# Patient Record
Sex: FEMALE | Race: WHITE | NOT HISPANIC OR LATINO | Employment: OTHER | URBAN - METROPOLITAN AREA
[De-identification: names, ages, dates, MRNs, and addresses within clinical notes are randomized per-mention and may not be internally consistent; named-entity substitution may affect disease eponyms.]

---

## 2017-01-18 ENCOUNTER — TRANSFERRED RECORDS (OUTPATIENT)
Dept: HEALTH INFORMATION MANAGEMENT | Facility: CLINIC | Age: 37
End: 2017-01-18

## 2017-01-18 ENCOUNTER — OFFICE VISIT (OUTPATIENT)
Dept: URGENT CARE | Facility: URGENT CARE | Age: 37
End: 2017-01-18

## 2017-01-18 VITALS
DIASTOLIC BLOOD PRESSURE: 83 MMHG | SYSTOLIC BLOOD PRESSURE: 145 MMHG | BODY MASS INDEX: 21.58 KG/M2 | OXYGEN SATURATION: 100 % | HEART RATE: 74 BPM | WEIGHT: 118 LBS | TEMPERATURE: 98.9 F

## 2017-01-18 DIAGNOSIS — S09.90XA CLOSED HEAD INJURY, INITIAL ENCOUNTER: Primary | ICD-10-CM

## 2017-01-18 DIAGNOSIS — H53.8 BLURRING OF VISUAL IMAGE: ICD-10-CM

## 2017-01-18 PROCEDURE — 99213 OFFICE O/P EST LOW 20 MIN: CPT | Performed by: FAMILY MEDICINE

## 2017-01-18 NOTE — MR AVS SNAPSHOT
"              After Visit Summary   2017    Rober Renee    MRN: [id number]           Patient Information     Date Of Birth          1980        Visit Information        Provider Department      2017 7:55 PM Niya Landon MD [facility]        Today's Diagnoses     Closed head injury, initial encounter    -  1     Blurring of visual image            Follow-ups after your visit        Who to contact     If you have questions or need follow up information about today's clinic visit or your schedule please contact [facility] directly at [phone].  Normal or non-critical lab and imaging results will be communicated to you by FreeATMhart, letter or phone within 4 business days after the clinic has received the results. If you do not hear from us within 7 days, please contact the clinic through Document Agilityt or phone. If you have a critical or abnormal lab result, we will notify you by phone as soon as possible.  Submit refill requests through Rayku or call your pharmacy and they will forward the refill request to us. Please allow 3 business days for your refill to be completed.          Additional Information About Your Visit        MyChart Information     Rayku lets you send messages to your doctor, view your test results, renew your prescriptions, schedule appointments and more. To sign up, go to [email or web address] . Click on \"Log in\" on the left side of the screen, which will take you to the Welcome page. Then click on \"Sign up Now\" on the right side of the page.     You will be asked to enter the access code listed below, as well as some personal information. Please follow the directions to create your username and password.     Your access code is: [email or web address]  Expires: 2017 12:15 AM     Your access code will  in 90 days. If you need help or a new code, please call your [facility] or [phone].        Care EveryWhere ID     This is your Care " EveryWhere ID. This could be used by other organizations to access your [place] medical records  [id number]        Your Vitals Were     Pulse Temperature Pulse Oximetry             74 98.9  F (37.2  C) (Tympanic) 100%          Blood Pressure from Last 3 Encounters:   01/18/17 145/83   07/05/16 139/93   02/23/16 133/82    Weight from Last 3 Encounters:   01/18/17 118 lb (53.524 kg)   02/23/16 126 lb (57.153 kg)   11/20/15 120 lb (54.432 kg)              Today, you had the following     No orders found for display       Primary Care Provider Office Phone # Fax #    R Joe Sanz [phone]       [place] [address]        Thank you!     [facility] you for [facility]. Our goal is always to provide you with excellent care. Hearing back from our patients is one way we can continue to improve our services. Please take a few minutes to complete the written survey that you may receive in the mail after your visit with us. Thank you!             Your Updated Medication List - Protect others around you: Learn how to safely use, store and throw away your medicines at www.disposemymeds.org.      Notice  As of 1/18/2017 11:59 PM    You have not been prescribed any medications.
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
## 2017-01-19 NOTE — NURSING NOTE
"Chief Complaint   Patient presents with     Head Injury       Initial /83 mmHg  Pulse 74  Temp(Src) 98.9  F (37.2  C) (Tympanic)  Wt 118 lb (53.524 kg)  SpO2 100% Estimated body mass index is 21.58 kg/(m^2) as calculated from the following:    Height as of 2/23/16: 5' 2\" (1.575 m).    Weight as of this encounter: 118 lb (53.524 kg).  BP completed using cuff size: regular    YONAS Sterling      "

## 2017-07-08 ENCOUNTER — HEALTH MAINTENANCE LETTER (OUTPATIENT)
Age: 37
End: 2017-07-08

## 2017-11-27 ENCOUNTER — TRANSFERRED RECORDS (OUTPATIENT)
Dept: HEALTH INFORMATION MANAGEMENT | Facility: CLINIC | Age: 37
End: 2017-11-27

## 2017-11-28 ENCOUNTER — HOSPITAL ENCOUNTER (EMERGENCY)
Facility: CLINIC | Age: 37
Discharge: HOME | End: 2017-11-28
Attending: PHYSICIAN ASSISTANT | Admitting: PHYSICIAN ASSISTANT

## 2017-11-28 VITALS
SYSTOLIC BLOOD PRESSURE: 136 MMHG | DIASTOLIC BLOOD PRESSURE: 100 MMHG | RESPIRATION RATE: 16 BRPM | OXYGEN SATURATION: 100 % | TEMPERATURE: 98.2 F

## 2017-11-28 DIAGNOSIS — R52 GENERALIZED PAIN: ICD-10-CM

## 2017-11-28 LAB
ALBUMIN SERPL-MCNC: 4.1 G/DL (ref 3.4–5)
ALBUMIN UR-MCNC: NEGATIVE MG/DL
ALP SERPL-CCNC: 49 U/L (ref 40–150)
ALT SERPL W P-5'-P-CCNC: 39 U/L (ref 0–50)
ANION GAP SERPL CALCULATED.3IONS-SCNC: 6 MMOL/L (ref 3–14)
APPEARANCE UR: CLEAR
AST SERPL W P-5'-P-CCNC: 20 U/L (ref 0–45)
BASOPHILS # BLD AUTO: 0 10E9/L (ref 0–0.2)
BASOPHILS NFR BLD AUTO: 0.5 %
BILIRUB SERPL-MCNC: 0.5 MG/DL (ref 0.2–1.3)
BILIRUB UR QL STRIP: NEGATIVE
BUN SERPL-MCNC: 9 MG/DL (ref 7–30)
CALCIUM SERPL-MCNC: 8.6 MG/DL (ref 8.5–10.1)
CHLORIDE SERPL-SCNC: 109 MMOL/L (ref 94–109)
CO2 SERPL-SCNC: 24 MMOL/L (ref 20–32)
COLOR UR AUTO: ABNORMAL
CREAT SERPL-MCNC: 0.65 MG/DL (ref 0.52–1.04)
CRP SERPL-MCNC: <2.9 MG/L (ref 0–8)
DIFFERENTIAL METHOD BLD: ABNORMAL
EOSINOPHIL # BLD AUTO: 0.1 10E9/L (ref 0–0.7)
EOSINOPHIL NFR BLD AUTO: 1 %
ERYTHROCYTE [DISTWIDTH] IN BLOOD BY AUTOMATED COUNT: 15.5 % (ref 10–15)
ERYTHROCYTE [SEDIMENTATION RATE] IN BLOOD BY WESTERGREN METHOD: 7 MM/H (ref 0–20)
GFR SERPL CREATININE-BSD FRML MDRD: >90 ML/MIN/1.7M2
GLUCOSE SERPL-MCNC: 101 MG/DL (ref 70–99)
GLUCOSE UR STRIP-MCNC: NEGATIVE MG/DL
HCG UR QL: NEGATIVE
HCT VFR BLD AUTO: 36.6 % (ref 35–47)
HGB BLD-MCNC: 11.6 G/DL (ref 11.7–15.7)
HGB UR QL STRIP: ABNORMAL
IMM GRANULOCYTES # BLD: 0 10E9/L (ref 0–0.4)
IMM GRANULOCYTES NFR BLD: 0 %
KETONES UR STRIP-MCNC: NEGATIVE MG/DL
LEUKOCYTE ESTERASE UR QL STRIP: NEGATIVE
LYMPHOCYTES # BLD AUTO: 0.8 10E9/L (ref 0.8–5.3)
LYMPHOCYTES NFR BLD AUTO: 11.9 %
MAGNESIUM SERPL-MCNC: 2.5 MG/DL (ref 1.6–2.3)
MCH RBC QN AUTO: 26 PG (ref 26.5–33)
MCHC RBC AUTO-ENTMCNC: 31.7 G/DL (ref 31.5–36.5)
MCV RBC AUTO: 82 FL (ref 78–100)
MONOCYTES # BLD AUTO: 0.4 10E9/L (ref 0–1.3)
MONOCYTES NFR BLD AUTO: 5.9 %
NEUTROPHILS # BLD AUTO: 5.1 10E9/L (ref 1.6–8.3)
NEUTROPHILS NFR BLD AUTO: 80.7 %
NITRATE UR QL: NEGATIVE
PH UR STRIP: 6.5 PH (ref 5–7)
PLATELET # BLD AUTO: 210 10E9/L (ref 150–450)
POTASSIUM SERPL-SCNC: 4.3 MMOL/L (ref 3.4–5.3)
PROT SERPL-MCNC: 7.8 G/DL (ref 6.8–8.8)
RBC # BLD AUTO: 4.46 10E12/L (ref 3.8–5.2)
RBC #/AREA URNS AUTO: 2 /HPF (ref 0–2)
SODIUM SERPL-SCNC: 139 MMOL/L (ref 133–144)
SOURCE: ABNORMAL
SP GR UR STRIP: 1 (ref 1–1.03)
SQUAMOUS #/AREA URNS AUTO: 1 /HPF (ref 0–1)
UROBILINOGEN UR STRIP-MCNC: NORMAL MG/DL (ref 0–2)
WBC # BLD AUTO: 6.3 10E9/L (ref 4–11)
WBC #/AREA URNS AUTO: 1 /HPF (ref 0–2)

## 2017-11-28 PROCEDURE — 99213 OFFICE O/P EST LOW 20 MIN: CPT | Performed by: PHYSICIAN ASSISTANT

## 2017-11-28 PROCEDURE — 86431 RHEUMATOID FACTOR QUANT: CPT | Performed by: PHYSICIAN ASSISTANT

## 2017-11-28 PROCEDURE — 86038 ANTINUCLEAR ANTIBODIES: CPT | Performed by: PHYSICIAN ASSISTANT

## 2017-11-28 PROCEDURE — 86618 LYME DISEASE ANTIBODY: CPT | Performed by: PHYSICIAN ASSISTANT

## 2017-11-28 PROCEDURE — 80053 COMPREHEN METABOLIC PANEL: CPT | Performed by: PHYSICIAN ASSISTANT

## 2017-11-28 PROCEDURE — 81001 URINALYSIS AUTO W/SCOPE: CPT | Performed by: EMERGENCY MEDICINE

## 2017-11-28 PROCEDURE — 85652 RBC SED RATE AUTOMATED: CPT | Performed by: PHYSICIAN ASSISTANT

## 2017-11-28 PROCEDURE — 99213 OFFICE O/P EST LOW 20 MIN: CPT

## 2017-11-28 PROCEDURE — 83735 ASSAY OF MAGNESIUM: CPT | Performed by: PHYSICIAN ASSISTANT

## 2017-11-28 PROCEDURE — 86140 C-REACTIVE PROTEIN: CPT | Performed by: PHYSICIAN ASSISTANT

## 2017-11-28 PROCEDURE — 81025 URINE PREGNANCY TEST: CPT | Performed by: EMERGENCY MEDICINE

## 2017-11-28 PROCEDURE — 85025 COMPLETE CBC W/AUTO DIFF WBC: CPT | Performed by: PHYSICIAN ASSISTANT

## 2017-11-28 RX ORDER — CYCLOBENZAPRINE HCL 5 MG
5 TABLET ORAL 3 TIMES DAILY PRN
Qty: 42 TABLET | Refills: 0 | Status: SHIPPED | OUTPATIENT
Start: 2017-11-28 | End: 2018-04-06

## 2017-11-28 RX ORDER — IBUPROFEN 800 MG/1
800 TABLET, FILM COATED ORAL EVERY 8 HOURS PRN
Qty: 60 TABLET | Refills: 0 | Status: SHIPPED | OUTPATIENT
Start: 2017-11-28 | End: 2017-12-06

## 2017-11-28 NOTE — ED NOTES
Pt here for generalized body aches that have been progressively getting worse over the past year. Has been seeing a chiropractor and taking supplements to try to help.

## 2017-11-28 NOTE — ED PROVIDER NOTES
History     Chief Complaint   Patient presents with     Generalized Body Aches     progressive for about a year     HPI  Rober Renee is a 37 year old female who resides in urgent care with concern over diffuse muscle pain/neck/hip/back pain which then ongoing for the last year.  She states that in the last several days pain has been more severe resulted in nausea, inability to eat.  She describes the pain as having knots throughout her body.  She states she has become lightheaded at time due to symptoms, 4 times in the last month which resolved spontaneously after a few minutes.  She is unable to identify any aggravating or alleviating factors attributed to this.  She has not had any objective fever, chills, recent nasal congestion, cough, dyspnea, wheezing, vomiting, diarrhea, abdominal pain.  She has attempted numerous OTC meds for this including vitamin supplements with magnesium, potassium, yoga, chiropractic therapy, cupping without relief.  She has not spoken about this with a healthcare provider other than her chiropractor.      Problem List:    Patient Active Problem List    Diagnosis Date Noted     Major depressive disorder, single episode, moderate (H) 02/29/2016     Priority: Medium     Renal stones 10/09/2015     Priority: Medium     Migraine headache 09/11/2012     Priority: Medium     Menorrhagia 01/27/2012     Priority: Medium     CARDIOVASCULAR SCREENING; LDL GOAL LESS THAN 160 10/31/2010     Priority: Medium     ASCUS on Pap smear 11/02/2009     Priority: Medium     10/16/09:pap--ASCUS, + HPV 53. Repeat pap at 6 wk PP visit. (9/2010)  8/11/2010:Pap--NIL. Repeat pap 1 year.  1/27/12:Pap--NIL. Repeat pap 1 year.  Pap 7/13--if normal, then Q3 year. Pap--NIL. Next pap in 3 years. HM updated.        Past Medical History:    Past Medical History:   Diagnosis Date     Calculus of kidney 12/06     Chickenpox      Gestational diabetes 2002,2010     History of postpartum depression, currently pregnant       Intervertebral lumbar disc disorder with myelopathy, lumbar region      Migraines      Urinary tract bacterial infections      Past Surgical History:    Past Surgical History:   Procedure Laterality Date      SECTION, TUBAL LIGATION, COMBINED  2013    c/section with BTL     Family History:    Family History   Problem Relation Age of Onset     CANCER Maternal Grandfather      liver CA     CANCER Paternal Grandfather      prostate CA     CANCER Maternal Grandmother      ovarian CA     DIABETES Paternal Grandmother      Hypertension Paternal Grandmother      Hypertension Father      Hypertension Brother      Other - See Comments Mother      migraine     Other - See Comments Maternal Grandmother      migraine     Other - See Comments Maternal Uncle      migraine     Depression Sister      Depression Father      Social History:  Marital Status:   [2]  Social History   Substance Use Topics     Smoking status: Former Smoker     Packs/day: 0.50     Years: 10.00     Types: Cigarettes     Quit date: 2008     Smokeless tobacco: Never Used     Alcohol use Yes      Comment: very seldom        Medications:      No current outpatient prescriptions on file.      Review of Systems   Constitutional: Positive for activity change. Negative for appetite change, chills, fever and unexpected weight change.   Eyes: Negative for pain, discharge and visual disturbance.   Respiratory: Negative for cough and shortness of breath.    Cardiovascular: Negative for chest pain.   Gastrointestinal: Positive for nausea. Negative for abdominal pain, blood in stool, diarrhea and vomiting.   Skin: Negative for color change, rash and wound.   Neurological: Positive for dizziness and light-headedness. Negative for tremors, seizures, weakness, numbness and headaches.     Physical Exam   BP: (!) 136/100  Heart Rate: 82  Temp: 98.2  F (36.8  C)  Resp: 16  SpO2: 100 %      Physical Exam   Constitutional: She appears well-developed  and well-nourished. No distress.   HENT:   Head: Normocephalic and atraumatic.   Eyes: Conjunctivae and EOM are normal. Pupils are equal, round, and reactive to light. Right eye exhibits no discharge. Left eye exhibits no discharge.   Neck: Normal range of motion.   Cardiovascular: Normal rate, regular rhythm and normal heart sounds.  Exam reveals no gallop and no friction rub.    No murmur heard.  Pulmonary/Chest: Effort normal and breath sounds normal. No respiratory distress. She has no wheezes. She has no rales. She exhibits no tenderness.   Abdominal: Soft. Bowel sounds are normal. She exhibits no distension. There is no tenderness. There is no rebound and no guarding.   Musculoskeletal:   Mild diffuse tenderness to palpation, no focal bony tenderness.  Patient has full ROM of her shoulders, elbows wrist, fingers, hips, knees and ankles bilaterally.     Lymphadenopathy:     She has no cervical adenopathy.   Skin: Skin is warm and dry. No rash noted. No erythema.     ED Course     ED Course     Procedures          Critical Care time:  none            Labs Ordered and Resulted from Time of ED Arrival Up to the Time of Departure from the ED   URINE MACROSCOPIC WITH REFLEX TO MICRO - Abnormal; Notable for the following:        Result Value    Specific Gravity Urine 1.002 (*)     Blood Urine Moderate (*)     All other components within normal limits   HCG QUALITATIVE URINE     Assessments & Plan (with Medical Decision Making)     I have reviewed the nursing notes.    I have reviewed the findings, diagnosis, plan and need for follow up with the patient.     Discharge Medication List as of 11/28/2017  2:09 PM      START taking these medications    Details   cyclobenzaprine (FLEXERIL) 5 MG tablet Take 1 tablet (5 mg) by mouth 3 times daily as needed for muscle spasms, Disp-42 tablet, R-0, E-Prescribe           Final diagnoses:   Generalized pain     37 year old female presents to  with concern over one year history  of generalized pain described as muscle cramping/knots in her body.  She was noted to be hypertensive upon arrival remainder of vital signs within normal limits.  physical exam findings as described above.  Given vague nature and duration of symptoms patient had extensive work up which included urinalysis, UPT, CBC, CMP, lyme screen, ESR, CRP, RF and AYSHA which were significant only for mild anemia which is unlikely cause of her symptoms and elevated magnesium which is likely secondary to patient's supplementation.   Laboratory testing effectively ruled out polymyalgia, rheumatica, electrolyte abnormality, or seropositive rheumatologic condition. Would consider possible of fibromyalgia.  Patient was given trial of muscle relaxer to help with her symptoms and instructed to follow up with PCP for further evaluation and treatment wtihn the next week.  Worrisome reasons to return to ER/UC sooner discussed    Disclaimer: This note consists of symbols derived from keyboarding, dictation, and/or voice recognition software. As a result, there may be errors in the script that have gone undetected.  Please consider this when interpreting information found in the chart.    11/28/2017   [facility] EMERGENCY DEPARTMENT     Reva Lopez PA-C  12/01/17 1139

## 2017-11-28 NOTE — ED AVS SNAPSHOT
[facility] [place] [facility]    [address] [address]    Phone:  [phone]    Fax:  [phone]                                       Rober Renee   MRN: [id number]    Department:  [facility] [place] [facility]   Date of Visit:  11/28/2017           Patient Information     Date Of Birth          1980        Your diagnoses for this visit were:     Generalized pain        You were seen by Reva Lopez PA-C.      Follow-up Information     Follow up with EVERETT Sanz MD In 1 week.    Specialty:  Family Practice    Why:  for recheck or sooner if new or worsening symptoms     Contact information:    [address] [address] [address]  [phone]        24 Hour Appointment Hotline       To make an appointment at any [facility], call [phone] ([phone]). If you don't have a family doctor or clinic, we will help you find one. [facility] clinics are conveniently located to serve the needs of you and your family.             Review of your medicines      START taking        Dose / Directions Last dose taken    cyclobenzaprine 5 MG tablet   Commonly known as:  FLEXERIL   Dose:  5 mg   Quantity:  42 tablet        Take 1 tablet (5 mg) by mouth 3 times daily as needed for muscle spasms   Refills:  0                Prescriptions were sent or printed at these locations (1 Prescription)                   [facility] [place], [place] - [address]   [address] [facility] [address]    Telephone:  [phone]   Fax:  [phone]   Hours:                  E-Prescribed (1 of 1)         cyclobenzaprine (FLEXERIL) 5 MG tablet                Procedures and tests performed during your visit     Anti Nuclear Debby IgG by IFA with Reflex    CBC with platelets, differential    CRP Inflammation    Comprehensive metabolic panel    Erythrocyte sedimentation rate auto    HCG qualitative urine    Lyme Disease Debby with reflex to WB Serum    Magnesium     Rheumatoid factor    UA reflex to Microscopic      Orders Needing Specimen Collection     None      Pending Results     Date and Time Order Name Status Description    11/28/2017 1350 Anti Nuclear Debby IgG by IFA with Reflex In process     11/28/2017 1350 Rheumatoid factor In process     11/28/2017 1350 Lyme Disease Debby with reflex to WB Serum In process     11/28/2017 1350 Erythrocyte sedimentation rate auto In process     11/28/2017 1350 CRP Inflammation In process     11/28/2017 1350 Magnesium In process     11/28/2017 1350 Comprehensive metabolic panel In process     11/28/2017 1350 CBC with platelets, differential In process             Pending Culture Results     No orders found from 11/26/2017 to 11/29/2017.            Pending Results Instructions     If you had any lab results that were not finalized at the time of your Discharge, you can call the ED Lab Result RN at [phone]. You will be contacted by this team for any positive Lab results or changes in treatment. The nurses are available 7 days a week from 10A to 6:30P.  You can leave a message 24 hours per day and they will return your call.        Test Results From Your Hospital Stay        11/28/2017  1:12 PM      Component Results     Component Value Ref Range & Units Status    Color Urine Straw  Final    Appearance Urine Clear  Final    Glucose Urine Negative NEG^Negative mg/dL Final    Bilirubin Urine Negative NEG^Negative Final    Ketones Urine Negative NEG^Negative mg/dL Final    Specific Gravity Urine 1.002 (L) 1.003 - 1.035 Final    Blood Urine Moderate (A) NEG^Negative Final    pH Urine 6.5 5.0 - 7.0 pH Final    Protein Albumin Urine Negative NEG^Negative mg/dL Final    Urobilinogen mg/dL Normal 0.0 - 2.0 mg/dL Final    Nitrite Urine Negative NEG^Negative Final    Leukocyte Esterase Urine Negative NEG^Negative Final    Source Midstream Urine  Final    RBC Urine 2 0 - 2 /HPF Final    WBC Urine 1 0 - 2 /HPF Final    Squamous Epithelial /HPF Urine  "1 0 - 1 /HPF Final         2017  1:20 PM      Component Results     Component Value Ref Range & Units Status    HCG Qual Urine Negative NEG^Negative Final    This test is for screening purposes.  Results should be interpreted along with   the clinical picture.  Confirmation testing is available if warranted by   ordering [id number], HCG Quantitative Pregnancy.           2017  2:08 PM         2017  2:08 PM         2017  2:08 PM         2017  2:08 PM         2017  2:08 PM         2017  2:08 PM         2017  2:08 PM         2017  2:08 PM                Thank you for choosing [facility]       Thank you for choosing [facility] for your care. Our goal is always to provide you with excellent care. Hearing back from our patients is one way we can continue to improve our services. Please take a few minutes to complete the written survey that you may receive in the mail after you visit with us. Thank you!        [employer] Information     Tirendo lets you send messages to your doctor, view your test results, renew your prescriptions, schedule appointments and more. To sign up, go to [email or web address] . Click on \"Log in\" on the left side of the screen, which will take you to the Welcome page. Then click on \"Sign up Now\" on the right side of the page.     You will be asked to enter the access code listed below, as well as some personal information. Please follow the directions to create your username and password.     Your access code is: [email or web address]  Expires: 2018  2:09 PM     Your access code will  in 90 days. If you need help or a new code, please call your [facility] clinic or [phone].        Care EveryWhere ID     This is your Care EveryWhere ID. This could be used by other organizations to access your [facility] medical records  [id number]        Equal Access to Services     KEYANA WRAY: Allyson Calderon, karla rojas, alex david " srinivas ku ah. So [facility] [phone].    ATENCIÓN: Si habla español, tiene a nguyen disposición servicios gratuitos de asistencia lingüística. Llame al [phone].    We comply with applicable federal civil rights laws and [place] laws. We do not discriminate on the basis of race, color, national origin, age, disability, sex, sexual orientation, or gender identity.            After Visit Summary       This is your record. Keep this with you and show to your community pharmacist(s) and doctor(s) at your next visit.

## 2017-11-28 NOTE — ED AVS SNAPSHOT
[facility] Emergency Department    [address] [address]    Phone:  [phone]    Fax:  [phone]                                       Rober Renee   MRN: [id number]    Department:  [facility] Emergency Department   Date of Visit:  11/28/2017           After Visit Summary Signature Page     I have received my discharge instructions, and my questions have been answered. I have discussed any challenges I see with this plan with the nurse or doctor.    ..........................................................................................................................................  Patient/Patient Representative Signature      ..........................................................................................................................................  Patient Representative Print Name and Relationship to Patient    ..................................................               ................................................  Date                                            Time    ..........................................................................................................................................  Reviewed by Signature/Title    ...................................................              ..............................................  Date                                                            Time

## 2017-11-29 LAB
ANA SER QL IF: NEGATIVE
B BURGDOR IGG+IGM SER QL: 0.08 (ref 0–0.89)
RHEUMATOID FACT SER NEPH-ACNC: <20 IU/ML (ref 0–20)

## 2017-12-01 ENCOUNTER — OFFICE VISIT (OUTPATIENT)
Dept: FAMILY MEDICINE | Facility: CLINIC | Age: 37
End: 2017-12-01

## 2017-12-01 VITALS
SYSTOLIC BLOOD PRESSURE: 120 MMHG | HEART RATE: 64 BPM | DIASTOLIC BLOOD PRESSURE: 78 MMHG | BODY MASS INDEX: 22.73 KG/M2 | WEIGHT: 124.3 LBS

## 2017-12-01 DIAGNOSIS — F41.9 ANXIETY: Primary | ICD-10-CM

## 2017-12-01 DIAGNOSIS — M79.18 MYOFASCIAL PAIN SYNDROME: ICD-10-CM

## 2017-12-01 PROCEDURE — 99214 OFFICE O/P EST MOD 30 MIN: CPT | Performed by: FAMILY MEDICINE

## 2017-12-01 RX ORDER — ASPIRIN 81 MG
TABLET,CHEWABLE ORAL 4 TIMES DAILY
Qty: 30 G | Refills: 11 | Status: SHIPPED | OUTPATIENT
Start: 2017-12-01 | End: 2018-04-06

## 2017-12-01 RX ORDER — ESCITALOPRAM OXALATE 10 MG/1
10 TABLET ORAL DAILY
Qty: 30 TABLET | Refills: 3 | Status: SHIPPED | OUTPATIENT
Start: 2017-12-01 | End: 2017-12-12 | Stop reason: SINTOL

## 2017-12-01 ASSESSMENT — ENCOUNTER SYMPTOMS
EYE DISCHARGE: 0
VOMITING: 0
LIGHT-HEADEDNESS: 1
TREMORS: 0
SEIZURES: 0
SHORTNESS OF BREATH: 0
COLOR CHANGE: 0
COUGH: 0
NUMBNESS: 0
NAUSEA: 1
CHILLS: 0
ABDOMINAL PAIN: 0
DIARRHEA: 0
UNEXPECTED WEIGHT CHANGE: 0
APPETITE CHANGE: 0
EYE PAIN: 0
WOUND: 0
BLOOD IN STOOL: 0
ACTIVITY CHANGE: 1
FEVER: 0
DIZZINESS: 1
WEAKNESS: 0
HEADACHES: 0

## 2017-12-01 ASSESSMENT — ANXIETY QUESTIONNAIRES
2. NOT BEING ABLE TO STOP OR CONTROL WORRYING: MORE THAN HALF THE DAYS
7. FEELING AFRAID AS IF SOMETHING AWFUL MIGHT HAPPEN: NOT AT ALL
1. FEELING NERVOUS, ANXIOUS, OR ON EDGE: NEARLY EVERY DAY
6. BECOMING EASILY ANNOYED OR IRRITABLE: MORE THAN HALF THE DAYS
3. WORRYING TOO MUCH ABOUT DIFFERENT THINGS: MORE THAN HALF THE DAYS
IF YOU CHECKED OFF ANY PROBLEMS ON THIS QUESTIONNAIRE, HOW DIFFICULT HAVE THESE PROBLEMS MADE IT FOR YOU TO DO YOUR WORK, TAKE CARE OF THINGS AT HOME, OR GET ALONG WITH OTHER PEOPLE: SOMEWHAT DIFFICULT
5. BEING SO RESTLESS THAT IT IS HARD TO SIT STILL: NOT AT ALL
GAD7 TOTAL SCORE: 12

## 2017-12-01 ASSESSMENT — PATIENT HEALTH QUESTIONNAIRE - PHQ9
SUM OF ALL RESPONSES TO PHQ QUESTIONS 1-9: 10
5. POOR APPETITE OR OVEREATING: NEARLY EVERY DAY

## 2017-12-01 NOTE — PROGRESS NOTES
SUBJECTIVE:   Rober Renee is a 37 year old female who presents to clinic today for the following health issues:      ED/UC Followup:    Facility:  [facility]  Date of visit: 11/28/2017  Reason for visit: generalized pain  Current Status: Slightly improved today     She has been complaining of generalized pain but primarily in the shoulder strap area on both sides and sometimes up into her neck. She has tried chiropractic, massage therapy, various over-the-counter pain medications without relief. It has been disturbing her sleep and she does admit to a fair amount of anxiety, but she thinks this is occurring because of the disturbed sleep and inability to rest the muscles.  In February 2016 I had seen her for what seemed to be in a significant episode of depression and prescribed escitalopram. She states that she only took that for about a week and then began a vigorous exercise program which she continued for over a year. She was feeling much better and in the best physical shape that she been in a long time. Then she started to have this upper back pain and has found it difficult to do her exercising. Her job is as a  but she has done that for many years so no real change in the stress level or physical activity required for her job.    She went to emergency room 4 days ago and requested extensive laboratory testing for problems like arthritis or Lyme's disease but that all came back negative. They did give her cyclobenzaprine 5 mg at bedtime and she did feel that that helped her sleep but also seemed to give her some palpitations      Current symptoms include:  PHQ-9 ([employer]) 12/1/2017   No Interest In Doing Things    Feeling Depressed    Trouble Sleeping    Tired / No Energy    No appetite or Over-Eating    Feeling Bad about Self    Trouble Concentrating    Moving Slow or Restless    Suicidal Thoughts    Total Score    1.  Little interest or pleasure in doing things 2   2.  Feeling down, depressed, or  hopeless 1   3.  Trouble falling or staying asleep, or sleeping too much 3   4.  Feeling tired or having little energy 3   5.  Poor appetite or overeating 0   6.  Feeling bad about yourself 1   7.  Trouble concentrating 0   8.  Moving slowly or restless 0   9.  Suicidal or self-harm thoughts 0   PHQ-9 Total Score 10   Difficulty at work, home, or with people Somewhat difficult     JACOB-7   [employer], 2002; Used with Permission) 12/1/2017   1. Feeling nervous, anxious, or on edge 3   2. Not being able to stop or control worrying 2   3. Worrying too much about different things 2   4. Trouble relaxing 3   5. Being so restless that it is hard to sit still 0   6. Becoming easily annoyed or irritable 2   7. Feeling afraid, as if something awful might happen 0   JACOB-7 Total Score 12   If you checked any problems, how difficult have they made it for you to do your work, take care of things at home, or get along with other people? Somewhat difficult     Problem list and histories reviewed & adjusted, as indicated.  Additional history: none        Reviewed and updated as needed this visit by clinical staff       Reviewed and updated as needed this visit by Provider               ROS:  Constitutional, HEENT, cardiovascular, pulmonary, gi and gu systems are negative, except as otherwise noted.          OBJECTIVE:                                                    /78 (BP Location: Right arm, Patient Position: Chair, Cuff Size: Adult Regular)  Pulse 64  Wt 124 lb 4.8 oz (56.4 kg)  BMI 22.73 kg/m2    GENERAL: healthy, alert and no distress  EYES: Eyes grossly normal to inspection, extraocular movements - intact, and PERRL  NECK: no tenderness, no adenopathy, no asymmetry, no masses, no stiffness; thyroid- normal to palpation  RESP: lungs clear to auscultation - no rales, no rhonchi, no wheezes  CV: regular rates and rhythm, normal S1 S2, no S3 or S4 and no murmur, no click or rub -  MS: Moderate tenderness and tightness  of the muscles in the shoulder strap region bilaterally, some tenderness of the paraspinal neck muscles posteriorly  NEURO: strength and tone- normal, sensory exam- grossly normal, mentation- intact, speech- normal, reflexes- symmetric  PSYCH: mentation appears normal, affect normal/bright and anxious         ASSESSMENT/PLAN:                                                    ASSESSMENT:  1. Anxiety    2. Myofascial pain syndrome      It is not clear whether the anxiety is causing the flare up of myofascial pain or vice versa. Either way, I think treating her anxiety will help so recommended we start the escitalopram. Will also have her use the capsaicin cream topically on the sore areas and the cyclobenzaprine at bedtime. Recheck in a month      PLAN:  Orders Placed This Encounter     escitalopram (LEXAPRO) 10 MG tablet     Capsaicin 0.1 % cream       Patient Instructions   Continue the cyclobenzaprine (may in crease to 10 mg if not working)    We are adding escitalopram to help with anxiety.    Use the capsaicin as directed      EVERETT Sanz  [facility][note 3]

## 2017-12-01 NOTE — NURSING NOTE
"Chief Complaint   Patient presents with     Anxiety     not able to sleep, palpations and pt. feels nervous all the time X 3-4 months.      Patient Request     concerns with knots in her shoulders that are not loosening up      Results     go over lab results     ER F/U     pt. was seen in ER 11/28/2017       Initial /78 (BP Location: Right arm, Patient Position: Chair, Cuff Size: Adult Regular)  Pulse 64  Wt 124 lb 4.8 oz (56.4 kg)  BMI 22.73 kg/m2 Estimated body mass index is 22.73 kg/(m^2) as calculated from the following:    Height as of 2/23/16: 5' 2\" (1.575 m).    Weight as of this encounter: 124 lb 4.8 oz (56.4 kg).  Medication Reconciliation: complete     Mayra Schneider, CMA      "

## 2017-12-01 NOTE — PATIENT INSTRUCTIONS
Continue the cyclobenzaprine (may in crease to 10 mg if not working)    We are adding escitalopram to help with anxiety.    Use the capsaicin as directed

## 2017-12-01 NOTE — MR AVS SNAPSHOT
"              After Visit Summary   12/1/2017    Rober Renee    MRN: [id number]           Patient Information     Date Of Birth          1980        Visit Information        Provider Department      12/1/2017 1:20 PM Yvon, EVERETT Diaz MD [facility]        Today's Diagnoses     Anxiety    -  1    Myofascial pain syndrome          Care Instructions    Continue the cyclobenzaprine (may in crease to 10 mg if not working)    We are adding escitalopram to help with anxiety.    Use the capsaicin as directed          Follow-ups after your visit        Who to contact     If you have questions or need follow up information about today's clinic visit or your schedule please contact [facility] directly at [phone].  Normal or non-critical lab and imaging results will be communicated to you by Reciclatahart, letter or phone within 4 business days after the clinic has received the results. If you do not hear from us within 7 days, please contact the clinic through Reciclatahart or phone. If you have a critical or abnormal lab result, we will notify you by phone as soon as possible.  Submit refill requests through [email or web address] or call your pharmacy and they will forward the refill request to us. Please allow 3 business days for your refill to be completed.          Additional Information About Your Visit        MyChart Information     [email or web address] lets you send messages to your doctor, view your test results, renew your prescriptions, schedule appointments and more. To sign up, go to [email or web address] . Click on \"Log in\" on the left side of the screen, which will take you to the Welcome page. Then click on \"Sign up Now\" on the right side of the page.     You will be asked to enter the access code listed below, as well as some personal information. Please follow the directions to create your username and password.     Your access code is: [email or web address]  Expires: 2/26/2018  2:09 PM     Your access code " will  in 90 days. If you need help or a new code, please call your [place] clinic or [phone].        Care EveryWhere ID     This is your Care EveryWhere ID. This could be used by other organizations to access your [place] medical records  [id number]        Your Vitals Were     Pulse BMI (Body Mass Index)                64 22.73 kg/m2           Blood Pressure from Last 3 Encounters:   17 120/78   17 (!) 136/100   17 145/83    Weight from Last 3 Encounters:   17 124 lb 4.8 oz (56.4 kg)   17 118 lb (53.5 kg)   16 126 lb (57.2 kg)              Today, you had the following     No orders found for display         Today's Medication Changes          These changes are accurate as of: 17  2:17 PM.  If you have any questions, ask your nurse or doctor.               Start taking these medicines.        Dose/Directions    Capsaicin 0.1 % cream   Used for:  Myofascial pain syndrome   Started by:  EVERETT Sanz MD        Apply topically 4 times daily Apply sparingly using rubber gloves.   Quantity:  30 g   Refills:  11       escitalopram 10 MG tablet   Commonly known as:  LEXAPRO   Used for:  Anxiety, Myofascial pain syndrome   Started by:  EVERETT Sanz MD        Dose:  10 mg   Take 1 tablet (10 mg) by mouth daily   Quantity:  30 tablet   Refills:  3            Where to get your medicines      These medications were sent to [facility] [address] [place] [address] [address]    [address]  [facility] [address]     Phone:  [phone]     Capsaicin 0.1 % cream    escitalopram 10 MG tablet                Primary Care Provider Office Phone # Fax #    EVERETT Sanz [phone] [address] [facility] [address]        Equal Access to Services     KEYANA MACK : Allyson quileso Sohemanth, waaxda luqadaha, qaybta kaalmada adedimitriyada, srinivas mcneill. So [facility] [phone].    ATENCIÓN: Si linda jones  a nguyen disposición servicios gratuitos de asistencia lingüística. Magali wilkins [phone].    We comply with applicable federal civil rights laws and [place] laws. We do not discriminate on the basis of race, color, national origin, age, disability, sex, sexual orientation, or gender identity.            Thank you!     Thank you for choosing [facility]  for your care. Our goal is always to provide you with excellent care. Hearing back from our patients is one way we can continue to improve our services. Please take a few minutes to complete the written survey that you may receive in the mail after your visit with us. Thank you!             Your Updated Medication List - Protect others around you: Learn how to safely use, store and throw away your medicines at www.disposemymeds.org.          This list is accurate as of: 12/1/17  2:17 PM.  Always use your most recent med list.                   Brand Name Dispense Instructions for use Diagnosis    Capsaicin 0.1 % cream     30 g    Apply topically 4 times daily Apply sparingly using rubber gloves.    Myofascial pain syndrome       cyclobenzaprine 5 MG tablet    FLEXERIL    42 tablet    Take 1 tablet (5 mg) by mouth 3 times daily as needed for muscle spasms        escitalopram 10 MG tablet    LEXAPRO    30 tablet    Take 1 tablet (10 mg) by mouth daily    Anxiety, Myofascial pain syndrome       ibuprofen 800 MG tablet    ADVIL/MOTRIN    60 tablet    Take 1 tablet (800 mg) by mouth every 8 hours as needed for moderate pain

## 2017-12-02 ASSESSMENT — ANXIETY QUESTIONNAIRES: GAD7 TOTAL SCORE: 12

## 2017-12-04 PROBLEM — F41.9 ANXIETY: Status: ACTIVE | Noted: 2017-12-04

## 2017-12-12 ENCOUNTER — TELEPHONE (OUTPATIENT)
Dept: FAMILY MEDICINE | Facility: CLINIC | Age: 37
End: 2017-12-12

## 2017-12-12 DIAGNOSIS — F41.9 ANXIETY: Primary | ICD-10-CM

## 2017-12-12 RX ORDER — LORAZEPAM 1 MG/1
1 TABLET ORAL EVERY 8 HOURS PRN
Qty: 20 TABLET | Refills: 0 | Status: SHIPPED | OUTPATIENT
Start: 2017-12-12 | End: 2018-04-05

## 2017-12-12 NOTE — TELEPHONE ENCOUNTER
Please call and explained to the patient: I doubt that the anger and confusion was due to the lexapro.  More likely it was due to her anxiety which had not come under control yet with only taking this med for 2 days.  At any rate, I will switch to sertraline which hopefully she will tolerate better.  I will give her a short-term prescription for Lorazepam to use until the sertraline kicks in.  But emphasized as you already have that this is not good for long-term use and can be habit-forming.  It should get her through until the sertraline kicks in and helps with the anxiety

## 2017-12-12 NOTE — TELEPHONE ENCOUNTER
Reason for call:  Patient reporting a symptom    Symptom or request: side effects  Confused /angry   From lexapro        Duration (how long have symptoms been present): stopped medication    Requesting  Anxiety medication  For bad days not necessarily  Taking every day if possible     Have you been treated for this before? No    Additional comments: please call patient     Phone Number patient can be reached at:  Cell number on file:    Telephone Information:   Mobile [phone]       Best Time:  Any     Can we leave a detailed message on this number:  YES   Minnie Bertrand- [facility]      Call taken on 12/12/2017 at 12:47 PM by Minnie Bertrand

## 2017-12-12 NOTE — TELEPHONE ENCOUNTER
Patient is contacted and told of the new Rx for zoloft and the ativan.  Fax to [facility]. Julianna CARRILLO RN

## 2017-12-12 NOTE — TELEPHONE ENCOUNTER
"        S-(situation): medication reaction    B-(background): started lexapro, took for two days only and expresses \"she couldn't do it\".  Patient felt she was angry about everything.  Went into grocery store and came out and had forgotten where she left the car.  Slept a lot and felt like she was in a dream state.  Has tried propanolol and ativan in the past for headaches and anxiety. It is explained that Ativan is a controlled substance and can be habit forming.  Patient unaware of this.   Works nights and gets home lays there unable to sleep due to stress and anxiety.    A-(assessment): depression, anxiety    R-(recommendations): please advise. Julianna CARRILLO RN      "

## 2017-12-18 ENCOUNTER — TELEPHONE (OUTPATIENT)
Dept: FAMILY MEDICINE | Facility: CLINIC | Age: 37
End: 2017-12-18

## 2017-12-18 NOTE — TELEPHONE ENCOUNTER
Anemic?  Pt had labs done on 11/28/17 with HGB of 11.6 (NML is 11.7-15.7)  Has she had other labs done @ different location?  LM to call clinic/RN to clarify.  Mahesh

## 2017-12-18 NOTE — TELEPHONE ENCOUNTER
Reason for Call:  Other call back    Detailed comments: Rober is extremely anemic.  She has results from some blood tests that she had done.  She is wondering if Dr. Sanz would look at the results that she has or does she need blood work done before she seen?  Please advise.    Phone Number Patient can be reached at: Cell number on file:    Telephone Information:   Mobile [phone]       Best Time: any    Can we leave a detailed message on this number? YES    Call taken on 12/18/2017 at 1:39 PM by Miroslava Freeman

## 2017-12-19 NOTE — TELEPHONE ENCOUNTER
Patient has appt on 12/21/17.  Patient did have labs done at a different location.  Patient will bring in labs to discuss the results.    Madhuri GRACE RN

## 2017-12-21 ENCOUNTER — OFFICE VISIT (OUTPATIENT)
Dept: FAMILY MEDICINE | Facility: CLINIC | Age: 37
End: 2017-12-21

## 2017-12-21 VITALS
HEIGHT: 60 IN | DIASTOLIC BLOOD PRESSURE: 74 MMHG | HEART RATE: 72 BPM | WEIGHT: 123.4 LBS | BODY MASS INDEX: 24.23 KG/M2 | SYSTOLIC BLOOD PRESSURE: 120 MMHG

## 2017-12-21 DIAGNOSIS — D50.0 IRON DEFICIENCY ANEMIA DUE TO CHRONIC BLOOD LOSS: Primary | ICD-10-CM

## 2017-12-21 DIAGNOSIS — E55.9 VITAMIN D DEFICIENCY: ICD-10-CM

## 2017-12-21 DIAGNOSIS — N92.0 MENORRHAGIA WITH REGULAR CYCLE: ICD-10-CM

## 2017-12-21 PROCEDURE — 99214 OFFICE O/P EST MOD 30 MIN: CPT | Performed by: FAMILY MEDICINE

## 2017-12-21 RX ORDER — CHOLECALCIFEROL (VITAMIN D3) 50 MCG
2000 TABLET ORAL DAILY
Qty: 100 TABLET | Refills: 3 | Status: SHIPPED | OUTPATIENT
Start: 2017-12-21 | End: 2019-01-17

## 2017-12-21 RX ORDER — FERROUS SULFATE 325(65) MG
325 TABLET ORAL
Qty: 90 TABLET | Refills: 2 | Status: SHIPPED | OUTPATIENT
Start: 2017-12-21 | End: 2018-07-02

## 2017-12-21 NOTE — PATIENT INSTRUCTIONS
Schedule apptmt with Dr Wilson to consider abalation (and update your pap).    Take the iron 3 times a day and Vit D daily and recheck in a month to see how the Hbg and Vit D level improve.

## 2017-12-21 NOTE — PROGRESS NOTES
Subjective:  Rober Renee is a 37 year old female   Chief Complaint   Patient presents with     Anemia     pt. had lab work done through [facility] and pt. brought lab work forms with her to go over with Dr. Sanz.     Medication Reconciliation     pt. has D/C zoloft 4-5 days. pt. has concerns with fatigue due to anemia and the medication ?     Health Maintenance     pt. was reminded she is due for pap/pe     She was seen earlier this month with anxiety along with a lot of myofascial pain.  She had some laboratory evaluation done in urgent care and at that time was noted to have a very mild microcytic anemia with a hemoglobin of 11.6.  After that she had some blood work sent off to [facility]. and brought in the results today: everything came back normal except her anemia was actually worse at 10.8 with definite microcytic hyperchromic indices and her iron level was on the low end of normal but her ferritin was low at 7.  The only other abnormality was a low vitamin D level at 28.  These labs will be abstracted into her chart.  She has had a problem with heavy and prolonged periods for a number of years.  Apparently her gynecologist had talked to her after the birth of her last child about an endometrial ablation but she put it off at that time.        Encounter Diagnoses   Name Primary?     Iron deficiency anemia due to chronic blood loss Yes     Menorrhagia with regular cycle      Vitamin D deficiency        ROS:other than noted above, general, HEENT, respiratory, cardiac, gastrointestinal systems are negative    Medical, surgical, social, and family histories, medications and allergies reviewed and updated.    Objective:  Exam:    GENERAL APPEARANCE ADULT: Alert, no acute distress  EYES: PERRL, EOM normal, conjunctiva and lids normal  PSYCH: mentation appears normal., affect and mood normal, appears tired      ASSESSMENT:  1. Iron deficiency anemia due to chronic blood loss    2. Menorrhagia with regular cycle    3.  Vitamin D deficiency      At her age I am quite confident that the iron deficiency anemia is related to blood loss from her heavy menses.  Therefore I recommend she seriously consider an endometrial ablation.  She is not interested in any further pregnancies and has had a tubal ligation    PLAN:  Orders Placed This Encounter     OB/GYN REFERRAL     ferrous sulfate (IRON) 325 (65 FE) MG tablet     Cholecalciferol (VITAMIN D) 2000 UNITS tablet       Patient Instructions   Schedule apptmt with Dr Wilson to consider abalation (and update your pap).    Take the iron 3 times a day and Vit D daily and recheck in a month to see how the Hbg and Vit D level improve.       This was a 30 minute appointment and 25 minutes were spent in education, counseling and coordination of care for this problem.

## 2017-12-21 NOTE — MR AVS SNAPSHOT
After Visit Summary   12/21/2017    Rober Renee    MRN: [id number]           Patient Information     Date Of Birth          1980        Visit Information        Provider Department      12/21/2017 9:00 AM EVERETT Sanz MD [facility]        Today's Diagnoses     Iron deficiency anemia due to chronic blood loss    -  1    Menorrhagia with regular cycle        Vitamin D deficiency          Care Instructions    Schedule apptmt with Dr Wilson to consider abalation (and update your pap).    Take the iron 3 times a day and Vit D daily and recheck in a month to see how the Hbg and Vit D level improve.          Follow-ups after your visit        Additional Services     OB/GYN REFERRAL       Your provider has referred you to:  [facility]: [facility] [phone]   [email or web address]    Please be aware that coverage of these services is subject to the terms and limitations of your health insurance plan.  Call member services at your health plan with any benefit or coverage questions.      Please bring the following with you to your appointment:    (1) Any X-Rays, CTs or MRIs which have been performed.  Contact the facility where they were done to arrange for  prior to your scheduled appointment.   (2) List of current medications   (3) This referral request   (4) Any documents/labs given to you for this referral                  Who to contact     If you have questions or need follow up information about today's clinic visit or your schedule please contact [facility] directly at [phone].  Normal or non-critical lab and imaging results will be communicated to you by MyChart, letter or phone within 4 business days after the clinic has received the results. If you do not hear from us within 7 days, please contact the clinic through MyChart or phone. If you have a critical or abnormal lab result, we will notify you by  "phone as soon as possible.  Submit refill requests through [employer] or call your pharmacy and they will forward the refill request to us. Please allow 3 business days for your refill to be completed.          Additional Information About Your Visit        [employer] Information     [employer] lets you send messages to your doctor, view your test results, renew your prescriptions, schedule appointments and more. To sign up, go to [email or web address] . Click on \"Log in\" on the left side of the screen, which will take you to the Welcome page. Then click on \"Sign up Now\" on the right side of the page.     You will be asked to enter the access code listed below, as well as some personal information. Please follow the directions to create your username and password.     Your access code is: [email or web address]  Expires: 2018  2:09 PM     Your access code will  in 90 days. If you need help or a new code, please call your [place] clinic or [phone].        Care EveryWhere ID     This is your Care EveryWhere ID. This could be used by other organizations to access your [place] medical records  [id number]        Your Vitals Were     Pulse Height BMI (Body Mass Index)             72 5' (1.524 m) 24.1 kg/m2          Blood Pressure from Last 3 Encounters:   17 120/74   17 120/78   17 (!) 136/100    Weight from Last 3 Encounters:   17 123 lb 6.4 oz (56 kg)   17 124 lb 4.8 oz (56.4 kg)   17 118 lb (53.5 kg)              We Performed the Following     OB/GYN REFERRAL          Today's Medication Changes          These changes are accurate as of: 17  9:47 AM.  If you have any questions, ask your nurse or doctor.               Start taking these medicines.        Dose/Directions    ferrous sulfate 325 (65 FE) MG tablet   Commonly known as:  IRON   Used for:  Iron deficiency anemia due to chronic blood loss   Started by:  EVERETT Sanz MD        Dose:  325 mg   Take 1 tablet (325 mg) by " mouth 3 times daily (with meals)   Quantity:  90 tablet   Refills:  2       vitamin D 2000 UNITS tablet   Used for:  Vitamin D deficiency   Started by:  EVERETT Sanz MD        Dose:  2000 Units   Take 2,000 Units by mouth daily   Quantity:  100 tablet   Refills:  3            Where to get your medicines      These medications were sent to [id number] - [place], [place] - [address]  [address] [facility] [address]     Phone:  [phone]     ferrous sulfate 325 (65 FE) MG tablet    vitamin D 2000 UNITS tablet                Primary Care Provider Office Phone # Fax #    EVERETT Sanz [phone] [address] [facility] [address]        Equal Access to Services     KEYANA MACK : Hadii dior [facility] hadasho Sohemanth, waaxda luqadaha, qaybta kaalmada adeegyada, srinivas landaverde . So [facility] [place] [phone].    ATENCIÓN: Si habla español, tiene a nguyen disposición servicios gratuitos de asistencia lingüística. [facility] [phone].    We comply with applicable federal civil rights laws and [place] laws. We do not discriminate on the basis of race, color, national origin, age, disability, sex, sexual orientation, or gender identity.            Thank you!     Thank you for choosing [facility]  for your care. Our goal is always to provide you with excellent care. Hearing back from our patients is one way we can continue to improve our services. Please take a few minutes to complete the written survey that you may receive in the mail after your visit with us. Thank you!             Your Updated Medication List - Protect others around you: Learn how to safely use, store and throw away your medicines at www.disposemymeds.org.          This list is accurate as of: 12/21/17  9:47 AM.  Always use your most recent med list.                   Brand Name Dispense Instructions for use Diagnosis    Capsaicin 0.1 % cream     30 g    Apply topically  4 times daily Apply sparingly using rubber gloves.    Myofascial pain syndrome       cyclobenzaprine 5 MG tablet    FLEXERIL    42 tablet    Take 1 tablet (5 mg) by mouth 3 times daily as needed for muscle spasms        ferrous sulfate 325 (65 FE) MG tablet    IRON    90 tablet    Take 1 tablet (325 mg) by mouth 3 times daily (with meals)    Iron deficiency anemia due to chronic blood loss       LORazepam 1 MG tablet    ATIVAN    20 tablet    Take 1 tablet (1 mg) by mouth every 8 hours as needed for anxiety    Anxiety       sertraline 50 MG tablet    ZOLOFT    30 tablet    Take 1 tablet (50 mg) by mouth daily    Anxiety       vitamin D 2000 UNITS tablet     100 tablet    Take 2,000 Units by mouth daily    Vitamin D deficiency

## 2017-12-21 NOTE — NURSING NOTE
Chief Complaint   Patient presents with     Anemia     pt. had lab work done through lab [facility] and pt. brought lab work forms with her to go over with Dr. Sanz.     Medication Reconciliation     pt. has D/C zoloft 4-5 days. pt. has concerns with fatigue due to anemia and the medication ?     Health Maintenance     pt. was reminded she is due for pap/pe       Initial /74 (BP Location: Right arm, Patient Position: Chair, Cuff Size: Adult Regular)  Pulse 72  Ht 5' (1.524 m)  Wt 123 lb 6.4 oz (56 kg)  BMI 24.1 kg/m2 Estimated body mass index is 24.1 kg/(m^2) as calculated from the following:    Height as of this encounter: 5' (1.524 m).    Weight as of this encounter: 123 lb 6.4 oz (56 kg).  Medication Reconciliation: complete     Mayra Schneider, CMA

## 2018-03-24 ENCOUNTER — HOSPITAL ENCOUNTER (EMERGENCY)
Facility: CLINIC | Age: 38
Discharge: HOME | End: 2018-03-24
Attending: FAMILY MEDICINE | Admitting: FAMILY MEDICINE

## 2018-03-24 VITALS
SYSTOLIC BLOOD PRESSURE: 124 MMHG | HEART RATE: 84 BPM | DIASTOLIC BLOOD PRESSURE: 77 MMHG | OXYGEN SATURATION: 99 % | WEIGHT: 120 LBS | TEMPERATURE: 98.3 F | BODY MASS INDEX: 23.56 KG/M2 | HEIGHT: 60 IN | RESPIRATION RATE: 16 BRPM

## 2018-03-24 DIAGNOSIS — R10.2 SUPRAPUBIC PAIN, ACUTE: ICD-10-CM

## 2018-03-24 LAB
ALBUMIN SERPL-MCNC: 4.2 G/DL (ref 3.4–5)
ALBUMIN UR-MCNC: NEGATIVE MG/DL
ALP SERPL-CCNC: 48 U/L (ref 40–150)
ALT SERPL W P-5'-P-CCNC: 33 U/L (ref 0–50)
ANION GAP SERPL CALCULATED.3IONS-SCNC: 7 MMOL/L (ref 3–14)
APPEARANCE UR: ABNORMAL
AST SERPL W P-5'-P-CCNC: 16 U/L (ref 0–45)
BACTERIA #/AREA URNS HPF: ABNORMAL /HPF
BASOPHILS # BLD AUTO: 0 10E9/L (ref 0–0.2)
BASOPHILS NFR BLD AUTO: 0.1 %
BILIRUB SERPL-MCNC: 1.1 MG/DL (ref 0.2–1.3)
BILIRUB UR QL STRIP: NEGATIVE
BUN SERPL-MCNC: 11 MG/DL (ref 7–30)
CALCIUM SERPL-MCNC: 8.6 MG/DL (ref 8.5–10.1)
CHLORIDE SERPL-SCNC: 105 MMOL/L (ref 94–109)
CO2 SERPL-SCNC: 27 MMOL/L (ref 20–32)
COLOR UR AUTO: YELLOW
CREAT SERPL-MCNC: 0.55 MG/DL (ref 0.52–1.04)
DIFFERENTIAL METHOD BLD: ABNORMAL
EOSINOPHIL # BLD AUTO: 0 10E9/L (ref 0–0.7)
EOSINOPHIL NFR BLD AUTO: 0.3 %
ERYTHROCYTE [DISTWIDTH] IN BLOOD BY AUTOMATED COUNT: 13 % (ref 10–15)
GFR SERPL CREATININE-BSD FRML MDRD: >90 ML/MIN/1.7M2
GLUCOSE SERPL-MCNC: 91 MG/DL (ref 70–99)
GLUCOSE UR STRIP-MCNC: NEGATIVE MG/DL
HCG UR QL: NEGATIVE
HCT VFR BLD AUTO: 42.4 % (ref 35–47)
HGB BLD-MCNC: 14.9 G/DL (ref 11.7–15.7)
HGB UR QL STRIP: ABNORMAL
IMM GRANULOCYTES # BLD: 0 10E9/L (ref 0–0.4)
IMM GRANULOCYTES NFR BLD: 0.1 %
KETONES UR STRIP-MCNC: 20 MG/DL
LACTATE BLD-SCNC: 0.9 MMOL/L (ref 0.7–2)
LEUKOCYTE ESTERASE UR QL STRIP: NEGATIVE
LYMPHOCYTES # BLD AUTO: 0.5 10E9/L (ref 0.8–5.3)
LYMPHOCYTES NFR BLD AUTO: 6.4 %
MCH RBC QN AUTO: 33.7 PG (ref 26.5–33)
MCHC RBC AUTO-ENTMCNC: 35.1 G/DL (ref 31.5–36.5)
MCV RBC AUTO: 96 FL (ref 78–100)
MONOCYTES # BLD AUTO: 0.3 10E9/L (ref 0–1.3)
MONOCYTES NFR BLD AUTO: 4.6 %
MUCOUS THREADS #/AREA URNS LPF: PRESENT /LPF
NEUTROPHILS # BLD AUTO: 6.4 10E9/L (ref 1.6–8.3)
NEUTROPHILS NFR BLD AUTO: 88.5 %
NITRATE UR QL: NEGATIVE
PH UR STRIP: 5 PH (ref 5–7)
PLATELET # BLD AUTO: 128 10E9/L (ref 150–450)
POTASSIUM SERPL-SCNC: 3.6 MMOL/L (ref 3.4–5.3)
PROT SERPL-MCNC: 7.5 G/DL (ref 6.8–8.8)
RBC # BLD AUTO: 4.42 10E12/L (ref 3.8–5.2)
RBC #/AREA URNS AUTO: 7 /HPF (ref 0–2)
SODIUM SERPL-SCNC: 139 MMOL/L (ref 133–144)
SOURCE: ABNORMAL
SP GR UR STRIP: 1.02 (ref 1–1.03)
SQUAMOUS #/AREA URNS AUTO: 6 /HPF (ref 0–1)
UROBILINOGEN UR STRIP-MCNC: 2 MG/DL (ref 0–2)
WBC # BLD AUTO: 7.2 10E9/L (ref 4–11)
WBC #/AREA URNS AUTO: 3 /HPF (ref 0–5)

## 2018-03-24 PROCEDURE — 96374 THER/PROPH/DIAG INJ IV PUSH: CPT | Performed by: FAMILY MEDICINE

## 2018-03-24 PROCEDURE — 99284 EMERGENCY DEPT VISIT MOD MDM: CPT | Mod: 25 | Performed by: FAMILY MEDICINE

## 2018-03-24 PROCEDURE — 76705 ECHO EXAM OF ABDOMEN: CPT | Mod: 26 | Performed by: FAMILY MEDICINE

## 2018-03-24 PROCEDURE — 25000128 H RX IP 250 OP 636: Performed by: FAMILY MEDICINE

## 2018-03-24 PROCEDURE — 96361 HYDRATE IV INFUSION ADD-ON: CPT | Performed by: FAMILY MEDICINE

## 2018-03-24 PROCEDURE — 85025 COMPLETE CBC W/AUTO DIFF WBC: CPT | Performed by: FAMILY MEDICINE

## 2018-03-24 PROCEDURE — 96375 TX/PRO/DX INJ NEW DRUG ADDON: CPT | Performed by: FAMILY MEDICINE

## 2018-03-24 PROCEDURE — 81001 URINALYSIS AUTO W/SCOPE: CPT | Performed by: FAMILY MEDICINE

## 2018-03-24 PROCEDURE — 80053 COMPREHEN METABOLIC PANEL: CPT | Performed by: FAMILY MEDICINE

## 2018-03-24 PROCEDURE — 87086 URINE CULTURE/COLONY COUNT: CPT | Performed by: FAMILY MEDICINE

## 2018-03-24 PROCEDURE — 81025 URINE PREGNANCY TEST: CPT | Performed by: FAMILY MEDICINE

## 2018-03-24 PROCEDURE — 76705 ECHO EXAM OF ABDOMEN: CPT | Performed by: FAMILY MEDICINE

## 2018-03-24 PROCEDURE — 83605 ASSAY OF LACTIC ACID: CPT | Performed by: FAMILY MEDICINE

## 2018-03-24 RX ORDER — ONDANSETRON 2 MG/ML
4 INJECTION INTRAMUSCULAR; INTRAVENOUS EVERY 30 MIN PRN
Status: DISCONTINUED | OUTPATIENT
Start: 2018-03-24 | End: 2018-03-24 | Stop reason: HOSPADM

## 2018-03-24 RX ORDER — CIPROFLOXACIN 500 MG/1
500 TABLET, FILM COATED ORAL 2 TIMES DAILY
Qty: 14 TABLET | Refills: 0 | Status: SHIPPED | OUTPATIENT
Start: 2018-03-24 | End: 2018-03-31

## 2018-03-24 RX ORDER — KETOROLAC TROMETHAMINE 15 MG/ML
15 INJECTION, SOLUTION INTRAMUSCULAR; INTRAVENOUS ONCE
Status: COMPLETED | OUTPATIENT
Start: 2018-03-24 | End: 2018-03-24

## 2018-03-24 RX ORDER — ONDANSETRON 4 MG/1
4 TABLET, ORALLY DISINTEGRATING ORAL EVERY 8 HOURS PRN
Qty: 10 TABLET | Refills: 0 | Status: SHIPPED | OUTPATIENT
Start: 2018-03-24 | End: 2018-03-27

## 2018-03-24 RX ORDER — SODIUM CHLORIDE 9 MG/ML
1000 INJECTION, SOLUTION INTRAVENOUS CONTINUOUS
Status: DISCONTINUED | OUTPATIENT
Start: 2018-03-24 | End: 2018-03-24 | Stop reason: HOSPADM

## 2018-03-24 RX ADMIN — KETOROLAC TROMETHAMINE 15 MG: 15 INJECTION, SOLUTION INTRAMUSCULAR; INTRAVENOUS at 20:06

## 2018-03-24 RX ADMIN — ONDANSETRON 4 MG: 2 INJECTION INTRAMUSCULAR; INTRAVENOUS at 20:02

## 2018-03-24 RX ADMIN — SODIUM CHLORIDE 1000 ML: 9 INJECTION, SOLUTION INTRAVENOUS at 20:02

## 2018-03-24 ASSESSMENT — ENCOUNTER SYMPTOMS
FLANK PAIN: 1
DIARRHEA: 0
VOMITING: 1
NAUSEA: 1
ABDOMINAL PAIN: 1

## 2018-03-24 NOTE — ED AVS SNAPSHOT
[facility] Emergency Department    [facility] [facility] [address]    Phone:  [phone]    Fax:  [phone]                                       Rober Renee   MRN: [id number]    Department:  [facility] Emergency Department   Date of Visit:  3/24/2018           Patient Information     Date Of Birth          1980        Your diagnoses for this visit were:     Suprapubic pain, acute given poartially treated symptoms on macrobid but also with flank pain, will change to cipro while awaiting urine culture.  Bedside ultrasoaund without signs of hydronephrosis.  Return for fever, dehydration/. take zofran for nausea/vomiting. stay hydrated with clear fluid. check on urine culture tuesday       You were seen by Jeff Moon MD.      Follow-up Information     Follow up with EVERETT Sanz MD.    Specialty:  Family Practice    Contact information:    [address] [facility] [id number] [phone]          Follow up with [facility].    Specialty:  EMERGENCY MEDICINE    Why:  As needed, If symptoms worsen    Contact information:    [address] [place] [id number] [phone]    Additional information:    The medical center is located at   [address] [place] [address] (between [address] and   [address] in [place], [place] north   of [place]).        Discharge Instructions         ICD-10-CM    1. Suprapubic pain, acute R10.2     given poartially treated symptoms on macrobid but also with flank pain, will change to cipro while awaiting urine culture.  Bedside ultrasoaund without signs of hydronephrosis.  Return for fever, dehydration/. take zofran for nausea/vomiting. stay hydrated with clear fluid         Flank Pain, Uncertain Cause  The flank is the area between your upper abdomen and your back. Pain there is often caused by a problem with your kidneys. It might be a kidney infection or a kidney stone. Other causes of flank pain include spinal  arthritis, a pinched nerve from a back injury, or a back muscle strain or spasm.  The cause of your flank pain is not certain. You may need other tests.  Home care  Follow these tips when caring for yourself at home:    You may use acetaminophen or ibuprofen to control pain, unless your health care provider prescribed another medicine. If you have chronic liver or kidney disease, talk with your provider before taking these medicines. Also talk with your provider first if you ve ever had a stomach ulcer or GI bleeding.    If the pain is coming from your muscles, you may get relief with ice or heat. During the first 2 days after the injury, put an ice pack on the painful area for 20 minutes every 2 to 4 hours. This will reduce swelling and pain. A hot shower, hot bath, or heating pad works well for a muscle spasm. You can start with ice, then switch to heat after 2 days. You might find that alternating ice and heat works well. Use the method that feels the best to you.  Follow-up care  Follow up with your healthcare provider if your symptoms don t get better over the next few days.  When to seek medical advice  Call your healthcare provider right away if any of these happen:    Repeated vomiting    Fever of 100.4 F (38 C) or higher, or as directed by your health care provider    Flank pain that gets worse    Pain that spreads to the front of your belly (abdomen)    Dizziness, weakness, or fainting    Blood in your urine    Burning feeling when you urinate or the need to urinate often    Pain in one of your legs that gets worse    Numbness or weakness in a leg  Date Last Reviewed: 10/1/2016    0622-8928 [employer]. [address] [place] [address]. All rights reserved. This information is not intended as a substitute for professional medical care. Always follow your healthcare professional's instructions.          24 Hour Appointment Hotline       To make an appointment at any [facility],  call [phone] ([phone]). If you don't have a family doctor or clinic, we will help you find one. [facility] are conveniently located to serve the needs of you and your family.             Review of your medicines      START taking        Dose / Directions Last dose taken    ciprofloxacin 500 MG tablet   Commonly known as:  CIPRO   Dose:  500 mg   Quantity:  14 tablet        Take 1 tablet (500 mg) by mouth 2 times daily for 7 days   Refills:  0        ondansetron 4 MG ODT tab   Commonly known as:  ZOFRAN ODT   Dose:  4 mg   Quantity:  10 tablet        Take 1 tablet (4 mg) by mouth every 8 hours as needed for nausea   Refills:  0          Our records show that you are taking the medicines listed below. If these are incorrect, please call your family doctor or clinic.        Dose / Directions Last dose taken    Capsaicin 0.1 % cream   Quantity:  30 g        Apply topically 4 times daily Apply sparingly using rubber gloves.   Refills:  11        cyclobenzaprine 5 MG tablet   Commonly known as:  FLEXERIL   Dose:  5 mg   Quantity:  42 tablet        Take 1 tablet (5 mg) by mouth 3 times daily as needed for muscle spasms   Refills:  0        ferrous sulfate 325 (65 FE) MG tablet   Commonly known as:  IRON   Dose:  325 mg   Quantity:  90 tablet        Take 1 tablet (325 mg) by mouth 3 times daily (with meals)   Refills:  2        LORazepam 1 MG tablet   Commonly known as:  ATIVAN   Dose:  1 mg   Quantity:  20 tablet        Take 1 tablet (1 mg) by mouth every 8 hours as needed for anxiety   Refills:  0        sertraline 50 MG tablet   Commonly known as:  ZOLOFT   Dose:  50 mg   Quantity:  30 tablet        Take 1 tablet (50 mg) by mouth daily   Refills:  1        vitamin D 2000 UNITS tablet   Dose:  2000 Units   Quantity:  100 tablet        Take 2,000 Units by mouth daily   Refills:  3                Prescriptions were sent or printed at these locations (2 Prescriptions)                   Other Prescriptions                 Printed at Department/Unit printer (2 of 2)         ondansetron (ZOFRAN ODT) 4 MG ODT tab               ciprofloxacin (CIPRO) 500 MG tablet                Procedures and tests performed during your visit     CBC with platelets differential    Comprehensive metabolic panel    HCG qualitative urine    Lactic acid whole blood    POC US ABDOMEN LIMITED    UA with Microscopic    Urine Culture      Orders Needing Specimen Collection     None      Pending Results     Date and Time Order Name Status Description    3/24/2018 1940 POC US ABDOMEN LIMITED In process     3/24/2018 1940 Urine Culture In process             Pending Culture Results     Date and Time Order Name Status Description    3/24/2018 1940 Urine Culture In process             Pending Results Instructions     If you had any lab results that were not finalized at the time of your Discharge, you can call the ED Lab Result RN at [phone]. You will be contacted by this team for any positive Lab results or changes in treatment. The nurses are available 7 days a week from 10A to 6:30P.  You can leave a message 24 hours per day and they will return your call.        Test Results From Your Hospital Stay        3/24/2018  8:32 PM      Component Results     Component Value Ref Range & Units Status    Sodium 139 133 - 144 mmol/L Final    Potassium 3.6 3.4 - 5.3 mmol/L Final    Chloride 105 94 - 109 mmol/L Final    Carbon Dioxide 27 20 - 32 mmol/L Final    Anion Gap 7 3 - 14 mmol/L Final    Glucose 91 70 - 99 mg/dL Final    Urea Nitrogen 11 7 - 30 mg/dL Final    Creatinine 0.55 0.52 - 1.04 mg/dL Final    GFR Estimate >90 >60 mL/min/1.7m2 Final    Non  GFR Calc    GFR Estimate If Black >90 >60 mL/min/1.7m2 Final    African American GFR Calc    Calcium 8.6 8.5 - 10.1 mg/dL Final    Bilirubin Total 1.1 0.2 - 1.3 mg/dL Final    Albumin 4.2 3.4 - 5.0 g/dL Final    Protein Total 7.5 6.8 - 8.8 g/dL Final    Alkaline Phosphatase 48 40 - 150 U/L  Final    ALT 33 0 - 50 U/L Final    AST 16 0 - 45 U/L Final         3/24/2018  8:19 PM      Component Results     Component Value Ref Range & Units Status    WBC 7.2 4.0 - 11.0 10e9/L Final    RBC Count 4.42 3.8 - 5.2 10e12/L Final    Hemoglobin 14.9 11.7 - 15.7 g/dL Final    Hematocrit 42.4 35.0 - 47.0 % Final    MCV 96 78 - 100 fl Final    MCH 33.7 (H) 26.5 - 33.0 pg Final    MCHC 35.1 31.5 - 36.5 g/dL Final    RDW 13.0 10.0 - 15.0 % Final    Platelet Count 128 (L) 150 - 450 10e9/L Final    Diff Method Automated Method  Final    % Neutrophils 88.5 % Final    % Lymphocytes 6.4 % Final    % Monocytes 4.6 % Final    % Eosinophils 0.3 % Final    % Basophils 0.1 % Final    % Immature Granulocytes 0.1 % Final    Absolute Neutrophil 6.4 1.6 - 8.3 10e9/L Final    Absolute Lymphocytes 0.5 (L) 0.8 - 5.3 10e9/L Final    Absolute Monocytes 0.3 0.0 - 1.3 10e9/L Final    Absolute Eosinophils 0.0 0.0 - 0.7 10e9/L Final    Absolute Basophils 0.0 0.0 - 0.2 10e9/L Final    Abs Immature Granulocytes 0.0 0 - 0.4 10e9/L Final         3/24/2018  8:13 PM      Component Results     Component Value Ref Range & Units Status    Lactic Acid 0.9 0.7 - 2.0 mmol/L Final         3/24/2018  8:16 PM      Component Results     Component Value Ref Range & Units Status    Color Urine Yellow  Final    Appearance Urine Slightly Cloudy  Final    Glucose Urine Negative NEG^Negative mg/dL Final    Bilirubin Urine Negative NEG^Negative Final    Ketones Urine 20 (A) NEG^Negative mg/dL Final    Specific Gravity Urine 1.024 1.003 - 1.035 Final    Blood Urine Large (A) NEG^Negative Final    pH Urine 5.0 5.0 - 7.0 pH Final    Protein Albumin Urine Negative NEG^Negative mg/dL Final    Urobilinogen mg/dL 2.0 0.0 - 2.0 mg/dL Final    Nitrite Urine Negative NEG^Negative Final    Leukocyte Esterase Urine Negative NEG^Negative Final    Source Midstream Urine  Final    WBC Urine 3 0 - 5 /HPF Final    RBC Urine 7 (H) 0 - 2 /HPF Final    Bacteria Urine Few (A)  "NEG^Negative /HPF Final    Squamous Epithelial /HPF Urine 6 (H) 0 - 1 /HPF Final    Mucous Urine Present (A) NEG^Negative /LPF Final         3/24/2018  8:10 PM         3/24/2018  7:40 PM      Result not yet available     Exam Begun         3/24/2018  8:52 PM      Component Results     Component Value Ref Range & Units Status    HCG Qual Urine Negative NEG^Negative Final    This test is for screening purposes.  Results should be interpreted along with   the clinical picture.  Confirmation testing is available if warranted by   ordering [id number], HCG Quantitative Pregnancy.                  Thank you for choosing [facility]       Thank you for choosing [facility] for your care. Our goal is always to provide you with excellent care. Hearing back from our patients is one way we can continue to improve our services. Please take a few minutes to complete the written survey that you may receive in the mail after you visit with us. Thank you!        [employer] Information     [employer] lets you send messages to your doctor, view your test results, renew your prescriptions, schedule appointments and more. To sign up, go to [email or web address] . Click on \"Log in\" on the left side of the screen, which will take you to the Welcome page. Then click on \"Sign up Now\" on the right side of the page.     You will be asked to enter the access code listed below, as well as some personal information. Please follow the directions to create your username and password.     Your access code is: [email or web address]  Expires: 2018  9:30 PM     Your access code will  in 90 days. If you need help or a new code, please call your [place] clinic or [phone].        Care EveryWhere ID     This is your Care EveryWhere ID. This could be used by other organizations to access your [place] medical records  [id number]        Equal Access to Services     KEYANA WRAY: karla Zhong qaybta kaalmada adeegyada, waxay " sina landaverde ah. So [facility] [phone].    ATENCIÓN: Si habla español, tiene a nguyen disposición servicios gratuitos de asistencia lingüística. Llame al [phone].    We comply with applicable federal civil rights laws and [place] laws. We do not discriminate on the basis of race, color, national origin, age, disability, sex, sexual orientation, or gender identity.            After Visit Summary       This is your record. Keep this with you and show to your community pharmacist(s) and doctor(s) at your next visit.

## 2018-03-24 NOTE — ED AVS SNAPSHOT
[facility] Emergency Department    [address] [facility] [address]    Phone:  [phone]    Fax:  [phone]                                       Rober Renee   MRN: [id number]    Department:  [facility] Emergency Department   Date of Visit:  3/24/2018           After Visit Summary Signature Page     I have received my discharge instructions, and my questions have been answered. I have discussed any challenges I see with this plan with the nurse or doctor.    ..........................................................................................................................................  Patient/Patient Representative Signature      ..........................................................................................................................................  Patient Representative Print Name and Relationship to Patient    ..................................................               ................................................  Date                                            Time    ..........................................................................................................................................  Reviewed by Signature/Title    ...................................................              ..............................................  Date                                                            Time

## 2018-03-25 LAB
BACTERIA SPEC CULT: NORMAL
Lab: NORMAL
SPECIMEN SOURCE: NORMAL

## 2018-03-25 ASSESSMENT — ENCOUNTER SYMPTOMS
BLOOD IN STOOL: 0
COUGH: 0
CONSTIPATION: 0
SORE THROAT: 0
DIAPHORESIS: 0
SHORTNESS OF BREATH: 0
DYSURIA: 0
PALPITATIONS: 0
HEMATURIA: 1
FEVER: 0
SINUS PRESSURE: 0
HEADACHES: 0
CHILLS: 0
WHEEZING: 0
FREQUENCY: 0

## 2018-03-25 NOTE — ED NOTES
Pt c/o pain at the end of urination yesterday and today.  Pt took 2 doses of abx yesterday today has been vomiting, bilat. Flank pain, and blood in urine.  No fevers.

## 2018-03-25 NOTE — ED PROVIDER NOTES
History     Chief Complaint   Patient presents with     Vomiting     bilateral back pain, hematuria and vomiting that started today.      Hematuria     HPI  Rober Renee is a 37 year old female who has a history of pyuria, perinephric fluid collection, acidosis, acute renal failure, anemia, menorrhagia, recurrent urinary tract infection,  renal stones, depression, and anxiety who presents to the ED for evaluation of vomiting and hematuria.   Patient reports onset of urinary tract infection symptoms two days ago and at that time she started on Macrobid. She had additional symptom of nausea and vomiting as well. She has had two doses. She took pyridium yesterday as well. Patient woke up today with pain in the lower back and suprapubic abdomen. She has tried tylenol for discomfort but this was vomited up. She also woke up with hematuria as well. She reports additional symptom of possible fever, but she did not have thermometer at home.   Patient does have a history of kidney stones. She has no history of STD. Patient has had a tubal ligation and denies chance of pregnancy.  History of menorrhagia, but denies current menses      Problem List:    Patient Active Problem List    Diagnosis Date Noted     Anxiety 12/04/2017     Priority: Medium     Major depressive disorder, single episode, moderate (H) 02/29/2016     Priority: Medium     Renal stones 10/09/2015     Priority: Medium     Migraine headache 09/11/2012     Priority: Medium     Menorrhagia 01/27/2012     Priority: Medium     CARDIOVASCULAR SCREENING; LDL GOAL LESS THAN 160 10/31/2010     Priority: Medium     ASCUS on Pap smear 11/02/2009     Priority: Medium     10/16/09:pap--ASCUS, + HPV 53. Repeat pap at 6 wk PP visit. (9/2010)  8/11/2010:Pap--NIL. Repeat pap 1 year.  1/27/12:Pap--NIL. Repeat pap 1 year.  Pap 7/13--if normal, then Q3 year. Pap--NIL. Next pap in 3 years. HM updated.          Past Medical History:    Past Medical History:   Diagnosis Date      Calculus of kidney      Chickenpox      Gestational diabetes ,     History of postpartum depression, currently pregnant      Intervertebral lumbar disc disorder with myelopathy, lumbar region      Migraines      Urinary tract bacterial infections        Past Surgical History:    Past Surgical History:   Procedure Laterality Date      SECTION, TUBAL LIGATION, COMBINED  2013    c/section with BTL       Family History:    Family History   Problem Relation Age of Onset     CANCER Maternal Grandfather      liver CA     CANCER Paternal Grandfather      prostate CA     CANCER Maternal Grandmother      ovarian CA     Other - See Comments Maternal Grandmother      migraine     DIABETES Paternal Grandmother      Hypertension Paternal Grandmother      Hypertension Father      Depression Father      Other - See Comments Mother      migraine     Hypertension Brother      Other - See Comments Maternal Uncle      migraine     Depression Sister        Social History:  Marital Status:   [2]  Social History   Substance Use Topics     Smoking status: Former Smoker     Packs/day: 0.50     Years: 10.00     Types: Cigarettes     Quit date: 2008     Smokeless tobacco: Never Used     Alcohol use Yes      Comment: very seldom        Medications:      ferrous sulfate (IRON) 325 (65 FE) MG tablet   Cholecalciferol (VITAMIN D) 2000 UNITS tablet   sertraline (ZOLOFT) 50 MG tablet   LORazepam (ATIVAN) 1 MG tablet   Capsaicin 0.1 % cream   cyclobenzaprine (FLEXERIL) 5 MG tablet         Review of Systems   Constitutional: Negative for chills, diaphoresis and fever.   HENT: Negative for ear pain, sinus pressure and sore throat.    Eyes: Negative for visual disturbance.   Respiratory: Negative for cough, shortness of breath and wheezing.    Cardiovascular: Negative for chest pain and palpitations.   Gastrointestinal: Positive for abdominal pain, nausea and vomiting. Negative for blood in stool, constipation and  diarrhea.   Genitourinary: Positive for flank pain and hematuria. Negative for dysuria, frequency and urgency.   Skin: Negative for rash.   Neurological: Negative for headaches.   All other systems reviewed and are negative.      Physical Exam   BP: 138/85  Pulse: 84  Temp: 98.3  F (36.8  C)  Resp: 16  Height: 152.4 cm (5')  Weight: 54.4 kg (120 lb)  SpO2: 99 %      Physical Exam   Constitutional: No distress.   HENT:   Mouth/Throat: Oropharynx is clear and moist.   Eyes: Conjunctivae are normal.   Neck: Neck supple.   Cardiovascular: Normal rate and regular rhythm.    No murmur heard.  Pulmonary/Chest: Breath sounds normal. She has no wheezes. She has no rhonchi. She has no rales.   Abdominal: Soft. Bowel sounds are normal. There is tenderness in the suprapubic area. There is CVA tenderness. There is no rebound and no guarding.   Musculoskeletal: She exhibits no edema.   Neurological: She exhibits normal muscle tone.   Skin: No rash noted. She is not diaphoretic.     Abdomen: CVA tenderness on the right > left CVA is without significant tenderness to palpation.     ED Course     ED Course     Procedures               Critical Care time:  none               Results for orders placed or performed during the hospital encounter of 03/24/18 (from the past 24 hour(s))   POC US ABDOMEN LIMITED    Impression    [facility] Procedure Note    Limited Bedside ED Renal Ultrasound:    PERFORMED BY: Dr. Jeff Moon  INDICATIONS:  Hematuria  PROBE: Low frequency convex probe  BODY LOCATION:  Abdomen  FINDINGS:  The ultrasound was performed with longitudinal and transverse views.   Right Kidney:   Hydronephrosis:  None   Renal cyst:  None  Left Kidney:   Hydronephrosis:  None   Renal cyst:  None  INTERPRETATION:  The evaluation of the kidneys was normal without evidence of hydronephrosis or cysts.  IMAGE DOCUMENTATION: Images were archived to PACs system.     UA with Microscopic   Result Value Ref Range    Color Urine  Yellow     Appearance Urine Slightly Cloudy     Glucose Urine Negative NEG^Negative mg/dL    Bilirubin Urine Negative NEG^Negative    Ketones Urine 20 (A) NEG^Negative mg/dL    Specific Gravity Urine 1.024 1.003 - 1.035    Blood Urine Large (A) NEG^Negative    pH Urine 5.0 5.0 - 7.0 pH    Protein Albumin Urine Negative NEG^Negative mg/dL    Urobilinogen mg/dL 2.0 0.0 - 2.0 mg/dL    Nitrite Urine Negative NEG^Negative    Leukocyte Esterase Urine Negative NEG^Negative    Source Midstream Urine     WBC Urine 3 0 - 5 /HPF    RBC Urine 7 (H) 0 - 2 /HPF    Bacteria Urine Few (A) NEG^Negative /HPF    Squamous Epithelial /HPF Urine 6 (H) 0 - 1 /HPF    Mucous Urine Present (A) NEG^Negative /LPF   Urine Culture   Result Value Ref Range    Specimen Description Midstream Urine     Special Requests Specimen received in preservative     Culture Micro PENDING    HCG qualitative urine   Result Value Ref Range    HCG Qual Urine Negative NEG^Negative   Comprehensive metabolic panel   Result Value Ref Range    Sodium 139 133 - 144 mmol/L    Potassium 3.6 3.4 - 5.3 mmol/L    Chloride 105 94 - 109 mmol/L    Carbon Dioxide 27 20 - 32 mmol/L    Anion Gap 7 3 - 14 mmol/L    Glucose 91 70 - 99 mg/dL    Urea Nitrogen 11 7 - 30 mg/dL    Creatinine 0.55 0.52 - 1.04 mg/dL    GFR Estimate >90 >60 mL/min/1.7m2    GFR Estimate If Black >90 >60 mL/min/1.7m2    Calcium 8.6 8.5 - 10.1 mg/dL    Bilirubin Total 1.1 0.2 - 1.3 mg/dL    Albumin 4.2 3.4 - 5.0 g/dL    Protein Total 7.5 6.8 - 8.8 g/dL    Alkaline Phosphatase 48 40 - 150 U/L    ALT 33 0 - 50 U/L    AST 16 0 - 45 U/L   CBC with platelets differential   Result Value Ref Range    WBC 7.2 4.0 - 11.0 10e9/L    RBC Count 4.42 3.8 - 5.2 10e12/L    Hemoglobin 14.9 11.7 - 15.7 g/dL    Hematocrit 42.4 35.0 - 47.0 %    MCV 96 78 - 100 fl    MCH 33.7 (H) 26.5 - 33.0 pg    MCHC 35.1 31.5 - 36.5 g/dL    RDW 13.0 10.0 - 15.0 %    Platelet Count 128 (L) 150 - 450 10e9/L    Diff Method Automated Method     %  Neutrophils 88.5 %    % Lymphocytes 6.4 %    % Monocytes 4.6 %    % Eosinophils 0.3 %    % Basophils 0.1 %    % Immature Granulocytes 0.1 %    Absolute Neutrophil 6.4 1.6 - 8.3 10e9/L    Absolute Lymphocytes 0.5 (L) 0.8 - 5.3 10e9/L    Absolute Monocytes 0.3 0.0 - 1.3 10e9/L    Absolute Eosinophils 0.0 0.0 - 0.7 10e9/L    Absolute Basophils 0.0 0.0 - 0.2 10e9/L    Abs Immature Granulocytes 0.0 0 - 0.4 10e9/L   Lactic acid whole blood   Result Value Ref Range    Lactic Acid 0.9 0.7 - 2.0 mmol/L       Medications - No data to display    7:26 PM Patient assessed.    Assessments & Plan (with Medical Decision Making)     MDM: Rober Renee is a 37 year old female who presented with a recent history of urinary tract symptoms  with hematuria and right flank pain without associated fever.  She does not appear to be in significant discomfort at this time and is afebrile with normal vital signs.  Has a benign exam.  Her urinalysis demonstrated red cells without significant pyuria.  This may be as she was on Macrobid partially treated.  A bedside ultrasound revealed no significant hydronephrosis.  We did discuss that this could still be a ureteral stone but as there was no finding of significant infection currently, would not recommend performing a CT.  She was however given precautions for return and await the urine culture.  In the meantime we will have her on ciprofloxacin place of the Macrobid.  I asked her to follow-up this week with her primary provider.    I have reviewed the nursing notes.    I have reviewed the findings, diagnosis, plan and need for follow up with the patient.       New Prescriptions    No medications on file       Final diagnoses:   Suprapubic pain, acute - given poartially treated symptoms on macrobid but also with flank pain, will change to cipro while awaiting urine culture.  Bedside ultrasoaund without signs of hydronephrosis.  Return for fever, dehydration/. take zofran for nausea/vomiting. stay  hydrated with clear fluid. check on urine culture tuesday     This document serves as a record of the services and decisions personally performed and made by Jeff Moon MD. It was created on HIS/HER behalf by   Karmen Pizarro, a trained medical scribe. The creation of this document is based the provider's statements to the medical scribe.  Karmen Pizarro 7:26 PM 3/24/2018    Provider:   The information in this document, created by the medical scribe for me, accurately reflects the services I personally performed and the decisions made by me. I have reviewed and approved this document for accuracy prior to leaving the patient care area.  Jeff Moon MD 7:26 PM 3/24/2018    3/24/2018   [facility] EMERGENCY DEPARTMENT     Jeff Moon MD  03/25/18 1020

## 2018-03-25 NOTE — DISCHARGE INSTRUCTIONS
ICD-10-CM    1. Suprapubic pain, acute R10.2     given poartially treated symptoms on macrobid but also with flank pain, will change to cipro while awaiting urine culture.  Bedside ultrasoaund without signs of hydronephrosis.  Return for fever, dehydration/. take zofran for nausea/vomiting. stay hydrated with clear fluid         Flank Pain, Uncertain Cause  The flank is the area between your upper abdomen and your back. Pain there is often caused by a problem with your kidneys. It might be a kidney infection or a kidney stone. Other causes of flank pain include spinal arthritis, a pinched nerve from a back injury, or a back muscle strain or spasm.  The cause of your flank pain is not certain. You may need other tests.  Home care  Follow these tips when caring for yourself at home:    You may use acetaminophen or ibuprofen to control pain, unless your health care provider prescribed another medicine. If you have chronic liver or kidney disease, talk with your provider before taking these medicines. Also talk with your provider first if you ve ever had a stomach ulcer or GI bleeding.    If the pain is coming from your muscles, you may get relief with ice or heat. During the first 2 days after the injury, put an ice pack on the painful area for 20 minutes every 2 to 4 hours. This will reduce swelling and pain. A hot shower, hot bath, or heating pad works well for a muscle spasm. You can start with ice, then switch to heat after 2 days. You might find that alternating ice and heat works well. Use the method that feels the best to you.  Follow-up care  Follow up with your healthcare provider if your symptoms don t get better over the next few days.  When to seek medical advice  Call your healthcare provider right away if any of these happen:    Repeated vomiting    Fever of 100.4 F (38 C) or higher, or as directed by your health care provider    Flank pain that gets worse    Pain that spreads to the front of your belly  (abdomen)    Dizziness, weakness, or fainting    Blood in your urine    Burning feeling when you urinate or the need to urinate often    Pain in one of your legs that gets worse    Numbness or weakness in a leg  Date Last Reviewed: 10/1/2016    0618-9597 [employer]. [address] [place] [address]. All rights reserved. This information is not intended as a substitute for professional medical care. Always follow your healthcare professional's instructions.

## 2018-03-29 ENCOUNTER — MYC MEDICAL ADVICE (OUTPATIENT)
Dept: OBGYN | Facility: CLINIC | Age: 38
End: 2018-03-29

## 2018-03-29 DIAGNOSIS — N92.0 MENORRHAGIA: Primary | ICD-10-CM

## 2018-03-29 DIAGNOSIS — R30.0 DYSURIA: ICD-10-CM

## 2018-03-29 NOTE — TELEPHONE ENCOUNTER
OK for patient to complete her antibiotics.  Recommend aggressive hydration.  Usually cipro does not cause yeast infections.      Hina Pro M.D.

## 2018-03-29 NOTE — TELEPHONE ENCOUNTER
Please have the patient get a formal pelvic ultrasound and have her on the wait list.      Hina Pro M.D.

## 2018-03-30 RX ORDER — FLUCONAZOLE 150 MG/1
150 TABLET ORAL ONCE
Qty: 1 TABLET | Refills: 0 | Status: SHIPPED | OUTPATIENT
Start: 2018-03-30 | End: 2018-03-30

## 2018-03-31 ENCOUNTER — HOSPITAL ENCOUNTER (OUTPATIENT)
Dept: ULTRASOUND IMAGING | Facility: CLINIC | Age: 38
Discharge: HOME | End: 2018-03-31
Attending: OBSTETRICS & GYNECOLOGY | Admitting: OBSTETRICS & GYNECOLOGY

## 2018-03-31 DIAGNOSIS — N92.0 MENORRHAGIA: ICD-10-CM

## 2018-03-31 PROCEDURE — 76856 US EXAM PELVIC COMPLETE: CPT

## 2018-04-03 ENCOUNTER — OFFICE VISIT (OUTPATIENT)
Dept: OBGYN | Facility: CLINIC | Age: 38
End: 2018-04-03
Payer: COMMERCIAL

## 2018-04-03 ENCOUNTER — MYC MEDICAL ADVICE (OUTPATIENT)
Dept: OBGYN | Facility: CLINIC | Age: 38
End: 2018-04-03

## 2018-04-03 ENCOUNTER — TELEPHONE (OUTPATIENT)
Dept: OBGYN | Facility: CLINIC | Age: 38
End: 2018-04-03

## 2018-04-03 VITALS
BODY MASS INDEX: 25.32 KG/M2 | DIASTOLIC BLOOD PRESSURE: 96 MMHG | HEART RATE: 81 BPM | TEMPERATURE: 98.7 F | RESPIRATION RATE: 18 BRPM | SYSTOLIC BLOOD PRESSURE: 140 MMHG | WEIGHT: 129 LBS | HEIGHT: 60 IN

## 2018-04-03 DIAGNOSIS — N94.6 DYSMENORRHEA: Primary | ICD-10-CM

## 2018-04-03 DIAGNOSIS — F41.9 ANXIETY: ICD-10-CM

## 2018-04-03 DIAGNOSIS — Z12.4 SCREENING FOR MALIGNANT NEOPLASM OF CERVIX: ICD-10-CM

## 2018-04-03 DIAGNOSIS — N92.0 MENORRHAGIA WITH REGULAR CYCLE: ICD-10-CM

## 2018-04-03 DIAGNOSIS — D25.1 INTRAMURAL LEIOMYOMA OF UTERUS: ICD-10-CM

## 2018-04-03 PROCEDURE — G0145 SCR C/V CYTO,THINLAYER,RESCR: HCPCS | Performed by: OBSTETRICS & GYNECOLOGY

## 2018-04-03 PROCEDURE — G0476 HPV COMBO ASSAY CA SCREEN: HCPCS | Performed by: OBSTETRICS & GYNECOLOGY

## 2018-04-03 PROCEDURE — 99214 OFFICE O/P EST MOD 30 MIN: CPT | Performed by: OBSTETRICS & GYNECOLOGY

## 2018-04-03 RX ORDER — OXYCODONE HCL 10 MG/1
10 TABLET, FILM COATED, EXTENDED RELEASE ORAL ONCE
Status: CANCELLED | OUTPATIENT
Start: 2018-04-03 | End: 2018-04-03

## 2018-04-03 RX ORDER — PHENAZOPYRIDINE HYDROCHLORIDE 200 MG/1
200 TABLET, FILM COATED ORAL ONCE
Status: CANCELLED | OUTPATIENT
Start: 2018-04-03 | End: 2018-04-03

## 2018-04-03 RX ORDER — CEFAZOLIN SODIUM 2 G/100ML
2 INJECTION, SOLUTION INTRAVENOUS
Status: CANCELLED | OUTPATIENT
Start: 2018-04-03

## 2018-04-03 NOTE — LETTER
[facility]  [address] [place] [address]  [phone]    April 3, 2018      To Whom It May Concern:      Rober Renee ( 1980) is a patient here at [facility].  She is scheduled for a surgery on 18 and will need this date and up to 6 weeks after for post-op recovery.      Sincerely,      Taisha Wilson MD

## 2018-04-03 NOTE — NURSING NOTE
Initial BP (!) 140/96 (BP Location: Right arm, Patient Position: Chair, Cuff Size: Adult Small)  Pulse 81  Temp 98.7  F (37.1  C) (Tympanic)  Resp 18  Ht 5' (1.524 m)  Wt 129 lb (58.5 kg)  LMP 03/10/2018  BMI 25.19 kg/m2 Estimated body mass index is 25.19 kg/(m^2) as calculated from the following:    Height as of this encounter: 5' (1.524 m).    Weight as of this encounter: 129 lb (58.5 kg). .

## 2018-04-03 NOTE — PROGRESS NOTES
Rober is a 37 year old   female who presents for evaluation as requested by recent ER visit; Rober states her menses have become more than a minor problem for her, 4 days are extremely heavy with large clots and 2 days are unbearably painful, refractory to Ibuprofen and Tylenol; she has had to miss or curtail work due to her symptoms; she denies dyspareunia; she has not plans for more children, had a BILATERAL TUBAL STERILIZATION with her c/section.  She is not a smoker but generally does not like BCP  She had a pelvic sonogram in the ER and it showed an enlarged 11-12 cm uterus with an intramural fibroid present, generous endometrial stripe and a 3cm complex left ovarian cyst..    Patient Active Problem List    Diagnosis Date Noted     Anxiety 2017     Priority: Medium     Major depressive disorder, single episode, moderate (H) 2016     Priority: Medium     Renal stones 10/09/2015     Priority: Medium     Migraine headache 2012     Priority: Medium     Menorrhagia 2012     Priority: Medium     CARDIOVASCULAR SCREENING; LDL GOAL LESS THAN 160 10/31/2010     Priority: Medium     ASCUS on Pap smear 2009     Priority: Medium     10/16/09:pap--ASCUS, + HPV 53. Repeat pap at 6 wk PP visit. (2010)  2010:Pap--NIL. Repeat pap 1 year.  12:Pap--NIL. Repeat pap 1 year.  Pap --if normal, then Q3 year. Pap--NIL. Next pap in 3 years. HM updated.         All systems were reviewed and pertinent information in noted in subjective/HPI.    Past Medical History:   Diagnosis Date     Calculus of kidney     right side     Chickenpox      Gestational diabetes ,     History of postpartum depression, currently pregnant     mild     Intervertebral lumbar disc disorder with myelopathy, lumbar region     herniated disks, L4-5, s/p steroid injection     Migraines      Urinary tract bacterial infections        Past Surgical History:   Procedure Laterality Date       SECTION, TUBAL LIGATION, COMBINED  6/4/2013    c/section with BTL         Current Outpatient Prescriptions:      ferrous sulfate (IRON) 325 (65 FE) MG tablet, Take 1 tablet (325 mg) by mouth 3 times daily (with meals), Disp: 90 tablet, Rfl: 2     Cholecalciferol (VITAMIN D) 2000 UNITS tablet, Take 2,000 Units by mouth daily, Disp: 100 tablet, Rfl: 3     LORazepam (ATIVAN) 1 MG tablet, Take 1 tablet (1 mg) by mouth every 8 hours as needed for anxiety, Disp: 20 tablet, Rfl: 0     Capsaicin 0.1 % cream, Apply topically 4 times daily Apply sparingly using rubber gloves., Disp: 30 g, Rfl: 11     sertraline (ZOLOFT) 50 MG tablet, Take 1 tablet (50 mg) by mouth daily (Patient not taking: Reported on 12/21/2017), Disp: 30 tablet, Rfl: 1     cyclobenzaprine (FLEXERIL) 5 MG tablet, Take 1 tablet (5 mg) by mouth 3 times daily as needed for muscle spasms (Patient not taking: Reported on 4/3/2018), Disp: 42 tablet, Rfl: 0    ALLERGIES:  Imitrex [sumatriptan] and Sulfa drugs    Social History     Social History     Marital status:      Spouse name: N/A     Number of children: 4     Years of education: N/A     Occupational History     [employer]      Social History Main Topics     Smoking status: Former Smoker     Packs/day: 0.50     Years: 10.00     Types: Cigarettes     Quit date: 2/20/2008     Smokeless tobacco: Never Used     Alcohol use Yes      Comment: very seldom     Drug use: No     Sexual activity: Yes     Partners: Male     Birth control/ protection: Surgical      Comment: tubal     Other Topics Concern     Parent/Sibling W/ Cabg, Mi Or Angioplasty Before 65f 55m? No     Social History Narrative       Family History   Problem Relation Age of Onset     CANCER Maternal Grandfather      liver CA     CANCER Paternal Grandfather      prostate CA     CANCER Maternal Grandmother      ovarian CA     Other - See Comments Maternal Grandmother      migraine     DIABETES Paternal  Grandmother      Hypertension Paternal Grandmother      Hypertension Father      Depression Father      Other - See Comments Mother      migraine     Hypertension Brother      Other - See Comments Maternal Uncle      migraine     Depression Sister        OBJECTIVE:  Vitals: BP (!) 140/96 (BP Location: Right arm, Patient Position: Chair, Cuff Size: Adult Small)  Pulse 81  Temp 98.7  F (37.1  C) (Tympanic)  Resp 18  Ht 5' (1.524 m)  Wt 129 lb (58.5 kg)  LMP 03/10/2018  BMI 25.19 kg/m2 BMI= Body mass index is 25.19 kg/(m^2).   Patient's last menstrual period was 03/10/2018.     GENERAL APPEARANCE: healthy, alert and no distress  ABDOMEN:  soft, nontender, no hepato-splenomegaly or hernias  PELVIC:  EGBUS:  within normal limits  VAGINA:  normoestrogenic, well-supported, no unusual discharge  CERVIX:  smooth, non-friable, no gross lesions, thin-layer PAP was taken  UTERUS:  AV, enlarged 8-9 week size, soft texture and tender over fundus; no motion tenderness  ADNEXAE:  non-tender, no masses palpable, no cul de sac nodularity    ASSESSMENT:      ICD-10-CM    1. Dysmenorrhea N94.6 [facility]-Operative Worksheet GYN   2. Menorrhagia with regular cycle N92.0    3. Intramural leiomyoma of uterus D25.1 [facility]-Operative Worksheet GYN   4. Screening for malignant neoplasm of cervix Z12.4 Pap imaged thin layer screen with HPV - recommended age 30 - 65 years (select HPV order below)     HPV High Risk Types DNA Cervical       PLAN:  I would suspect based upon her symptoms that Rober has a component of adenomyosis (variant of endometriosis) in the uterine wall; we discussed treatment options including, BCP, Mirena IUD, endometrial ablation and LAPAROSCOPIC ASSISTED VAGINAL HYSTERECTOMY-possible bilateral salpingo-oophorectomy. We weighed risks and benefits of each, and after counseling, Rober would like to proceed with LAPAROSCOPIC ASSISTED VAGINAL HYSTERECTOMY with bilateral salpingo-oophorectomy (if significant endometriosis is  encountered).  All questions were answered and no promises were made or implied  Lilia Wilson MD  [facility]    Duration of visit:  25 minutes, >50% in discussion of current issues, treatment options and treatment planning.  LILIA Wilson MD

## 2018-04-03 NOTE — MR AVS SNAPSHOT
After Visit Summary   4/3/2018    Rober Renee    MRN: [id number]           Patient Information     Date Of Birth          1980        Visit Information        Provider Department      4/3/2018 11:00 AM Taisha Wilson MD [facility]        Today's Diagnoses     Dysmenorrhea    -  1    Menorrhagia with regular cycle        Intramural leiomyoma of uterus        Screening for malignant neoplasm of cervix           Follow-ups after your visit        Who to contact     If you have questions or need follow up information about today's clinic visit or your schedule please contact [facility] directly at [phone].  Normal or non-critical lab and imaging results will be communicated to you by MyChart, letter or phone within 4 business days after the clinic has received the results. If you do not hear from us within 7 days, please contact the clinic through [employer] or phone. If you have a critical or abnormal lab result, we will notify you by phone as soon as possible.  Submit refill requests through "[employer] or call your pharmacy and they will forward the refill request to us. Please allow 3 business days for your refill to be completed.          Additional Information About Your Visit        MyChart Information     "[employer] gives you secure access to your electronic health record. If you see a primary care provider, you can also send messages to your care team and make appointments. If you have questions, please call your primary care clinic.  If you do not have a primary care provider, please call [phone] and they will assist you.        Care EveryWhere ID     This is your Care EveryWhere ID. This could be used by other organizations to access your [place] medical records  [id number]        Your Vitals Were     Pulse Temperature Respirations Height Last Period BMI (Body Mass Index)    81 98.7  F (37.1  C) (Tympanic) 18 5' (1.524 m) 03/10/2018 25.19 kg/m2        Blood Pressure from Last 3 Encounters:   04/03/18 (!) 140/96   03/24/18 124/77   12/21/17 120/74    Weight from Last 3 Encounters:   04/03/18 129 lb (58.5 kg)   03/24/18 120 lb (54.4 kg)   12/21/17 123 lb 6.4 oz (56 kg)              We Performed the Following     HPV High Risk Types DNA Cervical     Pap imaged thin layer screen with HPV - recommended age 30 - 65 years (select HPV order below)     Julianna-Operative Worksheet GYN        Primary Care Provider Office Phone # Fax #    R Joe Sanz [phone] [address] [address]        Equal Access to Services     KEYANA MACK : Allyson Calderon, wadacia rojas, qaybta kaalmada kings, srinivas mcneill. So [facility] [place] [phone].    ATENCIÓN: Si habla español, tiene a nguyen disposición servicios gratuitos de asistencia lingüística. Llame al [phone].    We comply with applicable federal civil rights laws and [place] laws. We do not discriminate on the basis of race, color, national origin, age, disability, sex, sexual orientation, or gender identity.            Thank you!     Thank you for choosing [facility]  for your care. Our goal is always to provide you with excellent care. Hearing back from our patients is one way we can continue to improve our services. Please take a few minutes to complete the written survey that you may receive in the mail after your visit with us. Thank you!             Your Updated Medication List - Protect others around you: Learn how to safely use, store and throw away your medicines at www.disposemymeds.org.          This list is accurate as of 4/3/18 11:18 AM.  Always use your most recent med list.                   Brand Name Dispense Instructions for use Diagnosis    Capsaicin 0.1 % cream     30 g    Apply topically 4 times daily Apply sparingly using rubber gloves.    Myofascial pain syndrome       cyclobenzaprine 5 MG tablet    FLEXERIL     42 tablet    Take 1 tablet (5 mg) by mouth 3 times daily as needed for muscle spasms        ferrous sulfate 325 (65 FE) MG tablet    IRON    90 tablet    Take 1 tablet (325 mg) by mouth 3 times daily (with meals)    Iron deficiency anemia due to chronic blood loss       LORazepam 1 MG tablet    ATIVAN    20 tablet    Take 1 tablet (1 mg) by mouth every 8 hours as needed for anxiety    Anxiety       sertraline 50 MG tablet    ZOLOFT    30 tablet    Take 1 tablet (50 mg) by mouth daily    Anxiety       vitamin D 2000 UNITS tablet     100 tablet    Take 2,000 Units by mouth daily    Vitamin D deficiency

## 2018-04-03 NOTE — TELEPHONE ENCOUNTER
"  You are now scheduled for surgery at [facility].  Below are the details for your surgery.  Please read the \"Preparing for Your Surgery\" instructions and let us know if you have any questions.    Type of surgery: Laparoscopic assisted vaginal hysterectomy with possible bilateral salpingoophorectomy.            Surgeon:  Taisha Wilson MD  Location of surgery: [facility] OR    Date of surgery: 4-24-18    Time: 7:30am   Arrival Time: 6:00am    Time can change, to be confirmed a couple of days prior by pre-op surgery nurse.    Pre-Op Appt Date: Patient to schedule with a PCP or Family Practice Provider within 30 days to the surgery.  Post-Op Appt Date: To be determined by provider     Packet sent out: Yes  Pre-cert/Authorization completed:  TBD by Financial Securing Office.   MA Sterilization/Hysterectomy Acknowledgment Consent signed: Not Applicable    [facility]  [phone]    Fax: [phone]  Same Day Surgery [phone]  Fax: [phone]    "

## 2018-04-05 LAB
COPATH REPORT: NORMAL
PAP: NORMAL

## 2018-04-05 RX ORDER — LORAZEPAM 1 MG/1
1 TABLET ORAL EVERY 8 HOURS PRN
Qty: 20 TABLET | Refills: 0 | Status: SHIPPED | OUTPATIENT
Start: 2018-04-05 | End: 2018-04-06

## 2018-04-05 RX ORDER — HYDROCODONE BITARTRATE AND ACETAMINOPHEN 5; 325 MG/1; MG/1
1-2 TABLET ORAL EVERY 4 HOURS PRN
Qty: 20 TABLET | Refills: 0 | Status: ON HOLD | OUTPATIENT
Start: 2018-04-05 | End: 2018-04-10

## 2018-04-05 NOTE — TELEPHONE ENCOUNTER
Hand signed rx for Norco and Ativan walked to the [facility] - pt informed.    -Maricarmen SOTO [facility]  [facility] [place]

## 2018-04-06 ENCOUNTER — OFFICE VISIT (OUTPATIENT)
Dept: FAMILY MEDICINE | Facility: CLINIC | Age: 38
End: 2018-04-06
Payer: COMMERCIAL

## 2018-04-06 VITALS
SYSTOLIC BLOOD PRESSURE: 149 MMHG | TEMPERATURE: 98.7 F | WEIGHT: 125.5 LBS | HEIGHT: 60 IN | RESPIRATION RATE: 16 BRPM | HEART RATE: 98 BPM | DIASTOLIC BLOOD PRESSURE: 99 MMHG | BODY MASS INDEX: 24.64 KG/M2

## 2018-04-06 DIAGNOSIS — N92.0 MENORRHAGIA WITH REGULAR CYCLE: ICD-10-CM

## 2018-04-06 DIAGNOSIS — D25.1 INTRAMURAL LEIOMYOMA OF UTERUS: ICD-10-CM

## 2018-04-06 DIAGNOSIS — Z01.818 PREOP GENERAL PHYSICAL EXAM: Primary | ICD-10-CM

## 2018-04-06 DIAGNOSIS — N94.6 DYSMENORRHEA: ICD-10-CM

## 2018-04-06 LAB
FINAL DIAGNOSIS: ABNORMAL
HPV HR 12 DNA CVX QL NAA+PROBE: POSITIVE
HPV16 DNA SPEC QL NAA+PROBE: NEGATIVE
HPV18 DNA SPEC QL NAA+PROBE: NEGATIVE
SPECIMEN DESCRIPTION: ABNORMAL
SPECIMEN SOURCE CVX/VAG CYTO: ABNORMAL

## 2018-04-06 PROCEDURE — 99214 OFFICE O/P EST MOD 30 MIN: CPT | Performed by: FAMILY MEDICINE

## 2018-04-06 NOTE — MR AVS SNAPSHOT
After Visit Summary   4/6/2018    Rober Renee    MRN: [id number]           Patient Information     Date Of Birth          1980        Visit Information        Provider Department      4/6/2018 3:20 PM Post, EVERETT Diaz MD [facility]        Today's Diagnoses     Preop general physical exam    -  1      Care Instructions      Before Your Surgery      Call your surgeon if there is any change in your health. This includes signs of a cold or flu (such as a sore throat, runny nose, cough, rash or fever).    Do not smoke, drink alcohol or take over the counter medicine (unless your surgeon or primary care doctor tells you to) for the 24 hours before and after surgery.    If you take prescribed drugs: Follow your doctor s orders about which medicines to take and which to stop until after surgery.    Eating and drinking prior to surgery: follow the instructions from your surgeon    Take a shower or bath the night before surgery. Use the soap your surgeon gave you to gently clean your skin. If you do not have soap from your surgeon, use your regular soap. Do not shave or scrub the surgery site.  Wear clean pajamas and have clean sheets on your bed.           Follow-ups after your visit        Your next 10 appointments already scheduled     Apr 10, 2018   Procedure with Taisha Wilson MD   [facility] (--)    [address] [address] [phone]           The medical center is located at [address] (between [address] and [address] in [place], [place] north of [place]).              Who to contact     If you have questions or need follow up information about today's clinic visit or your schedule please contact [facility] directly at [phone].  Normal or non-critical lab and imaging results will be communicated to you by MyChart, letter or phone within 4 business days after the clinic has received the results.  If you do not hear from us within 7 days, please contact the clinic through [employer] or phone. If you have a critical or abnormal lab result, we will notify you by phone as soon as possible.  Submit refill requests through [employer] or call your pharmacy and they will forward the refill request to us. Please allow 3 business days for your refill to be completed.          Additional Information About Your Visit        [employer] Information     [employer] gives you secure access to your electronic health record. If you see a primary care provider, you can also send messages to your care team and make appointments. If you have questions, please call your primary care clinic.  If you do not have a primary care provider, please call [phone] and they will assist you.        Care EveryWhere ID     This is your Care EveryWhere ID. This could be used by other organizations to access your [place] medical records  [id number]        Your Vitals Were     Pulse Temperature Respirations Height Last Period BMI (Body Mass Index)    98 98.7  F (37.1  C) (Tympanic) 16 5' (1.524 m) 03/10/2018 24.51 kg/m2       Blood Pressure from Last 3 Encounters:   04/06/18 (!) 149/99   04/03/18 (!) 140/96   03/24/18 124/77    Weight from Last 3 Encounters:   04/06/18 125 lb 8 oz (56.9 kg)   04/03/18 129 lb (58.5 kg)   03/24/18 120 lb (54.4 kg)              Today, you had the following     No orders found for display       Primary Care Provider Office Phone # Fax #    R Joe Sanz [phone] [address] [address]        Equal Access to Services     [facility] : Hadii dior Calderon, karla rojas, qasrinivas solis. So [facility] [phone].    ATENCIÓN: Si habla español, tiene a nguyen disposición servicios gratuitos de asistencia lingüística. Llame al [phone].    We comply with applicable federal civil rights laws and [place] laws. We do not  discriminate on the basis of race, color, national origin, age, disability, sex, sexual orientation, or gender identity.            Thank you!     Thank you for choosing [facility]  for your care. Our goal is always to provide you with excellent care. Hearing back from our patients is one way we can continue to improve our services. Please take a few minutes to complete the written survey that you may receive in the mail after your visit with us. Thank you!             Your Updated Medication List - Protect others around you: Learn how to safely use, store and throw away your medicines at www.disposemymeds.org.          This list is accurate as of 4/6/18  3:46 PM.  Always use your most recent med list.                   Brand Name Dispense Instructions for use Diagnosis    ferrous sulfate 325 (65 FE) MG tablet    IRON    90 tablet    Take 1 tablet (325 mg) by mouth 3 times daily (with meals)    Iron deficiency anemia due to chronic blood loss       HYDROcodone-acetaminophen 5-325 MG per tablet    NORCO    20 tablet    Take 1-2 tablets by mouth every 4 hours as needed for pain maximum 10 tablet(s) per day    Dysmenorrhea       vitamin D 2000 UNITS tablet     100 tablet    Take 2,000 Units by mouth daily    Vitamin D deficiency

## 2018-04-06 NOTE — NURSING NOTE
Chief Complaint   Patient presents with     Pre-Op Exam     surgery 4/10/2018 with Dr. Wilson       Initial BP (!) 149/99  Pulse 98  Temp 98.7  F (37.1  C) (Tympanic)  Resp 16  Ht 5' (1.524 m)  Wt 125 lb 8 oz (56.9 kg)  LMP 03/10/2018  BMI 24.51 kg/m2 Estimated body mass index is 24.51 kg/(m^2) as calculated from the following:    Height as of this encounter: 5' (1.524 m).    Weight as of this encounter: 125 lb 8 oz (56.9 kg).  Medication Reconciliation: complete     Mayra Schneider, CMA

## 2018-04-06 NOTE — PROGRESS NOTES
[facility]  [address] [facility] [address]  [phone]  Dept: [phone]    PRE-OP EVALUATION:  Today's date: 2018    Rober Renee (: 1980) presents for pre-operative evaluation assessment as requested by Dr. Wilson.  She requires evaluation and anesthesia risk assessment prior to undergoing surgery/procedure for treatment of dysmenorrhea, menorrhagia, uterine leiomyoma    Proposed Surgery/ Procedure:        Laparoscopic assisted vaginal hysterectomy with possible bilateral salpingoophorectomy         Date of Surgery/ Procedure: 4/10/2018  Time of Surgery/ Procedure: 10:05 am  Hospital/Surgical Facility[facility]    Primary Physician: EVERETT Sanz  Type of Anesthesia Anticipated: General    Patient has a Health Care Directive or Living Will:  NO    1. NO - Do you have a history of heart attack, stroke, stent, bypass or surgery on an artery in the head, neck, heart or legs?  2. YES - Do you ever have any pain or discomfort in your chest?  Not exertional  3. NO - Do you have a history of  Heart Failure?  4. NO - Are you troubled by shortness of breath when: walking on the level, up a slight hill or at night?  5. NO - Do you currently have a cold, bronchitis or other respiratory infection?  6. NO - Do you have a cough, shortness of breath or wheezing?  7. NO - Do you sometimes get pains in the calves of your legs when you walk?  8. NO - Do you or anyone in your family have previous history of blood clots?  9. NO - Do you or does anyone in your family have a serious bleeding problem such as prolonged bleeding following surgeries or cuts?  10. YES - Have you ever had problems with anemia or been told to take iron pills?  See HPI   11. YES- Have you had any abnormal blood loss such as black, tarry or bloody stools, or abnormal vaginal bleeding?  See HPI  12. NO - Have you ever had a blood transfusion?  13. NO - Have you or any of your relatives ever had problems with  anesthesia?  14. NO - Do you have sleep apnea, excessive snoring or daytime drowsiness?  15. NO - Do you have any prosthetic heart valves?  16. NO - Do you have prosthetic joints?  17. NO - Is there any chance that you may be pregnant?      HPI:     HPI related to upcoming procedure: She has been dealing with heavy and prolonged periods for some time and actually develop iron deficiency anemia.  She was seen by Dr. Wilson and the above surgery was recommended      See problem list for active medical problems.  Problems all longstanding and stable, except as noted/documented.  See ROS for pertinent symptoms related to these conditions.                                                                                                                                                          .    MEDICAL HISTORY:     Patient Active Problem List    Diagnosis Date Noted     Dysmenorrhea 04/03/2018     Priority: Medium     Intramural leiomyoma of uterus 04/03/2018     Priority: Medium     Anxiety 12/04/2017     Priority: Medium     Major depressive disorder, single episode, moderate (H) 02/29/2016     Priority: Medium     Renal stones 10/09/2015     Priority: Medium     Migraine headache 09/11/2012     Priority: Medium     Menorrhagia 01/27/2012     Priority: Medium     CARDIOVASCULAR SCREENING; LDL GOAL LESS THAN 160 10/31/2010     Priority: Medium     ASCUS on Pap smear 11/02/2009     Priority: Medium     10/16/09:pap--ASCUS, + HPV 53. Repeat pap at 6 wk PP visit. (9/2010)  8/11/2010:Pap--NIL. Repeat pap 1 year.  1/27/12:Pap--NIL. Repeat pap 1 year.  Pap 7/13--if normal, then Q3 year. Pap--NIL. Next pap in 3 years. HM updated.        Past Medical History:   Diagnosis Date     Calculus of kidney 12/06    right side     Chickenpox      Gestational diabetes 2002,2010     History of postpartum depression, currently pregnant     mild     Intervertebral lumbar disc disorder with myelopathy, lumbar region     herniated disks,  "L4-5, s/p steroid injection     Migraines      Urinary tract bacterial infections      Past Surgical History:   Procedure Laterality Date      SECTION, TUBAL LIGATION, COMBINED  2013    c/section with BTL     Current Outpatient Prescriptions   Medication Sig Dispense Refill     HYDROcodone-acetaminophen (NORCO) 5-325 MG per tablet Take 1-2 tablets by mouth every 4 hours as needed for pain maximum 10 tablet(s) per day 20 tablet 0     ferrous sulfate (IRON) 325 (65 FE) MG tablet Take 1 tablet (325 mg) by mouth 3 times daily (with meals) 90 tablet 2     Cholecalciferol (VITAMIN D) 2000 UNITS tablet Take 2,000 Units by mouth daily 100 tablet 3     OTC products: None, except as noted above    Allergies   Allergen Reactions     Imitrex [Sumatriptan] Other (See Comments)     Felt terrible, \"like pores were on fire\"     Sulfa Drugs Hives      Latex Allergy: NO    Social History   Substance Use Topics     Smoking status: Former Smoker     Packs/day: 0.50     Years: 10.00     Types: Cigarettes     Quit date: 2008     Smokeless tobacco: Never Used     Alcohol use Yes      Comment: very seldom     History   Drug Use No       REVIEW OF SYSTEMS:   CONSTITUTIONAL: NEGATIVE for fever, chills, change in weight  ENT/MOUTH: NEGATIVE for ear, mouth and throat problems  RESP: NEGATIVE for significant cough or SOB  CV: NEGATIVE for chest pain, palpitations or peripheral edema    EXAM:   BP (!) 149/99  Pulse 98  Temp 98.7  F (37.1  C) (Tympanic)  Resp 16  Ht 5' (1.524 m)  Wt 125 lb 8 oz (56.9 kg)  LMP 03/10/2018  BMI 24.51 kg/m2    GENERAL APPEARANCE: healthy, alert and no distress     EYES: EOMI, PERRL     HENT: ear canals and TM's normal and nose and mouth without ulcers or lesions     NECK: no adenopathy, no asymmetry, masses, or scars and thyroid normal to palpation     RESP: lungs clear to auscultation - no rales, rhonchi or wheezes     CV: regular rates and rhythm, normal S1 S2, no S3 or S4 and no murmur, " click or rub     ABDOMEN:  soft, nontender, no HSM or masses and bowel sounds normal     MS: extremities normal- no gross deformities noted, no evidence of inflammation in joints, FROM in all extremities.     SKIN: no suspicious lesions or rashes     NEURO: Normal strength and tone, sensory exam grossly normal, mentation intact and speech normal     PSYCH: mentation appears normal. and affect normal/bright     LYMPHATICS: No cervical adenopathy    DIAGNOSTICS:   EKG: Not indicated due to non-vascular surgery and low risk of event (age <65 and without cardiac risk factors)    Recent Labs   Lab Test  03/24/18   1950  11/28/17   1355   HGB  14.9  11.6*   PLT  128*  210   NA  139  139   POTASSIUM  3.6  4.3   CR  0.55  0.65        IMPRESSION:   Reason for surgery/procedure: Dysmenorrhea and menorrhagia  Diagnosis/reason for consult: Evaluate for anesthesia risk    The proposed surgical procedure is considered LOW risk.    REVISED CARDIAC RISK INDEX  The patient has the following serious cardiovascular risks for perioperative complications such as (MI, PE, VFib and 3  AV Block):  No serious cardiac risks  INTERPRETATION: 0 risks: Class I (very low risk - 0.4% complication rate)    The patient has the following additional risks for perioperative complications:  No identified additional risks      ICD-10-CM    1. Preop general physical exam Z01.818    2. Dysmenorrhea N94.6    3. Menorrhagia with regular cycle N92.0    4. Intramural leiomyoma of uterus D25.1        RECOMMENDATIONS:         --Patient is to take all scheduled medications on the day of surgery EXCEPT for modifications listed below.    Anticoagulant or Antiplatelet Medication Use  NSAIDS: Ibuprofen (Motrin):         Stop 4 days prior to surgery        APPROVAL GIVEN to proceed with proposed procedure, without further diagnostic evaluation       Signed Electronically by: EVERETT Sanz MD    Copy of this evaluation report is provided to requesting  physician.    [place] Preop Guidelines    Revised Cardiac Risk Index

## 2018-04-07 ENCOUNTER — RESULT FOLLOW UP (OUTPATIENT)
Dept: OBGYN | Facility: CLINIC | Age: 38
End: 2018-04-07

## 2018-04-07 DIAGNOSIS — R87.810 CERVICAL HIGH RISK HPV (HUMAN PAPILLOMAVIRUS) TEST POSITIVE: Primary | ICD-10-CM

## 2018-04-09 ENCOUNTER — ANESTHESIA EVENT (OUTPATIENT)
Dept: SURGERY | Facility: CLINIC | Age: 38
End: 2018-04-09
Payer: COMMERCIAL

## 2018-04-09 ENCOUNTER — NURSE TRIAGE (OUTPATIENT)
Dept: NURSING | Facility: CLINIC | Age: 38
End: 2018-04-09

## 2018-04-09 ASSESSMENT — LIFESTYLE VARIABLES: TOBACCO_USE: 1

## 2018-04-09 NOTE — ANESTHESIA PREPROCEDURE EVALUATION
Anesthesia Evaluation     . Pt has had prior anesthetic. Type: General           ROS/MED HX    ENT/Pulmonary:     (+)tobacco use, Past use , . .    Neurologic:     (+)migraines,     Cardiovascular: Comment: Nonexertional chest pain - neg cardiovascular ROS       METS/Exercise Tolerance:     Hematologic:     (+) Anemia, -      Musculoskeletal: Comment: Lumbar herniated discs - neg musculoskeletal ROS       GI/Hepatic:  - neg GI/hepatic ROS       Renal/Genitourinary: Comment: History UTIs    (+) Nephrolithiasis ,       Endo:         Psychiatric:     (+) psychiatric history anxiety and depression      Infectious Disease:         Malignancy:         Other: Comment: Menorrhagia  Dysmenorrhea  Intramural leiomyoma uterus                    Physical Exam  Normal systems: cardiovascular, pulmonary and dental    Airway   Mallampati: II  TM distance: >3 FB  Neck ROM: full    Dental     Cardiovascular       Pulmonary                     Anesthesia Plan      History & Physical Review  History and physical reviewed and following examination; no interval change.    ASA Status:  2 .    NPO Status:  > 8 hours    Plan for General and ETT with Intravenous and Propofol induction. Maintenance will be Balanced.    PONV prophylaxis:  Ondansetron (or other 5HT-3) and Dexamethasone or Solumedrol  Additional equipment: Videolaryngoscope      Postoperative Care  Postoperative pain management:  IV analgesics and Oral pain medications.      Consents  Anesthetic plan, risks, benefits and alternatives discussed with:  Patient..                          .

## 2018-04-09 NOTE — TELEPHONE ENCOUNTER
Caller is inquiring if it is okay to take  Norco for  cramping pain tonight; is  for hysterectomy in morning and was instructed not to take NSAIDs  Advised that Norco is not an NSAID and that she can take it  Understands ans will comply  Glenna Vides RN  FNA    Additional Information    Caller has medication question, adult has minor symptoms, caller declines triage, and triager answers question    Protocols used: MEDICATION QUESTION CALL-ADULT-

## 2018-04-10 ENCOUNTER — HOSPITAL ENCOUNTER (OUTPATIENT)
Facility: CLINIC | Age: 38
Discharge: HOME | End: 2018-04-10
Attending: OBSTETRICS & GYNECOLOGY | Admitting: OBSTETRICS & GYNECOLOGY
Payer: COMMERCIAL

## 2018-04-10 ENCOUNTER — SURGERY (OUTPATIENT)
Age: 38
End: 2018-04-10

## 2018-04-10 ENCOUNTER — ANESTHESIA (OUTPATIENT)
Dept: SURGERY | Facility: CLINIC | Age: 38
End: 2018-04-10
Payer: COMMERCIAL

## 2018-04-10 VITALS
TEMPERATURE: 97.6 F | HEIGHT: 60 IN | DIASTOLIC BLOOD PRESSURE: 76 MMHG | WEIGHT: 125 LBS | OXYGEN SATURATION: 94 % | SYSTOLIC BLOOD PRESSURE: 125 MMHG | BODY MASS INDEX: 24.54 KG/M2 | RESPIRATION RATE: 16 BRPM

## 2018-04-10 DIAGNOSIS — Z98.890 POSTOPERATIVE STATE: ICD-10-CM

## 2018-04-10 DIAGNOSIS — E89.40 SURGICAL MENOPAUSE: Primary | ICD-10-CM

## 2018-04-10 PROBLEM — N80.9 ENDOMETRIOSIS DETERMINED BY LAPAROSCOPY: Status: ACTIVE | Noted: 2018-04-10

## 2018-04-10 LAB
ABO + RH BLD: NORMAL
ABO + RH BLD: NORMAL
BLD GP AB SCN SERPL QL: NORMAL
BLOOD BANK CMNT PATIENT-IMP: NORMAL
ERYTHROCYTE [DISTWIDTH] IN BLOOD BY AUTOMATED COUNT: 11.7 % (ref 10–15)
HCT VFR BLD AUTO: 36.7 % (ref 35–47)
HGB BLD-MCNC: 12.8 G/DL (ref 11.7–15.7)
MCH RBC QN AUTO: 33.2 PG (ref 26.5–33)
MCHC RBC AUTO-ENTMCNC: 34.9 G/DL (ref 31.5–36.5)
MCV RBC AUTO: 95 FL (ref 78–100)
PLATELET # BLD AUTO: 195 10E9/L (ref 150–450)
RBC # BLD AUTO: 3.85 10E12/L (ref 3.8–5.2)
SPECIMEN EXP DATE BLD: NORMAL
WBC # BLD AUTO: 4.1 10E9/L (ref 4–11)

## 2018-04-10 PROCEDURE — 71000012 ZZH RECOVERY PHASE 1 LEVEL 1 FIRST HR: Performed by: OBSTETRICS & GYNECOLOGY

## 2018-04-10 PROCEDURE — 25000128 H RX IP 250 OP 636: Performed by: NURSE ANESTHETIST, CERTIFIED REGISTERED

## 2018-04-10 PROCEDURE — 25000128 H RX IP 250 OP 636: Performed by: OBSTETRICS & GYNECOLOGY

## 2018-04-10 PROCEDURE — 25000132 ZZH RX MED GY IP 250 OP 250 PS 637: Performed by: OBSTETRICS & GYNECOLOGY

## 2018-04-10 PROCEDURE — 85027 COMPLETE CBC AUTOMATED: CPT | Performed by: OBSTETRICS & GYNECOLOGY

## 2018-04-10 PROCEDURE — 25000125 ZZHC RX 250: Performed by: NURSE ANESTHETIST, CERTIFIED REGISTERED

## 2018-04-10 PROCEDURE — 58552 LAPARO-VAG HYST INCL T/O: CPT | Performed by: OBSTETRICS & GYNECOLOGY

## 2018-04-10 PROCEDURE — 25000564 ZZH DESFLURANE, EA 15 MIN: Performed by: OBSTETRICS & GYNECOLOGY

## 2018-04-10 PROCEDURE — 37000009 ZZH ANESTHESIA TECHNICAL FEE, EACH ADDTL 15 MIN: Performed by: OBSTETRICS & GYNECOLOGY

## 2018-04-10 PROCEDURE — 36000093 ZZH SURGERY LEVEL 4 1ST 30 MIN: Performed by: OBSTETRICS & GYNECOLOGY

## 2018-04-10 PROCEDURE — 25000125 ZZHC RX 250: Performed by: OBSTETRICS & GYNECOLOGY

## 2018-04-10 PROCEDURE — 86901 BLOOD TYPING SEROLOGIC RH(D): CPT | Performed by: OBSTETRICS & GYNECOLOGY

## 2018-04-10 PROCEDURE — 37000008 ZZH ANESTHESIA TECHNICAL FEE, 1ST 30 MIN: Performed by: OBSTETRICS & GYNECOLOGY

## 2018-04-10 PROCEDURE — 27210794 ZZH OR GENERAL SUPPLY STERILE: Performed by: OBSTETRICS & GYNECOLOGY

## 2018-04-10 PROCEDURE — 71000027 ZZH RECOVERY PHASE 2 EACH 15 MINS: Performed by: OBSTETRICS & GYNECOLOGY

## 2018-04-10 PROCEDURE — 88307 TISSUE EXAM BY PATHOLOGIST: CPT | Mod: 26 | Performed by: OBSTETRICS & GYNECOLOGY

## 2018-04-10 PROCEDURE — 88307 TISSUE EXAM BY PATHOLOGIST: CPT | Performed by: OBSTETRICS & GYNECOLOGY

## 2018-04-10 PROCEDURE — 86900 BLOOD TYPING SEROLOGIC ABO: CPT | Performed by: OBSTETRICS & GYNECOLOGY

## 2018-04-10 PROCEDURE — 40000306 ZZH STATISTIC PRE PROC ASSESS II: Performed by: OBSTETRICS & GYNECOLOGY

## 2018-04-10 PROCEDURE — 27210995 ZZH RX 272: Performed by: OBSTETRICS & GYNECOLOGY

## 2018-04-10 PROCEDURE — 36415 COLL VENOUS BLD VENIPUNCTURE: CPT | Performed by: OBSTETRICS & GYNECOLOGY

## 2018-04-10 PROCEDURE — 86850 RBC ANTIBODY SCREEN: CPT | Performed by: OBSTETRICS & GYNECOLOGY

## 2018-04-10 PROCEDURE — 36000063 ZZH SURGERY LEVEL 4 EA 15 ADDTL MIN: Performed by: OBSTETRICS & GYNECOLOGY

## 2018-04-10 PROCEDURE — 71000013 ZZH RECOVERY PHASE 1 LEVEL 1 EA ADDTL HR: Performed by: OBSTETRICS & GYNECOLOGY

## 2018-04-10 RX ORDER — AMOXICILLIN 250 MG
1-2 CAPSULE ORAL 2 TIMES DAILY
Qty: 60 TABLET | Refills: 1 | Status: SHIPPED | OUTPATIENT
Start: 2018-04-10 | End: 2018-05-24

## 2018-04-10 RX ORDER — SODIUM CHLORIDE, SODIUM LACTATE, POTASSIUM CHLORIDE, CALCIUM CHLORIDE 600; 310; 30; 20 MG/100ML; MG/100ML; MG/100ML; MG/100ML
INJECTION, SOLUTION INTRAVENOUS CONTINUOUS
Status: DISCONTINUED | OUTPATIENT
Start: 2018-04-10 | End: 2018-04-10 | Stop reason: HOSPADM

## 2018-04-10 RX ORDER — IBUPROFEN 600 MG/1
600 TABLET, FILM COATED ORAL
Status: DISCONTINUED | OUTPATIENT
Start: 2018-04-10 | End: 2018-04-10 | Stop reason: HOSPADM

## 2018-04-10 RX ORDER — ALBUTEROL SULFATE 0.83 MG/ML
2.5 SOLUTION RESPIRATORY (INHALATION) EVERY 4 HOURS PRN
Status: DISCONTINUED | OUTPATIENT
Start: 2018-04-10 | End: 2018-04-10 | Stop reason: HOSPADM

## 2018-04-10 RX ORDER — ESTRADIOL 2 MG/1
2 TABLET ORAL DAILY
Qty: 90 TABLET | Refills: 3 | Status: SHIPPED | OUTPATIENT
Start: 2018-04-10 | End: 2018-06-14

## 2018-04-10 RX ORDER — NALOXONE HYDROCHLORIDE 0.4 MG/ML
.1-.4 INJECTION, SOLUTION INTRAMUSCULAR; INTRAVENOUS; SUBCUTANEOUS
Status: DISCONTINUED | OUTPATIENT
Start: 2018-04-10 | End: 2018-04-10 | Stop reason: HOSPADM

## 2018-04-10 RX ORDER — OXYCODONE HYDROCHLORIDE 5 MG/1
5-10 TABLET ORAL
Qty: 40 TABLET | Refills: 0 | Status: SHIPPED | OUTPATIENT
Start: 2018-04-10 | End: 2018-04-14

## 2018-04-10 RX ORDER — MEPERIDINE HYDROCHLORIDE 25 MG/ML
12.5 INJECTION INTRAMUSCULAR; INTRAVENOUS; SUBCUTANEOUS
Status: DISCONTINUED | OUTPATIENT
Start: 2018-04-10 | End: 2018-04-10 | Stop reason: HOSPADM

## 2018-04-10 RX ORDER — ONDANSETRON 2 MG/ML
INJECTION INTRAMUSCULAR; INTRAVENOUS PRN
Status: DISCONTINUED | OUTPATIENT
Start: 2018-04-10 | End: 2018-04-10

## 2018-04-10 RX ORDER — LIDOCAINE 40 MG/G
CREAM TOPICAL
Status: DISCONTINUED | OUTPATIENT
Start: 2018-04-10 | End: 2018-04-10 | Stop reason: HOSPADM

## 2018-04-10 RX ORDER — GLYCOPYRROLATE 0.2 MG/ML
INJECTION, SOLUTION INTRAMUSCULAR; INTRAVENOUS PRN
Status: DISCONTINUED | OUTPATIENT
Start: 2018-04-10 | End: 2018-04-10

## 2018-04-10 RX ORDER — HYDROMORPHONE HYDROCHLORIDE 1 MG/ML
.3-.5 INJECTION, SOLUTION INTRAMUSCULAR; INTRAVENOUS; SUBCUTANEOUS EVERY 10 MIN PRN
Status: DISCONTINUED | OUTPATIENT
Start: 2018-04-10 | End: 2018-04-10 | Stop reason: HOSPADM

## 2018-04-10 RX ORDER — LORAZEPAM 1 MG/1
TABLET ORAL
COMMUNITY
Start: 2018-04-05 | End: 2018-10-04

## 2018-04-10 RX ORDER — KETOROLAC TROMETHAMINE 30 MG/ML
30 INJECTION, SOLUTION INTRAMUSCULAR; INTRAVENOUS EVERY 6 HOURS PRN
Status: DISCONTINUED | OUTPATIENT
Start: 2018-04-10 | End: 2018-04-10 | Stop reason: HOSPADM

## 2018-04-10 RX ORDER — KETOROLAC TROMETHAMINE 30 MG/ML
INJECTION, SOLUTION INTRAMUSCULAR; INTRAVENOUS PRN
Status: DISCONTINUED | OUTPATIENT
Start: 2018-04-10 | End: 2018-04-10

## 2018-04-10 RX ORDER — OXYCODONE HYDROCHLORIDE 5 MG/1
5-10 TABLET ORAL
Status: COMPLETED | OUTPATIENT
Start: 2018-04-10 | End: 2018-04-10

## 2018-04-10 RX ORDER — BUPIVACAINE HYDROCHLORIDE AND EPINEPHRINE 5; 5 MG/ML; UG/ML
INJECTION, SOLUTION PERINEURAL PRN
Status: DISCONTINUED | OUTPATIENT
Start: 2018-04-10 | End: 2018-04-10 | Stop reason: HOSPADM

## 2018-04-10 RX ORDER — FENTANYL CITRATE 50 UG/ML
25-50 INJECTION, SOLUTION INTRAMUSCULAR; INTRAVENOUS
Status: DISCONTINUED | OUTPATIENT
Start: 2018-04-10 | End: 2018-04-10 | Stop reason: HOSPADM

## 2018-04-10 RX ORDER — ONDANSETRON 2 MG/ML
4 INJECTION INTRAMUSCULAR; INTRAVENOUS EVERY 30 MIN PRN
Status: DISCONTINUED | OUTPATIENT
Start: 2018-04-10 | End: 2018-04-10 | Stop reason: HOSPADM

## 2018-04-10 RX ORDER — OXYCODONE HCL 10 MG/1
10 TABLET, FILM COATED, EXTENDED RELEASE ORAL ONCE
Status: COMPLETED | OUTPATIENT
Start: 2018-04-10 | End: 2018-04-10

## 2018-04-10 RX ORDER — PHENAZOPYRIDINE HYDROCHLORIDE 200 MG/1
200 TABLET, FILM COATED ORAL ONCE
Status: COMPLETED | OUTPATIENT
Start: 2018-04-10 | End: 2018-04-10

## 2018-04-10 RX ORDER — CEFAZOLIN SODIUM 1 G/50ML
1 INJECTION, SOLUTION INTRAVENOUS SEE ADMIN INSTRUCTIONS
Status: DISCONTINUED | OUTPATIENT
Start: 2018-04-10 | End: 2018-04-10 | Stop reason: HOSPADM

## 2018-04-10 RX ORDER — ONDANSETRON 4 MG/1
4 TABLET, ORALLY DISINTEGRATING ORAL
Status: DISCONTINUED | OUTPATIENT
Start: 2018-04-10 | End: 2018-04-10 | Stop reason: HOSPADM

## 2018-04-10 RX ORDER — DEXAMETHASONE SODIUM PHOSPHATE 4 MG/ML
INJECTION, SOLUTION INTRA-ARTICULAR; INTRALESIONAL; INTRAMUSCULAR; INTRAVENOUS; SOFT TISSUE PRN
Status: DISCONTINUED | OUTPATIENT
Start: 2018-04-10 | End: 2018-04-10

## 2018-04-10 RX ORDER — HYDRALAZINE HYDROCHLORIDE 20 MG/ML
2.5-5 INJECTION INTRAMUSCULAR; INTRAVENOUS EVERY 10 MIN PRN
Status: DISCONTINUED | OUTPATIENT
Start: 2018-04-10 | End: 2018-04-10 | Stop reason: HOSPADM

## 2018-04-10 RX ORDER — PROPOFOL 10 MG/ML
INJECTION, EMULSION INTRAVENOUS PRN
Status: DISCONTINUED | OUTPATIENT
Start: 2018-04-10 | End: 2018-04-10

## 2018-04-10 RX ORDER — HYDROXYZINE HYDROCHLORIDE 25 MG/1
25 TABLET, FILM COATED ORAL
Status: COMPLETED | OUTPATIENT
Start: 2018-04-10 | End: 2018-04-10

## 2018-04-10 RX ORDER — DEXAMETHASONE SODIUM PHOSPHATE 4 MG/ML
4 INJECTION, SOLUTION INTRA-ARTICULAR; INTRALESIONAL; INTRAMUSCULAR; INTRAVENOUS; SOFT TISSUE EVERY 10 MIN PRN
Status: DISCONTINUED | OUTPATIENT
Start: 2018-04-10 | End: 2018-04-10 | Stop reason: HOSPADM

## 2018-04-10 RX ORDER — CEFAZOLIN SODIUM 2 G/100ML
2 INJECTION, SOLUTION INTRAVENOUS
Status: COMPLETED | OUTPATIENT
Start: 2018-04-10 | End: 2018-04-10

## 2018-04-10 RX ORDER — IBUPROFEN 600 MG/1
600 TABLET, FILM COATED ORAL EVERY 6 HOURS PRN
Qty: 40 TABLET | Refills: 1 | Status: SHIPPED | OUTPATIENT
Start: 2018-04-10 | End: 2020-01-08

## 2018-04-10 RX ORDER — FENTANYL CITRATE 50 UG/ML
INJECTION, SOLUTION INTRAMUSCULAR; INTRAVENOUS PRN
Status: DISCONTINUED | OUTPATIENT
Start: 2018-04-10 | End: 2018-04-10

## 2018-04-10 RX ORDER — ONDANSETRON 4 MG/1
4 TABLET, ORALLY DISINTEGRATING ORAL EVERY 30 MIN PRN
Status: DISCONTINUED | OUTPATIENT
Start: 2018-04-10 | End: 2018-04-10 | Stop reason: HOSPADM

## 2018-04-10 RX ORDER — ONDANSETRON 4 MG/1
4-8 TABLET, ORALLY DISINTEGRATING ORAL EVERY 8 HOURS PRN
Qty: 4 TABLET | Refills: 0 | Status: SHIPPED | OUTPATIENT
Start: 2018-04-10 | End: 2018-05-24

## 2018-04-10 RX ADMIN — ONDANSETRON 4 MG: 2 INJECTION INTRAMUSCULAR; INTRAVENOUS at 11:02

## 2018-04-10 RX ADMIN — FENTANYL CITRATE 50 MCG: 50 INJECTION, SOLUTION INTRAMUSCULAR; INTRAVENOUS at 10:56

## 2018-04-10 RX ADMIN — HYDROXYZINE HYDROCHLORIDE 25 MG: 25 TABLET ORAL at 14:31

## 2018-04-10 RX ADMIN — MIDAZOLAM HYDROCHLORIDE 2 MG: 1 INJECTION, SOLUTION INTRAMUSCULAR; INTRAVENOUS at 10:56

## 2018-04-10 RX ADMIN — LIDOCAINE HYDROCHLORIDE 50 MG: 10 INJECTION, SOLUTION EPIDURAL; INFILTRATION; INTRACAUDAL; PERINEURAL at 11:02

## 2018-04-10 RX ADMIN — HYDROMORPHONE HYDROCHLORIDE 1 MG: 1 INJECTION, SOLUTION INTRAMUSCULAR; INTRAVENOUS; SUBCUTANEOUS at 11:27

## 2018-04-10 RX ADMIN — Medication 1 KIT: at 12:07

## 2018-04-10 RX ADMIN — FENTANYL CITRATE 50 MCG: 50 INJECTION, SOLUTION INTRAMUSCULAR; INTRAVENOUS at 11:38

## 2018-04-10 RX ADMIN — BUPIVACAINE HYDROCHLORIDE AND EPINEPHRINE BITARTRATE 8 ML: 5; .005 INJECTION, SOLUTION PERINEURAL at 11:40

## 2018-04-10 RX ADMIN — OXYCODONE HYDROCHLORIDE 10 MG: 10 TABLET, FILM COATED, EXTENDED RELEASE ORAL at 09:25

## 2018-04-10 RX ADMIN — MIDAZOLAM HYDROCHLORIDE 2 MG: 1 INJECTION, SOLUTION INTRAMUSCULAR; INTRAVENOUS at 10:59

## 2018-04-10 RX ADMIN — SODIUM CHLORIDE, POTASSIUM CHLORIDE, SODIUM LACTATE AND CALCIUM CHLORIDE: 600; 310; 30; 20 INJECTION, SOLUTION INTRAVENOUS at 09:07

## 2018-04-10 RX ADMIN — FENTANYL CITRATE 50 MCG: 50 INJECTION, SOLUTION INTRAMUSCULAR; INTRAVENOUS at 11:33

## 2018-04-10 RX ADMIN — ROCURONIUM BROMIDE 30 MG: 10 INJECTION INTRAVENOUS at 11:02

## 2018-04-10 RX ADMIN — HYDROMORPHONE HYDROCHLORIDE 0.5 MG: 1 INJECTION, SOLUTION INTRAMUSCULAR; INTRAVENOUS; SUBCUTANEOUS at 14:00

## 2018-04-10 RX ADMIN — KETOROLAC TROMETHAMINE 30 MG: 30 INJECTION, SOLUTION INTRAMUSCULAR at 12:12

## 2018-04-10 RX ADMIN — LIDOCAINE HYDROCHLORIDE 1 ML: 10 INJECTION, SOLUTION EPIDURAL; INFILTRATION; INTRACAUDAL; PERINEURAL at 09:24

## 2018-04-10 RX ADMIN — PHENAZOPYRIDINE HYDROCHLORIDE 200 MG: 200 TABLET, COATED ORAL at 09:24

## 2018-04-10 RX ADMIN — MIDAZOLAM 1 MG: 1 INJECTION INTRAMUSCULAR; INTRAVENOUS at 13:04

## 2018-04-10 RX ADMIN — FENTANYL CITRATE 100 MCG: 50 INJECTION, SOLUTION INTRAMUSCULAR; INTRAVENOUS at 10:58

## 2018-04-10 RX ADMIN — CEFAZOLIN SODIUM 2 G: 2 INJECTION, SOLUTION INTRAVENOUS at 10:56

## 2018-04-10 RX ADMIN — SODIUM CHLORIDE, POTASSIUM CHLORIDE, SODIUM LACTATE AND CALCIUM CHLORIDE: 600; 310; 30; 20 INJECTION, SOLUTION INTRAVENOUS at 09:24

## 2018-04-10 RX ADMIN — DEXAMETHASONE SODIUM PHOSPHATE 8 MG: 4 INJECTION, SOLUTION INTRA-ARTICULAR; INTRALESIONAL; INTRAMUSCULAR; INTRAVENOUS; SOFT TISSUE at 11:02

## 2018-04-10 RX ADMIN — VASOPRESSIN 8 ML: 20 INJECTION INTRAVENOUS at 12:19

## 2018-04-10 RX ADMIN — GLYCOPYRROLATE 0.2 MG: 0.2 INJECTION, SOLUTION INTRAMUSCULAR; INTRAVENOUS at 11:02

## 2018-04-10 RX ADMIN — MIDAZOLAM HYDROCHLORIDE 1 MG: 1 INJECTION, SOLUTION INTRAMUSCULAR; INTRAVENOUS at 11:01

## 2018-04-10 RX ADMIN — SODIUM CHLORIDE, POTASSIUM CHLORIDE, SODIUM LACTATE AND CALCIUM CHLORIDE: 600; 310; 30; 20 INJECTION, SOLUTION INTRAVENOUS at 13:32

## 2018-04-10 RX ADMIN — FENTANYL CITRATE 100 MCG: 50 INJECTION, SOLUTION INTRAMUSCULAR; INTRAVENOUS at 11:01

## 2018-04-10 RX ADMIN — OXYCODONE HYDROCHLORIDE 5 MG: 5 TABLET ORAL at 14:45

## 2018-04-10 RX ADMIN — PROPOFOL 200 MG: 10 INJECTION, EMULSION INTRAVENOUS at 11:02

## 2018-04-10 RX ADMIN — FENTANYL CITRATE 50 MCG: 50 INJECTION, SOLUTION INTRAMUSCULAR; INTRAVENOUS at 11:40

## 2018-04-10 RX ADMIN — FENTANYL CITRATE 100 MCG: 50 INJECTION, SOLUTION INTRAMUSCULAR; INTRAVENOUS at 11:27

## 2018-04-10 RX ADMIN — ONDANSETRON 4 MG: 2 INJECTION INTRAMUSCULAR; INTRAVENOUS at 14:31

## 2018-04-10 NOTE — INTERVAL H&P NOTE
H & P without inteval change; informed consent reviewed and signed  Taisha Wilson MD  [facility]

## 2018-04-10 NOTE — ANESTHESIA POSTPROCEDURE EVALUATION
Patient: Rober Renee    Procedure(s):  Laparoscopic assisted vaginal hysterectomy with possible bilateral salpingoophorectomy. - Wound Class: II-Clean Contaminated    Diagnosis:Dysmenorrhea, intramural leiomyoma of uterus  Diagnosis Additional Information: No value filed.    Anesthesia Type:  General, ETT    Note:  Anesthesia Post Evaluation    Patient location during evaluation: Phase 2  Patient participation: Able to fully participate in evaluation  Level of consciousness: awake and alert  Pain management: adequate  Airway patency: patent  Cardiovascular status: stable  Respiratory status: room air  Hydration status: stable  PONV: none             Last vitals:  Vitals:    04/10/18 1352 04/10/18 1400 04/10/18 1430   BP:  118/77 117/72   Resp:  16 16   Temp:      SpO2: 98% 98% 98%         Electronically Signed By: ANTONIO Dumont CRNA  April 10, 2018  3:22 PM

## 2018-04-10 NOTE — BRIEF OP NOTE
[facility] Gynecology Brief Operative Note    Pre-operative diagnosis: Dysmenorrhea, intramural leiomyoma of uterus   Post-operative diagnosis: Endometriosis,dysmenorrhea, intramural fibroid of uterus, menorraghia   Procedure: LAPAROSCOPIC ASSISTED VAGINAL HYSTERECTOMY-BSO   Surgeon: Taisha Wilson MD   Assistant(s): OR crew   Anesthesia: General Endotracheal Anesthesia   Estimated blood loss: 150ml   Total IV fluids: (See anesthesia record)   Blood transfusion: No transfusion was given during surgery   Total urine output: (See anesthesia record)   Drains: None   Specimens: Uterus, cervix, tubes and ovaries   Findings:    Complications: None   Condition: Stable   Comments: See dictated operative report for full details

## 2018-04-10 NOTE — IP AVS SNAPSHOT
MRN:[id number]                      After Visit Summary   4/10/2018    Rober Renee    MRN: [id number]           Thank you!     Thank you for choosing [facility] for your care. Our goal is always to provide you with excellent care. Hearing back from our patients is one way we can continue to improve our services. Please take a few minutes to complete the written survey that you may receive in the mail after you visit with us. Thank you!        Patient Information     Date Of Birth          1980        About your hospital stay     You were admitted on:  April 10, 2018 You last received care in the:  [facility] PreOP/Phase II    You were discharged on:  April 10, 2018       Who to Call     For medical emergencies, please call 911.  For non-urgent questions about your medical care, please call your primary care provider or clinic, [phone]  For questions related to your surgery, please call your surgery clinic        Attending Provider     Provider Specialty    Taisha Wilson MD OB/Gyn       Primary Care Provider Office Phone # Fax #    R Joe Sanz [phone]      After Care Instructions     Discharge Instructions       Resume pre procedure diet            Discharge Instructions       Pelvic Rest. No tampons, douching or intercourse for  6  weeks.            Discharge Instructions       Patient to arrange follow up appointment in 2  weeks            Ice to affected area       PRN as tolerated            No alcohol       NO ALCOHOL for 24 hours post procedure            No driving or operating machinery       No driving or operating machinery until day after procedure            No lifting       No lifting over 10 pounds and no strenuous physical activity.  For 2 weeks            Shower        Shower on Post-op day  1.   DO NOT take a bath for 2 weeks                  Further instructions from your care team                         Same Day Surgery Discharge  Instructions  Special Precautions After Surgery - Adult    1. It is not unusual to feel lightheaded or faint, up to 24 hours after surgery or while taking pain medication.  If you have these symptoms; sit for a few minutes before standing and have someone assist you when getting up.  2. You should rest and relax for the next 24 hours and must have someone stay with you for at least 24 hours after your discharge.  3. DO NOT DRIVE any vehicle or operate mechanical equipment for 24 hours following the end of your surgery.  DO NOT DRIVE while taking narcotic pain medications that have been prescribed by your physician.  If you had a limb operated on, you must be able to use it fully to drive.  4. DO NOT drink alcoholic beverages for 24 hours following surgery or while taking prescription pain medication.  5. Drink clear liquids (apple juice, ginger ale, broth, 7-Up, etc.).  Progress to your regular diet as you feel able.  6. Any questions call your physician and do not make important decisions for 24 hours.                                                                                   OB Clinic:  [phone]     Same Day Surgery [phone], Monday thru Friday 6am-9pm.  Breakthrough Bleeding    How/Why does it occur?   There are many causes of breakthrough bleeding onto your dressing. The two most common causes are increased activity and increased NSAID use.     How is it treated?   The treatment for breakthrough dressing bleeding depends on the cause. For simple problems such as a saturated dressing, you may need to reinforce the dressing with more gauze and tape and put slight pressure on the site.  Call your healthcare provider if something else is causing bleeding.        Nausea and Vomiting  What are nausea and vomiting?   Nausea is the queasy feeling you usually have before you vomit. Vomiting is the forceful emptying (throwing up) of the stomach's contents through the mouth.   What causes nausea and  vomiting?   Nausea and vomiting are symptoms that may occur with many conditions, such as:   Anesthesia medications   side effect of narcotic medicines  exposure to unpleasant odors or sights   stress and anxiety     How is it treated?   At first you should rest your stomach for a few hours by eating nothing solid and sipping only clear liquids. A little later you can eat soft bland foods that are easy to digest.   It is important to drink small amounts (1 to 4 ounces) often so that you do not become dehydrated. Gradually drink larger amounts of the clear fluids. If you vomit, wait an hour, then start over with a smaller amount of fluid.   Eat slowly and avoid foods that are acidic, spicy, fatty, or fibrous (such as meats, coarse grains, and raw vegetables). Also avoid extremely hot or cold food. In addition, avoid dairy products if you have diarrhea. You may start eating your normal diet again in 3 days or so, when all signs of illness have passed.   Rest as much as possible. Sit or lie down with your head propped up. Do not lie flat for at least 2 hours after eating. Nausea and vomiting usually last only a short period of time. If you have cramping or pain in your belly you can try putting a heating pad set at low or a covered hot water bottle on your belly. Never set a heating pad on high because you could get burned.   If you have been vomiting for more than a day or have had diarrhea for over 3 days, you may need to have an exam by your provider, including a check for dehydration. If you are very dehydrated, you may need to be given fluids intravenously (IV). In children and older adults dehydration can quickly become life threatening.   When should I call my healthcare provider?   Talk with your provider if you are unable to keep fluids down for more than 12 hours or if you have any of the following symptoms with nausea and vomiting:   severe headache or neck ache, or stiff neck   severe abdominal pain    diarrhea and vomiting that last more than 24 hours   blood in the vomited material that may look red, brown, or black, or like coffee grounds   bloody diarrhea   very forceful vomiting   signs of dehydration such as dry mouth, excessive thirst, little or no urination, severe weakness, dizziness, or lightheadedness.   If you have nausea and pain in the jaw, arm, shoulder, chest, or back; sweating; shortness of breath; or lightheadedness; call 911 for emergency care.           Pending Results     Date and Time Order Name Status Description    4/10/2018 1156 Surgical pathology exam In process             Admission Information     Date & Time Provider Department Dept. Phone    4/10/2018 Taisha Wilson MD [facility] PreOP/Phase [phone]      Your Vitals Were     Blood Pressure Temperature Respirations Height Weight Last Period    121/72 97.6  F (36.4  C) 16 1.524 m (5') 56.7 kg (125 lb) 04/02/2018    Pulse Oximetry BMI (Body Mass Index)                97% 24.41 kg/m2          [facility] Information     [facility] gives you secure access to your electronic health record. If you see a primary care provider, you can also send messages to your care team and make appointments. If you have questions, please call your primary care clinic.  If you do not have a primary care provider, please call [phone] and they will assist you.        Care EveryWhere ID     This is your Care EveryWhere ID. This could be used by other organizations to access your [facility] medical records  [id number]        Equal Access to Services     KEYANA MACK : Hadii dior quileso Somarivelali, waaxda luqadaha, qaybta kaalmada srinivas ku . So [facility] [phone].    ATENCIÓN: Si habla español, tiene a nguyen disposición servicios gratuitos de asistencia lingüística. Llame al [phone].    We comply with applicable federal civil rights laws and [place] laws. We do not discriminate on the basis of  race, color, national origin, age, disability, sex, sexual orientation, or gender identity.               Review of your medicines      START taking        Dose / Directions    estradiol 2 MG tablet   Commonly known as:  ESTRACE   Used for:  Surgical menopause        Dose:  2 mg   Take 1 tablet (2 mg) by mouth daily   Quantity:  90 tablet   Refills:  3       ibuprofen 600 MG tablet   Commonly known as:  ADVIL/MOTRIN   Used for:  Postoperative state        Dose:  600 mg   Take 1 tablet (600 mg) by mouth every 6 hours as needed for pain (mild)   Quantity:  40 tablet   Refills:  1       ondansetron 4 MG ODT tab   Commonly known as:  ZOFRAN-ODT   Used for:  Postoperative state   Notes to Patient:  May take at 7:00 pm if needed.        Dose:  4-8 mg   Take 1-2 tablets (4-8 mg) by mouth every 8 hours as needed for nausea Dissolve ON the tongue.   Quantity:  4 tablet   Refills:  0       oxyCODONE IR 5 MG tablet   Commonly known as:  ROXICODONE   Used for:  Postoperative state   Notes to Patient:  Last dose at 2:45 pm.        Dose:  5-10 mg   Take 1-2 tablets (5-10 mg) by mouth every 3 hours as needed for pain or other (Moderate to Severe)   Quantity:  40 tablet   Refills:  0       senna-docusate 8.6-50 MG per tablet   Commonly known as:  SENOKOT-S;PERICOLACE   Used for:  Postoperative state   Notes to Patient:  Start today.        Dose:  1-2 tablet   Take 1-2 tablets by mouth 2 times daily Take while on oral narcotics to prevent or treat constipation.   Quantity:  60 tablet   Refills:  1         CONTINUE these medicines which have NOT CHANGED        Dose / Directions    ferrous sulfate 325 (65 Fe) MG tablet   Commonly known as:  IRON   Used for:  Iron deficiency anemia due to chronic blood loss        Dose:  325 mg   Take 1 tablet (325 mg) by mouth 3 times daily (with meals)   Quantity:  90 tablet   Refills:  2       LORazepam 1 MG tablet   Commonly known as:  ATIVAN        Refills:  0       vitamin D 2000 units tablet    Used for:  Vitamin D deficiency        Dose:  2000 Units   Take 2,000 Units by mouth daily   Quantity:  100 tablet   Refills:  3         STOP taking     HYDROcodone-acetaminophen 5-325 MG per tablet   Commonly known as:  NORCO                Where to get your medicines      These medications were [place] [facility] [place] - [address]  [address] [address]     Phone:  [phone]     estradiol 2 MG tablet    ibuprofen 600 MG tablet    ondansetron 4 MG ODT tab    senna-docusate 8.6-50 MG per tablet         Some of these will need a paper prescription and others can be bought over the counter. Ask your nurse if you have questions.     Bring a paper prescription for each of these medications     oxyCODONE IR 5 MG tablet                Protect others around you: Learn how to safely use, store and throw away your medicines at www.disposemymeds.org.        Information about OPIOIDS     PRESCRIPTION OPIOIDS: WHAT YOU NEED TO KNOW    Prescription opioids can be used to help relieve moderate to severe pain and are often prescribed following a surgery or injury, or for certain health conditions. These medications can be an important part of treatment but also come with serious risks. It is important to work with your health care provider to make sure you are getting the safest, most effective care.    WHAT ARE THE RISKS AND SIDE EFFECTS OF OPIOID USE?  Prescription opioids carry serious risks of addiction and overdose, especially with prolonged use. An opioid overdose, often marked by slowed breathing can cause sudden death. The use of prescription opioids can have a number of side effects as well, even when taken as directed:      Tolerance - meaning you might need to take more of a medication for the same pain relief    Physical dependence - meaning you have symptoms of withdrawal when a medication is stopped    Increased sensitivity to pain    Constipation    Nausea,  vomiting, and dry mouth    Sleepiness and dizziness    Confusion    Depression    Low levels of testosterone that can result in lower sex drive, energy, and strength    Itching and sweating    RISKS ARE GREATER WITH:    History of drug misuse, substance use disorder, or overdose    Mental health conditions (such as depression or anxiety)    Sleep apnea    Older age (65 years or older)    Pregnancy    Avoid alcohol while taking prescription opioids.   Also, unless specifically advised by your health care provider, medications to avoid include:    Benzodiazepines (such as Xanax or Valium)    Muscle relaxants (such as Soma or Flexeril)    Hypnotics (such as Ambien or Lunesta)    Other prescription opioids    KNOW YOUR OPTIONS:  Talk to your health care provider about ways to manage your pain that do not involve prescription opioids. Some of these options may actually work better and have fewer risks and side effects:    Pain relievers such as acetaminophen, ibuprofen, and naproxen    Some medications that are also used for depression or seizures    Physical therapy and exercise    Cognitive behavioral therapy, a psychological, goal-directed approach, in which patients learn how to modify physical, behavioral, and emotional triggers of pain and stress    IF YOU ARE PRESCRIBED OPIOIDS FOR PAIN:    Never take opioids in greater amounts or more often than prescribed    Follow up with your primary health care provider and work together to create a plan on how to manage your pain.    Talk about ways to help manage your pain that do not involve prescription opioids    Talk about all concerns and side effects    Help prevent misuse and abuse    Never sell or share prescription opioids    Never use another person's prescription opioids    Store prescription opioids in a secure place and out of reach of others (this may include visitors, children, friends, and family)    Visit www.cdc.gov/drugoverdose to learn about risks of  opioid abuse and overdose    If you believe you may be struggling with addiction, tell your health care provider and ask for guidance or call [facility]'s National Helpline at 0-154-927-HELP    LEARN MORE / www.cdc.gov/drugoverdose/prescribing/guideline.html    Safely dispose of unused prescription opioids: Find your local drug take-back programs and more information about the importance of safe disposal at www.doseofreality.[place].gov             Medication List: This is a list of all your medications and when to take them. Check marks below indicate your daily home schedule. Keep this list as a reference.      Medications           Morning Afternoon Evening Bedtime As Needed    estradiol 2 MG tablet   Commonly known as:  ESTRACE   Take 1 tablet (2 mg) by mouth daily                                ferrous sulfate 325 (65 Fe) MG tablet   Commonly known as:  IRON   Take 1 tablet (325 mg) by mouth 3 times daily (with meals)                                ibuprofen 600 MG tablet   Commonly known as:  ADVIL/MOTRIN   Take 1 tablet (600 mg) by mouth every 6 hours as needed for pain (mild)                                LORazepam 1 MG tablet   Commonly known as:  ATIVAN                                ondansetron 4 MG ODT tab   Commonly known as:  ZOFRAN-ODT   Take 1-2 tablets (4-8 mg) by mouth every 8 hours as needed for nausea Dissolve ON the tongue.   Notes to Patient:  May take at 7:00 pm if needed.                                oxyCODONE IR 5 MG tablet   Commonly known as:  ROXICODONE   Take 1-2 tablets (5-10 mg) by mouth every 3 hours as needed for pain or other (Moderate to Severe)   Last time this was given:  5 mg on 4/10/2018  2:45 PM   Notes to Patient:  Last dose at 2:45 pm.                                senna-docusate 8.6-50 MG per tablet   Commonly known as:  SENOKOT-S;PERICOLACE   Take 1-2 tablets by mouth 2 times daily Take while on oral narcotics to prevent or treat constipation.   Notes to Patient:  Start today.                                 vitamin D 2000 units tablet   Take 2,000 Units by mouth daily

## 2018-04-10 NOTE — H&P (VIEW-ONLY)
[facility]  [address] [address]  [phone]  Dept: [phone]    PRE-OP EVALUATION:  Today's date: 2018    Rober Renee (: 1980) presents for pre-operative evaluation assessment as requested by Dr. Wilson.  She requires evaluation and anesthesia risk assessment prior to undergoing surgery/procedure for treatment of dysmenorrhea, menorrhagia, uterine leiomyoma    Proposed Surgery/ Procedure:        Laparoscopic assisted vaginal hysterectomy with possible bilateral salpingoophorectomy         Date of Surgery/ Procedure: 4/10/2018  Time of Surgery/ Procedure: 10:05 am  Hospital/Surgical Facility: [facility]    Primary Physician: EVERETT Sanz  Type of Anesthesia Anticipated: General    Patient has a Health Care Directive or Living Will:  NO    1. NO - Do you have a history of heart attack, stroke, stent, bypass or surgery on an artery in the head, neck, heart or legs?  2. YES - Do you ever have any pain or discomfort in your chest?  Not exertional  3. NO - Do you have a history of  Heart Failure?  4. NO - Are you troubled by shortness of breath when: walking on the level, up a slight hill or at night?  5. NO - Do you currently have a cold, bronchitis or other respiratory infection?  6. NO - Do you have a cough, shortness of breath or wheezing?  7. NO - Do you sometimes get pains in the calves of your legs when you walk?  8. NO - Do you or anyone in your family have previous history of blood clots?  9. NO - Do you or does anyone in your family have a serious bleeding problem such as prolonged bleeding following surgeries or cuts?  10. YES - Have you ever had problems with anemia or been told to take iron pills?  See HPI   11. YES- Have you had any abnormal blood loss such as black, tarry or bloody stools, or abnormal vaginal bleeding?  See HPI  12. NO - Have you ever had a blood transfusion?  13. NO - Have you or any of your relatives ever had problems with  anesthesia?  14. NO - Do you have sleep apnea, excessive snoring or daytime drowsiness?  15. NO - Do you have any prosthetic heart valves?  16. NO - Do you have prosthetic joints?  17. NO - Is there any chance that you may be pregnant?      HPI:     HPI related to upcoming procedure: She has been dealing with heavy and prolonged periods for some time and actually develop iron deficiency anemia.  She was seen by Dr. Wilson and the above surgery was recommended      See problem list for active medical problems.  Problems all longstanding and stable, except as noted/documented.  See ROS for pertinent symptoms related to these conditions.                                                                                                                                                          .    MEDICAL HISTORY:     Patient Active Problem List    Diagnosis Date Noted     Dysmenorrhea 04/03/2018     Priority: Medium     Intramural leiomyoma of uterus 04/03/2018     Priority: Medium     Anxiety 12/04/2017     Priority: Medium     Major depressive disorder, single episode, moderate (H) 02/29/2016     Priority: Medium     Renal stones 10/09/2015     Priority: Medium     Migraine headache 09/11/2012     Priority: Medium     Menorrhagia 01/27/2012     Priority: Medium     CARDIOVASCULAR SCREENING; LDL GOAL LESS THAN 160 10/31/2010     Priority: Medium     ASCUS on Pap smear 11/02/2009     Priority: Medium     10/16/09:pap--ASCUS, + HPV 53. Repeat pap at 6 wk PP visit. (9/2010)  8/11/2010:Pap--NIL. Repeat pap 1 year.  1/27/12:Pap--NIL. Repeat pap 1 year.  Pap 7/13--if normal, then Q3 year. Pap--NIL. Next pap in 3 years. HM updated.        Past Medical History:   Diagnosis Date     Calculus of kidney 12/06    right side     Chickenpox      Gestational diabetes 2002,2010     History of postpartum depression, currently pregnant     mild     Intervertebral lumbar disc disorder with myelopathy, lumbar region     herniated disks,  "L4-5, s/p steroid injection     Migraines      Urinary tract bacterial infections      Past Surgical History:   Procedure Laterality Date      SECTION, TUBAL LIGATION, COMBINED  2013    c/section with BTL     Current Outpatient Prescriptions   Medication Sig Dispense Refill     HYDROcodone-acetaminophen (NORCO) 5-325 MG per tablet Take 1-2 tablets by mouth every 4 hours as needed for pain maximum 10 tablet(s) per day 20 tablet 0     ferrous sulfate (IRON) 325 (65 FE) MG tablet Take 1 tablet (325 mg) by mouth 3 times daily (with meals) 90 tablet 2     Cholecalciferol (VITAMIN D) 2000 UNITS tablet Take 2,000 Units by mouth daily 100 tablet 3     OTC products: None, except as noted above    Allergies   Allergen Reactions     Imitrex [Sumatriptan] Other (See Comments)     Felt terrible, \"like pores were on fire\"     Sulfa Drugs Hives      Latex Allergy: NO    Social History   Substance Use Topics     Smoking status: Former Smoker     Packs/day: 0.50     Years: 10.00     Types: Cigarettes     Quit date: 2008     Smokeless tobacco: Never Used     Alcohol use Yes      Comment: very seldom     History   Drug Use No       REVIEW OF SYSTEMS:   CONSTITUTIONAL: NEGATIVE for fever, chills, change in weight  ENT/MOUTH: NEGATIVE for ear, mouth and throat problems  RESP: NEGATIVE for significant cough or SOB  CV: NEGATIVE for chest pain, palpitations or peripheral edema    EXAM:   BP (!) 149/99  Pulse 98  Temp 98.7  F (37.1  C) (Tympanic)  Resp 16  Ht 5' (1.524 m)  Wt 125 lb 8 oz (56.9 kg)  LMP 03/10/2018  BMI 24.51 kg/m2    GENERAL APPEARANCE: healthy, alert and no distress     EYES: EOMI, PERRL     HENT: ear canals and TM's normal and nose and mouth without ulcers or lesions     NECK: no adenopathy, no asymmetry, masses, or scars and thyroid normal to palpation     RESP: lungs clear to auscultation - no rales, rhonchi or wheezes     CV: regular rates and rhythm, normal S1 S2, no S3 or S4 and no murmur, " click or rub     ABDOMEN:  soft, nontender, no HSM or masses and bowel sounds normal     MS: extremities normal- no gross deformities noted, no evidence of inflammation in joints, FROM in all extremities.     SKIN: no suspicious lesions or rashes     NEURO: Normal strength and tone, sensory exam grossly normal, mentation intact and speech normal     PSYCH: mentation appears normal. and affect normal/bright     LYMPHATICS: No cervical adenopathy    DIAGNOSTICS:   EKG: Not indicated due to non-vascular surgery and low risk of event (age <65 and without cardiac risk factors)    Recent Labs   Lab Test  03/24/18   1950  11/28/17   1355   HGB  14.9  11.6*   PLT  128*  210   NA  139  139   POTASSIUM  3.6  4.3   CR  0.55  0.65        IMPRESSION:   Reason for surgery/procedure: Dysmenorrhea and menorrhagia  Diagnosis/reason for consult: Evaluate for anesthesia risk    The proposed surgical procedure is considered LOW risk.    REVISED CARDIAC RISK INDEX  The patient has the following serious cardiovascular risks for perioperative complications such as (MI, PE, VFib and 3  AV Block):  No serious cardiac risks  INTERPRETATION: 0 risks: Class I (very low risk - 0.4% complication rate)    The patient has the following additional risks for perioperative complications:  No identified additional risks      ICD-10-CM    1. Preop general physical exam Z01.818    2. Dysmenorrhea N94.6    3. Menorrhagia with regular cycle N92.0    4. Intramural leiomyoma of uterus D25.1        RECOMMENDATIONS:         --Patient is to take all scheduled medications on the day of surgery EXCEPT for modifications listed below.    Anticoagulant or Antiplatelet Medication Use  NSAIDS: Ibuprofen (Motrin):         Stop 4 days prior to surgery        APPROVAL GIVEN to proceed with proposed procedure, without further diagnostic evaluation       Signed Electronically by: EVERETT Sanz MD    Copy of this evaluation report is provided to requesting  physician.    [facility] Preop Guidelines    Revised Cardiac Risk Index

## 2018-04-10 NOTE — IP AVS SNAPSHOT
[facility] PreOP/Phase II    [address] [facility] [address]    Phone:  [phone]    Fax:  [phone]                                       After Visit Summary   4/10/2018    Rober Renee    MRN: [id number]           After Visit Summary Signature Page     I have received my discharge instructions, and my questions have been answered. I have discussed any challenges I see with this plan with the nurse or doctor.    ..........................................................................................................................................  Patient/Patient Representative Signature      ..........................................................................................................................................  Patient Representative Print Name and Relationship to Patient    ..................................................               ................................................  Date                                            Time    ..........................................................................................................................................  Reviewed by Signature/Title    ...................................................              ..............................................  Date                                                            Time

## 2018-04-10 NOTE — DISCHARGE INSTRUCTIONS
Same Day Surgery Discharge Instructions  Special Precautions After Surgery - Adult    1. It is not unusual to feel lightheaded or faint, up to 24 hours after surgery or while taking pain medication.  If you have these symptoms; sit for a few minutes before standing and have someone assist you when getting up.  2. You should rest and relax for the next 24 hours and must have someone stay with you for at least 24 hours after your discharge.  3. DO NOT DRIVE any vehicle or operate mechanical equipment for 24 hours following the end of your surgery.  DO NOT DRIVE while taking narcotic pain medications that have been prescribed by your physician.  If you had a limb operated on, you must be able to use it fully to drive.  4. DO NOT drink alcoholic beverages for 24 hours following surgery or while taking prescription pain medication.  5. Drink clear liquids (apple juice, ginger ale, broth, 7-Up, etc.).  Progress to your regular diet as you feel able.  6. Any questions call your physician and do not make important decisions for 24 hours.                                                                                   [facility] Clinic:  [phone]     Same Day Surgery [phone], Monday thru Friday 6am-9pm.  Breakthrough Bleeding    How/Why does it occur?   There are many causes of breakthrough bleeding onto your dressing. The two most common causes are increased activity and increased NSAID use.     How is it treated?   The treatment for breakthrough dressing bleeding depends on the cause. For simple problems such as a saturated dressing, you may need to reinforce the dressing with more gauze and tape and put slight pressure on the site.  Call your healthcare provider if something else is causing bleeding.        Nausea and Vomiting  What are nausea and vomiting?   Nausea is the queasy feeling you usually have before you vomit. Vomiting is the forceful emptying (throwing up) of the stomach's contents through  the mouth.   What causes nausea and vomiting?   Nausea and vomiting are symptoms that may occur with many conditions, such as:   Anesthesia medications   side effect of narcotic medicines  exposure to unpleasant odors or sights   stress and anxiety     How is it treated?   At first you should rest your stomach for a few hours by eating nothing solid and sipping only clear liquids. A little later you can eat soft bland foods that are easy to digest.   It is important to drink small amounts (1 to 4 ounces) often so that you do not become dehydrated. Gradually drink larger amounts of the clear fluids. If you vomit, wait an hour, then start over with a smaller amount of fluid.   Eat slowly and avoid foods that are acidic, spicy, fatty, or fibrous (such as meats, coarse grains, and raw vegetables). Also avoid extremely hot or cold food. In addition, avoid dairy products if you have diarrhea. You may start eating your normal diet again in 3 days or so, when all signs of illness have passed.   Rest as much as possible. Sit or lie down with your head propped up. Do not lie flat for at least 2 hours after eating. Nausea and vomiting usually last only a short period of time. If you have cramping or pain in your belly you can try putting a heating pad set at low or a covered hot water bottle on your belly. Never set a heating pad on high because you could get burned.   If you have been vomiting for more than a day or have had diarrhea for over 3 days, you may need to have an exam by your provider, including a check for dehydration. If you are very dehydrated, you may need to be given fluids intravenously (IV). In children and older adults dehydration can quickly become life threatening.   When should I call my healthcare provider?   Talk with your provider if you are unable to keep fluids down for more than 12 hours or if you have any of the following symptoms with nausea and vomiting:   severe headache or neck ache, or stiff  neck   severe abdominal pain   diarrhea and vomiting that last more than 24 hours   blood in the vomited material that may look red, brown, or black, or like coffee grounds   bloody diarrhea   very forceful vomiting   signs of dehydration such as dry mouth, excessive thirst, little or no urination, severe weakness, dizziness, or lightheadedness.   If you have nausea and pain in the jaw, arm, shoulder, chest, or back; sweating; shortness of breath; or lightheadedness; call 911 for emergency care.

## 2018-04-10 NOTE — OP NOTE
Procedure Date: 04/10/2018      PREOPERATIVE DIAGNOSES:   1.  Severe dysmenorrhea.   2.  Menorrhagia.   3.  Intramural leiomyoma of the uterus.      POSTOPERATIVE DIAGNOSES:     1.  Severe dysmenorrhea.   2.  Menorrhagia.   3.  Intramural leiomyoma of the uterus.   4.  Pelvic endometriosis determined by laparoscopy.      PROCEDURE:  Laparoscopically-assisted vaginal hysterectomy with bilateral salpingo-oophorectomy.      OPERATING SURGEON:  Taisha Wilson MD      ASSISTANT:  OR crew.      ANESTHESIA:  General.      FINDINGS:  There was a dense adhesion of the anterior surface of the uterus to the anterior abdominal wall from a prior  section.  The cul-de-sac was studded with endometriosis, particularly along the uterosacral ligaments bilaterally.  The uterus had an approximately 3 cm intramural fibroid.      DESCRIPTION OF PROCEDURE:  After assuring informed consent, the patient was taken to the operating suite, where a general anesthetic was induced.  The patient's abdomen and vagina were sterilely prepped and draped.  A timeout and fire safety assessment was performed.  A Posadas catheter was placed for bladder drainage and a  Kronner uterine manipulator within the cervix.  A 1 cm infraumbilical skin incision was made with a knife.  With elevation of the anterior abdominal wall, a 5 mm trocar was advanced into the abdominal cavity.  After assuring its intraperitoneal location, the abdomen was insufflated with carbon dioxide until fully distended.  Secondary trocars 5 mm in size were placed in the right and left lower quadrants, both under direct visualization and the patient was placed in Trendelenburg position.  The pelvis was thoroughly inspected at this time and because of the presence of endometriosis, it was  determined to removed the bilateral ovaries and tubes.  Utilizing a LigaSure cautery cut device, the infundibulopelvic ligaments were isolated, crossclamped, ligated and divided.  This was carried  down to the medial aspect of the broad ligaments, round ligaments and cardinal ligaments.  Uterine vessels were skeletonized, crossclamped, ligated and divided.  A bladder flap was created after lysis of the dense anterior serosal adhesion was lysed as well.  The legs were then placed in high lithotomy position.  The cervix was regrasped with Varsha clamps.  Cervicovaginal junction was injected with dilute Pitressin, and with sharp and blunt dissection, the anterior and posterior cul-de-sacs were entered.  The residual uterosacral ligaments were then cross-clamped, ligated and divided with the suture ligature fixed to the apex of the vagina.  Then, residual cardinal ligaments were cross-clamped, ligated and divided until the uterus, cervix and bilateral adnexa could be removed from the field.  The pedicle lines were inspected and appeared hemostatic.  The vaginal cuff was then closed in a running locking fashion with 0 Vicryl suture, and the urine was clear at this time.  The legs were then placed in low lithotomy position and gloves were changed.  The abdomen was reinsufflated and thoroughly irrigated and inspected for hemostasis.  There was no active bleeding but some very mild generalized oozing from the area of the bladder flap and prior adhesiolysis.  A thin layer of FloSeal thrombotic agent was placed in this area, as well as along the vaginal cuff and other pedicles.  When hemostasis was satisfactory, the carbon dioxide was allowed to evacuate from the abdomen, trocars removed, and the incisions closed in a subcuticular fashion with 4-0 Monocryl suture.  The Posadas catheter was removed.  The patient was then awakened and taken to postanesthesia recovery in stable condition.      ESTIMATED BLOOD LOSS:  150 mL.      SPECIMENS TO LAB:  Uterus, cervix, bilateral adnexa.         LILIA IBARRA MD             D: 04/10/2018   T: 04/10/2018   MT: CASEY      Name:     MERCEDEZ ZAMARRIPA   MRN:      [id number]         Account:        [id number]   :      1980           Procedure Date: 04/10/2018      Document: [id number]

## 2018-04-10 NOTE — ANESTHESIA CARE TRANSFER NOTE
Patient: Rober Renee    Procedure(s):  Laparoscopic assisted vaginal hysterectomy with possible bilateral salpingoophorectomy. - Wound Class: II-Clean Contaminated    Diagnosis: Dysmenorrhea, intramural leiomyoma of uterus  Diagnosis Additional Information: No value filed.    Anesthesia Type:   General, ETT     Note:  Airway :Nasal Cannula  Patient transferred to:PACU  Handoff Report: Identifed the Patient, Identified the Reponsible Provider, Reviewed the pertinent medical history, Discussed the surgical course, Reviewed Intra-OP anesthesia mangement and issues during anesthesia, Set expectations for post-procedure period and Allowed opportunity for questions and acknowledgement of understanding      Vitals: (Last set prior to Anesthesia Care Transfer)    CRNA VITALS  4/10/2018 1151 - 4/10/2018 1222      4/10/2018             Pulse: 96    SpO2: 100 %    Resp Rate (observed): (!)  1                Electronically Signed By: ANTONIO Diaz CRNA  April 10, 2018  12:22 PM

## 2018-04-12 LAB — COPATH REPORT: NORMAL

## 2018-04-14 ENCOUNTER — MYC REFILL (OUTPATIENT)
Dept: OBGYN | Facility: CLINIC | Age: 38
End: 2018-04-14

## 2018-04-14 DIAGNOSIS — Z98.890 POSTOPERATIVE STATE: ICD-10-CM

## 2018-04-17 ENCOUNTER — TELEPHONE (OUTPATIENT)
Dept: OBGYN | Facility: CLINIC | Age: 38
End: 2018-04-17

## 2018-04-17 RX ORDER — OXYCODONE HYDROCHLORIDE 5 MG/1
5 TABLET ORAL EVERY 6 HOURS PRN
Qty: 30 TABLET | Refills: 0 | Status: SHIPPED | OUTPATIENT
Start: 2018-04-17 | End: 2018-05-24

## 2018-04-17 NOTE — TELEPHONE ENCOUNTER
Message from [employer]:  Original authorizing provider: MD Rober Sterling would like a refill of the following medications:  oxyCODONE IR (ROXICODONE) 5 MG tablet [Taisha Wilson MD]    Preferred pharmacy: [facility] [address] [address] [address]    Comment:

## 2018-04-17 NOTE — TELEPHONE ENCOUNTER
4/10/18: PROCEDURE:  Laparoscopically-assisted vaginal hysterectomy with bilateral salpingo-oophorectomy.    Refill request received for Zofran. Ordered post-operatively. Attempted to call patient discuss if nausea is due to narcotic use, pain, or constipation. Unable to leave message on VM- will re-attempt.     Cortez BECKER   Specialty Clinic Flex RN

## 2018-04-19 NOTE — TELEPHONE ENCOUNTER
Re-called patient. Stated she has been using stool softeners. Having normal BM's now. Nausea resolved, appetite improved, does not need medication refilled.   Encouraged to call clinic if sx return-agreeable to plan    Cortez BECKER   Specialty Clinic Flex RN

## 2018-04-26 ENCOUNTER — OFFICE VISIT (OUTPATIENT)
Dept: OBGYN | Facility: CLINIC | Age: 38
End: 2018-04-26
Payer: COMMERCIAL

## 2018-04-26 VITALS
RESPIRATION RATE: 18 BRPM | SYSTOLIC BLOOD PRESSURE: 129 MMHG | WEIGHT: 125 LBS | BODY MASS INDEX: 23.6 KG/M2 | DIASTOLIC BLOOD PRESSURE: 72 MMHG | HEIGHT: 61 IN | TEMPERATURE: 98.6 F | HEART RATE: 66 BPM

## 2018-04-26 DIAGNOSIS — Z98.890 POSTOPERATIVE STATE: Primary | ICD-10-CM

## 2018-04-26 PROBLEM — D25.1 INTRAMURAL LEIOMYOMA OF UTERUS: Status: RESOLVED | Noted: 2018-04-03 | Resolved: 2018-04-26

## 2018-04-26 PROBLEM — N94.6 DYSMENORRHEA: Status: RESOLVED | Noted: 2018-04-03 | Resolved: 2018-04-26

## 2018-04-26 PROCEDURE — 99024 POSTOP FOLLOW-UP VISIT: CPT | Performed by: OBSTETRICS & GYNECOLOGY

## 2018-04-26 NOTE — MR AVS SNAPSHOT
After Visit Summary   4/26/2018    Rober Renee    MRN: [id number]           Patient Information     Date Of Birth          1980        Visit Information        Provider Department      4/26/2018 1:00 PM Taisha Wilson MD [facility]        Today's Diagnoses     Postoperative state    -  1       Follow-ups after your visit        Your next 10 appointments already scheduled     May 24, 2018  1:00 PM CDT   SHORT with Taisha Wilson MD   [facility] ([facility]    [address] [address] [phone]              Who to contact     If you have questions or need follow up information about today's clinic visit or your schedule please contact [facility] directly at [phone].  Normal or non-critical lab and imaging results will be communicated to you by MyChart, letter or phone within 4 business days after the clinic has received the results. If you do not hear from us within 7 days, please contact the clinic through MyChart or phone. If you have a critical or abnormal lab result, we will notify you by phone as soon as possible.  Submit refill requests through [employer] or call your pharmacy and they will forward the refill request to us. Please allow 3 business days for your refill to be completed.          Additional Information About Your Visit        MyChart Information     [employer] gives you secure access to your electronic health record. If you see a primary care provider, you can also send messages to your care team and make appointments. If you have questions, please call your primary care clinic.  If you do not have a primary care provider, please call [phone] and they will assist you.        Care EveryWhere ID     This is your Care EveryWhere ID. This could be used by other organizations to access your [facility] medical records  [id number]        Your Vitals Were     Pulse Temperature  "Respirations Height Last Period BMI (Body Mass Index)    66 98.6  F (37  C) (Tympanic) 18 5' 1\" (1.549 m) 04/02/2018 23.62 kg/m2       Blood Pressure from Last 3 Encounters:   04/26/18 129/72   04/10/18 125/76   04/06/18 (!) 149/99    Weight from Last 3 Encounters:   04/26/18 125 lb (56.7 kg)   04/10/18 125 lb (56.7 kg)   04/06/18 125 lb 8 oz (56.9 kg)              Today, you had the following     No orders found for display       Primary Care Provider Office Phone # Fax #    R Joe Sanz [phone] [id number]        Equal Access to Services     KEYANA MACK : Allyson Calderon, waaxda luqadaha, qaybta kaalmada jamaalyaisadora, srinivas landaverde . So [facility] [phone].    ATENCIÓN: Si habla español, tiene a nguyen disposición servicios gratuitos de asistencia lingüística. Llame al [phone].    We comply with applicable federal civil rights laws and [place] laws. We do not discriminate on the basis of race, color, national origin, age, disability, sex, sexual orientation, or gender identity.            Thank you!     Thank you for choosing [facility]  for your care. Our goal is always to provide you with excellent care. Hearing back from our patients is one way we can continue to improve our services. Please take a few minutes to complete the written survey that you may receive in the mail after your visit with us. Thank you!             Your Updated Medication List - Protect others around you: Learn how to safely use, store and throw away your medicines at www.disposemymeds.org.          This list is accurate as of 4/26/18  1:50 PM.  Always use your most recent med list.                   Brand Name Dispense Instructions for use Diagnosis    estradiol 2 MG tablet    ESTRACE    90 tablet    Take 1 tablet (2 mg) by mouth daily    Surgical menopause       ferrous sulfate 325 (65 Fe) MG tablet    IRON    90 tablet    " Take 1 tablet (325 mg) by mouth 3 times daily (with meals)    Iron deficiency anemia due to chronic blood loss       ibuprofen 600 MG tablet    ADVIL/MOTRIN    40 tablet    Take 1 tablet (600 mg) by mouth every 6 hours as needed for pain (mild)    Postoperative state       LORazepam 1 MG tablet    ATIVAN          ondansetron 4 MG ODT tab    ZOFRAN-ODT    4 tablet    Take 1-2 tablets (4-8 mg) by mouth every 8 hours as needed for nausea Dissolve ON the tongue.    Postoperative state       oxyCODONE IR 5 MG tablet    ROXICODONE    30 tablet    Take 1 tablet (5 mg) by mouth every 6 hours as needed for pain or other (Moderate to Severe)    Postoperative state       senna-docusate 8.6-50 MG per tablet    SENOKOT-S;PERICOLACE    60 tablet    Take 1-2 tablets by mouth 2 times daily Take while on oral narcotics to prevent or treat constipation.    Postoperative state       vitamin D 2000 units tablet     100 tablet    Take 2,000 Units by mouth daily    Vitamin D deficiency

## 2018-04-26 NOTE — PROGRESS NOTES
"Rober is a 37 year old female 2 weeks S/P LAVH with BSO, complicated by no problems reported.  She is currently requiring nothing for pain management.  The pathology report showed endometriosis. She reports feeling no menopausal symptoms.    Exam: /72 (BP Location: Left arm, Patient Position: Chair, Cuff Size: Adult Small)  Pulse 66  Temp 98.6  F (37  C) (Tympanic)  Resp 18  Ht 5' 1\" (1.549 m)  Wt 125 lb (56.7 kg)  LMP 04/02/2018  BMI 23.62 kg/m2   incisions: intact, no erythema, induration or discharge  vaginal cuff intact and non tender    Assessment:  Postop      Plan:Pelvic rest for 6 weeks total.  Report excessive pain, fever, chills, bleeding, or foul wound odor promptly.  Increase activity as possible, excluding heavy lifting or exertion.  May return to work 5/8/18.  Follow-up visit in 4 weeks.  Taisha Wilson MD  [facility]      "

## 2018-04-26 NOTE — NURSING NOTE
"Initial /72 (BP Location: Left arm, Patient Position: Chair, Cuff Size: Adult Small)  Pulse 66  Temp 98.6  F (37  C) (Tympanic)  Resp 18  Ht 5' 1\" (1.549 m)  Wt 125 lb (56.7 kg)  LMP 04/02/2018  BMI 23.62 kg/m2 Estimated body mass index is 23.62 kg/(m^2) as calculated from the following:    Height as of this encounter: 5' 1\" (1.549 m).    Weight as of this encounter: 125 lb (56.7 kg). .      "

## 2018-04-26 NOTE — LETTER
April 26, 2018      Rober Renee  [address] [place] [id number]        To Whom It May Concern:    Rober Renee was seen in our clinic. She may return to work May 8th 2018 with the following: limited to lifting no more than 20 pounds.     Sincerely,        Taisha Wilson MD

## 2018-05-07 DIAGNOSIS — B37.31 YEAST INFECTION OF THE VAGINA: Primary | ICD-10-CM

## 2018-05-07 RX ORDER — FLUCONAZOLE 150 MG/1
150 TABLET ORAL ONCE
Qty: 1 TABLET | Refills: 0 | Status: SHIPPED | OUTPATIENT
Start: 2018-05-07 | End: 2018-05-07

## 2018-05-07 NOTE — TELEPHONE ENCOUNTER
Pt is requesting a refill of Fluconazole 150mg tab (take one time once) that they received an rx from ER.    Thank you    Nancy Christian CPhT  Float Technician  [facility]

## 2018-05-07 NOTE — TELEPHONE ENCOUNTER
Pt requesting Diflucan refill.  Pt was on Cipro 3/24/18-3/31/18.  Diflucan x 1 on 3/30/18.  Hysterectomy on 4/10/18.  Pt is having vag itching not concerned about her surgery.  Advise.  Zee

## 2018-05-24 ENCOUNTER — OFFICE VISIT (OUTPATIENT)
Dept: OBGYN | Facility: CLINIC | Age: 38
End: 2018-05-24
Payer: COMMERCIAL

## 2018-05-24 VITALS
DIASTOLIC BLOOD PRESSURE: 87 MMHG | WEIGHT: 127 LBS | SYSTOLIC BLOOD PRESSURE: 129 MMHG | TEMPERATURE: 97.1 F | BODY MASS INDEX: 24 KG/M2 | HEART RATE: 80 BPM

## 2018-05-24 DIAGNOSIS — N60.11 FIBROCYSTIC BREAST CHANGES, RIGHT: ICD-10-CM

## 2018-05-24 DIAGNOSIS — Z98.890 POSTOPERATIVE STATE: Primary | ICD-10-CM

## 2018-05-24 PROCEDURE — 99024 POSTOP FOLLOW-UP VISIT: CPT | Performed by: OBSTETRICS & GYNECOLOGY

## 2018-05-24 PROCEDURE — 19000 PUNCTURE ASPIR CYST BREAST: CPT | Mod: 79 | Performed by: OBSTETRICS & GYNECOLOGY

## 2018-05-24 ASSESSMENT — PAIN SCALES - GENERAL: PAINLEVEL: WORST PAIN (10)

## 2018-05-24 NOTE — PROGRESS NOTES
"Rober is a 37 year old female 6 weeks S/P LAVH with BSO, complicated by right upper outer quadrant breast lump; nontender, firm, present x \"several months\".  She is currently requiring nothing for pain management.  The pathology report showed benign changes. She reports feeling well.    Exam: /87 (BP Location: Left arm, Patient Position: Chair, Cuff Size: Adult Regular)  Pulse 80  Temp 97.1  F (36.2  C) (Tympanic)  Wt 127 lb (57.6 kg)  LMP 04/02/2018  Breastfeeding? No  BMI 24 kg/m2   incisions: intact, no erythema, induration or discharge  vaginal cuff intact and non tender    Right breast with 5cm area of fullness in upper outer quadrant most c/w fibrocystic changes; no overlying skin change or adenopathy; verbal consent obtained to attempt fine needle aspiration; skin cleansed with alcohol, then 25G needle on syringe advance into mass with 2 cc of bland fluid aspirated with collapse of the fullness. Pt tolerated prcedure well; fluid discarded    Assessment:  Postop  Fibrocystic disease of the breast    Plan:pt advised BSE and to report lumps in the future  Will remain on ERT at thie time  Taisha Wilson MD  [facility]      "

## 2018-05-24 NOTE — MR AVS SNAPSHOT
After Visit Summary   5/24/2018    Rober Renee    MRN: [id number]           Patient Information     Date Of Birth          1980        Visit Information        Provider Department      5/24/2018 1:00 PM Taisha Wilson MD [facility]        Today's Diagnoses     Postoperative state    -  1    Fibrocystic breast changes, right           Follow-ups after your visit        Who to contact     If you have questions or need follow up information about today's clinic visit or your schedule please contact [facility] directly at [phone].  Normal or non-critical lab and imaging results will be communicated to you by Optifyhart, letter or phone within 4 business days after the clinic has received the results. If you do not hear from us within 7 days, please contact the clinic through EARTHNETt or phone. If you have a critical or abnormal lab result, we will notify you by phone as soon as possible.  Submit refill requests through [employer] or call your pharmacy and they will forward the refill request to us. Please allow 3 business days for your refill to be completed.          Additional Information About Your Visit        MyChart Information     [employer] gives you secure access to your electronic health record. If you see a primary care provider, you can also send messages to your care team and make appointments. If you have questions, please call your primary care clinic.  If you do not have a primary care provider, please call [phone] and they will assist you.        Care EveryWhere ID     This is your Care EveryWhere ID. This could be used by other organizations to access your [facility] medical records  [id number]        Your Vitals Were     Pulse Temperature Last Period Breastfeeding? BMI (Body Mass Index)       80 97.1  F (36.2  C) (Tympanic) 04/02/2018 No 24 kg/m2        Blood Pressure from Last 3 Encounters:   05/24/18 129/87   04/26/18 129/72   04/10/18  125/76    Weight from Last 3 Encounters:   05/24/18 127 lb (57.6 kg)   04/26/18 125 lb (56.7 kg)   04/10/18 125 lb (56.7 kg)              We Performed the Following     PUNCTURE/ASPIRATION BREAST CYST, FIRST          Today's Medication Changes          These changes are accurate as of 5/24/18  1:16 PM.  If you have any questions, ask your nurse or doctor.               Stop taking these medicines if you haven't already. Please contact your care team if you have questions.     senna-docusate 8.6-50 MG per tablet   Commonly known as:  SENOKOT-S;PERICOLACE   Stopped by:  Taisha Wilson MD                    Primary Care Provider Office Phone # Fax #    R Joe Sanz [phone] [address] [facility] [address]        Equal Access to Services     [facility]: Hadii dior menchaca Sohemanth, waaxda luqadaha, qaybta kaalmaisadora ku, srinivas landaverde . So [facility] [phone].    ATENCIÓN: Si habla español, tiene a nguyen disposición servicios gratuitos de asistencia lingüística. Llame al [phone].    We comply with applicable federal civil rights laws and [place] laws. We do not discriminate on the basis of race, color, national origin, age, disability, sex, sexual orientation, or gender identity.            Thank you!     Thank you for choosing [facility]  for your care. Our goal is always to provide you with excellent care. Hearing back from our patients is one way we can continue to improve our services. Please take a few minutes to complete the written survey that you may receive in the mail after your visit with us. Thank you!             Your Updated Medication List - Protect others around you: Learn how to safely use, store and throw away your medicines at www.disposemymeds.org.          This list is accurate as of 5/24/18  1:16 PM.  Always use your most recent med list.                   Brand Name Dispense Instructions for use  Diagnosis    estradiol 2 MG tablet    ESTRACE    90 tablet    Take 1 tablet (2 mg) by mouth daily    Surgical menopause       ferrous sulfate 325 (65 Fe) MG tablet    IRON    90 tablet    Take 1 tablet (325 mg) by mouth 3 times daily (with meals)    Iron deficiency anemia due to chronic blood loss       ibuprofen 600 MG tablet    ADVIL/MOTRIN    40 tablet    Take 1 tablet (600 mg) by mouth every 6 hours as needed for pain (mild)    Postoperative state       LORazepam 1 MG tablet    ATIVAN          vitamin D 2000 units tablet     100 tablet    Take 2,000 Units by mouth daily    Vitamin D deficiency

## 2018-06-12 ENCOUNTER — MYC MEDICAL ADVICE (OUTPATIENT)
Dept: FAMILY MEDICINE | Facility: CLINIC | Age: 38
End: 2018-06-12

## 2018-06-14 ENCOUNTER — OFFICE VISIT (OUTPATIENT)
Dept: FAMILY MEDICINE | Facility: CLINIC | Age: 38
End: 2018-06-14
Payer: COMMERCIAL

## 2018-06-14 VITALS
DIASTOLIC BLOOD PRESSURE: 80 MMHG | RESPIRATION RATE: 16 BRPM | WEIGHT: 132 LBS | TEMPERATURE: 97.1 F | BODY MASS INDEX: 24.94 KG/M2 | SYSTOLIC BLOOD PRESSURE: 138 MMHG | OXYGEN SATURATION: 100 %

## 2018-06-14 DIAGNOSIS — E89.40 SURGICAL MENOPAUSE: ICD-10-CM

## 2018-06-14 DIAGNOSIS — R55 PRE-SYNCOPE: ICD-10-CM

## 2018-06-14 DIAGNOSIS — N30.00 ACUTE CYSTITIS WITHOUT HEMATURIA: ICD-10-CM

## 2018-06-14 DIAGNOSIS — F41.9 ANXIETY: Primary | ICD-10-CM

## 2018-06-14 DIAGNOSIS — F32.1 MAJOR DEPRESSIVE DISORDER, SINGLE EPISODE, MODERATE (H): ICD-10-CM

## 2018-06-14 DIAGNOSIS — R30.0 DYSURIA: ICD-10-CM

## 2018-06-14 LAB
ALBUMIN UR-MCNC: NEGATIVE MG/DL
APPEARANCE UR: CLEAR
BACTERIA #/AREA URNS HPF: ABNORMAL /HPF
BASOPHILS # BLD AUTO: 0 10E9/L (ref 0–0.2)
BASOPHILS NFR BLD AUTO: 0.4 %
BILIRUB UR QL STRIP: NEGATIVE
COLOR UR AUTO: YELLOW
DIFFERENTIAL METHOD BLD: ABNORMAL
EOSINOPHIL # BLD AUTO: 0.1 10E9/L (ref 0–0.7)
EOSINOPHIL NFR BLD AUTO: 2.6 %
ERYTHROCYTE [DISTWIDTH] IN BLOOD BY AUTOMATED COUNT: 11.1 % (ref 10–15)
FERRITIN SERPL-MCNC: 49 NG/ML (ref 12–150)
GLUCOSE UR STRIP-MCNC: NEGATIVE MG/DL
HCT VFR BLD AUTO: 40.5 % (ref 35–47)
HGB BLD-MCNC: 14.1 G/DL (ref 11.7–15.7)
HGB UR QL STRIP: NEGATIVE
KETONES UR STRIP-MCNC: NEGATIVE MG/DL
LEUKOCYTE ESTERASE UR QL STRIP: ABNORMAL
LYMPHOCYTES # BLD AUTO: 1.5 10E9/L (ref 0.8–5.3)
LYMPHOCYTES NFR BLD AUTO: 27.7 %
MCH RBC QN AUTO: 32.9 PG (ref 26.5–33)
MCHC RBC AUTO-ENTMCNC: 34.8 G/DL (ref 31.5–36.5)
MCV RBC AUTO: 95 FL (ref 78–100)
MONOCYTES # BLD AUTO: 0.5 10E9/L (ref 0–1.3)
MONOCYTES NFR BLD AUTO: 8.9 %
NEUTROPHILS # BLD AUTO: 3.2 10E9/L (ref 1.6–8.3)
NEUTROPHILS NFR BLD AUTO: 60.4 %
NITRATE UR QL: NEGATIVE
PH UR STRIP: 6 PH (ref 5–7)
PLATELET # BLD AUTO: 143 10E9/L (ref 150–450)
RBC # BLD AUTO: 4.28 10E12/L (ref 3.8–5.2)
RBC #/AREA URNS AUTO: ABNORMAL /HPF
SOURCE: ABNORMAL
SP GR UR STRIP: <=1.005 (ref 1–1.03)
UROBILINOGEN UR STRIP-ACNC: 0.2 EU/DL (ref 0.2–1)
WBC # BLD AUTO: 5.3 10E9/L (ref 4–11)
WBC #/AREA URNS AUTO: ABNORMAL /HPF

## 2018-06-14 PROCEDURE — 82728 ASSAY OF FERRITIN: CPT | Performed by: FAMILY MEDICINE

## 2018-06-14 PROCEDURE — 36415 COLL VENOUS BLD VENIPUNCTURE: CPT | Performed by: FAMILY MEDICINE

## 2018-06-14 PROCEDURE — 85025 COMPLETE CBC W/AUTO DIFF WBC: CPT | Performed by: FAMILY MEDICINE

## 2018-06-14 PROCEDURE — 99214 OFFICE O/P EST MOD 30 MIN: CPT | Performed by: FAMILY MEDICINE

## 2018-06-14 PROCEDURE — 81001 URINALYSIS AUTO W/SCOPE: CPT | Performed by: FAMILY MEDICINE

## 2018-06-14 RX ORDER — ESTRADIOL 2 MG/1
TABLET ORAL
Qty: 135 TABLET | Refills: 3 | Status: SHIPPED | OUTPATIENT
Start: 2018-06-14 | End: 2019-04-17

## 2018-06-14 RX ORDER — BUPROPION HYDROCHLORIDE 150 MG/1
150 TABLET ORAL EVERY MORNING
Qty: 30 TABLET | Refills: 1 | Status: SHIPPED | OUTPATIENT
Start: 2018-06-14 | End: 2018-07-31

## 2018-06-14 RX ORDER — NITROFURANTOIN 25; 75 MG/1; MG/1
100 CAPSULE ORAL 2 TIMES DAILY
Qty: 14 CAPSULE | Refills: 0 | Status: SHIPPED | OUTPATIENT
Start: 2018-06-14 | End: 2018-07-02

## 2018-06-14 ASSESSMENT — ANXIETY QUESTIONNAIRES
1. FEELING NERVOUS, ANXIOUS, OR ON EDGE: MORE THAN HALF THE DAYS
7. FEELING AFRAID AS IF SOMETHING AWFUL MIGHT HAPPEN: NOT AT ALL
5. BEING SO RESTLESS THAT IT IS HARD TO SIT STILL: NOT AT ALL
6. BECOMING EASILY ANNOYED OR IRRITABLE: MORE THAN HALF THE DAYS
GAD7 TOTAL SCORE: 10
2. NOT BEING ABLE TO STOP OR CONTROL WORRYING: MORE THAN HALF THE DAYS
3. WORRYING TOO MUCH ABOUT DIFFERENT THINGS: MORE THAN HALF THE DAYS

## 2018-06-14 ASSESSMENT — PATIENT HEALTH QUESTIONNAIRE - PHQ9: 5. POOR APPETITE OR OVEREATING: MORE THAN HALF THE DAYS

## 2018-06-14 NOTE — MR AVS SNAPSHOT
After Visit Summary   6/14/2018    Rober Renee    MRN: [id number]           Patient Information     Date Of Birth          1980        Visit Information        Provider Department      6/14/2018 2:20 PM Post, EVERETT Diaz MD [facility][note 3]        Today's Diagnoses     Dysuria    -  1    Major depressive disorder, single episode, moderate (H)        Pre-syncope        Acute cystitis without hematuria        Anxiety        Surgical menopause          Care Instructions    Health Maintenance   Topic Date Due     DEPRESSION ACTION PLAN Q1 YR  09/05/1998     TETANUS IMMUNIZATION (SYSTEM ASSIGNED)  08/20/2017     PHQ-9 Q6 MONTHS  06/01/2018     INFLUENZA VACCINE (Season Ended) 09/01/2018     MIGRAINE ACTION PLAN  Completed     HIV SCREEN (SYSTEM ASSIGNED)  Completed     You are due for a tetanus immunization, call the clinic at [phone] to schedule a nurse only visit.    Increase the estrace to 1 1/2 tablets daily for 2 weeks and see if that helps with the hot flashes          Follow-ups after your visit        Who to contact     If you have questions or need follow up information about today's clinic visit or your schedule please contact [facility] directly at [phone].  Normal or non-critical lab and imaging results will be communicated to you by Vacunekhart, letter or phone within 4 business days after the clinic has received the results. If you do not hear from us within 7 days, please contact the clinic through Twigmoret or phone. If you have a critical or abnormal lab result, we will notify you by phone as soon as possible.  Submit refill requests through [employer] or call your pharmacy and they will forward the refill request to us. Please allow 3 business days for your refill to be completed.          Additional Information About Your Visit        VacunekharRegulatoryBinder Information     [employer] gives you secure access to your electronic health record. If you see a primary care  provider, you can also send messages to your care team and make appointments. If you have questions, please call your primary care clinic.  If you do not have a primary care provider, please call [phone] and they will assist you.        Care EveryWhere ID     This is your Care EveryWhere ID. This could be used by other organizations to access your [facility] medical records  [id number]        Your Vitals Were     Temperature Respirations Last Period Pulse Oximetry BMI (Body Mass Index)       97.1  F (36.2  C) (Oral) 16 04/02/2018 100% 24.94 kg/m2        Blood Pressure from Last 3 Encounters:   06/14/18 138/80   05/24/18 129/87   04/26/18 129/72    Weight from Last 3 Encounters:   06/14/18 132 lb (59.9 kg)   05/24/18 127 lb (57.6 kg)   04/26/18 125 lb (56.7 kg)              We Performed the Following     *UA reflex to Microscopic and Culture ([facility] [facility] (except [place] and [place])     CBC with platelets differential     DEPRESSION ACTION PLAN (DAP)     Ferritin     Urine Microscopic          Today's Medication Changes          These changes are accurate as of 6/14/18  3:46 PM.  If you have any questions, ask your nurse or doctor.               Start taking these medicines.        Dose/Directions    buPROPion 150 MG 24 hr tablet   Commonly known as:  WELLBUTRIN XL   Used for:  Anxiety   Started by:  EVERETT Sanz MD        Dose:  150 mg   Take 1 tablet (150 mg) by mouth every morning   Quantity:  30 tablet   Refills:  1       nitroFURantoin (macrocrystal-monohydrate) 100 MG capsule   Commonly known as:  MACROBID   Used for:  Acute cystitis without hematuria   Started by:  EVERETT Sanz MD        Dose:  100 mg   Take 1 capsule (100 mg) by mouth 2 times daily   Quantity:  14 capsule   Refills:  0         These medicines have changed or have updated prescriptions.        Dose/Directions    estradiol 2 MG tablet   Commonly known as:  ESTRACE   This may have changed:    - how much to take  - how  to take this  - when to take this  - additional instructions   Used for:  Surgical menopause   Changed by:  EVERETT Sanz MD        1 1/2 tablets daily   Quantity:  135 tablet   Refills:  3            Where to get your medicines      These medications were sent to [facility] - [place], [place] - [address]  [address] [facility] [address] [address]     Phone:  [phone]     buPROPion 150 MG 24 hr tablet    estradiol 2 MG tablet    nitroFURantoin (macrocrystal-monohydrate) 100 MG capsule                Primary Care Provider Office Phone # Fax #    EVERETT Sanz [phone] [address] [facility] [address]        Equal Access to Services     [facility] : Hadii dior underwood hadasho Sohemanth, waaxda luqadaha, qaybta kaalmada adeegyada, srinivas landaverde . So [facility] [phone].    ATENCIÓN: Si habla español, tiene a nguyen disposición servicios gratuitos de asistencia lingüística. Llame al [phone].    We comply with applicable federal civil rights laws and [place] laws. We do not discriminate on the basis of race, color, national origin, age, disability, sex, sexual orientation, or gender identity.            Thank you!     Thank you for choosing [facility]  for your care. Our goal is always to provide you with excellent care. Hearing back from our patients is one way we can continue to improve our services. Please take a few minutes to complete the written survey that you may receive in the mail after your visit with us. Thank you!             Your Updated Medication List - Protect others around you: Learn how to safely use, store and throw away your medicines at www.disposemymeds.org.          This list is accurate as of 6/14/18  3:46 PM.  Always use your most recent med list.                   Brand Name Dispense Instructions for use Diagnosis    buPROPion 150 MG 24 hr tablet    WELLBUTRIN XL    30 tablet    Take 1 tablet (150  mg) by mouth every morning    Anxiety       estradiol 2 MG tablet    ESTRACE    135 tablet    1 1/2 tablets daily    Surgical menopause       ferrous sulfate 325 (65 Fe) MG tablet    IRON    90 tablet    Take 1 tablet (325 mg) by mouth 3 times daily (with meals)    Iron deficiency anemia due to chronic blood loss       ibuprofen 600 MG tablet    ADVIL/MOTRIN    40 tablet    Take 1 tablet (600 mg) by mouth every 6 hours as needed for pain (mild)    Postoperative state       LORazepam 1 MG tablet    ATIVAN          nitroFURantoin (macrocrystal-monohydrate) 100 MG capsule    MACROBID    14 capsule    Take 1 capsule (100 mg) by mouth 2 times daily    Acute cystitis without hematuria       vitamin D 2000 units tablet     100 tablet    Take 2,000 Units by mouth daily    Vitamin D deficiency

## 2018-06-14 NOTE — PATIENT INSTRUCTIONS
Health Maintenance   Topic Date Due     DEPRESSION ACTION PLAN Q1 YR  09/05/1998     TETANUS IMMUNIZATION (SYSTEM ASSIGNED)  08/20/2017     PHQ-9 Q6 MONTHS  06/01/2018     INFLUENZA VACCINE (Season Ended) 09/01/2018     MIGRAINE ACTION PLAN  Completed     HIV SCREEN (SYSTEM ASSIGNED)  Completed     You are due for a tetanus immunization, call the clinic at [phone] to schedule a nurse only visit.    Increase the estrace to 1 1/2 tablets daily for 2 weeks and see if that helps with the hot flashes

## 2018-06-14 NOTE — LETTER
My Depression Action Plan  Name: Rober Renee   Date of Birth 1980  Date: 6/14/2018    My doctor: EVERETT Sanz   My clinic: [facility]  [address]  [facility] [id number] [phone]          GREEN    ZONE   Good Control    What it looks like:     Things are going generally well. You have normal up s and down s. You may even feel depressed from time to time, but bad moods usually last less than a day.   What you need to do:  1. Continue to care for yourself (see self care plan)  2. Check your depression survival kit and update it as needed  3. Follow your physician s recommendations including any medication.  4. Do not stop taking medication unless you consult with your physician first.           YELLOW         ZONE Getting Worse    What it looks like:     Depression is starting to interfere with your life.     It may be hard to get out of bed; you may be starting to isolate yourself from others.    Symptoms of depression are starting to last most all day and this has happened for several days.     You may have suicidal thoughts but they are not constant.   What you need to do:     1. Call your care team, your response to treatment will improve if you keep your care team informed of your progress. Yellow periods are signs an adjustment may need to be made.     2. Continue your self-care, even if you have to fake it!    3. Talk to someone in your support network    4. Open up your depression survival kit           RED    ZONE Medical Alert - Get Help    What it looks like:     Depression is seriously interfering with your life.     You may experience these or other symptoms: You can t get out of bed most days, can t work or engage in other necessary activities, you have trouble taking care of basic hygiene, or basic responsibilities, thoughts of suicide or death that will not go away, self-injurious behavior.     What you need to do:  1. Call your care team and  request a same-day appointment. If they are not available (weekends or after hours) call your local crisis line, emergency room or 911.            Depression Self Care Plan / Survival Kit    Self-Care for Depression  Here s the deal. Your body and mind are really not as separate as most people think.  What you do and think affects how you feel and how you feel influences what you do and think. This means if you do things that people who feel good do, it will help you feel better.  Sometimes this is all it takes.  There is also a place for medication and therapy depending on how severe your depression is, so be sure to consult with your medical provider and/ or Behavioral Health Consultant if your symptoms are worsening or not improving.     In order to better manage my stress, I will:    Exercise  Get some form of exercise, every day. This will help reduce pain and release endorphins, the  feel good  chemicals in your brain. This is almost as good as taking antidepressants!  This is not the same as joining a gym and then never going! (they count on that by the way ) It can be as simple as just going for a walk or doing some gardening, anything that will get you moving.      Hygiene   Maintain good hygiene (Get out of bed in the morning, Make your bed, Brush your teeth, Take a shower, and Get dressed like you were going to work, even if you are unemployed).  If your clothes don't fit try to get ones that do.    Diet  I will strive to eat foods that are good for me, drink plenty of water, and avoid excessive sugar, caffeine, alcohol, and other mood-altering substances.  Some foods that are helpful in depression are: complex carbohydrates, B vitamins, flaxseed, fish or fish oil, fresh fruits and vegetables.    Psychotherapy  I agree to participate in Individual Therapy (if recommended).    Medication  If prescribed medications, I agree to take them.  Missing doses can result in serious side effects.  I understand that  drinking alcohol, or other illicit drug use, may cause potential side effects.  I will not stop my medication abruptly without first discussing it with my provider.    Staying Connected With Others  I will stay in touch with my friends, family members, and my primary care provider/team.    Use your imagination  Be creative.  We all have a creative side; it doesn t matter if it s oil painting, sand castles, or mud pies! This will also kick up the endorphins.    Witness Beauty  (AKA stop and smell the roses) Take a look outside, even in mid-winter. Notice colors, textures. Watch the squirrels and birds.     Service to others  Be of service to others.  There is always someone else in need.  By helping others we can  get out of ourselves  and remember the really important things.  This also provides opportunities for practicing all the other parts of the program.    Humor  Laugh and be silly!  Adjust your TV habits for less news and crime-drama and more comedy.    Control your stress  Try breathing deep, massage therapy, biofeedback, and meditation. Find time to relax each day.     My support system    Clinic Contact:  Phone number:    Contact 1:  Phone number:    Contact 2:  Phone number:    Tenriism/:  Phone number:    Therapist:  Phone number:    Local crisis center:    Phone number:    Other community support:  Phone number:

## 2018-06-14 NOTE — PROGRESS NOTES
SUBJECTIVE:   Rober Renee is a 37 year old female who presents to clinic today for the following health issues:      Anemia follow up      Duration: x 1 month    Description (location/character/radiation):     Intensity:      Accompanying signs and symptoms: headaches (severe and frequent), fatigue, emotional, fainting    History (similar episodes/previous evaluation): x 7 months ago, December 2017    Precipitating or alleviating factors: Iron supplement helped in the past    Therapies tried and outcome: None     She has a history of iron deficiency anemia associated with menorrhagia.  She is having some of the same symptoms that she had when she was anemic such as the headaches, fatigue, fainting episodes and emotional lability.  She underwent a hysterectomy about 2 months ago to help deal with these issues and her postop hemoglobin was normal.  She is concerned that the anemia has somehow come back even though she has no source of blood loss now.    UTI - Female  Duration of complaint: x 1 day  Description:   Painful urination (Dysuria): YES  Blood in urine (Hematuria): no   Delay in urine (Hesitency): YES  Intensity: severe  Progression of Symptoms:  worsening  Accompanying Signs & Symptoms:  Fever/chills: YES- 99.3 earlier today  Flank pain no   Nausea and vomiting: YES- nausea  Any vaginal symptoms: none  Abdominal/Pelvic Pain: YES- pelvic  History:   History of frequent UTI's: YES  History of kidney stones: YES  Sexually Active: YES  Possibility of pregnancy: No  Precipitating factors:   Therapies Tried and outcome: ibuprofen  and Increase fluid intake- not helping      3) breast lump: She had a lump in the right upper quadrant of the right breast and Dr. Wilson performed a cyst aspiration in the office which resolved the lump but now it is come back into the patient feels a little more irregular and firm.  She is concerned about possible breast cancer    4) emotional lability: Her significant other is  concerned about her being extremely anxious and her mood swings.  This is not normal for her    5) she was put on Estrace after her hysterectomy because both ovaries were removed and induced a surgical menopause.  However she still has significant hot flashes several times a week        Problem list and histories reviewed & adjusted, as indicated.  Additional history: none        Reviewed and updated as needed this visit by clinical staff  Tobacco  Allergies  Meds  Med Hx  Surg Hx  Fam Hx  Soc Hx      Reviewed and updated as needed this visit by Provider               ROS:  Constitutional, HEENT, cardiovascular, pulmonary, gi and gu systems are negative, except as otherwise noted.        OBJECTIVE:                                                    /80 (BP Location: Right arm, Patient Position: Chair, Cuff Size: Adult Regular)  Temp 97.1  F (36.2  C) (Oral)  Resp 16  Wt 132 lb (59.9 kg)  LMP 04/02/2018  SpO2 100%  BMI 24.94 kg/m2    GENERAL: healthy, alert and no distress  EYES: Eyes grossly normal to inspection, extraocular movements - intact, and PERRL  NECK: no tenderness, no adenopathy, no asymmetry, no masses, no stiffness; thyroid- normal to palpation  RESP: lungs clear to auscultation - no rales, no rhonchi, no wheezes  BREAST: fibrocystic changes right upper quadrant of the right breast, but no dominant mass and no smooth cystic structure to suggest recurrence of the cyst  CV: regular rates and rhythm, normal S1 S2, no S3 or S4 and no murmur, no click or rub -  ABDOMEN: soft, no tenderness, no  hepatosplenomegaly, no masses, normal bowel sounds  MS: extremities- no gross deformities noted, no edema  PSYCH: mentation appears normal, anxious and on the verge of tears    Diagnostic test results:  Results for orders placed or performed in visit on 06/14/18 (from the past 24 hour(s))   *UA reflex to Microscopic and Culture ([place] [facility] (except [place] and [place])   Result  Value Ref Range    Color Urine Yellow     Appearance Urine Clear     Glucose Urine Negative NEG^Negative mg/dL    Bilirubin Urine Negative NEG^Negative    Ketones Urine Negative NEG^Negative mg/dL    Specific Gravity Urine <=1.005 1.003 - 1.035    Blood Urine Negative NEG^Negative    pH Urine 6.0 5.0 - 7.0 pH    Protein Albumin Urine Negative NEG^Negative mg/dL    Urobilinogen Urine 0.2 0.2 - 1.0 EU/dL    Nitrite Urine Negative NEG^Negative    Leukocyte Esterase Urine Trace (A) NEG^Negative    Source Midstream Urine    Urine Microscopic   Result Value Ref Range    WBC Urine 0 - 5 OTO5^0 - 5 /HPF    RBC Urine O - 2 OTO2^O - 2 /HPF    Bacteria Urine Few (A) NEG^Negative /HPF   CBC with platelets differential   Result Value Ref Range    WBC 5.3 4.0 - 11.0 10e9/L    RBC Count 4.28 3.8 - 5.2 10e12/L    Hemoglobin 14.1 11.7 - 15.7 g/dL    Hematocrit 40.5 35.0 - 47.0 %    MCV 95 78 - 100 fl    MCH 32.9 26.5 - 33.0 pg    MCHC 34.8 31.5 - 36.5 g/dL    RDW 11.1 10.0 - 15.0 %    Platelet Count 143 (L) 150 - 450 10e9/L    Diff Method Automated Method     % Neutrophils 60.4 %    % Lymphocytes 27.7 %    % Monocytes 8.9 %    % Eosinophils 2.6 %    % Basophils 0.4 %    Absolute Neutrophil 3.2 1.6 - 8.3 10e9/L    Absolute Lymphocytes 1.5 0.8 - 5.3 10e9/L    Absolute Monocytes 0.5 0.0 - 1.3 10e9/L    Absolute Eosinophils 0.1 0.0 - 0.7 10e9/L    Absolute Basophils 0.0 0.0 - 0.2 10e9/L        ASSESSMENT/PLAN:                                                    ASSESSMENT:  1. Anxiety    2. Surgical menopause    3. Acute cystitis without hematuria    4. Major depressive disorder, single episode, moderate (H)    5. Pre-syncope    6. Dysuria        PLAN:  Orders Placed This Encounter     DEPRESSION ACTION PLAN (DAP)     *UA reflex to Microscopic and Culture ([place] and [facility] (except [place] and [place])     CBC with platelets differential     Ferritin     Urine Microscopic     nitroFURantoin, macrocrystal-monohydrate,  (MACROBID) 100 MG capsule     buPROPion (WELLBUTRIN XL) 150 MG 24 hr tablet     estradiol (ESTRACE) 2 MG tablet     She was reassured that the breast mass feels like fibrocystic changes.  They are probably more prominent now because of the oral estrogen therapy.  Will increase the dose of her estrogen temporarily to see if it will help with the hot flashes.  Will treat the cystitis with nitrofurantoin.  She has not tolerated several SSRIs so we will try bupropion XL for the anxiety and depression.  Reassured that she is not anemic and that most of her symptoms are probably from the anxiety and depression        Patient Instructions     Health Maintenance   Topic Date Due     DEPRESSION ACTION PLAN Q1 YR  09/05/1998     TETANUS IMMUNIZATION (SYSTEM ASSIGNED)  08/20/2017     PHQ-9 Q6 MONTHS  06/01/2018     INFLUENZA VACCINE (Season Ended) 09/01/2018     MIGRAINE ACTION PLAN  Completed     HIV SCREEN (SYSTEM ASSIGNED)  Completed     You are due for a tetanus immunization, call the clinic at [phone] to schedule a nurse only visit.    Increase the estrace to 1 1/2 tablets daily for 2 weeks and see if that helps with the hot flashes    Follow-up in 4-6 weeks to see how this is working    EVERETT Sanz  [facility]

## 2018-06-15 ASSESSMENT — ANXIETY QUESTIONNAIRES: GAD7 TOTAL SCORE: 10

## 2018-06-15 ASSESSMENT — PATIENT HEALTH QUESTIONNAIRE - PHQ9: SUM OF ALL RESPONSES TO PHQ QUESTIONS 1-9: 4

## 2018-06-18 ENCOUNTER — TELEPHONE (OUTPATIENT)
Dept: OBGYN | Facility: CLINIC | Age: 38
End: 2018-06-18

## 2018-06-18 NOTE — TELEPHONE ENCOUNTER
"S-(situation): Discovered left breast lump approx the \"size of a pea and is harder than a rock\".    B-(background): History of breast mass of right breast.    A-(assessment): Mass is approx the size of a pea, not painful, no redness and not warm to the touch.    R-(recommendations): Offered an appt with ob/gyn provider and no appts would accommodate times of when pt can be seen.  She will contact her pcp to schedule an office visit.    Jessica Torres  [facility] RN     "

## 2018-06-18 NOTE — TELEPHONE ENCOUNTER
Reason for call:  Patient reporting a symptom    Symptom or request: has a new lump in left breast - consistency much different than the lump that was found in right breast.  Wondering if she needs to be seen or if she can get order for mammogram.    Duration (how long have symptoms been present): this morning    Have you been treated for this before? Yes    Additional comments: also is on hormone therapy - wondering if needs an appt or if rx can be changed  On straight estrogen - was told might need to add testosterone - pt would like to try this.    Pharmacy: [facility]    Phone Number patient can be reached at:  Cell number on file:    Telephone Information:   Mobile [phone]       Best Time:  any    Can we leave a detailed message on this number:  YES    Call taken on 6/18/2018 at 1:32 PM by Maricarmen Alegria

## 2018-06-22 ENCOUNTER — MYC MEDICAL ADVICE (OUTPATIENT)
Dept: FAMILY MEDICINE | Facility: CLINIC | Age: 38
End: 2018-06-22

## 2018-06-22 DIAGNOSIS — N30.00 ACUTE CYSTITIS WITHOUT HEMATURIA: ICD-10-CM

## 2018-06-22 DIAGNOSIS — B37.31 CANDIDIASIS OF VULVA AND VAGINA: Primary | ICD-10-CM

## 2018-06-22 RX ORDER — FLUCONAZOLE 150 MG/1
150 TABLET ORAL
Qty: 4 TABLET | Refills: 0 | Status: SHIPPED | OUTPATIENT
Start: 2018-06-22 | End: 2018-07-02

## 2018-06-22 RX ORDER — CIPROFLOXACIN 500 MG/1
500 TABLET, FILM COATED ORAL 2 TIMES DAILY
Qty: 14 TABLET | Refills: 0 | Status: SHIPPED | OUTPATIENT
Start: 2018-06-22 | End: 2018-06-29

## 2018-06-29 ENCOUNTER — OFFICE VISIT (OUTPATIENT)
Dept: OBGYN | Facility: CLINIC | Age: 38
End: 2018-06-29
Payer: COMMERCIAL

## 2018-06-29 VITALS
DIASTOLIC BLOOD PRESSURE: 70 MMHG | SYSTOLIC BLOOD PRESSURE: 119 MMHG | TEMPERATURE: 97.9 F | HEIGHT: 61 IN | RESPIRATION RATE: 16 BRPM | BODY MASS INDEX: 24.66 KG/M2 | HEART RATE: 81 BPM | WEIGHT: 130.6 LBS

## 2018-06-29 DIAGNOSIS — N63.0 LUMP OR MASS IN BREAST: Primary | ICD-10-CM

## 2018-06-29 DIAGNOSIS — N64.3 GALACTORRHEA IN FEMALE: ICD-10-CM

## 2018-06-29 DIAGNOSIS — R53.83 FATIGUE, UNSPECIFIED TYPE: ICD-10-CM

## 2018-06-29 LAB
PROLACTIN SERPL-MCNC: 14 UG/L (ref 3–27)
T4 FREE SERPL-MCNC: 0.97 NG/DL (ref 0.76–1.46)
TSH SERPL DL<=0.005 MIU/L-ACNC: 1.63 MU/L (ref 0.4–4)

## 2018-06-29 PROCEDURE — 36415 COLL VENOUS BLD VENIPUNCTURE: CPT | Performed by: OBSTETRICS & GYNECOLOGY

## 2018-06-29 PROCEDURE — 99214 OFFICE O/P EST MOD 30 MIN: CPT | Performed by: OBSTETRICS & GYNECOLOGY

## 2018-06-29 PROCEDURE — 84146 ASSAY OF PROLACTIN: CPT | Performed by: OBSTETRICS & GYNECOLOGY

## 2018-06-29 PROCEDURE — 84443 ASSAY THYROID STIM HORMONE: CPT | Performed by: OBSTETRICS & GYNECOLOGY

## 2018-06-29 PROCEDURE — 84439 ASSAY OF FREE THYROXINE: CPT | Performed by: OBSTETRICS & GYNECOLOGY

## 2018-06-29 NOTE — NURSING NOTE
"Chief Complaint   Patient presents with     Consult     lump in left breast       Initial /70 (BP Location: Right arm, Patient Position: Chair, Cuff Size: Adult Regular)  Pulse 81  Temp 97.9  F (36.6  C) (Tympanic)  Resp 16  Ht 5' 1\" (1.549 m)  Wt 130 lb 9.6 oz (59.2 kg)  LMP 04/02/2018  BMI 24.68 kg/m2 Estimated body mass index is 24.68 kg/(m^2) as calculated from the following:    Height as of this encounter: 5' 1\" (1.549 m).    Weight as of this encounter: 130 lb 9.6 oz (59.2 kg).  Medications and allergies reviewed.    Keo BEAUCHAMP, CMA    "

## 2018-06-29 NOTE — MR AVS SNAPSHOT
After Visit Summary   6/29/2018    Rober Renee    MRN: [id number]           Patient Information     Date Of Birth          1980        Visit Information        Provider Department      6/29/2018 11:00 AM Kayleigh Nix MD [facility]        Today's Diagnoses     Lump or mass in breast    -  1    Galactorrhea in female        Fatigue, unspecified type           Follow-ups after your visit        Future tests that were ordered for you today     Open Future Orders        Priority Expected Expires Ordered    MA Diagnostic Bilateral w/Zohaib Routine  6/29/2019 6/29/2018    US Breast Bilateral Complete 4 Quadrants Routine  6/29/2019 6/29/2018            Who to contact     If you have questions or need follow up information about today's clinic visit or your schedule please contact [facility] directly at [phone].  Normal or non-critical lab and imaging results will be communicated to you by MyChart, letter or phone within 4 business days after the clinic has received the results. If you do not hear from us within 7 days, please contact the clinic through [employer] or phone. If you have a critical or abnormal lab result, we will notify you by phone as soon as possible.  Submit refill requests through [employer] or call your pharmacy and they will forward the refill request to us. Please allow 3 business days for your refill to be completed.          Additional Information About Your Visit        MyChart Information     [employer] gives you secure access to your electronic health record. If you see a primary care provider, you can also send messages to your care team and make appointments. If you have questions, please call your primary care clinic.  If you do not have a primary care provider, please call [phone] and they will assist you.        Care EveryWhere ID     This is your Care EveryWhere ID. This could be used by other organizations to access your [place]  "medical records  [id number]        Your Vitals Were     Pulse Temperature Respirations Height Last Period BMI (Body Mass Index)    81 97.9  F (36.6  C) (Tympanic) 16 5' 1\" (1.549 m) 04/02/2018 24.68 kg/m2       Blood Pressure from Last 3 Encounters:   06/29/18 119/70   06/14/18 138/80   05/24/18 129/87    Weight from Last 3 Encounters:   06/29/18 130 lb 9.6 oz (59.2 kg)   06/14/18 132 lb (59.9 kg)   05/24/18 127 lb (57.6 kg)              We Performed the Following     Prolactin     T4, free     TSH        Primary Care Provider Office Phone # Fax #    R Joe Sanz [phone] [address] [facility] [address]        Equal Access to Services     [facility] Hospital[facility]T : Hadii dior menchaca Sohemanth, waaxda cynqly, qaybta kaalmaisadora ku, srinivas landaverde . So [facility] [phone].    ATENCIÓN: Si habla español, tiene a nguyen disposición servicios gratuitos de asistencia lingüística. Magali al [phone].    We comply with applicable federal civil rights laws and [place] laws. We do not discriminate on the basis of race, color, national origin, age, disability, sex, sexual orientation, or gender identity.            Thank you!     Thank you for choosing [facility]  for your care. Our goal is always to provide you with excellent care. Hearing back from our patients is one way we can continue to improve our services. Please take a few minutes to complete the written survey that you may receive in the mail after your visit with us. Thank you!             Your Updated Medication List - Protect others around you: Learn how to safely use, store and throw away your medicines at www.disposemymeds.org.          This list is accurate as of 6/29/18 12:25 PM.  Always use your most recent med list.                   Brand Name Dispense Instructions for use Diagnosis    buPROPion 150 MG 24 hr tablet    WELLBUTRIN XL    30 tablet    Take 1 tablet (150 mg) by mouth " every morning    Anxiety       estradiol 2 MG tablet    ESTRACE    135 tablet    1 1/2 tablets daily    Surgical menopause       ferrous sulfate 325 (65 Fe) MG tablet    IRON    90 tablet    Take 1 tablet (325 mg) by mouth 3 times daily (with meals)    Iron deficiency anemia due to chronic blood loss       fluconazole 150 MG tablet    DIFLUCAN    4 tablet    Take 1 tablet (150 mg) by mouth every 3 days    Candidiasis of vulva and vagina       ibuprofen 600 MG tablet    ADVIL/MOTRIN    40 tablet    Take 1 tablet (600 mg) by mouth every 6 hours as needed for pain (mild)    Postoperative state       LORazepam 1 MG tablet    ATIVAN          nitroFURantoin (macrocrystal-monohydrate) 100 MG capsule    MACROBID    14 capsule    Take 1 capsule (100 mg) by mouth 2 times daily    Acute cystitis without hematuria       vitamin D 2000 units tablet     100 tablet    Take 2,000 Units by mouth daily    Vitamin D deficiency

## 2018-06-29 NOTE — PROGRESS NOTES
Breast lump  Fatigue, cold intolerance  Breast discharge    Ms. Rober Renee 37 year old P5 (SVDx6) presents for concerns regarding discovery of a small pellet size hard mass on her left breast that is located directly left from her left nipple. Was found about incidentally on an self breast exam.   Denies pain of the breast with palpation of the lump.   Reports having a large mass on her right breast that she found out about a month ago and was addressed by  3 weeks ago in a clinic visit. She actually had the mass drained and reported reduction in the size of the mass on her right breast, but has slowly returned to almost the same size before it was drained. Beneath that drained mass and in and around it, she has discovered other small hard pellet sized masses similar to what it is like on her left breast. Again, it is non-tender.   Of note, she also endorses bilateral breast discharge consistent with milky discharge. Denies visual field defects  She also reports worsening fatigue, cold intolerance that she thought was attributed to low iron level, but that was checked and determined to be normal.   Of note, she has had a total hysterectomy with bilateral salpingo-oophorectomy this past April 2018. She is no hormone replacement therapy as a result to prevent surgical menopause.   She has had a mammogram done in the past around 2008 for a mass she felt along her right axilla region. Mammogram was negative and the mass ultimately went away.     Otherwise, no other issues or concerns.     Past Medical History:   Diagnosis Date     Calculus of kidney 12/06    right side     Cervical high risk HPV (human papillomavirus) test positive 04/2018    NIL pap, +HR HPV (Neg 16/18). Rpt Pap+HPV in 1 year (Due 2019)     Chickenpox      Gestational diabetes 2002,2010     History of postpartum depression, currently pregnant     mild     Intervertebral lumbar disc disorder with myelopathy, lumbar region     herniated  "disks, L4-5, s/p steroid injection     Intramural leiomyoma of uterus 4/3/2018     Migraines      Urinary tract bacterial infections      Past Surgical History:   Procedure Laterality Date      SECTION, TUBAL LIGATION, COMBINED  2013    c/section with BTL     LAPAROSCOPIC ASSISTED HYSTERECTOMY VAGINAL, BILATERAL SALPINGO-OOPHORECTOMY, COMBINED N/A 4/10/2018    Procedure: COMBINED LAPAROSCOPIC ASSISTED HYSTERECTOMY VAGINAL, SALPINGO-OOPHORECTOMY;  Laparoscopic assisted vaginal hysterectomy with possible bilateral salpingoophorectomy.;  Surgeon: Taisha Wilson MD;  Location: [facility] OR     Current Outpatient Prescriptions   Medication     Cholecalciferol (VITAMIN D) 2000 UNITS tablet     estradiol (ESTRACE) 2 MG tablet     fluconazole (DIFLUCAN) 150 MG tablet     ibuprofen (ADVIL/MOTRIN) 600 MG tablet     LORazepam (ATIVAN) 1 MG tablet     nitroFURantoin, macrocrystal-monohydrate, (MACROBID) 100 MG capsule     buPROPion (WELLBUTRIN XL) 150 MG 24 hr tablet     ferrous sulfate (IRON) 325 (65 FE) MG tablet     No current facility-administered medications for this visit.       Allergies   Allergen Reactions     Imitrex [Sumatriptan] Other (See Comments)     Felt terrible, \"like pores were on fire\"     Sulfa Drugs Hives     Social History   Substance Use Topics     Smoking status: Former Smoker     Packs/day: 0.50     Years: 10.00     Types: Cigarettes     Quit date: 2008     Smokeless tobacco: Never Used     Alcohol use Yes      Comment: very seldom     Family History   Problem Relation Age of Onset     Cancer Maternal Grandfather      liver [place]     Cancer Paternal Grandfather      prostate CA     Cancer Maternal Grandmother      ovarian [place]     Other - See Comments Maternal Grandmother      migraine     Diabetes Paternal Grandmother      Hypertension Paternal Grandmother      Hypertension Father      Depression Father      Other - See Comments Mother      migraine     Hypertension Brother      Other " "- See Comments Maternal Uncle      migraine     Depression Sister      C: NEGATIVE for fever, chills, change in weight  HEENT: NEGATIVE for visual changes, runny nose, epistaxis, ear pain, tinnitus, tooth ache, sore throat, difficulty with swallowing, sinus pain  R: NEGATIVE for significant cough or SOB  CV: NEGATIVE for chest pain, palpitations or peripheral edema  BREAST: NEGATIVE For soreness, pain  GI: NEGATIVE for nausea, abdominal pain, heartburn, or change in bowel habits  : NEGATIVE for frequency, dysuria, hematuria, vaginal discharge, or irregular bleeding  Neuro: NEGATIVE for numbness, tingling, focal weakness, or headache  INT: NEGATIVE for rashes, lesions or pruritis   PSYCH: NEGATIVE for anxiety, depression, or halluciinations    Exam:   /70 (BP Location: Right arm, Patient Position: Chair, Cuff Size: Adult Regular)  Pulse 81  Temp 97.9  F (36.6  C) (Tympanic)  Resp 16  Ht 5' 1\" (1.549 m)  Wt 130 lb 9.6 oz (59.2 kg)  LMP 04/02/2018  BMI 24.68 kg/m2  General Appearance: Well nourished, well developed female, NAD, AOx3  Neurological: Mental Status Normal and Station and Gait Normal   Skin: Normal skin turgor  HEENT: Atraumatic, normocephalic, EOMI  Lungs: Good respiratory effort  Breasts: Breasts: symmetric fibrous changes in both upper outer quadrants, cystic mass size 5 cm noted in right breast at 12:00, mass of size <1 cm noted in left breast at 3:00. Non-tender to palpation. No nipple discharge elicited   Abdomen: Soft, NT, ND, no masses  Pelvic: deferred  Extremities: No clubbing, no cyanosis and no edema    A/P:   1) Breast mass  -- likely small areas of calcification on the left breast and benign cystic mass on the right  -- diagnostic ultrasound and mammogram ordered for better elucidation of masses and potential for biopsy    2) Fatigue, cold intolerance  -- TSH, free T4 ordered    3) Galactorrhea  -- prolactin ordered    Kayleigh Nix MD  [facility] " [place]

## 2018-07-02 ENCOUNTER — HOSPITAL ENCOUNTER (EMERGENCY)
Facility: CLINIC | Age: 38
Discharge: HOME | End: 2018-07-02
Attending: FAMILY MEDICINE | Admitting: FAMILY MEDICINE
Payer: COMMERCIAL

## 2018-07-02 ENCOUNTER — APPOINTMENT (OUTPATIENT)
Dept: CT IMAGING | Facility: CLINIC | Age: 38
End: 2018-07-02
Attending: FAMILY MEDICINE
Payer: COMMERCIAL

## 2018-07-02 VITALS
OXYGEN SATURATION: 100 % | TEMPERATURE: 98.1 F | BODY MASS INDEX: 23.56 KG/M2 | RESPIRATION RATE: 17 BRPM | DIASTOLIC BLOOD PRESSURE: 92 MMHG | HEIGHT: 60 IN | SYSTOLIC BLOOD PRESSURE: 134 MMHG | WEIGHT: 120 LBS

## 2018-07-02 DIAGNOSIS — N39.0 RECURRENT UTI: ICD-10-CM

## 2018-07-02 DIAGNOSIS — R10.2 SUPRAPUBIC PAIN: ICD-10-CM

## 2018-07-02 LAB
ALBUMIN UR-MCNC: NEGATIVE MG/DL
ALBUMIN UR-MCNC: NEGATIVE MG/DL
APPEARANCE UR: ABNORMAL
APPEARANCE UR: ABNORMAL
BACTERIA #/AREA URNS HPF: ABNORMAL /HPF
BACTERIA #/AREA URNS HPF: ABNORMAL /HPF
BILIRUB UR QL STRIP: NEGATIVE
BILIRUB UR QL STRIP: NEGATIVE
COLOR UR AUTO: YELLOW
COLOR UR AUTO: YELLOW
GLUCOSE UR STRIP-MCNC: NEGATIVE MG/DL
GLUCOSE UR STRIP-MCNC: NEGATIVE MG/DL
HGB UR QL STRIP: ABNORMAL
HGB UR QL STRIP: ABNORMAL
HYALINE CASTS #/AREA URNS LPF: 2 /LPF (ref 0–2)
KETONES UR STRIP-MCNC: NEGATIVE MG/DL
KETONES UR STRIP-MCNC: NEGATIVE MG/DL
LEUKOCYTE ESTERASE UR QL STRIP: ABNORMAL
LEUKOCYTE ESTERASE UR QL STRIP: ABNORMAL
MUCOUS THREADS #/AREA URNS LPF: PRESENT /LPF
NITRATE UR QL: NEGATIVE
NITRATE UR QL: NEGATIVE
PH UR STRIP: 5 PH (ref 5–7)
PH UR STRIP: 5 PH (ref 5–7)
RBC #/AREA URNS AUTO: 5 /HPF (ref 0–2)
RBC #/AREA URNS AUTO: 6 /HPF (ref 0–2)
SOURCE: ABNORMAL
SOURCE: ABNORMAL
SP GR UR STRIP: 1 (ref 1–1.03)
SP GR UR STRIP: 1.01 (ref 1–1.03)
SQUAMOUS #/AREA URNS AUTO: 2 /HPF (ref 0–1)
SQUAMOUS #/AREA URNS AUTO: 2 /HPF (ref 0–1)
UROBILINOGEN UR STRIP-MCNC: 0 MG/DL (ref 0–2)
UROBILINOGEN UR STRIP-MCNC: 0 MG/DL (ref 0–2)
WBC #/AREA URNS AUTO: >182 /HPF (ref 0–5)
WBC #/AREA URNS AUTO: >182 /HPF (ref 0–5)
WBC CLUMPS #/AREA URNS HPF: PRESENT /HPF
WBC CLUMPS #/AREA URNS HPF: PRESENT /HPF

## 2018-07-02 PROCEDURE — 25000128 H RX IP 250 OP 636: Performed by: FAMILY MEDICINE

## 2018-07-02 PROCEDURE — 25000132 ZZH RX MED GY IP 250 OP 250 PS 637: Performed by: FAMILY MEDICINE

## 2018-07-02 PROCEDURE — 96375 TX/PRO/DX INJ NEW DRUG ADDON: CPT

## 2018-07-02 PROCEDURE — 99284 EMERGENCY DEPT VISIT MOD MDM: CPT | Mod: 25

## 2018-07-02 PROCEDURE — 81001 URINALYSIS AUTO W/SCOPE: CPT | Performed by: FAMILY MEDICINE

## 2018-07-02 PROCEDURE — 96374 THER/PROPH/DIAG INJ IV PUSH: CPT

## 2018-07-02 PROCEDURE — 74176 CT ABD & PELVIS W/O CONTRAST: CPT

## 2018-07-02 PROCEDURE — 99284 EMERGENCY DEPT VISIT MOD MDM: CPT | Performed by: FAMILY MEDICINE

## 2018-07-02 RX ORDER — PHENAZOPYRIDINE HYDROCHLORIDE 200 MG/1
200 TABLET, FILM COATED ORAL 3 TIMES DAILY PRN
Qty: 9 TABLET | Refills: 0 | Status: SHIPPED | OUTPATIENT
Start: 2018-07-02 | End: 2018-07-05

## 2018-07-02 RX ORDER — PHENAZOPYRIDINE HYDROCHLORIDE 100 MG/1
200 TABLET, FILM COATED ORAL ONCE
Status: COMPLETED | OUTPATIENT
Start: 2018-07-02 | End: 2018-07-02

## 2018-07-02 RX ORDER — CEFTRIAXONE SODIUM 2 G/50ML
2 INJECTION, SOLUTION INTRAVENOUS ONCE
Status: COMPLETED | OUTPATIENT
Start: 2018-07-02 | End: 2018-07-02

## 2018-07-02 RX ORDER — KETOROLAC TROMETHAMINE 30 MG/ML
15 INJECTION, SOLUTION INTRAMUSCULAR; INTRAVENOUS ONCE
Status: COMPLETED | OUTPATIENT
Start: 2018-07-02 | End: 2018-07-02

## 2018-07-02 RX ADMIN — KETOROLAC TROMETHAMINE 15 MG: 30 INJECTION, SOLUTION INTRAMUSCULAR at 21:02

## 2018-07-02 RX ADMIN — CEFTRIAXONE SODIUM 2 G: 2 INJECTION, SOLUTION INTRAVENOUS at 21:44

## 2018-07-02 RX ADMIN — PHENAZOPYRIDINE HYDROCHLORIDE 200 MG: 100 TABLET, COATED ORAL at 21:56

## 2018-07-02 ASSESSMENT — ENCOUNTER SYMPTOMS
BACK PAIN: 1
PALPITATIONS: 0
WHEEZING: 0
FREQUENCY: 0
DIARRHEA: 0
NAUSEA: 0
SORE THROAT: 0
CHILLS: 0
BLOOD IN STOOL: 0
VOMITING: 0
DIAPHORESIS: 0
DYSURIA: 0
HEADACHES: 0
ABDOMINAL PAIN: 1
COUGH: 0
SINUS PRESSURE: 0
FEVER: 0
CONSTIPATION: 0
SHORTNESS OF BREATH: 0

## 2018-07-02 NOTE — ED AVS SNAPSHOT
[facility] Emergency Department    [address] [address]    Phone:  [phone]    Fax:  [phone]                                       Rober Renee   MRN: [id number]    Department:  [facility] Emergency Department   Date of Visit:  7/2/2018           After Visit Summary Signature Page     I have received my discharge instructions, and my questions have been answered. I have discussed any challenges I see with this plan with the nurse or doctor.    ..........................................................................................................................................  Patient/Patient Representative Signature      ..........................................................................................................................................  Patient Representative Print Name and Relationship to Patient    ..................................................               ................................................  Date                                            Time    ..........................................................................................................................................  Reviewed by Signature/Title    ...................................................              ..............................................  Date                                                            Time

## 2018-07-02 NOTE — ED AVS SNAPSHOT
[facility] Emergency Department    [address] [facility] [place] [address]    Phone:  [phone]    Fax:  [phone]                                       Rober Renee   MRN: [id number]    Department:  [facility] Emergency Department   Date of Visit:  7/2/2018           Patient Information     Date Of Birth          1980        Your diagnoses for this visit were:     Suprapubic pain     Recurrent UTI or persistent UTI Rocephin given here. Take augmentin 875 mg orally twice daily for 7 days. take pyridium three times daily as needed.  await urine culture - should be back by thursday.  suspect the first urinary tract infection was partially resistant to cipro.  stay hydrated. consider urology if persistent.       You were seen by Jeff Moon MD.      Follow-up Information     Follow up with EVERETT Sanz MD In 1 week.    Specialty:  Family Practice    Contact information:    [address] [facility] [address] [phone]          Follow up with [facility] Emergency Department.    Specialty:  EMERGENCY MEDICINE    Why:  As needed, If symptoms worsen    Contact information:    [address] [place] [id number] [phone]    Additional information:    The medical center is located at   [address] [place] [address]. (between [address] and   [address] in [place], [place] miles north   of [place]).        Discharge Instructions         ICD-10-CM    1. Suprapubic pain R10.2 UA reflex to Microscopic and Culture     UA with Microscopic   2. Recurrent UTI or persistent UTI N39.0 UROLOGY ADULT REFERRAL    Rocephin given here. Take augmentin 875 mg orally twice daily for 7 days. take pyridium three times daily as needed.  await urine culture - should be back by thursday.  suspect the first urinary tract infection was partially resistant to cipro.  stay hydrated. consider urology if persistent.         Urinary Tract Infections in Women    Urinary tract infections (UTIs) are most often  caused by bacteria. These bacteria enter the urinary tract. The bacteria may come from outside the body. Or they may travel from the skin outside the rectum or vagina into the urethra. Female anatomy makes it easy for bacteria from the bowel to enter a woman s urinary tract, which is the most common source of UTI. This means women develop UTIs more often than men. Pain in or around the urinary tract is a common UTI symptom. But the only way to know for sure if you have a UTI for the healthcare provider to test your urine. The two tests that may be done are the urinalysis and urine culture.  Types of UTIs    Cystitis. A bladder infection (cystitis) is the most common UTI in women. You may have urgent or frequent urination. You may also have pain, burning when you urinate, and bloody urine.    Urethritis. This is an inflamed urethra, which is the tube that carries urine from the bladder to outside the body. You may have lower stomach or back pain. You may also have urgent or frequent urination.    Pyelonephritis. This is a kidney infection. If not treated, it can be serious and damage your kidneys. In severe cases, you may need to stay in the hospital. You may have a fever and lower back pain.  Medicines to treat a UTI  Most UTIs are treated with antibiotics. These kill the bacteria. The length of time you need to take them depends on the type of infection. It may be as short as 3 days. If you have repeated UTIs, you may need a low-dose antibiotic for several months. Take antibiotics exactly as directed. Don t stop taking them until all of the medicine is gone. If you stop taking the antibiotic too soon, the infection may not go away. You may also develop a resistance to the antibiotic. This can make it much harder to treat.  Lifestyle changes to treat and prevent UTIs  The lifestyle changes below will help get rid of your UTI. They may also help prevent future UTIs.    Drink plenty of fluids. This includes water,  juice, or other caffeine-free drinks. Fluids help flush bacteria out of your body.    Empty your bladder. Always empty your bladder when you feel the urge to urinate. And always urinate before going to sleep. Urine that stays in your bladder can lead to infection. Try to urinate before and after sex as well.    Practice good personal hygiene. Wipe yourself from front to back after using the toilet. This helps keep bacteria from getting into the urethra.    Use condoms during sex. These help prevent UTIs caused by sexually transmitted bacteria. Also don't use spermicides during sex. These can increase the risk for UTIs. Choose other forms of birth control instead. For women who tend to get UTIs after sex, a low-dose of a preventive antibiotic may be used. Be sure to discuss this option with your healthcare provider.    Follow up with your healthcare provider as directed. He or she may test to make sure the infection has cleared. If needed, more treatment may be started.  Date Last Reviewed: 1/1/2017 2000-2017 [employer]. [address] [place] [address]. All rights reserved. This information is not intended as a substitute for professional medical care. Always follow your healthcare professional's instructions.          Your next 10 appointments already scheduled     Jul 06, 2018  1:30 PM CDT   MA DIAGNOSTIC BILATERAL W/ ESPERANZA with [address] ([facility])    [address] [place] [id number] [phone]           Do not use any powder, lotion or deodorant under your arms or on your breast. If you do, we will ask you to remove it before your exam.  Wear comfortable, two-piece clothing.  If you have any allergies, tell your care team.  Bring any previous mammograms from other facilities or have them mailed to the breast center.  Three-dimensional (3D) mammograms are available at [facility] locations in [facility], [place], [place],  "[facility], [place], [place], and [place]. [facility] locations include [place] and [facility] in [place]. Benefits of 3D mammograms include: - Improved rate of cancer detection - Decreases your chance of having to go back for more tests, which means fewer: - \"False-positive\" results (This means that there is an abnormal area but it isn't cancer.) - Invasive testing procedures, such as a biopsy or surgery - Can provide clearer images of the breast if you have dense breast tissue. 3D mammography is an optional exam that anyone can have with a 2D mammogram. It doesn't replace or take the place of a 2D mammogram. 2D mammograms remain an effective screening test for all women.  Not all insurance companies cover the cost of a 3D mammogram. Check with your insurance.            Jul 06, 2018  1:50 PM CDT   US BREAST SUNITA CMPL 4 QUAD with [place]US2   [facility]ound ([facility])    [address] [facility] [address] [phone]           Please bring a list of your medicines (including vitamins, minerals and over-the-counter drugs). Also, tell your doctor about any allergies you may have. Wear comfortable clothes and leave your valuables at home.  You do not need to do anything special to prepare for your exam.  Please call the Imaging Department at your exam site with any questions.              24 Hour Appointment Hotline       To make an appointment at any [facility], call [phone] ([phone]). If you don't have a family doctor or clinic, we will help you find one. [facility] are conveniently located to serve the needs of you and your family.          ED Discharge Orders     UROLOGY ADULT REFERRAL       Your provider has referred you to: [facility]: [facility] [place] [phone]   [email or web address]    Please be aware that coverage of these " services is subject to the terms and limitations of your health insurance plan.  Call member services at your health plan with any benefit or coverage questions.      Please bring the following with you to your appointment:    (1) Any X-Rays, CTs or MRIs which have been performed.  Contact the facility where they were done to arrange for  prior to your scheduled appointment.    (2) List of current medications  (3) This referral request   (4) Any documents/labs given to you for this referral                     Review of your medicines      START taking        Dose / Directions Last dose taken    amoxicillin-clavulanate 875-125 MG per tablet   Commonly known as:  AUGMENTIN   Dose:  1 tablet   Quantity:  14 tablet        Take 1 tablet by mouth 2 times daily for 7 days   Refills:  0        phenazopyridine 200 MG tablet   Commonly known as:  PYRIDIUM   Dose:  200 mg   Quantity:  9 tablet        Take 1 tablet (200 mg) by mouth 3 times daily as needed for pain Expect your urine to appear bright orange   Refills:  0          Our records show that you are taking the medicines listed below. If these are incorrect, please call your family doctor or clinic.        Dose / Directions Last dose taken    buPROPion 150 MG 24 hr tablet   Commonly known as:  WELLBUTRIN XL   Dose:  150 mg   Quantity:  30 tablet        Take 1 tablet (150 mg) by mouth every morning   Refills:  1        estradiol 2 MG tablet   Commonly known as:  ESTRACE   Quantity:  135 tablet        1 1/2 tablets daily   Refills:  3        ibuprofen 600 MG tablet   Commonly known as:  ADVIL/MOTRIN   Dose:  600 mg   Quantity:  40 tablet        Take 1 tablet (600 mg) by mouth every 6 hours as needed for pain (mild)   Refills:  1        LORazepam 1 MG tablet   Commonly known as:  ATIVAN        Refills:  0        TYLENOL PO   Dose:  1000 mg        Take 1,000 mg by mouth once   Refills:  0        vitamin D 2000 units tablet   Dose:  2000 Units   Quantity:  100 tablet         Take 2,000 Units by mouth daily   Refills:  3          STOP taking        Dose Reason for stopping Comments    nitroFURantoin (macrocrystal-monohydrate) 100 MG capsule   Commonly known as:  MACROBID                      Prescriptions were sent or printed at these locations (2 Prescriptions)                   [facility] [place] - [place], [place] - [address] [facility]   [address] [facility] [address]    Telephone:  [phone]   Fax:  [phone]   Hours:                  E-Prescribed (2 of 2)         amoxicillin-clavulanate (AUGMENTIN) 875-125 MG per tablet               phenazopyridine (PYRIDIUM) 200 MG tablet                Procedures and tests performed during your visit     Abd/pelvis CT - no contrast - Stone Protocol    POC US RETROPERITONEAL LIMITED    UA reflex to Microscopic and Culture    UA with Microscopic      Orders Needing Specimen Collection     None      Pending Results     Date and Time Order Name Status Description    7/2/2018 2027 Abd/pelvis CT - no contrast - Stone Protocol Preliminary     7/2/2018 1944 POC US RETROPERITONEAL LIMITED In process             Pending Culture Results     No orders found from 6/30/2018 to 7/3/2018.            Pending Results Instructions     If you had any lab results that were not finalized at the time of your Discharge, you can call the ED Lab Result RN at [phone]. You will be contacted by this team for any positive Lab results or changes in treatment. The nurses are available 7 days a week from 10A to 6:30P.  You can leave a message 24 hours per day and they will return your call.        Test Results From Your Hospital Stay        7/2/2018  8:20 PM      Component Results     Component Value Ref Range & Units Status    Color Urine Yellow  Final    Appearance Urine Cloudy  Final    Glucose Urine Negative NEG^Negative mg/dL Final    Bilirubin Urine Negative NEG^Negative Final    Ketones Urine Negative NEG^Negative mg/dL Final    Specific Gravity  Urine 1.005 1.003 - 1.035 Final    Blood Urine Large (A) NEG^Negative Final    pH Urine 5.0 5.0 - 7.0 pH Final    Protein Albumin Urine Negative NEG^Negative mg/dL Final    Urobilinogen mg/dL 0.0 0.0 - 2.0 mg/dL Final    Nitrite Urine Negative NEG^Negative Final    Leukocyte Esterase Urine Large (A) NEG^Negative Final    Source Midstream Urine  Final    RBC Urine 6 (H) 0 - 2 /HPF Final    WBC Urine >182 (H) 0 - 5 /HPF Final    WBC Clumps Present (A) NEG^Negative /HPF Final    Bacteria Urine Many (A) NEG^Negative /HPF Final    Squamous Epithelial /HPF Urine 2 (H) 0 - 1 /HPF Final    Hyaline Casts 2 0 - 2 /LPF Final         7/2/2018  7:44 PM      Result not yet available     Exam Begun         7/2/2018  8:44 PM      Component Results     Component Value Ref Range & Units Status    Color Urine Yellow  Final    Appearance Urine Cloudy  Final    Glucose Urine Negative NEG^Negative mg/dL Final    Bilirubin Urine Negative NEG^Negative Final    Ketones Urine Negative NEG^Negative mg/dL Final    Specific Gravity Urine 1.006 1.003 - 1.035 Final    Blood Urine Large (A) NEG^Negative Final    pH Urine 5.0 5.0 - 7.0 pH Final    Protein Albumin Urine Negative NEG^Negative mg/dL Final    Urobilinogen mg/dL 0.0 0.0 - 2.0 mg/dL Final    Nitrite Urine Negative NEG^Negative Final    Leukocyte Esterase Urine Large (A) NEG^Negative Final    Source Midstream Urine  Final    WBC Urine >182 (H) 0 - 5 /HPF Final    RBC Urine 5 (H) 0 - 2 /HPF Final    WBC Clumps Present (A) NEG^Negative /HPF Final    Bacteria Urine Moderate (A) NEG^Negative /HPF Final    Squamous Epithelial /HPF Urine 2 (H) 0 - 1 /HPF Final    Mucous Urine Present (A) NEG^Negative /LPF Final         7/2/2018  9:22 PM      Narrative     CT ABDOMEN PELVIS WITHOUT CONTRAST 7/2/2018 9:13 PM     HISTORY: Abdominal pain, flank pain.    Radiation dose for this scan was reduced using automated exposure  control, adjustment of the mA and/or kV according to patient size, or  iterative  reconstruction technique.    FINDINGS: No renal stone, hydronephrosis, or ureteral stone is  demonstrated. The liver and spleen are unremarkable for the unenhanced  technique. There is a normal gas-filled appendix. There is no evidence  of bowel obstruction or pericolonic inflammatory stranding. No free  peritoneal fluid or air. The uterus and ovaries are not identified.  Pelvic contents are otherwise unremarkable for the unenhanced  technique.        Impression     IMPRESSION: No evidence of urinary tract stone or hydronephrosis. No  unenhanced CT evidence of an acute inflammatory process in the abdomen  or pelvis.                 Thank you for choosing [facility]       Thank you for choosing [facility] for your care. Our goal is always to provide you with excellent care. Hearing back from our patients is one way we can continue to improve our services. Please take a few minutes to complete the written survey that you may receive in the mail after you visit with us. Thank you!        [facility] Information     [email or web address] gives you secure access to your electronic health record. If you see a primary care provider, you can also send messages to your care team and make appointments. If you have questions, please call your primary care clinic.  If you do not have a primary care provider, please call [phone] and they will assist you.        Care EveryWhere ID     This is your Care EveryWhere ID. This could be used by other organizations to access your [facility] medical records  [id number]        Equal Access to Services     KEYANA MACK : Allyson Calderon, waaxda luqadaha, qaybta kaalmada adedimitriyada, srinivas mcneill. So [facility] [phone].    ATENCIÓN: Si habla español, tiene a nguyne disposición servicios gratuitos de asistencia lingüística. Llame al [phone].    We comply with applicable federal civil rights laws and [place] laws. We do not discriminate on the basis of race, color,  national origin, age, disability, sex, sexual orientation, or gender identity.            After Visit Summary       This is your record. Keep this with you and show to your community pharmacist(s) and doctor(s) at your next visit.

## 2018-07-03 ENCOUNTER — MYC MEDICAL ADVICE (OUTPATIENT)
Dept: FAMILY MEDICINE | Facility: CLINIC | Age: 38
End: 2018-07-03

## 2018-07-03 ENCOUNTER — MYC MEDICAL ADVICE (OUTPATIENT)
Dept: OBGYN | Facility: CLINIC | Age: 38
End: 2018-07-03

## 2018-07-03 NOTE — DISCHARGE INSTRUCTIONS
ICD-10-CM    1. Suprapubic pain R10.2 UA reflex to Microscopic and Culture     UA with Microscopic   2. Recurrent UTI or persistent UTI N39.0 UROLOGY ADULT REFERRAL    Rocephin given here. Take augmentin 875 mg orally twice daily for 7 days. take pyridium three times daily as needed.  await urine culture - should be back by thursday.  suspect the first urinary tract infection was partially resistant to cipro.  stay hydrated. consider urology if persistent.         Urinary Tract Infections in Women    Urinary tract infections (UTIs) are most often caused by bacteria. These bacteria enter the urinary tract. The bacteria may come from outside the body. Or they may travel from the skin outside the rectum or vagina into the urethra. Female anatomy makes it easy for bacteria from the bowel to enter a woman s urinary tract, which is the most common source of UTI. This means women develop UTIs more often than men. Pain in or around the urinary tract is a common UTI symptom. But the only way to know for sure if you have a UTI for the healthcare provider to test your urine. The two tests that may be done are the urinalysis and urine culture.  Types of UTIs    Cystitis. A bladder infection (cystitis) is the most common UTI in women. You may have urgent or frequent urination. You may also have pain, burning when you urinate, and bloody urine.    Urethritis. This is an inflamed urethra, which is the tube that carries urine from the bladder to outside the body. You may have lower stomach or back pain. You may also have urgent or frequent urination.    Pyelonephritis. This is a kidney infection. If not treated, it can be serious and damage your kidneys. In severe cases, you may need to stay in the hospital. You may have a fever and lower back pain.  Medicines to treat a UTI  Most UTIs are treated with antibiotics. These kill the bacteria. The length of time you need to take them depends on the type of infection. It may be as  short as 3 days. If you have repeated UTIs, you may need a low-dose antibiotic for several months. Take antibiotics exactly as directed. Don t stop taking them until all of the medicine is gone. If you stop taking the antibiotic too soon, the infection may not go away. You may also develop a resistance to the antibiotic. This can make it much harder to treat.  Lifestyle changes to treat and prevent UTIs  The lifestyle changes below will help get rid of your UTI. They may also help prevent future UTIs.    Drink plenty of fluids. This includes water, juice, or other caffeine-free drinks. Fluids help flush bacteria out of your body.    Empty your bladder. Always empty your bladder when you feel the urge to urinate. And always urinate before going to sleep. Urine that stays in your bladder can lead to infection. Try to urinate before and after sex as well.    Practice good personal hygiene. Wipe yourself from front to back after using the toilet. This helps keep bacteria from getting into the urethra.    Use condoms during sex. These help prevent UTIs caused by sexually transmitted bacteria. Also don't use spermicides during sex. These can increase the risk for UTIs. Choose other forms of birth control instead. For women who tend to get UTIs after sex, a low-dose of a preventive antibiotic may be used. Be sure to discuss this option with your healthcare provider.    Follow up with your healthcare provider as directed. He or she may test to make sure the infection has cleared. If needed, more treatment may be started.  Date Last Reviewed: 1/1/2017 2000-2017 [employer]. [address] [place] [address], [place] [address]. All rights reserved. This information is not intended as a substitute for professional medical care. Always follow your healthcare professional's instructions.

## 2018-07-03 NOTE — ED PROVIDER NOTES
History     Chief Complaint   Patient presents with     Flank Pain     started a few hours ago      HPI  Rober Renee is a 37 year old female [facility] history of ureterolithiasis, s/p KAYLA for endometriosis - on estradiiol, who is seen today for persistent urinary tract symptoms.  seen  for dysuria, urgency, frequency, hematuria and started on macrobid - which she did not tolerate and was switched to ciprofloxacin and completed that 7 days course a few days ago.  Now still  with increasing pelvic pain, no classic flank pain - but does have low back pain bilaterally which she associates with prior ureterolithiasis, dysuria, sense of bladder fullness, pressure. foul urine order  No fever.  No vaginal discharge. no new sexual partners, No STD history.  still has appendix.  No other surgeries other than KAYLA..       pain resolved completely with cipro, but then recurred.    Problem List:    Patient Active Problem List    Diagnosis Date Noted     Endometriosis determined by laparoscopy 04/10/2018     Priority: Medium     Anxiety 2017     Priority: Medium     Major depressive disorder, single episode, moderate (H) 2016     Priority: Medium     Renal stones 10/09/2015     Priority: Medium     Migraine headache 2012     Priority: Medium     CARDIOVASCULAR SCREENING; LDL GOAL LESS THAN 160 10/31/2010     Priority: Medium        Past Medical History:    Past Medical History:   Diagnosis Date     Calculus of kidney      Cervical high risk HPV (human papillomavirus) test positive 2018     Chickenpox      Gestational diabetes ,     History of postpartum depression, currently pregnant      Intervertebral lumbar disc disorder with myelopathy, lumbar region      Intramural leiomyoma of uterus 4/3/2018     Migraines      Urinary tract bacterial infections        Past Surgical History:    Past Surgical History:   Procedure Laterality Date      SECTION, TUBAL LIGATION, COMBINED  2013     c/section with BTL     LAPAROSCOPIC ASSISTED HYSTERECTOMY VAGINAL, BILATERAL SALPINGO-OOPHORECTOMY, COMBINED N/A 4/10/2018    Procedure: COMBINED LAPAROSCOPIC ASSISTED HYSTERECTOMY VAGINAL, SALPINGO-OOPHORECTOMY;  Laparoscopic assisted vaginal hysterectomy with possible bilateral salpingoophorectomy.;  Surgeon: Taisha Wilson MD;  Location: [facility] OR       Family History:    Family History   Problem Relation Age of Onset     Cancer Maternal Grandfather      liver [place]     Cancer Paternal Grandfather      prostate CA     Cancer Maternal Grandmother      ovarian CA     Other - See Comments Maternal Grandmother      migraine     Diabetes Paternal Grandmother      Hypertension Paternal Grandmother      Hypertension Father      Depression Father      Other - See Comments Mother      migraine     Hypertension Brother      Other - See Comments Maternal Uncle      migraine     Depression Sister        Social History:  Marital Status:   [2]  Social History   Substance Use Topics     Smoking status: Former Smoker     Packs/day: 0.50     Years: 10.00     Types: Cigarettes     Quit date: 2/20/2008     Smokeless tobacco: Never Used     Alcohol use Yes      Comment: very seldom        Medications:      buPROPion (WELLBUTRIN XL) 150 MG 24 hr tablet   Cholecalciferol (VITAMIN D) 2000 UNITS tablet   estradiol (ESTRACE) 2 MG tablet   ferrous sulfate (IRON) 325 (65 FE) MG tablet   fluconazole (DIFLUCAN) 150 MG tablet   ibuprofen (ADVIL/MOTRIN) 600 MG tablet   LORazepam (ATIVAN) 1 MG tablet   nitroFURantoin, macrocrystal-monohydrate, (MACROBID) 100 MG capsule         Review of Systems   Constitutional: Negative for chills, diaphoresis and fever.   HENT: Negative for ear pain, sinus pressure and sore throat.    Eyes: Negative for visual disturbance.   Respiratory: Negative for cough, shortness of breath and wheezing.    Cardiovascular: Negative for chest pain and palpitations.   Gastrointestinal: Positive for abdominal pain.  Negative for blood in stool, constipation, diarrhea, nausea and vomiting.   Genitourinary: Positive for pelvic pain. Negative for dysuria, frequency, urgency, vaginal bleeding, vaginal discharge and vaginal pain.   Musculoskeletal: Positive for back pain.   Skin: Negative for rash.   Neurological: Negative for headaches.   All other systems reviewed and are negative.      Physical Exam   BP: (!) 163/93  Heart Rate: 63  Temp: 98.1  F (36.7  C)  Resp: 17  Height: 152.4 cm (5')  Weight: 54.4 kg (120 lb)  SpO2: 100 %      Physical Exam   Constitutional: She appears distressed.   HENT:   Mouth/Throat: Oropharynx is clear and moist.   Eyes: Conjunctivae are normal.   Neck: Neck supple.   Cardiovascular: Normal rate and regular rhythm.    No murmur heard.  Pulmonary/Chest: No respiratory distress. She has no wheezes. She has no rales.   Abdominal: Soft. She exhibits no distension. There is tenderness in the suprapubic area. There is no rebound, no guarding and no CVA tenderness.   Musculoskeletal: She exhibits no edema.   Neurological: She is alert. She exhibits normal muscle tone.   Skin: No rash noted. She is not diaphoretic.       ED Course     ED Course     Procedures               Critical Care time:  none               Results for orders placed or performed during the hospital encounter of 07/02/18 (from the past 24 hour(s))   UA reflex to Microscopic and Culture   Result Value Ref Range    Color Urine Yellow     Appearance Urine Cloudy     Glucose Urine Negative NEG^Negative mg/dL    Bilirubin Urine Negative NEG^Negative    Ketones Urine Negative NEG^Negative mg/dL    Specific Gravity Urine 1.005 1.003 - 1.035    Blood Urine Large (A) NEG^Negative    pH Urine 5.0 5.0 - 7.0 pH    Protein Albumin Urine Negative NEG^Negative mg/dL    Urobilinogen mg/dL 0.0 0.0 - 2.0 mg/dL    Nitrite Urine Negative NEG^Negative    Leukocyte Esterase Urine Large (A) NEG^Negative    Source Midstream Urine     RBC Urine 6 (H) 0 - 2 /HPF     WBC Urine >182 (H) 0 - 5 /HPF    WBC Clumps Present (A) NEG^Negative /HPF    Bacteria Urine Many (A) NEG^Negative /HPF    Squamous Epithelial /HPF Urine 2 (H) 0 - 1 /HPF    Hyaline Casts 2 0 - 2 /LPF   UA with Microscopic   Result Value Ref Range    Color Urine Yellow     Appearance Urine Cloudy     Glucose Urine Negative NEG^Negative mg/dL    Bilirubin Urine Negative NEG^Negative    Ketones Urine Negative NEG^Negative mg/dL    Specific Gravity Urine 1.006 1.003 - 1.035    Blood Urine Large (A) NEG^Negative    pH Urine 5.0 5.0 - 7.0 pH    Protein Albumin Urine Negative NEG^Negative mg/dL    Urobilinogen mg/dL 0.0 0.0 - 2.0 mg/dL    Nitrite Urine Negative NEG^Negative    Leukocyte Esterase Urine Large (A) NEG^Negative    Source Midstream Urine     WBC Urine >182 (H) 0 - 5 /HPF    RBC Urine 5 (H) 0 - 2 /HPF    WBC Clumps Present (A) NEG^Negative /HPF    Bacteria Urine Moderate (A) NEG^Negative /HPF    Squamous Epithelial /HPF Urine 2 (H) 0 - 1 /HPF    Mucous Urine Present (A) NEG^Negative /LPF   POC US RETROPERITONEAL LIMITED    Impression    [facility] Procedure Note    Limited Bedside ED Renal Ultrasound:    PERFORMED BY: Dr. Jeff Moon  INDICATIONS:  Abdominal Pain  PROBE: Low frequency convex probe  BODY LOCATION:  Abdomen  FINDINGS:  The ultrasound was performed with longitudinal views.   Right Kidney:   Hydronephrosis:  None   Renal cyst:  None  Left Kidney:   Hydronephrosis:  None   Renal cyst:  None  INTERPRETATION:  The evaluation of the kidneys was normal without evidence of hydronephrosis or cysts.  IMAGE DOCUMENTATION: Images were archived to PACs system.     Abd/pelvis CT - no contrast - Stone Protocol    Narrative    CT ABDOMEN PELVIS WITHOUT CONTRAST 7/2/2018 9:13 PM     HISTORY: Abdominal pain, flank pain.    Radiation dose for this scan was reduced using automated exposure  control, adjustment of the mA and/or kV according to patient size, or  iterative reconstruction  technique.    FINDINGS: No renal stone, hydronephrosis, or ureteral stone is  demonstrated. The liver and spleen are unremarkable for the unenhanced  technique. There is a normal gas-filled appendix. There is no evidence  of bowel obstruction or pericolonic inflammatory stranding. No free  peritoneal fluid or air. The uterus and ovaries are not identified.  Pelvic contents are otherwise unremarkable for the unenhanced  technique.      Impression    IMPRESSION: No evidence of urinary tract stone or hydronephrosis. No  unenhanced CT evidence of an acute inflammatory process in the abdomen  or pelvis.        Medications - No data to display    Assessments & Plan (with Medical Decision Making)     MDM: Rober Renee is a 37 year old female who presents with suprapubic pain 2 days after completing his ciprofloxacin course for UTI.  She originally had a urinalysis which was only slightly abnormal without culture and was treated with Macrobid for UTI but should not tolerated and was later placed on ciprofloxacin for 7 days.  Now she has dysuria urgency frequency and suprapubic pain and notes some low back pain that she attributes to symptoms similar to prior ureterolithiasis which she has had in the past.  She is afebrile.  Her examination is relatively unremarkable other than suprapubic tenderness to palpation.  I could not elicit flank pain.  Her urinalysis with dipstick was returned fairly soon but only showed leukocyte esterase and blood on the dipstick and microscopic evaluation was delayed by nearly an hour.  In the interim we discussed the prior findings and was unclear if her current findings are presented to significant UTI that could explain all of her symptoms.  She was concerned that she may have a ureteral stone again.  A bedside ultrasound demonstrated no hydronephrosis.  After some discussion and the risks of radiation were discussed we decided to go forward with CT.  In the interim urinalysis was returned  with significant findings she had greater than 182 white cells in clumps and findings were most suggestive of UTI.  It is likely that she has been partially resistant to the ciprofloxacin versus a new infection.  Her CT demonstrates no ureterolithiasis. . She was given 2 g IV Rocephin in the emergency department.  She has a sulfa allergy.  Urine is been sent for culture.  She will be treated with Augmentin 875 twice daily for 7 days.  Await urine culture results.  Precautions are given for return.  Additional recommendations as below.    I have reviewed the nursing notes.    I have reviewed the findings, diagnosis, plan and need for follow up with the patient.       New Prescriptions    No medications on file       Final diagnoses:   Suprapubic pain   Recurrent UTI or persistent UTI - Rocephin given here. Take augmentin 875 mg orally twice daily for 7 days. take pyridium three times daily as needed.  await urine culture - should be back by thursday.  suspect the first urinary tract infection was partially resistant to cipro.  stay hydrated. consider urology if persistent.       7/2/2018   [facility] EMERGENCY DEPARTMENT     Jeff Moon MD  07/02/18 [id number]

## 2018-07-03 NOTE — TELEPHONE ENCOUNTER
Spoke with pt and she continues to have pain with urination.  Encouraged fluid, Tylenol/Ibuprofen and Pyridium.  Pt will f/u if pain worsens/doesn't resolve, fever.  Mahesh

## 2018-07-03 NOTE — ED NOTES
Sudden onset dysuria and abd/groin pain  Recently finished cipro for UTI approx 1 week ago  Slight nausea no emesis  Denies any other complaints

## 2018-07-03 NOTE — PROGRESS NOTES
Inform patient that her hormone level is normal.     Kayleigh Nix MD  [facility]

## 2018-07-03 NOTE — TELEPHONE ENCOUNTER
This would be an unusual amount of pain for a UTI. Unfortunately, we cannot prescription narcotics for an acute condition by phone.  Furthermore we may just be masking a more serious issue. I would recommend she be seen again if pain is escalating as she describes.

## 2018-07-05 DIAGNOSIS — R39.89 BLADDER PAIN: Primary | ICD-10-CM

## 2018-07-05 RX ORDER — HYDROCODONE BITARTRATE AND ACETAMINOPHEN 5; 325 MG/1; MG/1
1 TABLET ORAL 2 TIMES DAILY PRN
Qty: 18 TABLET | Refills: 0 | Status: SHIPPED | OUTPATIENT
Start: 2018-07-05 | End: 2018-07-31

## 2018-07-06 ENCOUNTER — HOSPITAL ENCOUNTER (OUTPATIENT)
Dept: ULTRASOUND IMAGING | Facility: CLINIC | Age: 38
End: 2018-07-06
Attending: OBSTETRICS & GYNECOLOGY
Payer: COMMERCIAL

## 2018-07-06 ENCOUNTER — HOSPITAL ENCOUNTER (OUTPATIENT)
Dept: MAMMOGRAPHY | Facility: CLINIC | Age: 38
Discharge: HOME | End: 2018-07-06
Attending: OBSTETRICS & GYNECOLOGY | Admitting: OBSTETRICS & GYNECOLOGY
Payer: COMMERCIAL

## 2018-07-06 DIAGNOSIS — N64.3 GALACTORRHEA IN FEMALE: ICD-10-CM

## 2018-07-06 DIAGNOSIS — N63.0 LUMP OR MASS IN BREAST: ICD-10-CM

## 2018-07-06 DIAGNOSIS — N63.15 BREAST LUMP ON RIGHT SIDE AT 12 O'CLOCK POSITION: ICD-10-CM

## 2018-07-06 DIAGNOSIS — N63.25 BREAST LUMP ON LEFT SIDE AT 3 O'CLOCK POSITION: ICD-10-CM

## 2018-07-06 PROCEDURE — G0279 TOMOSYNTHESIS, MAMMO: HCPCS

## 2018-07-06 PROCEDURE — 76642 ULTRASOUND BREAST LIMITED: CPT | Mod: 50

## 2018-07-31 ENCOUNTER — OFFICE VISIT (OUTPATIENT)
Dept: UROLOGY | Facility: CLINIC | Age: 38
End: 2018-07-31
Payer: COMMERCIAL

## 2018-07-31 VITALS
SYSTOLIC BLOOD PRESSURE: 131 MMHG | OXYGEN SATURATION: 100 % | HEART RATE: 77 BPM | BODY MASS INDEX: 23.56 KG/M2 | WEIGHT: 120 LBS | DIASTOLIC BLOOD PRESSURE: 88 MMHG | RESPIRATION RATE: 14 BRPM | HEIGHT: 60 IN | TEMPERATURE: 98.1 F

## 2018-07-31 DIAGNOSIS — N39.0 RECURRENT URINARY TRACT INFECTION: Primary | ICD-10-CM

## 2018-07-31 LAB
ALBUMIN UR-MCNC: NEGATIVE MG/DL
APPEARANCE UR: CLEAR
BILIRUB UR QL STRIP: NEGATIVE
COLOR UR AUTO: YELLOW
GLUCOSE UR STRIP-MCNC: NEGATIVE MG/DL
HGB UR QL STRIP: NEGATIVE
KETONES UR STRIP-MCNC: NEGATIVE MG/DL
LEUKOCYTE ESTERASE UR QL STRIP: NEGATIVE
NITRATE UR QL: NEGATIVE
PH UR STRIP: 5.5 PH (ref 5–7)
SOURCE: NORMAL
SP GR UR STRIP: 1.01 (ref 1–1.03)
UROBILINOGEN UR STRIP-ACNC: 0.2 EU/DL (ref 0.2–1)

## 2018-07-31 PROCEDURE — 81003 URINALYSIS AUTO W/O SCOPE: CPT | Performed by: UROLOGY

## 2018-07-31 PROCEDURE — 99244 OFF/OP CNSLTJ NEW/EST MOD 40: CPT | Mod: 25 | Performed by: UROLOGY

## 2018-07-31 PROCEDURE — 52000 CYSTOURETHROSCOPY: CPT | Performed by: UROLOGY

## 2018-07-31 NOTE — MR AVS SNAPSHOT
After Visit Summary   7/31/2018    Rober Renee    MRN: [id number]           Patient Information     Date Of Birth          1980        Visit Information        Provider Department      7/31/2018 11:00 AM Mike Silverio MD [facility]        Today's Diagnoses     Recurrent urinary tract infection    -  1      Care Instructions    If you have questions or concerns on any instructions given to you by your provider today or if you need to schedule an appointment, you can reach us at [phone].                         Follow-ups after your visit        Follow-up notes from your care team     Return if symptoms worsen or fail to improve.      Who to contact     If you have questions or need follow up information about today's clinic visit or your schedule please contact [facility] directly at [phone].  Normal or non-critical lab and imaging results will be communicated to you by MyChart, letter or phone within 4 business days after the clinic has received the results. If you do not hear from us within 7 days, please contact the clinic through MyChart or phone. If you have a critical or abnormal lab result, we will notify you by phone as soon as possible.  Submit refill requests through [employer] or call your pharmacy and they will forward the refill request to us. Please allow 3 business days for your refill to be completed.          Additional Information About Your Visit        MyChart Information     [employer] gives you secure access to your electronic health record. If you see a primary care provider, you can also send messages to your care team and make appointments. If you have questions, please call your primary care clinic.  If you do not have a primary care provider, please call [phone] and they will assist you.        Care EveryWhere ID     This is your Care EveryWhere ID. This could be used by other organizations to access your [facility]  records  [id number]        Your Vitals Were     Pulse Temperature Respirations Height Last Period Pulse Oximetry    77 98.1  F (36.7  C) 14 1.524 m (5') 04/02/2018 100%    BMI (Body Mass Index)                   23.44 kg/m2            Blood Pressure from Last 3 Encounters:   07/31/18 131/88   07/02/18 (!) 134/92   06/29/18 119/70    Weight from Last 3 Encounters:   07/31/18 54.4 kg (120 lb)   07/02/18 54.4 kg (120 lb)   06/29/18 59.2 kg (130 lb 9.6 oz)              We Performed the Following     CYSTOURETHROSCOPY     UA reflex to Microscopic and Culture [[id number]]        Primary Care Provider Office Phone # Fax #    R Joe Sanz [phone] [id number] [facility] [address]        Equal Access to Services     [facility] HospitalEVER[facility]T : Hadii dior quileso Kvng, waaxda luqadaha, qaybta kaalmada adedimitriyaisadora, srinivas landaverde . So [facility] [phone].    ATENCIÓN: Si habla español, tiene a nguyen disposición servicios gratuitos de asistencia lingüística. Magali al [phone].    We comply with applicable federal civil rights laws and [place] laws. We do not discriminate on the basis of race, color, national origin, age, disability, sex, sexual orientation, or gender identity.            Thank you!     Thank you for choosing [facility]  for your care. Our goal is always to provide you with excellent care. Hearing back from our patients is one way we can continue to improve our services. Please take a few minutes to complete the written survey that you may receive in the mail after your visit with us. Thank you!             Your Updated Medication List - Protect others around you: Learn how to safely use, store and throw away your medicines at www.disposemymeds.org.          This list is accurate as of 7/31/18  1:16 PM.  Always use your most recent med list.                   Brand Name Dispense Instructions for use Diagnosis    estradiol 2 MG tablet    ESTRACE     135 tablet    1 1/2 tablets daily    Surgical menopause       ibuprofen 600 MG tablet    ADVIL/MOTRIN    40 tablet    Take 1 tablet (600 mg) by mouth every 6 hours as needed for pain (mild)    Postoperative state       LORazepam 1 MG tablet    ATIVAN          TYLENOL PO      Take 1,000 mg by mouth once        vitamin D 2000 units tablet     100 tablet    Take 2,000 Units by mouth daily    Vitamin D deficiency

## 2018-07-31 NOTE — PROGRESS NOTES
"Rober Renee is a 37 year old female seen in consultation for rec UTI. Consult from Post, EVERETT Diaz.    Pt gives longstanding hx rec UTI's. Saw first  in , w/u was apparently negative. Then saw Dr Dangelo in [place] in , eval again negative, \"gave me tips to reduce infections but it didn't really do anything.\"    Sx's associated with her UTI's include, in order:  1. Burning and throbing at the end of urination (not midstream)  2. Nausea  3. Fatigue  4. Cloudiness and blood in urine    Does not have those sx's today. Today with some \"mild pain over my bladder,\" not associated with voiding; seems to be worse when she lies down after voiding.    Specifically denies dysuria, gross hematuria, frequency, significant incont, need for pad. Voids q 2 hrs during the day. Noc x 2, no leak, no pad.    Hx 4 vag deliveries, 1 c/s, vag hyster in . On Estradiol.    Denies constipation, fecal incont.    Drinks one coffee, one tea and 8-10 bottles of Mi'kmaq per day.    Reviewed med hx in detail including migranes, endometriosis.      Past Medical History:   Diagnosis Date     Calculus of kidney     right side     Cervical high risk HPV (human papillomavirus) test positive 2018    NIL pap, +HR HPV (Neg 16/18). Rpt Pap+HPV in 1 year (Due )     Chickenpox      Gestational diabetes ,     History of postpartum depression, currently pregnant     mild     Intervertebral lumbar disc disorder with myelopathy, lumbar region     herniated disks, L4-5, s/p steroid injection     Intramural leiomyoma of uterus 4/3/2018     Migraines      Urinary tract bacterial infections        Past Surgical History:   Procedure Laterality Date      SECTION, TUBAL LIGATION, COMBINED  2013    c/section with BTL     LAPAROSCOPIC ASSISTED HYSTERECTOMY VAGINAL, BILATERAL SALPINGO-OOPHORECTOMY, COMBINED N/A 4/10/2018    Procedure: COMBINED LAPAROSCOPIC ASSISTED HYSTERECTOMY VAGINAL, SALPINGO-OOPHORECTOMY;  Laparoscopic " assisted vaginal hysterectomy with possible bilateral salpingoophorectomy.;  Surgeon: Taisha Wilson MD;  Location: [facility] OR       Social History     Social History     Marital status:      Spouse name: N/A     Number of children: 4     Years of education: N/A     Occupational History      [employer]      [employer]      Social History Main Topics     Smoking status: Former Smoker     Packs/day: 0.50     Years: 10.00     Types: Cigarettes     Quit date: 2/20/2008     Smokeless tobacco: Never Used     Alcohol use Yes      Comment: very seldom     Drug use: No     Sexual activity: Not Currently     Partners: Male     Birth control/ protection: Surgical      Comment: tubal     Other Topics Concern     Parent/Sibling W/ Cabg, Mi Or Angioplasty Before 65f 55m? No     Social History Narrative       Current Outpatient Prescriptions   Medication Sig Dispense Refill     Acetaminophen (TYLENOL PO) Take 1,000 mg by mouth once       Cholecalciferol (VITAMIN D) 2000 UNITS tablet Take 2,000 Units by mouth daily 100 tablet 3     estradiol (ESTRACE) 2 MG tablet 1 1/2 tablets daily 135 tablet 3     ibuprofen (ADVIL/MOTRIN) 600 MG tablet Take 1 tablet (600 mg) by mouth every 6 hours as needed for pain (mild) 40 tablet 1     LORazepam (ATIVAN) 1 MG tablet          Physical Exam:    GENL: NAD.    ABD: Soft, non-tender, no masses.    EG: Well-estrogenized, no masses.    VAGINA: Well-estrogenized, no masses.    BN HYPERMOBILITY: None.    CYSTOCELE: None.    APICAL PROLAPSE: None.    RECTOCELE: None.    BIMANUAL: No mass or tenderness.    Cysto:    (Informed consent obtained. Pause for cause performed)   Sterile prep.    17 Fr scope inserted through urethra. Systematic examination w 70 degree lens.   PVR: 200 ml (pt was unable to void)   MUCOSA: Normal without lesion; minimal trabeculation   ORIFICES: Normal location and morphology   CAPACITY: 600 cc; no pain with filling   Scope withdrawn without untoward  effect.      (Pt tolerated procedure without difficulty).      7/5/16  UC: E coli    6/14/18 UA: negative  7/2/18  UA: large heme, neg nit    Today:  Results for orders placed or performed in visit on 07/31/18   UA reflex to Microscopic and Culture [[id number]]   Result Value Ref Range    Color Urine Yellow     Appearance Urine Clear     Glucose Urine Negative NEG^Negative mg/dL    Bilirubin Urine Negative NEG^Negative    Ketones Urine Negative NEG^Negative mg/dL    Specific Gravity Urine 1.010 1.003 - 1.035    Blood Urine Negative NEG^Negative    pH Urine 5.5 5.0 - 7.0 pH    Protein Albumin Urine Negative NEG^Negative mg/dL    Urobilinogen Urine 0.2 0.2 - 1.0 EU/dL    Nitrite Urine Negative NEG^Negative    Leukocyte Esterase Urine Negative NEG^Negative    Source Midstream Urine                IMP:  1. Rec UTi's without significant documentation  2. Hx nephrolithiasis, now with negative CT  3. Massive fluids      PLAN:  1. Discussed situation with patient in detail.  2. Stressed need for UC to document presence/absence of infection prior to starting abx  3. Would moderate fluids, especially in face of negative recent CT; pt expresses understanding  4. Set realistic expectations  5. RTC prn  6. 60 minutes spent with patient, more than 50% in counseling and coordination of care for UTI, which did not include time spent for the procedure.  7. Copy

## 2018-07-31 NOTE — NURSING NOTE
Pt brought back to the procedure room for a cystoscopy per Dr. Silverio  Informed consent obtained, pt prepped in a sterile manner.    Scope Number: Tulio Storz 70 Rigid: [id number]     Ximena VERDUZCO MA

## 2018-07-31 NOTE — NURSING NOTE
Chief Complaint   Patient presents with     Consult For     recurrent uti's       Initial /88 (BP Location: Left arm, Patient Position: Chair, Cuff Size: Adult Regular)  Pulse 77  Temp 98.1  F (36.7  C)  Resp 14  Ht 1.524 m (5')  Wt 54.4 kg (120 lb)  LMP 04/02/2018  SpO2 100%  BMI 23.44 kg/m2 Estimated body mass index is 23.44 kg/(m^2) as calculated from the following:    Height as of this encounter: 1.524 m (5').    Weight as of this encounter: 54.4 kg (120 lb).  BP completed using cuff size: regular  Medications and allergies reviewed.      Ximena VERDUZCO MA                   Statement Selected

## 2018-07-31 NOTE — PATIENT INSTRUCTIONS
If you have questions or concerns on any instructions given to you by your provider today or if you need to schedule an appointment, you can reach us at [phone].

## 2018-09-13 ENCOUNTER — OFFICE VISIT (OUTPATIENT)
Dept: OBGYN | Facility: CLINIC | Age: 38
End: 2018-09-13
Payer: COMMERCIAL

## 2018-09-13 VITALS
BODY MASS INDEX: 26.11 KG/M2 | SYSTOLIC BLOOD PRESSURE: 136 MMHG | HEART RATE: 65 BPM | HEIGHT: 60 IN | RESPIRATION RATE: 18 BRPM | TEMPERATURE: 96.1 F | WEIGHT: 133 LBS | DIASTOLIC BLOOD PRESSURE: 92 MMHG

## 2018-09-13 DIAGNOSIS — N95.1 MENOPAUSAL SYNDROME (HOT FLASHES): ICD-10-CM

## 2018-09-13 DIAGNOSIS — R30.0 DYSURIA: Primary | ICD-10-CM

## 2018-09-13 LAB
ALBUMIN UR-MCNC: NEGATIVE MG/DL
APPEARANCE UR: CLEAR
BACTERIA #/AREA URNS HPF: ABNORMAL /HPF
BILIRUB UR QL STRIP: NEGATIVE
COLOR UR AUTO: YELLOW
GLUCOSE UR STRIP-MCNC: NEGATIVE MG/DL
HGB UR QL STRIP: ABNORMAL
KETONES UR STRIP-MCNC: NEGATIVE MG/DL
LEUKOCYTE ESTERASE UR QL STRIP: ABNORMAL
NITRATE UR QL: POSITIVE
PH UR STRIP: 6 PH (ref 5–7)
RBC #/AREA URNS AUTO: ABNORMAL /HPF
SOURCE: ABNORMAL
SP GR UR STRIP: 1.01 (ref 1–1.03)
UROBILINOGEN UR STRIP-ACNC: 0.2 EU/DL (ref 0.2–1)
WBC #/AREA URNS AUTO: ABNORMAL /HPF

## 2018-09-13 PROCEDURE — 87088 URINE BACTERIA CULTURE: CPT | Performed by: OBSTETRICS & GYNECOLOGY

## 2018-09-13 PROCEDURE — 99213 OFFICE O/P EST LOW 20 MIN: CPT | Performed by: OBSTETRICS & GYNECOLOGY

## 2018-09-13 PROCEDURE — 87086 URINE CULTURE/COLONY COUNT: CPT | Performed by: OBSTETRICS & GYNECOLOGY

## 2018-09-13 PROCEDURE — 87186 SC STD MICRODIL/AGAR DIL: CPT | Performed by: OBSTETRICS & GYNECOLOGY

## 2018-09-13 PROCEDURE — 81001 URINALYSIS AUTO W/SCOPE: CPT | Performed by: OBSTETRICS & GYNECOLOGY

## 2018-09-13 RX ORDER — ESTERIFIED ESTROGEN AND METHYLTESTOSTERONE .625; 1.25 MG/1; MG/1
1 TABLET ORAL DAILY
Qty: 30 TABLET | Refills: 3 | Status: SHIPPED | OUTPATIENT
Start: 2018-09-13 | End: 2019-01-30

## 2018-09-13 RX ORDER — NITROFURANTOIN 25; 75 MG/1; MG/1
100 CAPSULE ORAL 2 TIMES DAILY
Qty: 14 CAPSULE | Refills: 0 | Status: SHIPPED | OUTPATIENT
Start: 2018-09-13 | End: 2019-01-17

## 2018-09-13 NOTE — MR AVS SNAPSHOT
After Visit Summary   9/13/2018    Rober Renee    MRN: [id number]           Patient Information     Date Of Birth          1980        Visit Information        Provider Department      9/13/2018 11:00 AM Taisha Wilson MD [facility]        Today's Diagnoses     Dysuria    -  1    Menopausal syndrome (hot flashes)           Follow-ups after your visit        Who to contact     If you have questions or need follow up information about today's clinic visit or your schedule please contact [facility] directly at [phone].  Normal or non-critical lab and imaging results will be communicated to you by [facility], letter or phone within 4 business days after the clinic has received the results. If you do not hear from us within 7 days, please contact the clinic through [employer]end[employer] or phone. If you have a critical or abnormal lab result, we will notify you by phone as soon as possible.  Submit refill requests through OnApp or call your pharmacy and they will forward the refill request to us. Please allow 3 business days for your refill to be completed.          Additional Information About Your Visit        MyChart Information     OnApp gives you secure access to your electronic health record. If you see a primary care provider, you can also send messages to your care team and make appointments. If you have questions, please call your primary care clinic.  If you do not have a primary care provider, please call [phone] and they will assist you.        Care EveryWhere ID     This is your Care EveryWhere ID. This could be used by other organizations to access your [place] medical records  [id number]        Your Vitals Were     Pulse Temperature Respirations Height Last Period BMI (Body Mass Index)    65 96.1  F (35.6  C) (Tympanic) 18 5' (1.524 m) 04/02/2018 25.97 kg/m2       Blood Pressure from Last 3 Encounters:   09/13/18 (!) 136/92   07/31/18 131/88    07/02/18 (!) 134/92    Weight from Last 3 Encounters:   09/13/18 133 lb (60.3 kg)   07/31/18 120 lb (54.4 kg)   07/02/18 120 lb (54.4 kg)              We Performed the Following     *UA reflex to Microscopic     Urine Culture Aerobic Bacterial     Urine Microscopic          Today's Medication Changes          These changes are accurate as of 9/13/18 11:46 AM.  If you have any questions, ask your nurse or doctor.               Start taking these medicines.        Dose/Directions    estrogens-methylTESTOSTERone 0.625-1.25 MG per tablet   Commonly known as:  ESTRATEST HS   Used for:  Menopausal syndrome (hot flashes)   Started by:  Taisha Wilson MD        Dose:  1 tablet   Take 1 tablet by mouth daily   Quantity:  30 tablet   Refills:  3       nitroFURantoin (macrocrystal-monohydrate) 100 MG capsule   Commonly known as:  MACROBID   Used for:  Dysuria   Started by:  Taisha Wilson MD        Dose:  100 mg   Take 1 capsule (100 mg) by mouth 2 times daily   Quantity:  14 capsule   Refills:  0            Where to get your medicines      These medications were sent to [facility] [address]  [address] [address]     Phone:  [phone]     nitroFURantoin (macrocrystal-monohydrate) 100 MG capsule         Some of these will need a paper prescription and others can be bought over the counter.  Ask your nurse if you have questions.     Bring a paper prescription for each of these medications     estrogens-methylTESTOSTERone 0.625-1.25 MG per tablet                Primary Care Provider Office Phone # Fax #    R Joe Sanz [phone] [address] [address] [facility] [address] [address]        Equal Access to Services     BRYN MACK : Allyson Calderon, karla rojas, srinivas haywood. So [facility] [phone].    ATENCIÓN: Si habla español, tiene a nguyen disposición servicios  radha de asistencia lingüística. Magali wilkins [phone].    We comply with applicable federal civil rights laws and [place] laws. We do not discriminate on the basis of race, color, national origin, age, disability, sex, sexual orientation, or gender identity.            Thank you!     Thank you for choosing [facility]  for your care. Our goal is always to provide you with excellent care. Hearing back from our patients is one way we can continue to improve our services. Please take a few minutes to complete the written survey that you may receive in the mail after your visit with us. Thank you!             Your Updated Medication List - Protect others around you: Learn how to safely use, store and throw away your medicines at www.disposemymeds.org.          This list is accurate as of 9/13/18 11:46 AM.  Always use your most recent med list.                   Brand Name Dispense Instructions for use Diagnosis    estradiol 2 MG tablet    ESTRACE    135 tablet    1 1/2 tablets daily    Surgical menopause       estrogens-methylTESTOSTERone 0.625-1.25 MG per tablet    ESTRATEST HS    30 tablet    Take 1 tablet by mouth daily    Menopausal syndrome (hot flashes)       ibuprofen 600 MG tablet    ADVIL/MOTRIN    40 tablet    Take 1 tablet (600 mg) by mouth every 6 hours as needed for pain (mild)    Postoperative state       LORazepam 1 MG tablet    ATIVAN          nitroFURantoin (macrocrystal-monohydrate) 100 MG capsule    MACROBID    14 capsule    Take 1 capsule (100 mg) by mouth 2 times daily    Dysuria       TYLENOL PO      Take 1,000 mg by mouth once        vitamin D 2000 units tablet     100 tablet    Take 2,000 Units by mouth daily    Vitamin D deficiency

## 2018-09-13 NOTE — PROGRESS NOTES
Rober is a 38 year old   female who presents for 2 issues: she believes she is either having recurring UTI or kidney stones--gets fairly acute onset of bladder pain, hard to pee, blood in urine; has been seen in ER with UA showing Leukocytes, , RBC and WBC but no cultures taken  She as seen a urologist in the past who thought may be stones, but no stones collected  Yesterday she had same symptoms again    She also reports hot flashes much better with [id number] daily, however her libido is way down, so she would like to try androgen therapy..    Patient Active Problem List    Diagnosis Date Noted     Endometriosis determined by laparoscopy 04/10/2018     Priority: Medium     Anxiety 2017     Priority: Medium     Major depressive disorder, single episode, moderate (H) 2016     Priority: Medium     Renal stones 10/09/2015     Priority: Medium     Migraine headache 2012     Priority: Medium     CARDIOVASCULAR SCREENING; LDL GOAL LESS THAN 160 10/31/2010     Priority: Medium       All systems were reviewed and pertinent information in noted in subjective/HPI.    Past Medical History:   Diagnosis Date     Calculus of kidney     right side     Cervical high risk HPV (human papillomavirus) test positive 2018    NIL pap, +HR HPV (Neg 16/18). Rpt Pap+HPV in 1 year (Due )     Chickenpox      Gestational diabetes ,     History of postpartum depression, currently pregnant     mild     Intervertebral lumbar disc disorder with myelopathy, lumbar region     herniated disks, L4-5, s/p steroid injection     Intramural leiomyoma of uterus 4/3/2018     Migraines      Urinary tract bacterial infections        Past Surgical History:   Procedure Laterality Date      SECTION, TUBAL LIGATION, COMBINED  2013    c/section with BTL     LAPAROSCOPIC ASSISTED HYSTERECTOMY VAGINAL, BILATERAL SALPINGO-OOPHORECTOMY, COMBINED N/A 4/10/2018    Procedure: COMBINED LAPAROSCOPIC ASSISTED  HYSTERECTOMY VAGINAL, SALPINGO-OOPHORECTOMY;  Laparoscopic assisted vaginal hysterectomy with possible bilateral salpingoophorectomy.;  Surgeon: Taisha Wilson MD;  Location: [facility] OR         Current Outpatient Prescriptions:      Acetaminophen (TYLENOL PO), Take 1,000 mg by mouth once, Disp: , Rfl:      Cholecalciferol (VITAMIN D) 2000 UNITS tablet, Take 2,000 Units by mouth daily, Disp: 100 tablet, Rfl: 3     estradiol (ESTRACE) 2 MG tablet, 1 1/2 tablets daily, Disp: 135 tablet, Rfl: 3     estrogens-methylTESTOSTERone (ESTRATEST HS) 0.625-1.25 MG per tablet, Take 1 tablet by mouth daily, Disp: 30 tablet, Rfl: 3     ibuprofen (ADVIL/MOTRIN) 600 MG tablet, Take 1 tablet (600 mg) by mouth every 6 hours as needed for pain (mild), Disp: 40 tablet, Rfl: 1     LORazepam (ATIVAN) 1 MG tablet, , Disp: , Rfl:      nitroFURantoin, macrocrystal-monohydrate, (MACROBID) 100 MG capsule, Take 1 capsule (100 mg) by mouth 2 times daily, Disp: 14 capsule, Rfl: 0    ALLERGIES:  Imitrex [sumatriptan] and Sulfa drugs    Social History     Social History     Marital status:      Spouse name: N/A     Number of children: 4     Years of education: N/A     Occupational History     [employer]      Social History Main Topics     Smoking status: Former Smoker     Packs/day: 0.50     Years: 10.00     Types: Cigarettes     Quit date: 2/20/2008     Smokeless tobacco: Never Used     Alcohol use Yes      Comment: very seldom     Drug use: No     Sexual activity: Not Currently     Partners: Male     Birth control/ protection: Surgical, Female Surgical      Comment: tubal     Other Topics Concern     Parent/Sibling W/ Cabg, Mi Or Angioplasty Before 65f 55m? No     Social History Narrative       Family History   Problem Relation Age of Onset     Cancer Maternal Grandfather      liver CA     Cancer Paternal Grandfather      prostate CA     Cancer Maternal Grandmother      ovarian CA     Other - See  Comments Maternal Grandmother      migraine     Diabetes Paternal Grandmother      Hypertension Paternal Grandmother      Hypertension Father      Depression Father      Other - See Comments Mother      migraine     Hypertension Brother      Other - See Comments Maternal Uncle      migraine     Depression Sister        OBJECTIVE:  Vitals: BP (!) 136/92 (BP Location: Left arm, Patient Position: Chair, Cuff Size: Adult Small)  Pulse 65  Temp 96.1  F (35.6  C) (Tympanic)  Resp 18  Ht 5' (1.524 m)  Wt 133 lb (60.3 kg)  LMP 04/02/2018  BMI 25.97 kg/m2 BMI= Body mass index is 25.97 kg/(m^2).   Patient's last menstrual period was 04/02/2018.     GENERAL APPEARANCE: healthy, alert and no distress    UA: pos leukocytes, pos RCS, pos WBC    ASSESSMENT:      ICD-10-CM    1. Dysuria R30.0 *UA reflex to Microscopic     Urine Culture Aerobic Bacterial     nitroFURantoin, macrocrystal-monohydrate, (MACROBID) 100 MG capsule   2. Menopausal syndrome (hot flashes) N95.1 estrogens-methylTESTOSTERone (ESTRATEST HS) 0.625-1.25 MG per tablet     Urine Microscopic       PLAN:  I recommend txing for probable UTI today with Macrobid 1 po BID with urine culture  We discussed trying a low dose combo of EE and MT along with reduced dose of Estradiol (2 mg);  Recommend if she can collect a stone, can submit for analysis  Push fluids  Avoid calcium supplement  Taisha Wilson MD  [facility]      Taisha Wilson MD

## 2018-09-14 ENCOUNTER — TELEPHONE (OUTPATIENT)
Dept: OBGYN | Facility: CLINIC | Age: 38
End: 2018-09-14

## 2018-09-15 DIAGNOSIS — N95.1 MENOPAUSAL SYNDROME (HOT FLASHES): ICD-10-CM

## 2018-09-15 LAB
BACTERIA SPEC CULT: ABNORMAL
Lab: ABNORMAL
SPECIMEN SOURCE: ABNORMAL

## 2018-09-15 NOTE — TELEPHONE ENCOUNTER
Prior Authorization required on Estratest  Insurance Phone:[phone]  Patient ID:[id number] ([facility])  Please contact the pharmacy with Prior Auth status (approved/denied).  Thank you, Jyoti Basurto - Pharmacy [facility]  [phone]

## 2018-09-19 ENCOUNTER — MYC MEDICAL ADVICE (OUTPATIENT)
Dept: OBGYN | Facility: CLINIC | Age: 38
End: 2018-09-19

## 2018-09-19 DIAGNOSIS — R30.0 DYSURIA: Primary | ICD-10-CM

## 2018-09-19 RX ORDER — ESTERIFIED ESTROGEN AND METHYLTESTOSTERONE .625; 1.25 MG/1; MG/1
1 TABLET ORAL DAILY
Qty: 30 TABLET | Refills: 3 | Status: CANCELLED | OUTPATIENT
Start: 2018-09-19

## 2018-09-19 RX ORDER — FLUCONAZOLE 150 MG/1
150 TABLET ORAL ONCE
Qty: 1 TABLET | Refills: 0 | Status: SHIPPED | OUTPATIENT
Start: 2018-09-19 | End: 2018-09-20

## 2018-09-19 RX ORDER — DOXYCYCLINE 100 MG/1
100 CAPSULE ORAL 2 TIMES DAILY
Qty: 14 CAPSULE | Refills: 0 | Status: SHIPPED | OUTPATIENT
Start: 2018-09-19 | End: 2019-01-17

## 2018-09-20 DIAGNOSIS — R30.0 DYSURIA: ICD-10-CM

## 2018-09-20 RX ORDER — FLUCONAZOLE 150 MG/1
150 TABLET ORAL ONCE
Qty: 1 TABLET | Refills: 0 | Status: SHIPPED | OUTPATIENT
Start: 2018-09-20 | End: 2019-03-19

## 2018-09-20 NOTE — TELEPHONE ENCOUNTER
Central Prior Authorization Team   Phone: [phone]      PA Initiation    Medication: Estratest  Insurance Company: [employer] - Phone [phone] Fax [phone]  Pharmacy Filling the Rx: [facility] [place], [place] - [address]  Filling Pharmacy Phone: [phone]  Filling Pharmacy Fax:    Start Date: 9/20/2018

## 2018-09-20 NOTE — TELEPHONE ENCOUNTER
Prior Authorization required on Estratest  Insurance Phone:[phone]  Patient ID:[id number] ([facility])  Please contact the pharmacy with Prior Auth status (approved/denied).  Thank you, Jyoti Basurto - Pharmacy [facility]  [phone]

## 2018-09-20 NOTE — TELEPHONE ENCOUNTER
Prescription sent yesterday by Dr. Wilson.  Patient requesting different pharmacy.  Patient requesting pharmacy downstairs.  Prescription sent.    Chinyere Hall   Ob[facility]Gyn Clinic  RN

## 2018-09-20 NOTE — TELEPHONE ENCOUNTER
Amercia Thompson   [facility] Prior Auth 3 days ago                 Please create a telephone encounter and route back to PA pool. Thank you! (Routing comment)

## 2018-09-21 NOTE — TELEPHONE ENCOUNTER
PRIOR AUTHORIZATION DENIED    Medication: Estratest    Denial Date: 9/21/2018    Denial Rational:  Pt must try two formulary alternatives first such as Premarin cream and Estradiol patches before proceeding with requested drug.    Appeal Information:    If you would like to appeal, please supply P/A team with a letter of medical necessity with clinical reason.

## 2018-09-21 NOTE — TELEPHONE ENCOUNTER
Received request from [facility].  Request for Estrogen-Methyltestosterone 0.625-1.25 tab has been denied.  Pt must try two formulary alternatives first such as Premarin cream and Estradiol patches before proceeding with requested drug.    Pharmacy: [facility]    Please advise.    Thanks-    -Maricarmen Alegria  Clinic Station [place]

## 2018-09-24 NOTE — TELEPHONE ENCOUNTER
Please see letter for appeal that was typed by Dr. Wilson today.  Send appeal request please.    -Maricarmen SOTO [facility]  Clinic Station [place]

## 2018-09-25 NOTE — TELEPHONE ENCOUNTER
Medication Appeal Initiation    We have initiated an appeal for the requested medication:  Medication: Estratest  Appeal Start Date:  9/25/2018  Insurance Company: [employer] - Phone [phone] Fax [phone]  Comments:

## 2018-10-03 NOTE — TELEPHONE ENCOUNTER
MEDICATION APPEAL APPROVED    Medication: Estratest  Authorization Effective Date: 8/26/2018  Authorization Expiration Date: 9/26/2019  Approved Dose/Quantity:    Reference #: [id number]   Insurance Company: [employer] - Phone [phone] Fax [phone]  Expected CoPay:       CoPay Card Available:      Foundation Assistance Needed:    Which Pharmacy is filling the prescription (Not needed for infusion/clinic administered): [facility] [address]      Received approval over the phone. Patient and pharmacy are aware of approval.

## 2018-10-04 ENCOUNTER — TELEPHONE (OUTPATIENT)
Dept: OBGYN | Facility: CLINIC | Age: 38
End: 2018-10-04

## 2018-10-04 DIAGNOSIS — F41.9 ANXIETY: Primary | ICD-10-CM

## 2018-10-04 RX ORDER — LORAZEPAM 1 MG/1
1 TABLET ORAL EVERY 8 HOURS PRN
Qty: 20 TABLET | Refills: 0 | Status: SHIPPED | OUTPATIENT
Start: 2018-10-04 | End: 2018-12-28

## 2018-10-04 NOTE — TELEPHONE ENCOUNTER
Refill request received for Ativan.   Not listed on [facility] RN refill protocol.   Will route to provider for consideration.     Patient appeared to be given short term prescription by PCP until zoloft was effective.   4/2018 requested refill due to increased anxiety prior to procedure.     Would you like patient to follow-up with PCP re: anxiety and ativan refill? Or okay to fill?     Thank you,   Cortez BECKER RN   Specialty Clinics

## 2018-10-05 NOTE — TELEPHONE ENCOUNTER
Hand signed rx for Ativan faxed to [facility] for #20 no refills.    -Maricarmen SOTO [facility]  Clinic Station

## 2018-10-31 ENCOUNTER — MYC MEDICAL ADVICE (OUTPATIENT)
Dept: FAMILY MEDICINE | Facility: CLINIC | Age: 38
End: 2018-10-31

## 2018-10-31 DIAGNOSIS — B37.31 YEAST INFECTION OF THE VAGINA: Primary | ICD-10-CM

## 2018-10-31 RX ORDER — FLUCONAZOLE 150 MG/1
150 TABLET ORAL ONCE
Qty: 1 TABLET | Refills: 0 | Status: SHIPPED | OUTPATIENT
Start: 2018-10-31 | End: 2019-03-19

## 2018-12-26 DIAGNOSIS — F41.9 ANXIETY: ICD-10-CM

## 2018-12-27 NOTE — TELEPHONE ENCOUNTER
Rx refill request for Lorazepam.  Date of last issue was on 10-04-18 for a qty of 20 with 0 refills.  Date of last ov with Dr. Wilson 09-13-18.    Controlled substance, to provider to authorize refills.    Jessica Torres  [facility] RN

## 2018-12-28 RX ORDER — LORAZEPAM 1 MG/1
1 TABLET ORAL EVERY 8 HOURS PRN
Qty: 20 TABLET | Refills: 0 | Status: SHIPPED | OUTPATIENT
Start: 2018-12-28 | End: 2019-03-07

## 2019-01-06 ENCOUNTER — HOSPITAL ENCOUNTER (EMERGENCY)
Facility: CLINIC | Age: 39
Discharge: HOME | End: 2019-01-06
Attending: PHYSICIAN ASSISTANT | Admitting: PHYSICIAN ASSISTANT
Payer: COMMERCIAL

## 2019-01-06 ENCOUNTER — MYC MEDICAL ADVICE (OUTPATIENT)
Dept: OBGYN | Facility: CLINIC | Age: 39
End: 2019-01-06

## 2019-01-06 VITALS
HEIGHT: 61 IN | WEIGHT: 126 LBS | TEMPERATURE: 97.9 F | BODY MASS INDEX: 23.79 KG/M2 | RESPIRATION RATE: 18 BRPM | DIASTOLIC BLOOD PRESSURE: 105 MMHG | OXYGEN SATURATION: 99 % | SYSTOLIC BLOOD PRESSURE: 161 MMHG

## 2019-01-06 DIAGNOSIS — I10 BENIGN ESSENTIAL HYPERTENSION: Primary | ICD-10-CM

## 2019-01-06 DIAGNOSIS — R07.0 THROAT PAIN: ICD-10-CM

## 2019-01-06 DIAGNOSIS — Z20.818 EXPOSURE TO STREP THROAT: ICD-10-CM

## 2019-01-06 LAB
INTERNAL QC OK POCT: YES
S PYO AG THROAT QL IA.RAPID: NEGATIVE

## 2019-01-06 PROCEDURE — 87880 STREP A ASSAY W/OPTIC: CPT | Performed by: PHYSICIAN ASSISTANT

## 2019-01-06 PROCEDURE — 99213 OFFICE O/P EST LOW 20 MIN: CPT | Mod: Z6 | Performed by: PHYSICIAN ASSISTANT

## 2019-01-06 PROCEDURE — G0463 HOSPITAL OUTPT CLINIC VISIT: HCPCS | Performed by: PHYSICIAN ASSISTANT

## 2019-01-06 PROCEDURE — 87081 CULTURE SCREEN ONLY: CPT | Performed by: PHYSICIAN ASSISTANT

## 2019-01-06 ASSESSMENT — MIFFLIN-ST. JEOR: SCORE: 1188.91

## 2019-01-06 ASSESSMENT — ENCOUNTER SYMPTOMS
MYALGIAS: 1
SORE THROAT: 1

## 2019-01-07 NOTE — DISCHARGE INSTRUCTIONS
No antibiotic indicated at this time; throat culture pending.     Patient advised to call for any lab results (if obtained during visit) within 2-3 days.     Symptomatic treatment with fluids, rest, salt water gargles, and cool humidifier.  May use acetaminophen, ibuprofen prn.    Return to care if any worsening symptoms or if not improving ([place] may need to be ruled out if symptoms fail to improve).    Patient to go to Emergency Room if drooling, change in voice, difficulty swallowing or talking, or persistent fevers occur.      Patient voiced understanding of instructions given.

## 2019-01-07 NOTE — ED PROVIDER NOTES
History     Chief Complaint   Patient presents with     Pharyngitis     SOre throat today/ children sick     HPI    Rober Renee  is a 38 year old female who is here today because of: Sore Throat.  The patient has had symptoms of sore throat and body aches.   Onset of symptoms was 1 day ago. Course of illness is same.  Patient admits to exposure to illness at home or work/school. Kids with strep throat.   Patient denies fever, cough, earache, nasal congestion/runny nose, nausea, vomiting, diarrhea and headache  Treatment measures tried include acetaminophen.    Problem list, Medication list, Allergies, and Medical/Social/Surgical histories reviewed in [facility] and updated as appropriate.    Problem List:    Patient Active Problem List    Diagnosis Date Noted     Endometriosis determined by laparoscopy 04/10/2018     Priority: Medium     Anxiety 2017     Priority: Medium     Major depressive disorder, single episode, moderate (H) 2016     Priority: Medium     Renal stones 10/09/2015     Priority: Medium     Migraine headache 2012     Priority: Medium     CARDIOVASCULAR SCREENING; LDL GOAL LESS THAN 160 10/31/2010     Priority: Medium        Past Medical History:    Past Medical History:   Diagnosis Date     Calculus of kidney      Cervical high risk HPV (human papillomavirus) test positive 2018     Chickenpox      Gestational diabetes ,     History of postpartum depression, currently pregnant      Intervertebral lumbar disc disorder with myelopathy, lumbar region      Intramural leiomyoma of uterus 4/3/2018     Migraines      Urinary tract bacterial infections        Past Surgical History:    Past Surgical History:   Procedure Laterality Date      SECTION, TUBAL LIGATION, COMBINED  2013    c/section with BTL     LAPAROSCOPIC ASSISTED HYSTERECTOMY VAGINAL, BILATERAL SALPINGO-OOPHORECTOMY, COMBINED N/A 4/10/2018    Procedure: COMBINED LAPAROSCOPIC ASSISTED HYSTERECTOMY  "VAGINAL, SALPINGO-OOPHORECTOMY;  Laparoscopic assisted vaginal hysterectomy with possible bilateral salpingoophorectomy.;  Surgeon: Taisha Wilson MD;  Location: [facility] OR       Family History:    Family History   Problem Relation Age of Onset     Cancer Maternal Grandfather         liver CA     Cancer Paternal Grandfather         prostate CA     Cancer Maternal Grandmother         ovarian CA     Other - See Comments Maternal Grandmother         migraine     Diabetes Paternal Grandmother      Hypertension Paternal Grandmother      Hypertension Father      Depression Father      Other - See Comments Mother         migraine     Hypertension Brother      Other - See Comments Maternal Uncle         migraine     Depression Sister        Social History:  Marital Status:   [2]  Social History     Tobacco Use     Smoking status: Former Smoker     Packs/day: 0.50     Years: 10.00     Pack years: 5.00     Types: Cigarettes     Last attempt to quit: 2/20/2008     Years since quitting: 10.8     Smokeless tobacco: Never Used   Substance Use Topics     Alcohol use: Yes     Comment: very seldom     Drug use: No        Medications:      Acetaminophen (TYLENOL PO)   Cholecalciferol (VITAMIN D) 2000 UNITS tablet   doxycycline (VIBRAMYCIN) 100 MG capsule   estradiol (ESTRACE) 2 MG tablet   estrogens-methylTESTOSTERone (ESTRATEST HS) 0.625-1.25 MG per tablet   ibuprofen (ADVIL/MOTRIN) 600 MG tablet   LORazepam (ATIVAN) 1 MG tablet   nitroFURantoin, macrocrystal-monohydrate, (MACROBID) 100 MG capsule         Review of Systems   HENT: Positive for sore throat.    Musculoskeletal: Positive for myalgias.   All other systems reviewed and are negative.      Physical Exam   BP: (!) 161/105  Heart Rate: 65  Temp: 97.9  F (36.6  C)  Resp: 18  Height: 154.9 cm (5' 1\")  Weight: 57.2 kg (126 lb)  SpO2: 99 %      Physical Exam     BP (!) 161/105   Temp 97.9  F (36.6  C) (Temporal)   Resp 18   Ht 1.549 m (5' 1\")   Wt 57.2 kg (126 " lb)   LMP 04/02/2018   SpO2 99%   BMI 23.81 kg/m    General: healthy, alert with no acute distress, and non toxic in appearance  Eyes - conjunctivae clear.  Ears - External ears normal. Canals clear. TM's normal.  Nose/Sinuses - Nares normal.Mucosa normal. No drainage or sinus tenderness.  Oropharynx - Lips, mucosa, and tongue normal. Positive findings: oropharyngeal erythema. No tonsillar hypertrophy or exudates present.   Neck - Neck supple; Positive findings: moderate anterior cervical nodes. No meningeal signs.   Lungs - Lungs clear; no wheezing or rales.  Heart - regular rate and rhythm. No murmurs, rub.  Abdomen: Abdomen soft, non-tender. BS normal. No masses, organomegaly  SKIN: no suspicious lesions or rashes    Labs:  Rapid Strep test is negative  Results for orders placed or performed during the hospital encounter of 01/06/19 (from the past 24 hour(s))   Rapid strep group A screen POCT   Result Value Ref Range    Rapid Strep A Screen negative neg    Internal QC OK Yes          ED Course        Procedures              Critical Care time:  none               Results for orders placed or performed during the hospital encounter of 01/06/19 (from the past 24 hour(s))   Rapid strep group A screen POCT   Result Value Ref Range    Rapid Strep A Screen negative neg    Internal QC OK Yes        Medications - No data to display    Assessments & Plan (with Medical Decision Making)     I have reviewed the nursing notes.    I have reviewed the findings, diagnosis, plan and need for follow up with the patient.   38-year-old female presents to urgent care with sore throat that started today and overall feeling of body aches.  Patient states her kids are positive for strep at home.  Rapid strep was obtained in his office today and was negative.  Throat culture currently pending.  Discussed symptomatic treatment at this time.  Patient to follow-up if symptoms worsen or change.       Medication List      There are no  discharge medications for this visit.         Final diagnoses:   Throat pain   Exposure to strep throat       1/6/2019   [facility] EMERGENCY DEPARTMENT     Desi Jefferson PA-C  01/06/19 2012

## 2019-01-08 LAB
BACTERIA SPEC CULT: NORMAL
Lab: NORMAL
SPECIMEN SOURCE: NORMAL

## 2019-01-08 NOTE — RESULT ENCOUNTER NOTE
Final Beta strep group A r/o culture is NEGATIVE for Group A streptococcus.    No treatment or change in treatment per [place] Strep protocol.

## 2019-01-17 ENCOUNTER — OFFICE VISIT (OUTPATIENT)
Dept: DERMATOLOGY | Facility: CLINIC | Age: 39
End: 2019-01-17
Payer: COMMERCIAL

## 2019-01-17 VITALS — OXYGEN SATURATION: 98 % | HEART RATE: 71 BPM | SYSTOLIC BLOOD PRESSURE: 155 MMHG | DIASTOLIC BLOOD PRESSURE: 95 MMHG

## 2019-01-17 DIAGNOSIS — D48.5 NEOPLASM OF UNCERTAIN BEHAVIOR OF SKIN: Primary | ICD-10-CM

## 2019-01-17 DIAGNOSIS — I10 BENIGN ESSENTIAL HYPERTENSION: ICD-10-CM

## 2019-01-17 LAB
ALBUMIN SERPL-MCNC: 4.1 G/DL (ref 3.4–5)
ALBUMIN UR-MCNC: NEGATIVE MG/DL
ALP SERPL-CCNC: 32 U/L (ref 40–150)
ALT SERPL W P-5'-P-CCNC: 58 U/L (ref 0–50)
ANION GAP SERPL CALCULATED.3IONS-SCNC: 6 MMOL/L (ref 3–14)
APPEARANCE UR: CLEAR
AST SERPL W P-5'-P-CCNC: 30 U/L (ref 0–45)
BILIRUB SERPL-MCNC: 0.7 MG/DL (ref 0.2–1.3)
BILIRUB UR QL STRIP: NEGATIVE
BUN SERPL-MCNC: 14 MG/DL (ref 7–30)
CALCIUM SERPL-MCNC: 9.2 MG/DL (ref 8.5–10.1)
CHLORIDE SERPL-SCNC: 104 MMOL/L (ref 94–109)
CO2 SERPL-SCNC: 28 MMOL/L (ref 20–32)
COLOR UR AUTO: YELLOW
CREAT SERPL-MCNC: 0.66 MG/DL (ref 0.52–1.04)
GFR SERPL CREATININE-BSD FRML MDRD: >90 ML/MIN/{1.73_M2}
GLUCOSE SERPL-MCNC: 101 MG/DL (ref 70–99)
GLUCOSE UR STRIP-MCNC: NEGATIVE MG/DL
HGB UR QL STRIP: NEGATIVE
KETONES UR STRIP-MCNC: NEGATIVE MG/DL
LEUKOCYTE ESTERASE UR QL STRIP: NEGATIVE
NITRATE UR QL: NEGATIVE
PH UR STRIP: 6 PH (ref 5–7)
POTASSIUM SERPL-SCNC: 3.6 MMOL/L (ref 3.4–5.3)
PROT SERPL-MCNC: 7.4 G/DL (ref 6.8–8.8)
SODIUM SERPL-SCNC: 138 MMOL/L (ref 133–144)
SOURCE: NORMAL
SP GR UR STRIP: <=1.005 (ref 1–1.03)
TSH SERPL DL<=0.005 MIU/L-ACNC: 2.12 MU/L (ref 0.4–4)
UROBILINOGEN UR STRIP-ACNC: 0.2 EU/DL (ref 0.2–1)

## 2019-01-17 PROCEDURE — 81003 URINALYSIS AUTO W/O SCOPE: CPT | Performed by: OBSTETRICS & GYNECOLOGY

## 2019-01-17 PROCEDURE — 82384 ASSAY THREE CATECHOLAMINES: CPT | Mod: 90 | Performed by: OBSTETRICS & GYNECOLOGY

## 2019-01-17 PROCEDURE — 88305 TISSUE EXAM BY PATHOLOGIST: CPT | Mod: TC | Performed by: PHYSICIAN ASSISTANT

## 2019-01-17 PROCEDURE — 84443 ASSAY THYROID STIM HORMONE: CPT | Performed by: OBSTETRICS & GYNECOLOGY

## 2019-01-17 PROCEDURE — 80053 COMPREHEN METABOLIC PANEL: CPT | Performed by: OBSTETRICS & GYNECOLOGY

## 2019-01-17 PROCEDURE — 36415 COLL VENOUS BLD VENIPUNCTURE: CPT | Performed by: OBSTETRICS & GYNECOLOGY

## 2019-01-17 PROCEDURE — 11103 TANGNTL BX SKIN EA SEP/ADDL: CPT | Performed by: PHYSICIAN ASSISTANT

## 2019-01-17 PROCEDURE — 99000 SPECIMEN HANDLING OFFICE-LAB: CPT | Performed by: OBSTETRICS & GYNECOLOGY

## 2019-01-17 PROCEDURE — 11102 TANGNTL BX SKIN SINGLE LES: CPT | Performed by: PHYSICIAN ASSISTANT

## 2019-01-17 PROCEDURE — 99213 OFFICE O/P EST LOW 20 MIN: CPT | Mod: 25 | Performed by: PHYSICIAN ASSISTANT

## 2019-01-17 NOTE — LETTER
1/17/2019         RE: Rober Renee  [address] [facility] [place] [address]        Dear Colleague,    Thank you for referring your patient, Rober Renee, to the [facility]. Please see a copy of my visit note below.    HPI:   Rober Renee is a 38 year old female who presents for removal of several moles surrounding her bra line and to evaluate a spot on the buttocks.    chief complaint  Location: bra line   Condition present for:  A long time.   Previous treatments include: shave removal in the past     --Using tanning beds right now    --Going to [place] in Feb 5, 8, and 18 year old children are going with    Review Of Systems  Eyes: negative  Ears/Nose/Throat: negative  Respiratory: No shortness of breath, dyspnea on exertion, cough, or hemoptysis  Cardiovascular: negative  Gastrointestinal: negative  Genitourinary: negative  Musculoskeletal: negative  Neurologic: negative  Psychiatric: negative    This document serves as a record of the services and decisions personally performed and made by Coty Sethi PA-C. It was created on her behalf by Sue Sal, a trained medical scribe. The creation of this document is based the provider's statements to the medical scribe.  Sue Sal January 17, 2019 11:42 AM    PHYSICAL EXAM:    BP (!) 147/103   Pulse 70   LMP 04/02/2018   SpO2 98%   Skin exam performed as follows: Type 2 skin. Mood appropriate  Alert and Oriented X 3. Well developed, well nourished in no distress.  General appearance: Normal  Head including face: Normal  Eyes: conjunctiva and lids: Normal  Mouth: Lips, teeth, gums: Normal  Neck: Normal  Chest-breast/axillae: Normal  Back: Normal  Spleen and liver: Normal  Cardiovascular: Exam of peripheral vascular system by observation for swelling, varicosities, edema: Normal  Genitalia: groin, buttocks: Normal  Extremities: digits/nails (clubbing): Normal  Eccrine and Apocrine glands: Normal  Right upper extremity: Normal  Left upper  extremity: Normal  Right lower extremity: Normal  Left lower extremity: Normal  Skin: Scalp and body hair: See below    1. 5 mm tan papule on left trapezius.  2. 4 mm pink fleshy papule on right mid back  3. 4 mm irregular dark brown macule on left buttocks    ASSESSMENT/PLAN:     1. Dermal nevus on the left trapezius, right mid back - advised.  Shave bx in typical fashion .  Area cleaned with betadyne and anesthetized with 1% lidocaine with epi .  Dermablade used to remove the lesion and sent to pathology. Bleeding was cauterized. Pt tolerated procedure well.  2. Dermal nevus on the left jawline - recommend excision with Dr Mix for optimal cosmesis   3. Dermatofibroma vs atypical nevus on left buttocks - advised A photo was taken and placed in the chart. Shave bx in typical fashion .  Area cleaned with betadyne and anesthetized with 1% lidocaine with epi .  Dermablade used to remove the lesion and sent to pathology. Bleeding was cauterized. Pt tolerated procedure well.   4. Discussed use of tanning bed and complications that might arise from use. Advised to discontinue use immediately.   5. Patient to follow up with Primary Care provider regarding elevated blood pressure.         Follow-up: yearly FSE/PRN sooner  CC:   Scribed By: Sue Sal, Medical Scribe            Again, thank you for allowing me to participate in the care of your patient.        Sincerely,        Coty Becker PA-C

## 2019-01-17 NOTE — NURSING NOTE
"Initial BP (!) 147/103   Pulse 70   LMP 04/02/2018   SpO2 98%  Estimated body mass index is 23.81 kg/m  as calculated from the following:    Height as of 1/6/19: 1.549 m (5' 1\").    Weight as of 1/6/19: 57.2 kg (126 lb). .    America Pelayo LPN    "

## 2019-01-17 NOTE — PATIENT INSTRUCTIONS
Wound Care Instructions     FOR SUPERFICIAL WOUNDS     [facility] [phone]    [facility] [phone]  x2 on back and 1 on Left buttocks                       AFTER 24 HOURS YOU SHOULD REMOVE THE BANDAGE AND BEGIN DAILY DRESSING CHANGES AS FOLLOWS:     1) Remove Dressing.     2) Clean and dry the area with tap water using a Q-tip or sterile gauze pad.     3) Apply Vaseline, Aquaphor, Polysporin ointment or Bacitracin ointment over entire wound.  Do NOT use Neosporin ointment.     4) Cover the wound with a band-aid, or a sterile non-stick gauze pad and micropore paper tape      REPEAT THESE INSTRUCTIONS AT LEAST ONCE A DAY UNTIL THE WOUND HAS COMPLETELY HEALED.    It is an old wives tale that a wound heals better when it is exposed to air and allowed to dry out. The wound will heal faster with a better cosmetic result if it is kept moist with ointment and covered with a bandage.    **Do not let the wound dry out.**      Supplies Needed:      *Cotton tipped applicators (Q-tips)    *Polysporin Ointment or Bacitracin Ointment (NOT NEOSPORIN)    *Band-aids or non-stick gauze pads and micropore paper tape.      PATIENT INFORMATION:    During the healing process you will notice a number of changes. All wounds develop a small halo of redness surrounding the wound.  This means healing is occurring. Severe itching with extensive redness usually indicates sensitivity to the ointment or bandage tape used to dress the wound.  You should call our office if this develops.      Swelling  and/or discoloration around your surgical site is common, particularly when performed around the eye.    All wounds normally drain.  The larger the wound the more drainage there will be.  After 7-10 days, you will notice the wound beginning to shrink and new skin will begin to grow.  The wound is healed when you can see skin has formed over the entire area.  A healed wound has a healthy, shiny look to the  surface and is red to dark pink in color to normalize.  Wounds may take approximately 4-6 weeks to heal.  Larger wounds may take 6-8 weeks.  After the wound is healed you may discontinue dressing changes.    You may experience a sensation of tightness as your wound heals. This is normal and will gradually subside.    Your healed wound may be sensitive to temperature changes. This sensitivity improves with time, but if you re having a lot of discomfort, try to avoid temperature extremes.    Patients frequently experience itching after their wound appears to have healed because of the continue healing under the skin.  Plain Vaseline will help relieve the itching.        POSSIBLE COMPLICATIONS    BLEEDIN. Leave the bandage in place.  2. Use tightly rolled up gauze or a cloth to apply direct pressure over the bandage for 30  minutes.  3. Reapply pressure for an additional 30 minutes if necessary  4. Use additional gauze and tape to maintain pressure once the bleeding has stopped.

## 2019-01-17 NOTE — PROGRESS NOTES
HPI:   Rober Renee is a 38 year old female who presents for removal of several moles surrounding her bra line and to evaluate a spot on the buttocks.    chief complaint  Location: bra line   Condition present for:  A long time.   Previous treatments include: shave removal in the past     --Using tanning beds right now    --Going to [place] in Feb 5, 8, and 18 year old children are going with    Review Of Systems  Eyes: negative  Ears/Nose/Throat: negative  Respiratory: No shortness of breath, dyspnea on exertion, cough, or hemoptysis  Cardiovascular: negative  Gastrointestinal: negative  Genitourinary: negative  Musculoskeletal: negative  Neurologic: negative  Psychiatric: negative    This document serves as a record of the services and decisions personally performed and made by Coty Sethi PA-C. It was created on her behalf by Sue Sal, a trained medical scribe. The creation of this document is based the provider's statements to the medical scribe.  Sue Sal January 17, 2019 11:42 AM    PHYSICAL EXAM:    BP (!) 147/103   Pulse 70   LMP 04/02/2018   SpO2 98%   Skin exam performed as follows: Type 2 skin. Mood appropriate  Alert and Oriented X 3. Well developed, well nourished in no distress.  General appearance: Normal  Head including face: Normal  Eyes: conjunctiva and lids: Normal  Mouth: Lips, teeth, gums: Normal  Neck: Normal  Chest-breast/axillae: Normal  Back: Normal  Spleen and liver: Normal  Cardiovascular: Exam of peripheral vascular system by observation for swelling, varicosities, edema: Normal  Genitalia: groin, buttocks: Normal  Extremities: digits/nails (clubbing): Normal  Eccrine and Apocrine glands: Normal  Right upper extremity: Normal  Left upper extremity: Normal  Right lower extremity: Normal  Left lower extremity: Normal  Skin: Scalp and body hair: See below    1. 5 mm tan papule on left trapezius.  2. 4 mm pink fleshy papule on right mid back  3. 4 mm irregular dark brown  macule on left buttocks    ASSESSMENT/PLAN:     1. Dermal nevus on the left trapezius, right mid back - advised.  Shave bx in typical fashion .  Area cleaned with betadyne and anesthetized with 1% lidocaine with epi .  Dermablade used to remove the lesion and sent to pathology. Bleeding was cauterized. Pt tolerated procedure well.  2. Dermal nevus on the left jawline - recommend excision with Dr Mix for optimal cosmesis   3. Dermatofibroma vs atypical nevus on left buttocks - advised A photo was taken and placed in the chart. Shave bx in typical fashion .  Area cleaned with betadyne and anesthetized with 1% lidocaine with epi .  Dermablade used to remove the lesion and sent to pathology. Bleeding was cauterized. Pt tolerated procedure well.   4. Discussed use of tanning bed and complications that might arise from use. Advised to discontinue use immediately.   5. Patient to follow up with Primary Care provider regarding elevated blood pressure.         Follow-up: yearly FSE/PRN sooner  CC:   Scribed By: Hue Joy Scribe

## 2019-01-18 LAB — COPATH REPORT: NORMAL

## 2019-01-19 LAB
CATECHOLS UR-IMP: ABNORMAL
COLLECT DURATION TIME SPEC: ABNORMAL H
CREAT 24H UR-MRATE: ABNORMAL MG/D (ref 700–1600)
CREAT UR-MCNC: 34 MG/DL
DOPAMINE 24H UR-MRATE: ABNORMAL UG/D (ref 77–324)
DOPAMINE UR-MCNC: 5 UG/L
DOPAMINE/CREAT UR: 221 UG/G CRT (ref 0–250)
EPINEPH 24H UR-MRATE: ABNORMAL UG/D (ref 1–7)
EPINEPH UR-MCNC: 75 UG/L
EPINEPH/CREAT UR: 15 UG/G CRT (ref 0–20)
NOREPINEPH 24H UR-MRATE: ABNORMAL UG/D (ref 16–71)
NOREPINEPH UR-MCNC: 18 UG/L
NOREPINEPH/CREAT UR: 53 UG/G CRT (ref 0–45)
SPECIMEN VOL ?TM UR: ABNORMAL ML

## 2019-01-28 DIAGNOSIS — N95.1 MENOPAUSAL SYNDROME (HOT FLASHES): ICD-10-CM

## 2019-01-30 RX ORDER — ESTERIFIED ESTROGEN AND METHYLTESTOSTERONE .625; 1.25 MG/1; MG/1
1 TABLET ORAL DAILY
Qty: 30 TABLET | Refills: 3 | Status: SHIPPED | OUTPATIENT
Start: 2019-01-30 | End: 2019-04-17

## 2019-03-05 ENCOUNTER — E-VISIT (OUTPATIENT)
Dept: OBGYN | Facility: CLINIC | Age: 39
End: 2019-03-05
Payer: COMMERCIAL

## 2019-03-05 DIAGNOSIS — N30.00 ACUTE CYSTITIS WITHOUT HEMATURIA: Primary | ICD-10-CM

## 2019-03-05 PROCEDURE — 99444 ZZC PHYSICIAN ONLINE EVALUATION & MANAGEMENT SERVICE: CPT | Performed by: OBSTETRICS & GYNECOLOGY

## 2019-03-05 RX ORDER — NITROFURANTOIN 25; 75 MG/1; MG/1
100 CAPSULE ORAL 2 TIMES DAILY
Qty: 14 CAPSULE | Refills: 0 | Status: SHIPPED | OUTPATIENT
Start: 2019-03-05 | End: 2019-03-19

## 2019-03-06 DIAGNOSIS — F41.9 ANXIETY: ICD-10-CM

## 2019-03-06 NOTE — TELEPHONE ENCOUNTER
From: Rober Renee  To: Taisha Wilson MD  Sent: 3/5/2019 2:22 PM CST  Subject: E-Visit Submission: Urinary Problems    E-Visit Submission: Urinary Problems  --------------------------------    Question: Which of the following are you experiencing?  Answer: Pain while passing urine    Question: When you have pain when passing urine, which of these apply?  Answer: The pain feels like it is on or near the outside    Question: Are you able to pass urine?  Answer: Yes, I can pass urine with difficulty    Question: How long have you had pain or difficulty passing urine?  Answer: Two days or less    Question: Do you have a fever?  Answer: No, I do not have a fever    Question: Do you have any of the following?  Answer:     Question: Do you have an exaggerated sensation of the need to pass urine?  Answer: Yes, the sensation is exaggerated    Question: Do you have the urge to urinate more or less frequently than normal?  Answer: More frequently    Question: What does your urine look like?  Answer: It is cloudy    Question: Do you have any of the following?  Answer: An unusual smell    Question: Have you had any unusual penile discharge (if male) or vaginal discharge (if female)?  Answer: No    Question: Do you have any sores on your genitals?  Answer: No    Question: Have you had any kidney problems in the past?  Answer: Yes    Question: Within the past 3 months, have you had any surgery on your kidneys or bladder, or have you had a tube inserted to collect your urine?  Answer: No, I have never had either    Question: Have you had similar symptoms in the past?  Answer: Yes, I have had similar symptoms more than once before    Question: If you had similar symptoms in the past, did any of the following work?  Answer: Pills for urine infection   Pills for yeast infection    Question: Wrap up  Answer:     Question: Anything else you would like to add?  Answer: I have taken two doses of doxycycline and some of  symptoms have gone away but I only have one left from my previous prescription, If I can get more of it and a couple difflucan in case of yeast infection or thrush that would be helpful.    Question: Are you pregnant or breastfeeding?  Answer: I am confident that I am neither

## 2019-03-06 NOTE — TELEPHONE ENCOUNTER
Refill request from the [facility] pharmacy received for Ativan. Documented in patient chart on 1/17/19 that patient was not taking.  Call placed to patient to further inquire.     Left message for pt to call back.    Chinyree Hall   [facility]  RN

## 2019-03-06 NOTE — TELEPHONE ENCOUNTER
Patient returning call.  Patient reports she is currently needing the Ativan about 1-2 times per week.  Patient would like a refill on the prescription.    Please review and advise.  Thank you.    Chinyere Hall   [facility]  RN

## 2019-03-07 DIAGNOSIS — B37.31 YEAST INFECTION OF THE VAGINA: ICD-10-CM

## 2019-03-07 RX ORDER — LORAZEPAM 1 MG/1
1 TABLET ORAL EVERY 8 HOURS PRN
Qty: 20 TABLET | Refills: 0 | Status: SHIPPED | OUTPATIENT
Start: 2019-03-07 | End: 2019-05-15

## 2019-03-07 NOTE — TELEPHONE ENCOUNTER
Diflucan refill.  Last seen by Dr Wilson on 9/13/18.  Last written by  on 9/20/18.   is retired pt hasn't Established Care yet with new provider.  Advise.  Mahesh

## 2019-03-07 NOTE — TELEPHONE ENCOUNTER
Requested Prescriptions   Pending Prescriptions Disp Refills     fluconazole (DIFLUCAN) 150 MG tablet 1 tablet 0     Sig: Take 1 tablet (150 mg) by mouth once for 1 dose    There is no refill protocol information for this order        Last Written Prescription Date:  10/31/18  Last Fill Quantity: 1,  # refills: 0   Last office visit: 6/14/2018 with prescribing provider:  EVERETT Sanz     Future Office Visit:

## 2019-03-13 ENCOUNTER — E-VISIT (OUTPATIENT)
Dept: UROLOGY | Facility: CLINIC | Age: 39
End: 2019-03-13
Payer: COMMERCIAL

## 2019-03-13 DIAGNOSIS — R30.0 DYSURIA: Primary | ICD-10-CM

## 2019-03-13 PROCEDURE — 99207 ZZC NO CHARGE NURSE ONLY: CPT | Performed by: UROLOGY

## 2019-03-19 ENCOUNTER — OFFICE VISIT (OUTPATIENT)
Dept: FAMILY MEDICINE | Facility: CLINIC | Age: 39
End: 2019-03-19
Payer: COMMERCIAL

## 2019-03-19 VITALS
TEMPERATURE: 97.2 F | WEIGHT: 133.8 LBS | RESPIRATION RATE: 18 BRPM | OXYGEN SATURATION: 98 % | HEART RATE: 69 BPM | BODY MASS INDEX: 25.28 KG/M2 | SYSTOLIC BLOOD PRESSURE: 140 MMHG | DIASTOLIC BLOOD PRESSURE: 88 MMHG

## 2019-03-19 DIAGNOSIS — R03.0 ELEVATED BLOOD PRESSURE READING WITHOUT DIAGNOSIS OF HYPERTENSION: ICD-10-CM

## 2019-03-19 DIAGNOSIS — F41.1 GENERALIZED ANXIETY DISORDER: Primary | ICD-10-CM

## 2019-03-19 DIAGNOSIS — F32.1 MAJOR DEPRESSIVE DISORDER, SINGLE EPISODE, MODERATE (H): ICD-10-CM

## 2019-03-19 DIAGNOSIS — G44.229 CHRONIC TENSION-TYPE HEADACHE, NOT INTRACTABLE: ICD-10-CM

## 2019-03-19 LAB
ERYTHROCYTE [DISTWIDTH] IN BLOOD BY AUTOMATED COUNT: 11.8 % (ref 10–15)
HCT VFR BLD AUTO: 42.6 % (ref 35–47)
HGB BLD-MCNC: 15 G/DL (ref 11.7–15.7)
MCH RBC QN AUTO: 34.4 PG (ref 26.5–33)
MCHC RBC AUTO-ENTMCNC: 35.2 G/DL (ref 31.5–36.5)
MCV RBC AUTO: 98 FL (ref 78–100)
PLATELET # BLD AUTO: 171 10E9/L (ref 150–450)
RBC # BLD AUTO: 4.36 10E12/L (ref 3.8–5.2)
WBC # BLD AUTO: 6.9 10E9/L (ref 4–11)

## 2019-03-19 PROCEDURE — 82306 VITAMIN D 25 HYDROXY: CPT | Performed by: FAMILY MEDICINE

## 2019-03-19 PROCEDURE — 85027 COMPLETE CBC AUTOMATED: CPT | Performed by: FAMILY MEDICINE

## 2019-03-19 PROCEDURE — 36415 COLL VENOUS BLD VENIPUNCTURE: CPT | Performed by: FAMILY MEDICINE

## 2019-03-19 PROCEDURE — 99214 OFFICE O/P EST MOD 30 MIN: CPT | Performed by: FAMILY MEDICINE

## 2019-03-19 PROCEDURE — 82607 VITAMIN B-12: CPT | Performed by: FAMILY MEDICINE

## 2019-03-19 RX ORDER — CYCLOBENZAPRINE HCL 5 MG
5 TABLET ORAL 3 TIMES DAILY PRN
Qty: 42 TABLET | Refills: 1 | Status: SHIPPED | OUTPATIENT
Start: 2019-03-19 | End: 2019-05-21

## 2019-03-19 RX ORDER — ESCITALOPRAM OXALATE 10 MG/1
10 TABLET ORAL DAILY
Qty: 30 TABLET | Refills: 1 | Status: SHIPPED | OUTPATIENT
Start: 2019-03-19 | End: 2019-04-16

## 2019-03-19 RX ORDER — DICLOFENAC SODIUM 75 MG/1
75 TABLET, DELAYED RELEASE ORAL 2 TIMES DAILY PRN
Qty: 30 TABLET | Refills: 1 | Status: SHIPPED | OUTPATIENT
Start: 2019-03-19 | End: 2019-04-16

## 2019-03-19 ASSESSMENT — PATIENT HEALTH QUESTIONNAIRE - PHQ9
5. POOR APPETITE OR OVEREATING: MORE THAN HALF THE DAYS
SUM OF ALL RESPONSES TO PHQ QUESTIONS 1-9: 4

## 2019-03-19 ASSESSMENT — ANXIETY QUESTIONNAIRES
6. BECOMING EASILY ANNOYED OR IRRITABLE: MORE THAN HALF THE DAYS
GAD7 TOTAL SCORE: 10
7. FEELING AFRAID AS IF SOMETHING AWFUL MIGHT HAPPEN: NOT AT ALL
3. WORRYING TOO MUCH ABOUT DIFFERENT THINGS: MORE THAN HALF THE DAYS
5. BEING SO RESTLESS THAT IT IS HARD TO SIT STILL: NOT AT ALL
1. FEELING NERVOUS, ANXIOUS, OR ON EDGE: MORE THAN HALF THE DAYS
IF YOU CHECKED OFF ANY PROBLEMS ON THIS QUESTIONNAIRE, HOW DIFFICULT HAVE THESE PROBLEMS MADE IT FOR YOU TO DO YOUR WORK, TAKE CARE OF THINGS AT HOME, OR GET ALONG WITH OTHER PEOPLE: SOMEWHAT DIFFICULT
2. NOT BEING ABLE TO STOP OR CONTROL WORRYING: MORE THAN HALF THE DAYS

## 2019-03-19 NOTE — PATIENT INSTRUCTIONS
Patient Education     Managing Tension-type Headache Symptoms  A tension-type headache can develop slowly. Being aware of the symptoms helps you recognize a headache early. Then you can act to reduce pain and relieve tension. Methods for relieving your symptoms include self-care and medicine.    Tension-type symptoms  The earlier you recognize the symptoms of a tension-type headache, the easier it is to treat. Tension-type headaches may:    Start with fatigue, tension, or pain in the neck and shoulders    Feel like a band around the head    Be concentrated in the temple, the back of the head, behind the eyes, or in the face    Come and go, or last for days, weeks, or even longer    Involve referred pain   this means that the area that hurts may not be where the problem starts  Self-care during a tension-type headache  When you have a tension-type headache, there are things you can do to relax, loosen muscles, and reduce the pain:    Brush your scalp lightly with a soft hairbrush.    Give yourself a massage. Knead the muscles running from your shoulders up the back of your skull. Or ask a friend to gently massage your neck and shoulders.    Use an ice pack. Wrap a thin cloth around a cold pack, a cold can of soda, or a bag of frozen vegetables. Apply this directly to the place where you feel pain (such as your neck or temples).    Use moist heat to relax your muscles. Take a warm shower or bath. Or drape a warm, moist towel around your neck and shoulders.  Relieving pain and tension  Over-the-counter or prescription medicine can help relieve pain. Another way to reduce your pain is to use relaxation techniques to loosen tight muscles.  Medicine  Medicine used for tension-type headaches include the following:    NSAIDs (nonsteroidal anti-inflammatories), such as aspirin and ibuprofen, relieve inflammation and help block pain signals.    Acetaminophen treats pain, and some formulations contain caffeine.     Muscle  relaxants can reduce painful muscle contractions.  Taking medicine safely  Be aware that:    Taking analgesics (pain relievers) or drinking too much coffee may lead to rebound headaches (frequent or severe headaches that can happen if you miss a dose of medication), so take pain medicines only as needed. If you think you have these headaches, contact your healthcare provider.    Taking too much medicine can cause sleep problems or stomach upset. Some over-the-counter headache medications may contain caffeine. These may disrupt sleep and worsen pain.  Relaxation techniques  A , class, book, or tape may help you learn these techniques. One or more of these methods may work for you:    Deep breathing. Slow, calm, deep breathing can help you relax. Breathe in for a count of 5 or more. Then slowly let the breath out.    Visualization. Imagining a peaceful, secure scene can give you a sense of control over your body and surroundings.    Progressive relaxation. This is done by tightening and then releasing muscle groups. Start at the top of your head and work your way down your body. Tighten each muscle group for 5 to 10 seconds. Then release the muscle group for the same amount of time.    Biofeedback. This is a type of training in which you learn to control certain physical functions and responses. This helps you learn to reduce muscle tension.  Date Last Reviewed: 4/1/2018 2000-2018 [employer]. [address] [place] [address], [place] [address]. All rights reserved. This information is not intended as a substitute for professional medical care. Always follow your healthcare professional's instructions.

## 2019-03-19 NOTE — PROGRESS NOTES
SUBJECTIVE:   Rober Renee is a 38 year old female who presents to clinic today for the following health issues:      Headache  Onset: chronic    Description:   Location: back of head    Character: throbbing pain  Frequency:  every day over the past x8-9 months  Duration:  constant    Intensity: severe    Progression of Symptoms:  Worsening over the past 2 years    Accompanying Signs & Symptoms:  Stiff neck: YES  Neck or upper back pain: YES- neck  Fever: no  Sinus pressure: no  Nausea or vomiting: YES- nausea   Dizziness: YES  Numbness: YES- fingertips   Weakness: YES- In hands  Visual changes: YES    History:   Head trauma: YES- concussion 2 years ago   Family history of migraines: YES- mother, son, maternal grandmother, maternal uncle   Previous tests for headaches: YES- seen by neurologist 2015   Neurologist evaluations: YES  Able to do daily activities: Sometimes  Wake with a headaches: YES  Do headaches wake you up: YES  Daily pain medication use: no  Work/school stressors/changes: no    Precipitating factors:   Does light make it worse: YES  Does sound make it worse: YES    Alleviating factors:  Does sleep help: no     Therapies Tried and outcome: Ibuprofen (Advil, Motrin) and Imitrex- no relief.     Also reporting fatigue over the past year, sleeping 6-8 hours/ night.    Napping maximum of 3 hours/ day has this fatigue along with generalized body aches will wake with them in the morning, making it hard to get out bed, fall asleep, stay asleep.     Had hysterectomy April 2018.      History of generalized anxiety with panic attacks- states that she takes Lorazepam as needed.   States that she has been depressed in the past but not at this time.        JACOB-7   [employer], 2002; Used with Permission) 3/19/2019   1. Feeling nervous, anxious, or on edge 2   2. Not being able to stop or control worrying 2   3. Worrying too much about different things 2   4. Trouble relaxing 2   5. Being so restless that it is  hard to sit still 0   6. Becoming easily annoyed or irritable 2   7. Feeling afraid, as if something awful might happen 0   JACOB-7 Total Score 10   If you checked any problems, how difficult have they made it for you to do your work, take care of things at home, or get along with other people? Somewhat difficult     PHQ-9 ([employer]) 3/19/2019   Total Score    1.  Little interest or pleasure in doing things 0   2.  Feeling down, depressed, or hopeless 0   3.  Trouble falling or staying asleep, or sleeping too much 2   4.  Feeling tired or having little energy 2   5.  Poor appetite or overeating 0   6.  Feeling bad about yourself 0   7.  Trouble concentrating 0   8.  Moving slowly or restless 0   9.  Suicidal or self-harm thoughts 0   PHQ-9 Total Score 4   Difficulty at work, home, or with people Somewhat difficult       Blood pressure is elevated in the clinic today 159/98- has no known history of hypertension but states that it has been elevated lately.     BP Readings from Last 3 Encounters:   19 140/88   19 (!) 155/95   19 (!) 161/105             Problem list and histories reviewed & adjusted, as indicated.  Additional history: as documented    Patient Active Problem List   Diagnosis     CARDIOVASCULAR SCREENING; LDL GOAL LESS THAN 160     Migraine headache     Renal stones     Major depressive disorder, single episode, moderate (H)     Anxiety     Endometriosis determined by laparoscopy     Past Surgical History:   Procedure Laterality Date      SECTION, TUBAL LIGATION, COMBINED  2013    c/section with BTL     LAPAROSCOPIC ASSISTED HYSTERECTOMY VAGINAL, BILATERAL SALPINGO-OOPHORECTOMY, COMBINED N/A 4/10/2018    Procedure: COMBINED LAPAROSCOPIC ASSISTED HYSTERECTOMY VAGINAL, SALPINGO-OOPHORECTOMY;  Laparoscopic assisted vaginal hysterectomy with possible bilateral salpingoophorectomy.;  Surgeon: Taisha Wilson MD;  Location: [facility]       Social History     Tobacco Use      "Smoking status: Former Smoker     Packs/day: 0.50     Years: 10.00     Pack years: 5.00     Types: Cigarettes     Last attempt to quit: 2008     Years since quittin.0     Smokeless tobacco: Never Used   Substance Use Topics     Alcohol use: Yes     Comment: very seldom     Family History   Problem Relation Age of Onset     Cancer Maternal Grandfather         liver CA     Cancer Paternal Grandfather         prostate CA     Cancer Maternal Grandmother         ovarian CA     Other - See Comments Maternal Grandmother         migraine     Diabetes Paternal Grandmother      Hypertension Paternal Grandmother      Hypertension Father      Depression Father      Other - See Comments Mother         migraine     Hypertension Brother      Other - See Comments Maternal Uncle         migraine     Depression Sister      Melanoma No family hx of          Current Outpatient Medications   Medication Sig Dispense Refill     cyclobenzaprine (FLEXERIL) 5 MG tablet Take 1 tablet (5 mg) by mouth 3 times daily as needed for muscle spasms 42 tablet 1     diclofenac (VOLTAREN) 75 MG EC tablet Take 1 tablet (75 mg) by mouth 2 times daily as needed for moderate pain ( take with food) 30 tablet 1     escitalopram (LEXAPRO) 10 MG tablet Take 1 tablet (10 mg) by mouth daily 30 tablet 1     estradiol (ESTRACE) 2 MG tablet 1 1/2 tablets daily 135 tablet 3     estrogens-methylTESTOSTERone (ESTRATEST HS) 0.625-1.25 MG per tablet Take 1 tablet by mouth daily 30 tablet 3     ibuprofen (ADVIL/MOTRIN) 600 MG tablet Take 1 tablet (600 mg) by mouth every 6 hours as needed for pain (mild) 40 tablet 1     LORazepam (ATIVAN) 1 MG tablet Take 1 tablet (1 mg) by mouth every 8 hours as needed for anxiety 20 tablet 0     Allergies   Allergen Reactions     Imitrex [Sumatriptan] Other (See Comments)     Felt terrible, \"like pores were on fire\"     Sulfa Drugs Hives       Reviewed and updated as needed this visit by clinical staff  Tobacco  Allergies  " Meds       Reviewed and updated as needed this visit by Provider         ROS:  Constitutional, HEENT, cardiovascular, pulmonary, gi and gu systems are negative, except as otherwise noted.    OBJECTIVE:     /88   Pulse 69   Temp 97.2  F (36.2  C) (Tympanic)   Resp 18   Wt 60.7 kg (133 lb 12.8 oz)   LMP 04/02/2018   SpO2 98%   Breastfeeding? No   BMI 25.28 kg/m    Body mass index is 25.28 kg/m .  GENERAL: healthy, alert and no distress  GENERAL: healthy, alert and no distress  PSYCH: mentation appears normal, anxious, judgement and insight intact and appearance well groomed  EYES: Eyes grossly normal to inspection, PERRL and conjunctivae and sclerae normal  HENT: ear canals and TM's normal, nose and mouth without ulcers or lesions  NECK: no adenopathy, no asymmetry, masses, or scars and thyroid normal to palpation  RESP: lungs clear to auscultation - no rales, rhonchi or wheezes  CV: regular rate and rhythm, normal S1 S2, no S3 or S4, no murmur, click or rub, no peripheral edema and peripheral pulses strong  NEURO: Normal strength and tone, sensory exam grossly normal, mentation intact and cranial nerves 2-12 intact    Diagnostic Test Results:  Recent labs reviewed.     Labs drawn and in process  Component      Latest Ref Rng & Units 1/17/2019   Sodium      133 - 144 mmol/L 138   Potassium      3.4 - 5.3 mmol/L 3.6   Chloride      94 - 109 mmol/L 104   Carbon Dioxide      20 - 32 mmol/L 28   Anion Gap      3 - 14 mmol/L 6   Glucose      70 - 99 mg/dL 101 (H)   Urea Nitrogen      7 - 30 mg/dL 14   Creatinine      0.52 - 1.04 mg/dL 0.66   GFR Estimate      >60 mL/min/1.73:m2 >90   GFR Estimate If Black      >60 mL/min/1.73:m2 >90   Calcium      8.5 - 10.1 mg/dL 9.2   Bilirubin Total      0.2 - 1.3 mg/dL 0.7   Albumin      3.4 - 5.0 g/dL 4.1   Protein Total      6.8 - 8.8 g/dL 7.4   Alkaline Phosphatase      40 - 150 U/L 32 (L)   ALT      0 - 50 U/L 58 (H)   AST      0 - 45 U/L 30   TSH      0.40 - 4.00  mU/L 2.12     ASSESSMENT/PLAN:     Rober was seen today for fatigue, generalized body aches and headache.    Diagnoses and all orders for this visit:    Generalized anxiety disorder, uncontrolled  -     Vitamin B12  -     Vitamin D Deficiency  -     CBC with platelets  -     PHQ-9/JACOB 7 completed, see above/Epic for details    -    Start: escitalopram (LEXAPRO) 10 MG tablet; Take 1 tablet (10 mg) by mouth daily    Chronic tension-type headache, not intractable  -     cyclobenzaprine (FLEXERIL) 5 MG tablet; Take 1 tablet (5 mg) by mouth 3 times daily as needed for muscle spasms  -     diclofenac (VOLTAREN) 75 MG EC tablet; Take 1 tablet (75 mg) by mouth 2 times daily as needed for moderate pain ( take with food)- avoid daily use as it may cause rebound headaches. Patient verbalized understanding.  Recommended stress reduction techniques, massage therapy, regular exercise.     Elevated blood pressure reading without diagnosis of hypertension  Repeat BP improved but still slightly elevated above goal.   Repeat BP check in 2 weeks (ancillary)    Patient education and Handout with home care instructions given    Follow up in a month for med check- sooner if needed    Sally Joe MD  [facility]

## 2019-03-20 LAB
DEPRECATED CALCIDIOL+CALCIFEROL SERPL-MC: 90 UG/L (ref 20–75)
VIT B12 SERPL-MCNC: 619 PG/ML (ref 193–986)

## 2019-03-20 ASSESSMENT — ANXIETY QUESTIONNAIRES: GAD7 TOTAL SCORE: 10

## 2019-03-20 NOTE — PROGRESS NOTES
10/16/09:pap--ASCUS, + HPV 53. Repeat pap at 6 wk PP visit. (9/2010)  8/11/2010:Pap--NIL. Repeat pap 1 year.  1/27/12:Pap--NIL. Repeat pap 1 year.  Pap 7/13--if normal, then Q3 year. Pap--NIL. Next pap in 3 years. HM updated.  4/3/18 Pap: NIL, +HR HPV (Neg 16/18). Plan Pap+HPV in 1 year.  4/10/18 Hysterectomy - pathology benign. No further pap screening recommended. No history of LIBERTY 2 or greater.

## 2019-03-21 RX ORDER — FLUCONAZOLE 150 MG/1
150 TABLET ORAL ONCE
Qty: 1 TABLET | Refills: 0 | Status: SHIPPED | OUTPATIENT
Start: 2019-03-21 | End: 2019-03-22

## 2019-03-21 NOTE — TELEPHONE ENCOUNTER
Patient recently treated for UTI. Per E-Visit, requesting diflucan as she tends to get yeast infections from abx. Patient has had 3 refills in 1 year. Per [facility] protocol will need approval from provider. Routing to Dr. Wilson for consideration.     Cortez BECKER RN   [facility]

## 2019-04-16 ENCOUNTER — OFFICE VISIT (OUTPATIENT)
Dept: FAMILY MEDICINE | Facility: CLINIC | Age: 39
End: 2019-04-16
Payer: COMMERCIAL

## 2019-04-16 VITALS
BODY MASS INDEX: 24.92 KG/M2 | RESPIRATION RATE: 16 BRPM | OXYGEN SATURATION: 97 % | WEIGHT: 132 LBS | DIASTOLIC BLOOD PRESSURE: 94 MMHG | TEMPERATURE: 97 F | SYSTOLIC BLOOD PRESSURE: 154 MMHG | HEIGHT: 61 IN | HEART RATE: 79 BPM

## 2019-04-16 DIAGNOSIS — I10 HYPERTENSION GOAL BP (BLOOD PRESSURE) < 140/90: Primary | ICD-10-CM

## 2019-04-16 DIAGNOSIS — R53.83 FATIGUE, UNSPECIFIED TYPE: ICD-10-CM

## 2019-04-16 DIAGNOSIS — F41.1 GENERALIZED ANXIETY DISORDER: ICD-10-CM

## 2019-04-16 LAB
ANION GAP SERPL CALCULATED.3IONS-SCNC: 7 MMOL/L (ref 3–14)
BUN SERPL-MCNC: 8 MG/DL (ref 7–30)
CALCIUM SERPL-MCNC: 9.4 MG/DL (ref 8.5–10.1)
CHLORIDE SERPL-SCNC: 107 MMOL/L (ref 94–109)
CO2 SERPL-SCNC: 26 MMOL/L (ref 20–32)
CREAT SERPL-MCNC: 0.64 MG/DL (ref 0.52–1.04)
CREAT UR-MCNC: 136 MG/DL
GFR SERPL CREATININE-BSD FRML MDRD: >90 ML/MIN/{1.73_M2}
GLUCOSE SERPL-MCNC: 91 MG/DL (ref 70–99)
MICROALBUMIN UR-MCNC: 13 MG/L
MICROALBUMIN/CREAT UR: 9.56 MG/G CR (ref 0–25)
POTASSIUM SERPL-SCNC: 4 MMOL/L (ref 3.4–5.3)
SODIUM SERPL-SCNC: 140 MMOL/L (ref 133–144)

## 2019-04-16 PROCEDURE — 99214 OFFICE O/P EST MOD 30 MIN: CPT | Performed by: FAMILY MEDICINE

## 2019-04-16 PROCEDURE — 93000 ELECTROCARDIOGRAM COMPLETE: CPT | Performed by: FAMILY MEDICINE

## 2019-04-16 PROCEDURE — 82043 UR ALBUMIN QUANTITATIVE: CPT | Performed by: FAMILY MEDICINE

## 2019-04-16 PROCEDURE — 36415 COLL VENOUS BLD VENIPUNCTURE: CPT | Performed by: FAMILY MEDICINE

## 2019-04-16 PROCEDURE — 80048 BASIC METABOLIC PNL TOTAL CA: CPT | Performed by: FAMILY MEDICINE

## 2019-04-16 RX ORDER — HYDROCHLOROTHIAZIDE 25 MG/1
25 TABLET ORAL DAILY
Qty: 30 TABLET | Refills: 1 | Status: SHIPPED | OUTPATIENT
Start: 2019-04-16 | End: 2019-05-21

## 2019-04-16 RX ORDER — ESCITALOPRAM OXALATE 20 MG/1
20 TABLET ORAL DAILY
Qty: 30 TABLET | Refills: 1 | Status: SHIPPED | OUTPATIENT
Start: 2019-04-16 | End: 2019-05-21

## 2019-04-16 ASSESSMENT — ANXIETY QUESTIONNAIRES
2. NOT BEING ABLE TO STOP OR CONTROL WORRYING: NOT AT ALL
7. FEELING AFRAID AS IF SOMETHING AWFUL MIGHT HAPPEN: NOT AT ALL
5. BEING SO RESTLESS THAT IT IS HARD TO SIT STILL: NOT AT ALL
3. WORRYING TOO MUCH ABOUT DIFFERENT THINGS: NOT AT ALL
6. BECOMING EASILY ANNOYED OR IRRITABLE: NOT AT ALL
1. FEELING NERVOUS, ANXIOUS, OR ON EDGE: NOT AT ALL
GAD7 TOTAL SCORE: 0

## 2019-04-16 ASSESSMENT — PATIENT HEALTH QUESTIONNAIRE - PHQ9
5. POOR APPETITE OR OVEREATING: NOT AT ALL
SUM OF ALL RESPONSES TO PHQ QUESTIONS 1-9: 6

## 2019-04-16 ASSESSMENT — MIFFLIN-ST. JEOR: SCORE: 1216.13

## 2019-04-16 NOTE — PROGRESS NOTES
SUBJECTIVE:   Rober Renee is a 38 year old female who presents to clinic today for the following   health issues:      Depression and Anxiety Follow-Up    Status since last visit: Improved; no longer having body aches, feels like she is thinking now instead of worrying     Other associated symptoms:severe fatigue, exhausted- states that she tried increasing her Lexapro dose to 20 mg and it seemed to help. Would like to try taking increased dose of Lexapro.     Complicating factors:     Significant life event: No     Current substance abuse: None    PHQ-9 ([employer]) 4/16/2019   1.  Little interest or pleasure in doing things 0   2.  Feeling down, depressed, or hopeless 0   3.  Trouble falling or staying asleep, or sleeping too much 3   4.  Feeling tired or having little energy 3   5.  Poor appetite or overeating 0   6.  Feeling bad about yourself 0   7.  Trouble concentrating 0   8.  Moving slowly or restless 0   9.  Suicidal or self-harm thoughts 0   PHQ-9 Total Score 6     JACOB-7   [employer], 2002; Used with Permission) 4/16/2019   1. Feeling nervous, anxious, or on edge 0   2. Not being able to stop or control worrying 0   3. Worrying too much about different things 0   4. Trouble relaxing 0   5. Being so restless that it is hard to sit still 0   6. Becoming easily annoyed or irritable 0   7. Feeling afraid, as if something awful might happen 0   JACOB-7 Total Score 0       PHQ 12/1/2017 6/14/2018 3/19/2019   PHQ-9 Total Score 10 4 4   Q9: Thoughts of better off dead/self-harm past 2 weeks Not at all Not at all Not at all     JACOB-7 SCORE 12/1/2017 6/14/2018 3/19/2019   Total Score 12 10 10     In the past two weeks have you had thoughts of suicide or self-harm?  No.    Do you have concerns about your personal safety or the safety of others?   No  PHQ-9  English  PHQ-9   Any Language  JACOB-7  Suicide Assessment Five-step Evaluation and Treatment (SAFE-T)    Amount of exercise or physical activity: active at work      Problems taking medications regularly: No    Medication side effects: weakness, jittery- symptoms only lasted the first couple weeks of taking new medication.     Diet: regular (no restrictions)    Elevated Blood Pressure  Blood pressures have been elevated lately.   Previously noted at the previous office visit as well. Denies having any chest pain, shortness of breath or dyspnea on exertion.     BP Readings from Last 3 Encounters:   19 (!) 154/94   19 140/88   19 (!) 155/95         Additional history: as documented    Reviewed  and updated as needed this visit by clinical staff         Reviewed and updated as needed this visit by Provider         Patient Active Problem List   Diagnosis     CARDIOVASCULAR SCREENING; LDL GOAL LESS THAN 160     Migraine headache     Renal stones     Major depressive disorder, single episode, moderate (H)     Anxiety     Endometriosis determined by laparoscopy     Past Surgical History:   Procedure Laterality Date      SECTION, TUBAL LIGATION, COMBINED  2013    c/section with BTL     HYSTERECTOMY, PAP NO LONGER INDICATED  04/10/2018    No history of LIBERTY 2 or greater     LAPAROSCOPIC ASSISTED HYSTERECTOMY VAGINAL, BILATERAL SALPINGO-OOPHORECTOMY, COMBINED N/A 4/10/2018    Procedure: COMBINED LAPAROSCOPIC ASSISTED HYSTERECTOMY VAGINAL, SALPINGO-OOPHORECTOMY;  Laparoscopic assisted vaginal hysterectomy with possible bilateral salpingoophorectomy.;  Surgeon: Taisha Wilson MD;  Location: [facility] OR       Social History     Tobacco Use     Smoking status: Former Smoker     Packs/day: 0.50     Years: 10.00     Pack years: 5.00     Types: Cigarettes     Last attempt to quit: 2008     Years since quittin.1     Smokeless tobacco: Never Used   Substance Use Topics     Alcohol use: Yes     Comment: very seldom     Family History   Problem Relation Age of Onset     Cancer Maternal Grandfather         liver CA     Cancer Paternal Grandfather         " prostate CA     Cancer Maternal Grandmother         ovarian CA     Other - See Comments Maternal Grandmother         migraine     Diabetes Paternal Grandmother      Hypertension Paternal Grandmother      Hypertension Father      Depression Father      Other - See Comments Mother         migraine     Hypertension Brother      Other - See Comments Maternal Uncle         migraine     Depression Sister      Melanoma No family hx of          Current Outpatient Medications   Medication Sig Dispense Refill     cyclobenzaprine (FLEXERIL) 5 MG tablet Take 1 tablet (5 mg) by mouth 3 times daily as needed for muscle spasms 42 tablet 1     escitalopram (LEXAPRO) 20 MG tablet Take 1 tablet (20 mg) by mouth daily 30 tablet 1     estradiol (ESTRACE) 2 MG tablet 1 1/2 tablets daily 135 tablet 3     estrogens-methylTESTOSTERone (ESTRATEST HS) 0.625-1.25 MG per tablet Take 1 tablet by mouth daily 30 tablet 3     hydrochlorothiazide (HYDRODIURIL) 25 MG tablet Take 1 tablet (25 mg) by mouth daily 30 tablet 1     ibuprofen (ADVIL/MOTRIN) 600 MG tablet Take 1 tablet (600 mg) by mouth every 6 hours as needed for pain (mild) 40 tablet 1     LORazepam (ATIVAN) 1 MG tablet Take 1 tablet (1 mg) by mouth every 8 hours as needed for anxiety 20 tablet 0     Allergies   Allergen Reactions     Imitrex [Sumatriptan] Other (See Comments)     Felt terrible, \"like pores were on fire\"     Sulfa Drugs Hives       ROS:  Constitutional, HEENT, cardiovascular, pulmonary, gi and gu systems are negative, except as otherwise noted.    OBJECTIVE:     BP (!) 154/94   Pulse 79   Temp 97  F (36.1  C) (Tympanic)   Resp 16   Ht 1.549 m (5' 1\")   Wt 59.9 kg (132 lb)   LMP 04/02/2018   SpO2 97%   BMI 24.94 kg/m    Body mass index is 24.94 kg/m .  GENERAL: healthy, alert and no distress  RESP: lungs clear to auscultation - no rales, rhonchi or wheezes  CV: regular rate and rhythm, normal S1 S2, no S3 or S4, no murmur, click or rub, no peripheral edema and " peripheral pulses strong  PSYCH: mentation appears normal, affect normal/bright    Diagnostic Test Results:  Labs drawn and in process    EKG - appears normal, NSR, normal axis, normal intervals, no acute ST/T changes c/w ischemia, no LVH by voltage criteria, there are no prior tracings available    ASSESSMENT/PLAN:     Rober was seen today for depression and anxiety.    Diagnoses and all orders for this visit:    Hypertension goal BP (blood pressure) < 140/90, persistently elevated > 1 month. Newly diagnosed  -     Baseline: EKG 12-lead complete w/read - Clinics  -     Albumin Random Urine Quantitative with Creat Ratio  -     Basic metabolic panel  -     Start: hydrochlorothiazide (HYDRODIURIL) 25 MG tablet; Take 1 tablet (25 mg) by mouth daily    Fatigue, unspecified type  -     EKG 12-lead complete w/read - Clinics  -     PHQ-9/JACOB 7 completed, see above/Epic for details    Re-evaluate in a month after increasing Lexapro and managing the blood pressure.       Generalized anxiety disorder, improved  - PHQ-9/JACOB 7 completed, see above/Epic for details        - Increase dose: escitalopram (LEXAPRO) 20 MG tablet; Take 1 tablet (20 mg) by mouth daily    Follow up in a month, sooner if needed.       Sally Joe MD  [facility]

## 2019-04-16 NOTE — PATIENT INSTRUCTIONS
Patient Education     High Blood Pressure, New, Begin Treatment  Your blood pressure was high enough today to start treatment with medicines. Often healthcare providers don t know what causes high blood pressure (hypertension). But it can be controlled with lifestyle changes and medicines. High blood pressure usually has no symptoms. But it can sometimes cause headache, dizziness, blurred vision, a rushing sound in your ears, chest pain, or shortness of breath. But even without symptoms, high blood pressure that s not treated raises your risk for heart attack, heart failure, and stroke. High blood pressure is a serious health risk and shouldn t be ignored.    Blood pressure measurements are given as 2 numbers. Systolic blood pressure is the upper number. This is the pressure when the heart contracts. Diastolic blood pressure is the lower number. This is the pressure when the heart relaxes between beats. You will see your blood pressure readings written together. For example, a person with a systolic pressure of 118 and a diastolic pressure of 78 will have 118/78 written in the medical record.   Blood pressure is categorized as normal, elevated, or stage 1 or stage 2 high blood pressure:    Normal blood pressure is systolic of less than 120 and diastolic of less than 80 (120/80)    Elevated blood pressure is systolic of 120 to 129 and diastolic less than 80    Stage 1 high blood pressure is systolic is 130 to 139 or diastolic between 80 to 89    Stage 2 high blood pressure is when systolic is 140 or higher or the diastolic is 90 or higher  Home care  If you have high blood pressure, you should do what is listed below to lower your blood pressure. If you are taking medicines for high blood pressure, these methods may reduce or end your need for medicines in the future.    Begin a weight-loss program if you are overweight.    Cut back on how much salt you get in your diet. Here s how to do this:  ? Don t eat foods  that have a lot of salt. These include olives, pickles, smoked meats, and salted potato chips.  ? Don t add salt to your food at the table.  ? Use only small amounts of salt when cooking.  ? Review food labels to track how much salt is in prepared foods.  ? When eating out, ask that no additional salt be added to your food order.    Begin an exercise program. Talk with your healthcare provider about the type of exercise program that would be best for you. It doesn't have to be hard. Even brisk walking for 20 minutes 3 times a week is a good form of exercise.    Don t take medicines that have heart stimulants. This includes many over-the-counter cold and sinus decongestant pills and sprays, as well as diet pills. Check the warnings about high blood pressure on the label. Before purchasing any over-the-counter medicines or supplements, always ask the pharmacist about the product's potential interaction with your high blood pressure and your high blood pressure medicines.    Stimulants such as amphetamine or cocaine could be lethal for someone with high blood pressure. Never take these.    Limit how much caffeine you get in your diet. Switch to caffeine-free products.    Stop smoking. If you are a long-time smoker, this can be hard. Enroll in a stop-smoking program to make it more likely that you will quit for good.    Learn how to handle stress. This is an important part of any program to lower blood pressure. Learn about relaxation methods like meditation, yoga, or biofeedback.    If your provider prescribed medicines, take them exactly as directed. Missing doses may cause your blood pressure get out of control.    If you miss a dose or doses, check with your healthcare provider or pharmacist about what to do.    Consider buying an automatic blood pressure machine. Your provider can make a recommendation. You can get one of these at most pharmacies.  The American Heart Association recommends the following guidelines  for home blood pressure monitoring:    Don't smoke or drink coffee or other caffeinated drinks for 30 minutes before taking your blood pressure.    Go to the bathroom before the test.    Relax for 5 minutes before taking the measurement.    Sit with your back supported (don't sit on a couch or soft chair); keep your feet on the floor uncrossed. Place your arm on a solid flat surface (like a table) with the upper part of the arm at heart level. Place the middle of the cuff directly above the bend of the elbow. Check the monitor's instruction manual for an illustration.    Take multiple readings. When you measure, take 2 to 3 readings one minute apart and record all of the results.    Take your blood pressure at the same time every day, or as your healthcare provider recommends.    Record the date, time, and blood pressure reading.    Take the record with you to your next medical appointment. If your blood pressure monitor has a built-in memory, simply take the monitor with you to your next appointment.    Call your provider if you have several high readings. Don't be frightened by a single high blood pressure reading, but if you get several high readings, check in with your healthcare provider.    Note: When blood pressure reaches a systolic (top number) of 180 or higher OR diastolic (bottom number) of 110 or higher, seek emergency medical treatment.  Follow-up care  Because a new blood pressure medicine was started today, it s important that you have your blood pressure rechecked. This is to make sure that the medicine is working and that you have no serious side effects. Keep all your follow up appointments. Write down medicine and blood pressure questions and bring them to your next appointment. If you have pressing concerns about your new medicine or your blood pressure, call your provider. Unless told otherwise, follow up with your healthcare provider or this facility within the next 3 days.  When to seek  medical advice  Call your healthcare provider right away if any of these occur:    Blood pressure reaches a systolic (top number) of 180 or higher, OR diastolic (bottom number) of 110 or higher, seek emergency medical treatment.    Chest pain or shortness of breath    Severe headache    Throbbing or rushing sound in the ears    Nosebleed    Sudden severe pain in your belly (abdomen)    Extreme drowsiness, confusion, or fainting    Dizziness or dizziness with a spinning sensation (vertigo)    Weakness of an arm or leg or one side of the face    You have problems speaking or seeing   Date Last Reviewed: 12/1/2016 2000-2018 [employer]. [address] [place] [id number]. All rights reserved. This information is not intended as a substitute for professional medical care. Always follow your healthcare professional's instructions.

## 2019-04-17 DIAGNOSIS — N95.1 MENOPAUSAL SYNDROME (HOT FLASHES): ICD-10-CM

## 2019-04-17 DIAGNOSIS — E89.40 SURGICAL MENOPAUSE: ICD-10-CM

## 2019-04-17 RX ORDER — ESTERIFIED ESTROGEN AND METHYLTESTOSTERONE .625; 1.25 MG/1; MG/1
1 TABLET ORAL DAILY
Qty: 30 TABLET | Refills: 5 | Status: SHIPPED | OUTPATIENT
Start: 2019-04-17 | End: 2019-10-11

## 2019-04-17 RX ORDER — ESTRADIOL 2 MG/1
2 TABLET ORAL DAILY
Qty: 90 TABLET | Refills: 3 | Status: SHIPPED | OUTPATIENT
Start: 2019-04-17 | End: 2020-07-22

## 2019-04-17 ASSESSMENT — ANXIETY QUESTIONNAIRES: GAD7 TOTAL SCORE: 0

## 2019-04-17 NOTE — TELEPHONE ENCOUNTER
Last office visit 9/13/18 menopausal issues  [facility] 4/10/18    Patient requesting refill on her Estradiol. Patient reports currently take 2mg tablet one time per day. Patient also takes Estratest 0.625-1.25 mg daily. Patient saw provider yesterday at [facility] for hypertension. Patient was started on hydrochlorothiazide and lexapro was refilled.    BP Readings from Last 5 Encounters:   04/16/19 (!) 154/94   03/19/19 140/88   01/17/19 (!) 155/95   01/06/19 (!) 161/105   09/13/18 (!) 136/92     Please review and advise.  Patient will also need refill on the estra-test.    Thank you.    Chinyere Hall   [facility]  RN

## 2019-05-13 DIAGNOSIS — F41.9 ANXIETY: ICD-10-CM

## 2019-05-13 NOTE — TELEPHONE ENCOUNTER
Rx refill request for Lorazepam.  Date of last issue was on 03-07-19 for a qty of 20 with 0 refills.     Date of last ov with Dr. Wilson was 09-13-18.  Last ov with FP 04-16-19.    To provider to authorize refills on a controlled substance.    Jessica Torres  [facility] RN

## 2019-05-15 RX ORDER — LORAZEPAM 1 MG/1
1 TABLET ORAL EVERY 8 HOURS PRN
Qty: 20 TABLET | Refills: 0 | Status: SHIPPED | OUTPATIENT
Start: 2019-05-15 | End: 2019-08-05

## 2019-05-15 NOTE — TELEPHONE ENCOUNTER
Hand signed rx faxed to [facility].    -Maricarmen Alegria  [facility]

## 2019-05-21 ENCOUNTER — OFFICE VISIT (OUTPATIENT)
Dept: FAMILY MEDICINE | Facility: CLINIC | Age: 39
End: 2019-05-21
Payer: COMMERCIAL

## 2019-05-21 VITALS
TEMPERATURE: 98.8 F | HEART RATE: 77 BPM | HEIGHT: 61 IN | SYSTOLIC BLOOD PRESSURE: 125 MMHG | BODY MASS INDEX: 24.2 KG/M2 | WEIGHT: 128.2 LBS | DIASTOLIC BLOOD PRESSURE: 80 MMHG

## 2019-05-21 DIAGNOSIS — F41.1 GENERALIZED ANXIETY DISORDER: ICD-10-CM

## 2019-05-21 DIAGNOSIS — I10 HYPERTENSION GOAL BP (BLOOD PRESSURE) < 140/90: Primary | ICD-10-CM

## 2019-05-21 DIAGNOSIS — G47.10 HYPERSOMNOLENCE: ICD-10-CM

## 2019-05-21 PROCEDURE — 99214 OFFICE O/P EST MOD 30 MIN: CPT | Performed by: FAMILY MEDICINE

## 2019-05-21 RX ORDER — HYDROCHLOROTHIAZIDE 25 MG/1
25 TABLET ORAL DAILY
Qty: 90 TABLET | Refills: 3 | Status: SHIPPED | OUTPATIENT
Start: 2019-05-21 | End: 2020-09-01

## 2019-05-21 RX ORDER — ESCITALOPRAM OXALATE 20 MG/1
20 TABLET ORAL DAILY
Qty: 90 TABLET | Refills: 1 | Status: SHIPPED | OUTPATIENT
Start: 2019-05-21 | End: 2020-05-12

## 2019-05-21 ASSESSMENT — ANXIETY QUESTIONNAIRES
IF YOU CHECKED OFF ANY PROBLEMS ON THIS QUESTIONNAIRE, HOW DIFFICULT HAVE THESE PROBLEMS MADE IT FOR YOU TO DO YOUR WORK, TAKE CARE OF THINGS AT HOME, OR GET ALONG WITH OTHER PEOPLE: VERY DIFFICULT
1. FEELING NERVOUS, ANXIOUS, OR ON EDGE: NOT AT ALL
5. BEING SO RESTLESS THAT IT IS HARD TO SIT STILL: NOT AT ALL
7. FEELING AFRAID AS IF SOMETHING AWFUL MIGHT HAPPEN: NOT AT ALL
3. WORRYING TOO MUCH ABOUT DIFFERENT THINGS: NOT AT ALL
2. NOT BEING ABLE TO STOP OR CONTROL WORRYING: NOT AT ALL
GAD7 TOTAL SCORE: 0
6. BECOMING EASILY ANNOYED OR IRRITABLE: NOT AT ALL

## 2019-05-21 ASSESSMENT — PATIENT HEALTH QUESTIONNAIRE - PHQ9
5. POOR APPETITE OR OVEREATING: NOT AT ALL
SUM OF ALL RESPONSES TO PHQ QUESTIONS 1-9: 6

## 2019-05-21 ASSESSMENT — MIFFLIN-ST. JEOR: SCORE: 1198.89

## 2019-05-21 NOTE — PROGRESS NOTES
Subjective     Rober Renee is a 38 year old female who presents to clinic today for the following health issues:    HPI   Hypertension Follow-up      Do you check your blood pressure regularly outside of the clinic? Yes     Are you following a low salt diet? Yes    Are your blood pressures ever more than 140 on the top number (systolic) OR more   than 90 on the bottom number (diastolic), for example 140/90? No       Depression and Anxiety Follow-Up    How are you doing with your depression since your last visit? Stable on the medication    How are you doing with your anxiety since your last visit?  Improved, no longer having feels of anxiety     Are you having other symptoms that might be associated with depression or anxiety? No  Still having fatigue and sleeps too much when not at work.    Have you had a significant life event? OTHER: daughter moved to [place] with her 3 children     Do you have any concerns with your use of alcohol or other drugs? No       JACOB-7   [employer], 2002; Used with Permission) 5/21/2019   1. Feeling nervous, anxious, or on edge 0   2. Not being able to stop or control worrying 0   3. Worrying too much about different things 0   4. Trouble relaxing 0   5. Being so restless that it is hard to sit still 0   6. Becoming easily annoyed or irritable 0   7. Feeling afraid, as if something awful might happen 0   JACOB-7 Total Score 0   If you checked any problems, how difficult have they made it for you to do your work, take care of things at home, or get along with other people? Very difficult     PHQ-9 ([employer]) 5/21/2019   1.  Little interest or pleasure in doing things 0   2.  Feeling down, depressed, or hopeless 0   3.  Trouble falling or staying asleep, or sleeping too much 3   4.  Feeling tired or having little energy 3   5.  Poor appetite or overeating 0   6.  Feeling bad about yourself 0   7.  Trouble concentrating 0   8.  Moving slowly or restless 0   9.  Suicidal or self-harm thoughts 0    PHQ-9 Total Score 6   Difficulty at work, home, or with people Very difficult       Social History     Tobacco Use     Smoking status: Former Smoker     Packs/day: 0.50     Years: 10.00     Pack years: 5.00     Types: Cigarettes     Last attempt to quit: 2008     Years since quittin.2     Smokeless tobacco: Never Used   Substance Use Topics     Alcohol use: Yes     Comment: very seldom     Drug use: No     PHQ 3/19/2019 2019 2019   PHQ-9 Total Score 4 6 6   Q9: Thoughts of better off dead/self-harm past 2 weeks Not at all Not at all Not at all     JACOB-7 SCORE 3/19/2019 2019 2019   Total Score 10 0 0       In the past two weeks have you had thoughts of suicide or self-harm?  No.    Do you have concerns about your personal safety or the safety of others?   No    Suicide Assessment Five-step Evaluation and Treatment (SAFE-T)    Amount of exercise or physical activity: 5-6 days/ week of exercise     Problems taking medications regularly: No    Medication side effects: none    Diet: regular (no restrictions)      {  Patient Active Problem List   Diagnosis     CARDIOVASCULAR SCREENING; LDL GOAL LESS THAN 160     Migraine headache     Renal stones     Major depressive disorder, single episode, moderate (H)     Anxiety     Endometriosis determined by laparoscopy     Past Surgical History:   Procedure Laterality Date      SECTION, TUBAL LIGATION, COMBINED  2013    c/section with BTL     HYSTERECTOMY, PAP NO LONGER INDICATED  04/10/2018    No history of LIBERTY 2 or greater     LAPAROSCOPIC ASSISTED HYSTERECTOMY VAGINAL, BILATERAL SALPINGO-OOPHORECTOMY, COMBINED N/A 4/10/2018    Procedure: COMBINED LAPAROSCOPIC ASSISTED HYSTERECTOMY VAGINAL, SALPINGO-OOPHORECTOMY;  Laparoscopic assisted vaginal hysterectomy with possible bilateral salpingoophorectomy.;  Surgeon: Taisha Wilson MD;  Location: [facility] OR       Social History     Tobacco Use     Smoking status: Former Smoker      "Packs/day: 0.50     Years: 10.00     Pack years: 5.00     Types: Cigarettes     Last attempt to quit: 2008     Years since quittin.2     Smokeless tobacco: Never Used   Substance Use Topics     Alcohol use: Yes     Comment: very seldom     Family History   Problem Relation Age of Onset     Cancer Maternal Grandfather         liver CA     Cancer Paternal Grandfather         prostate CA     Cancer Maternal Grandmother         ovarian CA     Other - See Comments Maternal Grandmother         migraine     Diabetes Paternal Grandmother      Hypertension Paternal Grandmother      Hypertension Father      Depression Father      Other - See Comments Mother         migraine     Hypertension Brother      Other - See Comments Maternal Uncle         migraine     Depression Sister      Melanoma No family hx of          Current Outpatient Medications   Medication Sig Dispense Refill     escitalopram (LEXAPRO) 20 MG tablet Take 1 tablet (20 mg) by mouth daily 90 tablet 1     estradiol (ESTRACE) 2 MG tablet Take 1 tablet (2 mg) by mouth daily 90 tablet 3     estrogens-methylTESTOSTERone (ESTRATEST HS) 0.625-1.25 MG per tablet Take 1 tablet by mouth daily 30 tablet 5     hydrochlorothiazide (HYDRODIURIL) 25 MG tablet Take 1 tablet (25 mg) by mouth daily 90 tablet 3     ibuprofen (ADVIL/MOTRIN) 600 MG tablet Take 1 tablet (600 mg) by mouth every 6 hours as needed for pain (mild) 40 tablet 1     LORazepam (ATIVAN) 1 MG tablet Take 1 tablet (1 mg) by mouth every 8 hours as needed for anxiety 20 tablet 0     Allergies   Allergen Reactions     Imitrex [Sumatriptan] Other (See Comments)     Felt terrible, \"like pores were on fire\"     Sulfa Drugs Hives     Recent Labs   Lab Test 19  1512 19  1220 19  1154 18  1150 18  1950 17  1355   ALT  --   --  58*  --  33 39   CR 0.64  --  0.66  --  0.55 0.65   GFRESTIMATED >90  --  >90  --  >90 >90   GFRESTBLACK >90  --  >90  --  >90 >90   POTASSIUM 4.0  " "--  3.6  --  3.6 4.3   TSH  --  2.12  --  1.63  --   --         Reviewed and updated as needed this visit by Provider         Review of Systems   ROS COMP: Constitutional, HEENT, cardiovascular, pulmonary, gi and gu systems are negative, except as otherwise noted.      Objective    /80   Pulse 77   Temp 98.8  F (37.1  C) (Tympanic)   Ht 1.549 m (5' 1\")   Wt 58.2 kg (128 lb 3.2 oz)   LMP 04/02/2018   Breastfeeding? No   BMI 24.22 kg/m    Body mass index is 24.22 kg/m .  Physical Exam   GENERAL: healthy, alert and no distress  PSYCH: mentation appears normal, affect normal/bright, judgement and insight intact and appearance well groomed    Diagnostic Test Results:  Previous labs reviewed  Results for orders placed or performed in visit on 04/16/19   Albumin Random Urine Quantitative with Creat Ratio   Result Value Ref Range    Creatinine Urine 136 mg/dL    Albumin Urine mg/L 13 mg/L    Albumin Urine mg/g Cr 9.56 0 - 25 mg/g Cr   Basic metabolic panel   Result Value Ref Range    Sodium 140 133 - 144 mmol/L    Potassium 4.0 3.4 - 5.3 mmol/L    Chloride 107 94 - 109 mmol/L    Carbon Dioxide 26 20 - 32 mmol/L    Anion Gap 7 3 - 14 mmol/L    Glucose 91 70 - 99 mg/dL    Urea Nitrogen 8 7 - 30 mg/dL    Creatinine 0.64 0.52 - 1.04 mg/dL    GFR Estimate >90 >60 mL/min/[1.73_m2]    GFR Estimate If Black >90 >60 mL/min/[1.73_m2]    Calcium 9.4 8.5 - 10.1 mg/dL             Assessment & Plan     Rober was seen today for depression, anxiety and hypertension.    Diagnoses and all orders for this visit:    Hypertension goal BP (blood pressure) < 140/90, controlled  -     Refill; hydrochlorothiazide (HYDRODIURIL) 25 MG tablet; Take 1 tablet (25 mg) by mouth daily    Generalized anxiety disorder, stable       - PHQ-9/JACOB 7 completed, see above/Epic for details    - Refill: escitalopram (LEXAPRO) 20 MG tablet; Take 1 tablet (20 mg) by mouth daily    Hypersomnolence with fatigue  Patient is planning to make healthy dietary " changes in her diet and exercise routine and plans to change her mattress.   If no improvement, recommend a Sleep Consult at next office visit. Patient verbalized understanding.      Return in about 1 month (around 6/21/2019), or if symptoms worsen or fail to improve, for Follow up.      Sally Joe MD  [facility]

## 2019-05-22 ASSESSMENT — ANXIETY QUESTIONNAIRES: GAD7 TOTAL SCORE: 0

## 2019-08-05 DIAGNOSIS — F41.9 ANXIETY: ICD-10-CM

## 2019-08-05 RX ORDER — LORAZEPAM 1 MG/1
1 TABLET ORAL EVERY 8 HOURS PRN
Qty: 20 TABLET | Refills: 0 | Status: SHIPPED | OUTPATIENT
Start: 2019-08-05 | End: 2019-11-04

## 2019-08-05 NOTE — TELEPHONE ENCOUNTER
Hand signed rx for Lorazepam faxed to [facility].    -Maricarmen Alegria  [facility] [place]

## 2019-08-05 NOTE — TELEPHONE ENCOUNTER
Lorazepam 1mg      Last Written Prescription Date:  05/15/19  Last Fill Quantity: 20,   # refills: 0  Last Office Visit: 08/13/18  Future Office visit:       Thank You,  Tiffanie Song, Pharmacy Tech  [facility]

## 2019-10-11 ENCOUNTER — TELEPHONE (OUTPATIENT)
Dept: FAMILY MEDICINE | Facility: CLINIC | Age: 39
End: 2019-10-11

## 2019-10-11 ENCOUNTER — OFFICE VISIT (OUTPATIENT)
Dept: OBGYN | Facility: CLINIC | Age: 39
End: 2019-10-11
Payer: COMMERCIAL

## 2019-10-11 VITALS
DIASTOLIC BLOOD PRESSURE: 106 MMHG | RESPIRATION RATE: 16 BRPM | SYSTOLIC BLOOD PRESSURE: 147 MMHG | HEIGHT: 61 IN | HEART RATE: 78 BPM | WEIGHT: 138 LBS | BODY MASS INDEX: 26.06 KG/M2 | TEMPERATURE: 97.8 F

## 2019-10-11 DIAGNOSIS — N95.1 MENOPAUSAL SYNDROME (HOT FLASHES): ICD-10-CM

## 2019-10-11 PROBLEM — I10 BENIGN ESSENTIAL HYPERTENSION: Status: ACTIVE | Noted: 2019-10-11

## 2019-10-11 PROCEDURE — 99213 OFFICE O/P EST LOW 20 MIN: CPT | Performed by: OBSTETRICS & GYNECOLOGY

## 2019-10-11 RX ORDER — ESTERIFIED ESTROGEN AND METHYLTESTOSTERONE .625; 1.25 MG/1; MG/1
1 TABLET ORAL DAILY
Qty: 90 TABLET | Refills: 1 | Status: SHIPPED | OUTPATIENT
Start: 2019-10-11 | End: 2020-09-01

## 2019-10-11 ASSESSMENT — MIFFLIN-ST. JEOR: SCORE: 1238.34

## 2019-10-11 NOTE — TELEPHONE ENCOUNTER
Prior Authorization Retail Medication Request    Medication/Dose: estratest hs  ICD code (if different than what is on RX):  N95.1  Previously Tried and Failed:  n/a  Rationale:  n/a    Insurance Name:  [facility]  Insurance ID:  [id number]  Insurance Phone # [phone]      Pharmacy Information (if different than what is on RX)  Name:  [facility] [place]  Phone:  [phone]

## 2019-10-11 NOTE — PROGRESS NOTES
Rober is a 39 year old   female who presents for refill of her Estratest; she thinks it has made a big difference with her energy and fewer hot flashes, but she still has a lot of night sweats.  She is taking Lexapro for anxiety and this has made a big differnence as well; her PCP is following/treating her BP..    Patient Active Problem List    Diagnosis Date Noted     Benign essential hypertension 10/11/2019     Priority: Medium     Endometriosis determined by laparoscopy 04/10/2018     Priority: Medium     Anxiety 2017     Priority: Medium     Major depressive disorder, single episode, moderate (H) 2016     Priority: Medium     Renal stones 10/09/2015     Priority: Medium     Migraine headache 2012     Priority: Medium     CARDIOVASCULAR SCREENING; LDL GOAL LESS THAN 160 10/31/2010     Priority: Medium       All systems were reviewed and pertinent information in noted in subjective/HPI.    Past Medical History:   Diagnosis Date     Calculus of kidney     right side     Cervical high risk HPV (human papillomavirus) test positive 2018    NIL pap, +HR HPV (Neg 16/18). Rpt Pap+HPV in 1 year (Due )     Chickenpox      Gestational diabetes ,     History of postpartum depression, currently pregnant     mild     Intervertebral lumbar disc disorder with myelopathy, lumbar region     herniated disks, L4-5, s/p steroid injection     Intramural leiomyoma of uterus 4/3/2018     Migraines      Urinary tract bacterial infections        Past Surgical History:   Procedure Laterality Date      SECTION, TUBAL LIGATION, COMBINED  2013    c/section with BTL     HYSTERECTOMY, PAP NO LONGER INDICATED  04/10/2018    No history of LIBERTY 2 or greater     LAPAROSCOPIC ASSISTED HYSTERECTOMY VAGINAL, BILATERAL SALPINGO-OOPHORECTOMY, COMBINED N/A 4/10/2018    Procedure: COMBINED LAPAROSCOPIC ASSISTED HYSTERECTOMY VAGINAL, SALPINGO-OOPHORECTOMY;  Laparoscopic assisted vaginal hysterectomy  with possible bilateral salpingoophorectomy.;  Surgeon: Taisha Wilson MD;  Location: [facility] OR         Current Outpatient Medications:      escitalopram (LEXAPRO) 20 MG tablet, Take 1 tablet (20 mg) by mouth daily, Disp: 90 tablet, Rfl: 1     estradiol (ESTRACE) 2 MG tablet, Take 1 tablet (2 mg) by mouth daily, Disp: 90 tablet, Rfl: 3     estrogens-methylTESTOSTERone (ESTRATEST HS) 0.625-1.25 MG per tablet, Take 1 tablet by mouth daily, Disp: 90 tablet, Rfl: 1     hydrochlorothiazide (HYDRODIURIL) 25 MG tablet, Take 1 tablet (25 mg) by mouth daily, Disp: 90 tablet, Rfl: 3     ibuprofen (ADVIL/MOTRIN) 600 MG tablet, Take 1 tablet (600 mg) by mouth every 6 hours as needed for pain (mild) (Patient not taking: Reported on 10/11/2019), Disp: 40 tablet, Rfl: 1     LORazepam (ATIVAN) 1 MG tablet, Take 1 tablet (1 mg) by mouth every 8 hours as needed for anxiety (Patient not taking: Reported on 10/11/2019), Disp: 20 tablet, Rfl: 0    ALLERGIES:  Imitrex [sumatriptan] and Sulfa drugs    Social History     Socioeconomic History     Marital status: Single     Spouse name: None     Number of children: 4     Years of education: None     Highest education level: None   Occupational History     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     Employer: [employer]     Employer: [employer]    Social Needs     Financial resource strain: None     Food insecurity:     Worry: None     Inability: None     Transportation needs:     Medical: None     Non-medical: None   Tobacco Use     Smoking status: Former Smoker     Packs/day: 0.50     Years: 10.00     Pack years: 5.00     Types: Cigarettes     Last attempt to quit: 2008     Years since quittin.6     Smokeless tobacco: Never Used   Substance and Sexual Activity     Alcohol use: Yes     Comment: very seldom     Drug use: No     Sexual activity: Not Currently     Partners: Male     Birth control/protection: Surgical, Female Surgical     Comment: tubal   Lifestyle     Physical  "activity:     Days per week: None     Minutes per session: None     Stress: None   Relationships     Social connections:     Talks on phone: None     Gets together: None     Attends Rastafari service: None     Active member of club or organization: None     Attends meetings of clubs or organizations: None     Relationship status: None     Intimate partner violence:     Fear of current or ex partner: None     Emotionally abused: None     Physically abused: None     Forced sexual activity: None   Other Topics Concern     Parent/sibling w/ CABG, MI or angioplasty before 65F 55M? No   Social History Narrative     None       Family History   Problem Relation Age of Onset     Cancer Maternal Grandfather         liver CA     Cancer Paternal Grandfather         prostate CA     Cancer Maternal Grandmother         ovarian CA     Other - See Comments Maternal Grandmother         migraine     Diabetes Paternal Grandmother      Hypertension Paternal Grandmother      Hypertension Father      Depression Father      Other - See Comments Mother         migraine     Hypertension Brother      Other - See Comments Maternal Uncle         migraine     Depression Sister      Melanoma No family hx of        OBJECTIVE:  Vitals: BP (!) 147/106 (BP Location: Right arm, Patient Position: Chair, Cuff Size: Adult Regular)   Pulse 78   Temp 97.8  F (36.6  C) (Tympanic)   Resp 16   Ht 1.549 m (5' 1\")   Wt 62.6 kg (138 lb)   LMP 04/02/2018   Breastfeeding? No   BMI 26.07 kg/m   BMI= Body mass index is 26.07 kg/m .   Patient's last menstrual period was 04/02/2018.     GENERAL APPEARANCE: healthy, alert and no distress    ASSESSMENT:      ICD-10-CM    1. Menopausal syndrome (hot flashes) N95.1 estrogens-methylTESTOSTERone (ESTRATEST HS) 0.625-1.25 MG per tablet       PLAN:  I discussed the addition of Gabapentin 100-300mg at bedtime but she really does not wish to add anymore medications and is willing to live with what she is " feeling.  Refill of Estratest HS given  Taisha Wilson MD  [facility]      Taisha Wilson MD

## 2019-10-11 NOTE — NURSING NOTE
"Initial BP (!) 147/106 (BP Location: Right arm, Patient Position: Chair, Cuff Size: Adult Regular)   Pulse 78   Temp 97.8  F (36.6  C) (Tympanic)   Resp 16   Ht 1.549 m (5' 1\")   Wt 62.6 kg (138 lb)   LMP 04/02/2018   Breastfeeding? No   BMI 26.07 kg/m   Estimated body mass index is 26.07 kg/m  as calculated from the following:    Height as of this encounter: 1.549 m (5' 1\").    Weight as of this encounter: 62.6 kg (138 lb). .    Ximena Mandel, HAO    "

## 2019-10-16 NOTE — TELEPHONE ENCOUNTER
Central Prior Authorization Team   Phone: [phone]      PA Initiation    Medication: estratest hs - INITIATED  Insurance Company: [employer] - Phone [phone] Fax [phone]  Pharmacy Filling the Rx: [facility] [place], [place] - [address]  Filling Pharmacy Phone: [phone]  Filling Pharmacy Fax: [phone]  Start Date: 10/16/2019

## 2019-10-17 NOTE — TELEPHONE ENCOUNTER
Received response from [facility].  Request for Estrogen-methyltestosterone 0.625-1.25 Tablet has been approved 10-2-19 to 10-17-20.    ?'s [id number].    Pharmacy and pt informed.    -Maricarmen Alegria  [facility] [place]

## 2019-11-03 ENCOUNTER — HEALTH MAINTENANCE LETTER (OUTPATIENT)
Age: 39
End: 2019-11-03

## 2019-11-04 ENCOUNTER — TELEPHONE (OUTPATIENT)
Dept: OBGYN | Facility: CLINIC | Age: 39
End: 2019-11-04

## 2019-11-04 DIAGNOSIS — F41.9 ANXIETY: ICD-10-CM

## 2019-11-04 RX ORDER — LORAZEPAM 1 MG/1
1 TABLET ORAL EVERY 8 HOURS PRN
Qty: 20 TABLET | Refills: 0 | Status: SHIPPED | OUTPATIENT
Start: 2019-11-04 | End: 2020-03-09

## 2019-11-04 NOTE — TELEPHONE ENCOUNTER
Rx refill request for Lorazepam.  Date last issued was on 08-05-19 for a qty of 20.    I called pt to clarify if she continues to use this medication and she reports that she only uses this when she has an anxiety attack.    Jessica Torres  [facility] RN

## 2020-01-08 ENCOUNTER — APPOINTMENT (OUTPATIENT)
Dept: CT IMAGING | Facility: CLINIC | Age: 40
End: 2020-01-08
Attending: EMERGENCY MEDICINE

## 2020-01-08 ENCOUNTER — HOSPITAL ENCOUNTER (EMERGENCY)
Facility: CLINIC | Age: 40
Discharge: HOME | End: 2020-01-08
Attending: EMERGENCY MEDICINE | Admitting: EMERGENCY MEDICINE

## 2020-01-08 VITALS
OXYGEN SATURATION: 100 % | HEART RATE: 83 BPM | TEMPERATURE: 97.8 F | SYSTOLIC BLOOD PRESSURE: 154 MMHG | RESPIRATION RATE: 16 BRPM | DIASTOLIC BLOOD PRESSURE: 111 MMHG

## 2020-01-08 DIAGNOSIS — K63.89 EPIPLOIC APPENDAGITIS: ICD-10-CM

## 2020-01-08 LAB
ALBUMIN SERPL-MCNC: 4 G/DL (ref 3.4–5)
ALBUMIN UR-MCNC: NEGATIVE MG/DL
ALP SERPL-CCNC: 55 U/L (ref 40–150)
ALT SERPL W P-5'-P-CCNC: 68 U/L (ref 0–50)
ANION GAP SERPL CALCULATED.3IONS-SCNC: 5 MMOL/L (ref 3–14)
APPEARANCE UR: CLEAR
AST SERPL W P-5'-P-CCNC: 38 U/L (ref 0–45)
BASOPHILS # BLD AUTO: 0.1 10E9/L (ref 0–0.2)
BASOPHILS NFR BLD AUTO: 0.5 %
BILIRUB SERPL-MCNC: 0.8 MG/DL (ref 0.2–1.3)
BILIRUB UR QL STRIP: NEGATIVE
BUN SERPL-MCNC: 12 MG/DL (ref 7–30)
CALCIUM SERPL-MCNC: 9.6 MG/DL (ref 8.5–10.1)
CHLORIDE SERPL-SCNC: 103 MMOL/L (ref 94–109)
CO2 SERPL-SCNC: 29 MMOL/L (ref 20–32)
COLOR UR AUTO: YELLOW
CREAT SERPL-MCNC: 0.68 MG/DL (ref 0.52–1.04)
DIFFERENTIAL METHOD BLD: ABNORMAL
EOSINOPHIL # BLD AUTO: 0.2 10E9/L (ref 0–0.7)
EOSINOPHIL NFR BLD AUTO: 1.9 %
ERYTHROCYTE [DISTWIDTH] IN BLOOD BY AUTOMATED COUNT: 11.1 % (ref 10–15)
GFR SERPL CREATININE-BSD FRML MDRD: >90 ML/MIN/{1.73_M2}
GLUCOSE SERPL-MCNC: 109 MG/DL (ref 70–99)
GLUCOSE UR STRIP-MCNC: NEGATIVE MG/DL
HCT VFR BLD AUTO: 42.4 % (ref 35–47)
HGB BLD-MCNC: 14.8 G/DL (ref 11.7–15.7)
HGB UR QL STRIP: NEGATIVE
IMM GRANULOCYTES # BLD: 0 10E9/L (ref 0–0.4)
IMM GRANULOCYTES NFR BLD: 0.4 %
KETONES UR STRIP-MCNC: NEGATIVE MG/DL
LEUKOCYTE ESTERASE UR QL STRIP: NEGATIVE
LYMPHOCYTES # BLD AUTO: 1.5 10E9/L (ref 0.8–5.3)
LYMPHOCYTES NFR BLD AUTO: 14.4 %
MCH RBC QN AUTO: 34.8 PG (ref 26.5–33)
MCHC RBC AUTO-ENTMCNC: 34.9 G/DL (ref 31.5–36.5)
MCV RBC AUTO: 100 FL (ref 78–100)
MONOCYTES # BLD AUTO: 0.8 10E9/L (ref 0–1.3)
MONOCYTES NFR BLD AUTO: 8.2 %
NEUTROPHILS # BLD AUTO: 7.6 10E9/L (ref 1.6–8.3)
NEUTROPHILS NFR BLD AUTO: 74.6 %
NITRATE UR QL: NEGATIVE
NRBC # BLD AUTO: 0 10*3/UL
NRBC BLD AUTO-RTO: 0 /100
PH UR STRIP: 6 PH (ref 5–7)
PLATELET # BLD AUTO: 185 10E9/L (ref 150–450)
POTASSIUM SERPL-SCNC: 3.6 MMOL/L (ref 3.4–5.3)
PROT SERPL-MCNC: 7.6 G/DL (ref 6.8–8.8)
RBC # BLD AUTO: 4.25 10E12/L (ref 3.8–5.2)
SODIUM SERPL-SCNC: 137 MMOL/L (ref 133–144)
SOURCE: NORMAL
SP GR UR STRIP: 1.01 (ref 1–1.03)
UROBILINOGEN UR STRIP-MCNC: 0 MG/DL (ref 0–2)
WBC # BLD AUTO: 10.2 10E9/L (ref 4–11)

## 2020-01-08 PROCEDURE — 99284 EMERGENCY DEPT VISIT MOD MDM: CPT | Mod: Z6 | Performed by: EMERGENCY MEDICINE

## 2020-01-08 PROCEDURE — 99284 EMERGENCY DEPT VISIT MOD MDM: CPT | Mod: 25 | Performed by: EMERGENCY MEDICINE

## 2020-01-08 PROCEDURE — 80053 COMPREHEN METABOLIC PANEL: CPT | Performed by: EMERGENCY MEDICINE

## 2020-01-08 PROCEDURE — 74176 CT ABD & PELVIS W/O CONTRAST: CPT

## 2020-01-08 PROCEDURE — 25000132 ZZH RX MED GY IP 250 OP 250 PS 637: Performed by: EMERGENCY MEDICINE

## 2020-01-08 PROCEDURE — 81003 URINALYSIS AUTO W/O SCOPE: CPT | Performed by: EMERGENCY MEDICINE

## 2020-01-08 PROCEDURE — 85025 COMPLETE CBC W/AUTO DIFF WBC: CPT | Performed by: EMERGENCY MEDICINE

## 2020-01-08 RX ORDER — IBUPROFEN 200 MG
600-800 TABLET ORAL EVERY 8 HOURS PRN
Qty: 30 TABLET | Refills: 0 | COMMUNITY
Start: 2020-01-08 | End: 2020-05-12

## 2020-01-08 RX ORDER — MORPHINE SULFATE 15 MG/1
15 TABLET ORAL ONCE
Status: COMPLETED | OUTPATIENT
Start: 2020-01-08 | End: 2020-01-08

## 2020-01-08 RX ORDER — MORPHINE SULFATE 15 MG/1
7.5-15 TABLET ORAL EVERY 4 HOURS PRN
Qty: 8 TABLET | Refills: 0 | Status: SHIPPED | OUTPATIENT
Start: 2020-01-08 | End: 2020-05-12

## 2020-01-08 RX ORDER — ACETAMINOPHEN 500 MG
1000 TABLET ORAL EVERY 8 HOURS PRN
Qty: 30 TABLET | Refills: 0 | COMMUNITY
Start: 2020-01-08 | End: 2020-05-12

## 2020-01-08 RX ADMIN — MORPHINE SULFATE 15 MG: 15 TABLET ORAL at 06:20

## 2020-01-08 ASSESSMENT — ENCOUNTER SYMPTOMS
CHILLS: 0
NAUSEA: 1
FREQUENCY: 0
FATIGUE: 0
LIGHT-HEADEDNESS: 0
FEVER: 0
HEMATURIA: 0
CHEST TIGHTNESS: 0
VOMITING: 0
ABDOMINAL PAIN: 1
NUMBNESS: 0
BACK PAIN: 0
DYSURIA: 0
HEADACHES: 0
COUGH: 0
WEAKNESS: 0
APPETITE CHANGE: 1
SHORTNESS OF BREATH: 0
FLANK PAIN: 1

## 2020-01-08 NOTE — ED AVS SNAPSHOT
[facility] Emergency Department  [address] [address]  Phone:  [phone]  Fax:  [phone]                                    Rober Renee   MRN: [id number]    Department:  [facility] Emergency Department   Date of Visit:  1/8/2020           After Visit Summary Signature Page    I have received my discharge instructions, and my questions have been answered. I have discussed any challenges I see with this plan with the nurse or doctor.    ..........................................................................................................................................  Patient/Patient Representative Signature      ..........................................................................................................................................  Patient Representative Print Name and Relationship to Patient    ..................................................               ................................................  Date                                   Time    ..........................................................................................................................................  Reviewed by Signature/Title    ...................................................              ..............................................  Date                                               Time          22EPIC Rev 08/18

## 2020-01-08 NOTE — ED PROVIDER NOTES
History     Chief Complaint   Patient presents with     Abdominal Pain     LLQ pain/ pain with urination/ nausea yesterday     HPI  Rober Renee is a 39 year old female with a history of nephrolithiasis in the past presenting for evaluation of left flank pain and left lower quadrant abdominal pain.  Patient reports symptoms began yesterday with low back pain and low pelvic pain.  Symptoms associated with nausea but no vomiting.  She reports no dysuria, urgency, or frequency.  Denies hematuria.  Patient does not recall if this is similar to her previous kidney stones.  She reports symptoms do remind her of labor pains.  Patient has had a previous total abdominal hysterectomy with bilateral oophorectomy 2 years ago.  No history of bowel obstruction.  No history of diverticulitis.  Reports notably decreased appetite.  Thought she might be constipated so took a laxative and had a large bowel movement without any improvement in her pain.  Denies fever or chills.      ==================================================================    CHART REVIEW:      CT ABDOMEN AND PELVIS WITHOUT CONTRAST 10/5/2015 9:32 AM      HISTORY: Suprapubic pain, history of kidney stones.     TECHNIQUE: Axial images are obtained from the lung bases to the  symphysis without oral or IV contrast. Coronal reformatted images are  also generated.     FINDINGS: The lung bases are clear.     Abdomen: Tiny nonobstructing stone is present within each kidney.   These measure 0.2 cm in size.  There is no hydronephrosis or ureteral  calculi.  A recently passed stone cannot be excluded.  Remaining upper  abdominal organs are within normal limits.  No enlarged lymph nodes.   The bowel is unremarkable.  No obstruction or diverticulitis.   Appendix is normal.     Pelvis: The bladder, rectum, uterus and right adnexal region are  within normal limits.  There appears to be a dominant follicle left  ovary measuring 2.4 cm on image 59.  No enlarged pelvic lymph  "nodes or  free fluid.  Bone window examination is unremarkable.  No  aggressive-appearing bone lesions are appreciated.     IMPRESSION  IMPRESSION:  1.  Tiny nonobstructing renal collecting system stones.  No  hydronephrosis.  2.  Dominant follicle left ovary measures 2.4 cm.  No free pelvic  fluid.  This is most likely physiologic.    3.  No other acute changes are evident in the abdomen or pelvis.  No  diverticulitis.  Appendix is normal.    END CHART REVIEW  ==================================================================      Allergies:  Allergies   Allergen Reactions     Imitrex [Sumatriptan] Other (See Comments)     Felt terrible, \"like pores were on fire\"     Sulfa Drugs Hives       Problem List:    Patient Active Problem List    Diagnosis Date Noted     Benign essential hypertension 10/11/2019     Priority: Medium     Endometriosis determined by laparoscopy 04/10/2018     Priority: Medium     Anxiety 2017     Priority: Medium     Major depressive disorder, single episode, moderate (H) 2016     Priority: Medium     Renal stones 10/09/2015     Priority: Medium     Migraine headache 2012     Priority: Medium     CARDIOVASCULAR SCREENING; LDL GOAL LESS THAN 160 10/31/2010     Priority: Medium        Past Medical History:    Past Medical History:   Diagnosis Date     Calculus of kidney      Cervical high risk HPV (human papillomavirus) test positive 2018     Chickenpox      Gestational diabetes ,     History of postpartum depression, currently pregnant      Intervertebral lumbar disc disorder with myelopathy, lumbar region      Intramural leiomyoma of uterus 4/3/2018     Migraines      Urinary tract bacterial infections        Past Surgical History:    Past Surgical History:   Procedure Laterality Date      SECTION, TUBAL LIGATION, COMBINED  2013    c/section with BTL     HYSTERECTOMY, PAP NO LONGER INDICATED  04/10/2018    No history of LIBERTY 2 or greater     " LAPAROSCOPIC ASSISTED HYSTERECTOMY VAGINAL, BILATERAL SALPINGO-OOPHORECTOMY, COMBINED N/A 4/10/2018    Procedure: COMBINED LAPAROSCOPIC ASSISTED HYSTERECTOMY VAGINAL, SALPINGO-OOPHORECTOMY;  Laparoscopic assisted vaginal hysterectomy with possible bilateral salpingoophorectomy.;  Surgeon: Taisha Wilson MD;  Location: [facility]       Family History:    Family History   Problem Relation Age of Onset     Cancer Maternal Grandfather         liver CA     Cancer Paternal Grandfather         prostate CA     Cancer Maternal Grandmother         ovarian CA     Other - See Comments Maternal Grandmother         migraine     Diabetes Paternal Grandmother      Hypertension Paternal Grandmother      Hypertension Father      Depression Father      Other - See Comments Mother         migraine     Hypertension Brother      Other - See Comments Maternal Uncle         migraine     Depression Sister      Melanoma No family hx of        Social History:  Marital Status:  Single [1]  Social History     Tobacco Use     Smoking status: Former Smoker     Packs/day: 0.50     Years: 10.00     Pack years: 5.00     Types: Cigarettes     Last attempt to quit: 2008     Years since quittin.8     Smokeless tobacco: Never Used   Substance Use Topics     Alcohol use: Yes     Comment: very seldom     Drug use: No        Medications:    acetaminophen (TYLENOL) 500 MG tablet  escitalopram (LEXAPRO) 20 MG tablet  estradiol (ESTRACE) 2 MG tablet  estrogens-methylTESTOSTERone (ESTRATEST HS) 0.625-1.25 MG per tablet  hydrochlorothiazide (HYDRODIURIL) 25 MG tablet  ibuprofen (ADVIL/MOTRIN) 200 MG tablet  LORazepam (ATIVAN) 1 MG tablet  morphine (MSIR) 15 MG IR tablet          Review of Systems   Constitutional: Positive for appetite change. Negative for chills, fatigue and fever.   HENT: Negative for congestion.    Respiratory: Negative for cough, chest tightness and shortness of breath.    Cardiovascular: Negative for chest pain.    Gastrointestinal: Positive for abdominal pain and nausea. Negative for vomiting.   Genitourinary: Positive for flank pain. Negative for decreased urine volume, dysuria, frequency, hematuria, vaginal bleeding and vaginal discharge.   Musculoskeletal: Negative for back pain.   Skin: Negative for rash.   Neurological: Negative for weakness, light-headedness, numbness and headaches.   All other systems reviewed and are negative.      Physical Exam   BP: (!) 156/119  Pulse: 83  Temp: 97.8  F (36.6  C)  Resp: 16  SpO2: 100 %      Physical Exam  Vitals signs and nursing note reviewed.   Constitutional:       Appearance: She is well-developed. She is ill-appearing. She is not diaphoretic.   HENT:      Head: Normocephalic and atraumatic.   Cardiovascular:      Rate and Rhythm: Normal rate.   Pulmonary:      Effort: Pulmonary effort is normal.   Abdominal:      General: Abdomen is flat. Bowel sounds are normal.      Palpations: Abdomen is soft.      Tenderness: There is abdominal tenderness in the left lower quadrant. There is left CVA tenderness (mild). There is no rebound. Negative signs include Rovsing's sign and McBurney's sign.   Skin:     General: Skin is warm and dry.      Capillary Refill: Capillary refill takes less than 2 seconds.   Neurological:      Mental Status: She is alert and oriented to person, place, and time.         ED Course        Procedures         Results for orders placed or performed during the hospital encounter of 01/08/20 (from the past 24 hour(s))   UA reflex to Microscopic   Result Value Ref Range    Color Urine Yellow     Appearance Urine Clear     Glucose Urine Negative NEG^Negative mg/dL    Bilirubin Urine Negative NEG^Negative    Ketones Urine Negative NEG^Negative mg/dL    Specific Gravity Urine 1.013 1.003 - 1.035    Blood Urine Negative NEG^Negative    pH Urine 6.0 5.0 - 7.0 pH    Protein Albumin Urine Negative NEG^Negative mg/dL    Urobilinogen mg/dL 0.0 0.0 - 2.0 mg/dL    Nitrite  Urine Negative NEG^Negative    Leukocyte Esterase Urine Negative NEG^Negative    Source Midstream Urine    CBC with platelets differential   Result Value Ref Range    WBC 10.2 4.0 - 11.0 10e9/L    RBC Count 4.25 3.8 - 5.2 10e12/L    Hemoglobin 14.8 11.7 - 15.7 g/dL    Hematocrit 42.4 35.0 - 47.0 %     78 - 100 fl    MCH 34.8 (H) 26.5 - 33.0 pg    MCHC 34.9 31.5 - 36.5 g/dL    RDW 11.1 10.0 - 15.0 %    Platelet Count 185 150 - 450 10e9/L    Diff Method Automated Method     % Neutrophils 74.6 %    % Lymphocytes 14.4 %    % Monocytes 8.2 %    % Eosinophils 1.9 %    % Basophils 0.5 %    % Immature Granulocytes 0.4 %    Nucleated RBCs 0 0 /100    Absolute Neutrophil 7.6 1.6 - 8.3 10e9/L    Absolute Lymphocytes 1.5 0.8 - 5.3 10e9/L    Absolute Monocytes 0.8 0.0 - 1.3 10e9/L    Absolute Eosinophils 0.2 0.0 - 0.7 10e9/L    Absolute Basophils 0.1 0.0 - 0.2 10e9/L    Abs Immature Granulocytes 0.0 0 - 0.4 10e9/L    Absolute Nucleated RBC 0.0    Comprehensive metabolic panel   Result Value Ref Range    Sodium 137 133 - 144 mmol/L    Potassium 3.6 3.4 - 5.3 mmol/L    Chloride 103 94 - 109 mmol/L    Carbon Dioxide 29 20 - 32 mmol/L    Anion Gap 5 3 - 14 mmol/L    Glucose 109 (H) 70 - 99 mg/dL    Urea Nitrogen 12 7 - 30 mg/dL    Creatinine 0.68 0.52 - 1.04 mg/dL    GFR Estimate >90 >60 mL/min/[1.73_m2]    GFR Estimate If Black >90 >60 mL/min/[1.73_m2]    Calcium 9.6 8.5 - 10.1 mg/dL    Bilirubin Total 0.8 0.2 - 1.3 mg/dL    Albumin 4.0 3.4 - 5.0 g/dL    Protein Total 7.6 6.8 - 8.8 g/dL    Alkaline Phosphatase 55 40 - 150 U/L    ALT 68 (H) 0 - 50 U/L    AST 38 0 - 45 U/L   CT Abdomen Pelvis w/o Contrast    Narrative    EXAM: CT ABDOMEN AND PELVIS WITHOUT CONTRAST - RENAL STONE PROTOCOL  LOCATION: [facility]  DATE/TIME: 01/08/2020, 5:11 AM    INDICATION: Left flank pain.  COMPARISON: None.    TECHNIQUE: CT scan of the abdomen and pelvis was performed without oral or IV contrast. Multiplanar reformats were  obtained. Dose reduction techniques were used.  CONTRAST: None.    FINDINGS:    LOWER CHEST: Unremarkable.    HEPATOBILIARY: Slight diffuse fatty infiltration of the liver.    SPLEEN: Unremarkable.    PANCREAS: Unremarkable.    ADRENAL GLANDS: Unremarkable.    KIDNEYS/BLADDER: At least three tiny less than 0.3 cm nonobstructing right renal calculi and a tiny less than 0.3 cm nonobstructing left renal calculus. No ureteral calculi. No dilatation of the intrarenal collecting system or ureter. No visualized   bladder calculi.    BOWEL: A few colonic diverticula are present. Haziness is present within a small region of fat abutting the anterior aspect of the proximal sigmoid colon (for example, series 2 image 61 and series 3 image 48). No definite diverticulum is visualized at   this location. No extraluminal gas or loculated fluid collections. Normal appendix.    LYMPH NODES: Unremarkable.      Impression    IMPRESSION:   1.  Haziness is present in a small region of fat abutting the proximal sigmoid colon. This most likely represents epiploic appendagitis. Less likely, this could relate to diverticulitis.  2.  No other cause of acute pain identified in the abdomen or pelvis.  3.  A few tiny nonobstructing bilateral renal calculi. No evidence of urinary obstruction.         Medications   morphine (MSIR) IR tablet 15 mg (has no administration in time range)       Assessments & Plan (with Medical Decision Making)  39-year-old female presenting for evaluation of left-sided abdominal flank pain over the past 24 hours or so.  Patient reports constant dull pain with episodes of fluctuating intense severe sharp pain.  Symptoms highly clinically suggestive of ureterolithiasis however patient does not recall what her previous stones felt light.  Her previous stones of all passed spontaneously.  Has tried ibuprofen at home with some mild improvement in her symptoms.  Patient has had a previous abdominal hysterectomy so is the  medically at risk for bowel obstruction.  Still has her appendix although atypical given her left-sided symptoms this could be a new problem.  Obtain CT imaging to further evaluate the cause of her symptoms.  CT showed evidence of acute epiploic appendagitis but no other acute abnormality.  Blood work and urine with out significant acute abnormality.  Treated with oral morphine for breakthrough pain.     I have reviewed the nursing notes.    I have reviewed the findings, diagnosis, plan and need for follow up with the patient.       New Prescriptions    ACETAMINOPHEN (TYLENOL) 500 MG TABLET    Take 2 tablets (1,000 mg) by mouth every 8 hours as needed for fever or pain    IBUPROFEN (ADVIL/MOTRIN) 200 MG TABLET    Take 3-4 tablets (600-800 mg) by mouth every 8 hours as needed for mild pain    MORPHINE (MSIR) 15 MG IR TABLET    Take 0.5-1 tablets (7.5-15 mg) by mouth every 4 hours as needed for moderate to severe pain       Final diagnoses:   Epiploic appendagitis       1/8/2020   [facility] EMERGENCY DEPARTMENT     Garcia, Andre Chaparro MD  01/08/20 0602

## 2020-01-08 NOTE — LETTER
January 8, 2020      To Whom It May Concern:      Rober Renee was seen in our Emergency Department today, 01/08/20.  I expect her condition to improve over the next 3-5 days.  She may return to work when improved.    Sincerely,        Andre Garcia MD

## 2020-03-09 DIAGNOSIS — F41.9 ANXIETY: ICD-10-CM

## 2020-03-09 RX ORDER — LORAZEPAM 1 MG/1
1 TABLET ORAL EVERY 8 HOURS PRN
Qty: 20 TABLET | Refills: 0 | Status: SHIPPED | OUTPATIENT
Start: 2020-03-09 | End: 2020-04-14

## 2020-03-09 NOTE — TELEPHONE ENCOUNTER
Rx refill request for Lorazepam, date last issued on 11-04-19 for a qty of 20 with 0 refills.  Date of last ov was on 10-11-19.    Rx controlled substance: To provider to authorize additional refills.    Jessica Torres  [facility] RN

## 2020-04-27 ENCOUNTER — E-VISIT (OUTPATIENT)
Dept: FAMILY MEDICINE | Facility: CLINIC | Age: 40
End: 2020-04-27

## 2020-04-27 DIAGNOSIS — M54.12 CERVICAL RADICULOPATHY: Primary | ICD-10-CM

## 2020-04-27 PROCEDURE — 99422 OL DIG E/M SVC 11-20 MIN: CPT | Performed by: FAMILY MEDICINE

## 2020-04-28 RX ORDER — PREDNISONE 20 MG/1
40 TABLET ORAL DAILY
Qty: 10 TABLET | Refills: 0 | Status: SHIPPED | OUTPATIENT
Start: 2020-04-28 | End: 2020-05-12

## 2020-04-28 RX ORDER — HYDROCODONE BITARTRATE AND ACETAMINOPHEN 5; 325 MG/1; MG/1
1 TABLET ORAL EVERY 6 HOURS PRN
Qty: 12 TABLET | Refills: 0 | Status: SHIPPED | OUTPATIENT
Start: 2020-04-28 | End: 2020-05-12

## 2020-04-28 NOTE — PATIENT INSTRUCTIONS
Patient Education     Understanding Cervical Radiculopathy    Cervical radiculopathy is irritation or inflammation of a nerve root in the neck. It causes neck pain and other symptoms that may spread into the chest or down the arm. To understand this condition, it helps to understand the parts of the spine:    Vertebrae. These are bones that stack to form the spine. The cervical spine contains the 7 vertebrae in the neck.    Disks. These are soft pads of tissue between the vertebrae. They act as shock absorbers for the spine.    The spinal canal. This is a tunnel formed within the stacked vertebrae. The spinal cord runs through this canal.    Nerves. These branch off the spinal cord. As they leave the spinal canal, nerves pass through openings between the vertebrae. The nerve root is the part of the nerve that is closest to the spinal cord.   With cervical radiculopathy, nerve roots in the neck become irritated. This leads to pain and symptoms that can travel to the nerves that go from the spinal cord down the arms and into the torso.  What causes cervical radiculopathy?  Aging, injury, poor posture, and other issues can lead to problems in the neck. These problems may then irritate nerve roots. These include:    Damage to a disk in the cervical spine. The damaged disk may then press on nearby nerve roots.    Degeneration from wear and tear, and aging. This can lead to narrowing (stenosis) of the openings between the vertebrae. The narrowed openings press on nerve roots as they leave the spinal canal.    An unstable spine. This is when a vertebra slips forward. It can then press on a nerve root.  There are other, less common causes of pressure on nerves in the neck. These include infection, cysts, and tumors.  Symptoms of cervical radiculopathy  These include:    Neck pain    Pain, numbness, tingling, or weakness that travels down the arm    Loss of neck movement    Muscle spasms  Treatment for cervical  radiculopathy  In most cases, your healthcare provider will first try treatments that help relieve symptoms. These may include:    Prescription or over-the-counter pain medicines. These help relieve pain and swelling.    Cold packs. These help reduce pain.    Resting. This involves avoiding positions and activities that increase pain.    Neck brace (cervical collar). This can help relieve inflammation and pain.    Physical therapy, including exercises and stretches. This can help decrease pain and increase movement and function.    Shots of medicinesaround the nerve roots. This is done to help relieve symptoms for a time.  In some cases, your healthcare provider may advise surgery to fix the underlying problem. This depends on the cause, the symptoms, and how long the pain has lasted.  Possible complications  Over time, an irritated and inflamed nerve may become damaged. This may lead to long-lasting (permanent) numbness or weakness. If symptoms change suddenly or get worse, be sure to let your healthcare provider know.     When to call your healthcare provider  Call your healthcare provider right away if you have any of these:    New pain or pain that gets worse    New or increasing weakness, numbness, or tingling in your arm or hand    Bowel or bladder changes   Date Last Reviewed: 3/10/2016    2779-0305 [employer]. [address] [place] [address], [place] [address]. All rights reserved. This information is not intended as a substitute for professional medical care. Always follow your healthcare professional's instructions.           Patient Education     Nonsurgical Treatment of Cervical Spine Problems  Cervical spine problems can often be treated without surgery. Choices may include rest, medicines, or injections. Your healthcare provider may also suggest physical therapy or certain exercises. These treatments may all help to relieve your symptoms and are often successful. If your symptoms don t subside,  talk with your healthcare provider who may recommend surgery as your best treatment option.  Relieving your symptoms  Your healthcare provider may recommend:    Medicines. These help to reduce pain and inflammation.    Epidural steroid injections. These are injections into the spinal canal near the spinal cord. They may relieve severe pain and reduce inflammation.    Restricting aggravating activities. This can help to give your cervical spine time to heal.    A soft cervical collar. This can help to support and immobilize your neck while keeping your cervical spine aligned.    Traction. This may help relieve pressure on the irritated nerves.  Restoring mobility and strength  Your healthcare provider may recommend that you work with a physical therapist, an osteopathic doctor, or a chiropractor. This can help you regain mobility and strength in your neck. Physical therapy may last for 4 to 8 weeks. It may include:    Exercises to improve your neck s range of motion and strength.    Evaluation and correction of posture and body movements. This can correct problems that affect your cervical spine.    Heat, massage, and traction to help relieve your symptoms.  Self-care  You ll take an active role in your own therapy. To protect your neck from further injury:    Follow any exercise program given to you by your healthcare provider or physical therapist.    Practice good posture while sitting, standing, or moving.    Have your workspace evaluated. Rearrange it if needed.    When lying down, support your neck. You can use a special cervical pillow or rolled-up towel.  Date Last Reviewed: 5/1/2018 2000-2019 [employer]. [address] [place] [address], [place] [address]. All rights reserved. This information is not intended as a substitute for professional medical care. Always follow your healthcare professional's instructions.           Patient Education     Relieving Back Pain  Back pain is a common problem. You  can strain back muscles by lifting too much weight or just by moving the wrong way. Back strain can be uncomfortable, even painful. And it can take weeks or months to improve. To help yourself feel better and prevent future back strains, try these tips.  Important: Don't give aspirin to children or teens without first discussing it with your child's healthcare provider.  Ice    Ice reduces muscle pain and swelling. It helps most during the first 24 to 48 hours after an injury.    Wrap an ice pack or a bag of frozen peas in a thin towel. Never put ice directly on your skin.    Place the ice where your back hurts the most.    Don t ice for more than 20 minutes at a time.    You can use ice several times a day.  Medicines  Over-the-counter pain relievers include acetaminophen and anti-inflammatory medicines, which includes aspirin, naproxen, or ibuprofen. They can help ease discomfort. Some also reduce swelling.    Tell your healthcare provider about any medicines you are already taking.    Take medicines only as directed.  Manipulation and massage  Having manipulation by an osteopathic doctor or chiropractor may be helpful. Getting a massage also may help.   Heat  After the first 48 hours, heat can relax sore muscles and improve blood flow.    Try a warm bath or shower. Or use a heating pad set on low. To prevent a burn, keep a cloth between you and the heating pad.    Don t use a heating pad for more than 15 minutes at a time. Never sleep on a heating pad.  Date Last Reviewed: 6/1/2018 2000-2019 [employer]. [address] [place] [address], [place] [address]. All rights reserved. This information is not intended as a substitute for professional medical care. Always follow your healthcare professional's instructions.

## 2020-05-03 ENCOUNTER — E-VISIT (OUTPATIENT)
Dept: FAMILY MEDICINE | Facility: CLINIC | Age: 40
End: 2020-05-03

## 2020-05-03 DIAGNOSIS — M54.9 UPPER BACK PAIN: Primary | ICD-10-CM

## 2020-05-03 PROCEDURE — 99421 OL DIG E/M SVC 5-10 MIN: CPT | Performed by: FAMILY MEDICINE

## 2020-05-07 RX ORDER — DICLOFENAC SODIUM 75 MG/1
75 TABLET, DELAYED RELEASE ORAL 2 TIMES DAILY PRN
Qty: 30 TABLET | Refills: 0 | Status: SHIPPED | OUTPATIENT
Start: 2020-05-07 | End: 2020-09-01

## 2020-05-12 ENCOUNTER — ANCILLARY PROCEDURE (OUTPATIENT)
Dept: GENERAL RADIOLOGY | Facility: CLINIC | Age: 40
End: 2020-05-12
Attending: FAMILY MEDICINE

## 2020-05-12 ENCOUNTER — OFFICE VISIT (OUTPATIENT)
Dept: FAMILY MEDICINE | Facility: CLINIC | Age: 40
End: 2020-05-12

## 2020-05-12 VITALS
WEIGHT: 141.2 LBS | DIASTOLIC BLOOD PRESSURE: 92 MMHG | BODY MASS INDEX: 26.68 KG/M2 | OXYGEN SATURATION: 99 % | SYSTOLIC BLOOD PRESSURE: 136 MMHG | HEART RATE: 77 BPM | TEMPERATURE: 98.7 F

## 2020-05-12 DIAGNOSIS — M54.9 UPPER BACK PAIN ON LEFT SIDE: Primary | ICD-10-CM

## 2020-05-12 DIAGNOSIS — M25.512 LEFT SHOULDER PAIN, UNSPECIFIED CHRONICITY: ICD-10-CM

## 2020-05-12 PROCEDURE — 73030 X-RAY EXAM OF SHOULDER: CPT | Mod: LT

## 2020-05-12 PROCEDURE — 99213 OFFICE O/P EST LOW 20 MIN: CPT | Performed by: FAMILY MEDICINE

## 2020-05-12 RX ORDER — PREDNISONE 20 MG/1
20 TABLET ORAL DAILY
Qty: 5 TABLET | Refills: 0 | Status: SHIPPED | OUTPATIENT
Start: 2020-05-12 | End: 2020-05-17

## 2020-05-12 RX ORDER — TIZANIDINE 2 MG/1
2 TABLET ORAL 3 TIMES DAILY
Qty: 30 TABLET | Refills: 0 | Status: SHIPPED | OUTPATIENT
Start: 2020-05-12 | End: 2020-09-01

## 2020-05-12 ASSESSMENT — ANXIETY QUESTIONNAIRES
2. NOT BEING ABLE TO STOP OR CONTROL WORRYING: NOT AT ALL
1. FEELING NERVOUS, ANXIOUS, OR ON EDGE: SEVERAL DAYS
5. BEING SO RESTLESS THAT IT IS HARD TO SIT STILL: NOT AT ALL
GAD7 TOTAL SCORE: 3
IF YOU CHECKED OFF ANY PROBLEMS ON THIS QUESTIONNAIRE, HOW DIFFICULT HAVE THESE PROBLEMS MADE IT FOR YOU TO DO YOUR WORK, TAKE CARE OF THINGS AT HOME, OR GET ALONG WITH OTHER PEOPLE: SOMEWHAT DIFFICULT
7. FEELING AFRAID AS IF SOMETHING AWFUL MIGHT HAPPEN: NOT AT ALL
3. WORRYING TOO MUCH ABOUT DIFFERENT THINGS: SEVERAL DAYS
6. BECOMING EASILY ANNOYED OR IRRITABLE: SEVERAL DAYS

## 2020-05-12 ASSESSMENT — PATIENT HEALTH QUESTIONNAIRE - PHQ9
5. POOR APPETITE OR OVEREATING: NOT AT ALL
SUM OF ALL RESPONSES TO PHQ QUESTIONS 1-9: 1

## 2020-05-12 NOTE — PATIENT INSTRUCTIONS
Patient Education     Back Care Tips    Caring for your back  These are things you can do to prevent a recurrence of acute back pain and to reduce symptoms from chronic back pain:    Stay at a healthy weight. If you are overweight, losing weight will help most types of back pain.    Exercise is an important part of recovery from most types of back pain. The muscles behind and in front of the spine support the back. This means strengthening both the back muscles and the abdominal muscles will provide better support for your spine.     Swimming and brisk walking are good overall exercises to improve your fitness level.    Practice safe lifting methods (see below).    Practice good posture when sitting, standing, and walking. Don't sit for a long time. This puts more stress on the lower back than standing or walking.    Wear quality shoes with good arch support. Foot and ankle alignment can affect back symptoms. Don't wear high heels.    Therapeutic massage can help relax the back muscles without stretching them.    During the first 24 to 72 hours after an acute injury or flare-up of chronic back pain, put an ice pack on the painful area for 20 minutes and then remove it for 20 minutes. Do thisover a period of 60 to 90 minutes, or several times a day. As a safety precaution, don't use a heating pad at bedtime. Sleeping on a heating pad can lead to skin burns or tissue damage.    You can alternate using ice and heat.  Medicines  Talk with your healthcare provider before using medicines, especially if you have other health problems or are taking other medicines.    You may use over-the-counter medicines, such as acetaminophen, ibuprofen, or naprosyn to control pain, unless your healthcare provider prescribed other pain medicine. Talk with your healthcare provider before taking any medicines if you have a chronic condition such ass diabetes, liver or kidney disease, stomach ulcers, or digestive bleeding, or are taking  blood thinners.    Be careful if you are given prescription pain medicines, opioids, or medicine for muscle spasm. They can cause drowsiness, and affect your coordination, reflexes, and judgment. Don't drive or operate heavy machinery while taking these types of medicines. Take prescription pain medicine only as prescribed by your healthcare provider.  Lumbar stretch  This simple stretch will help relax muscle spasm and keep your back more limber. If exercise makes your back pain worse, don t do it.    Lie on your back with your knees bent and both feet on the ground.    Slowly raise your left knee to your chest as you flatten your lower back against the floor. Hold for 5 seconds.    Relax and repeat the exercise with your right knee.    Do 10 of these exercises for each leg.  Safe lifting method    Don t bend over at the waist to lift an object off the floor.  Instead, bend your knees and hips in a squat.     Keep your back and head upright    Hold the object close to your body, directly in front of you.    Straighten your legs to lift the object.     Lower the object to the floor in the reverse fashion.    If you must slide something across the floor, push it.    Posture tips  Sitting  Sit in chairs with straight backs or low-back support. Keep your knees lower than your hips, with your feet flat on the floor.  When driving, sit up straight. Adjust the seat forward so you are not leaning toward the steering wheel.  A small pillow or rolled towel behind your lower back may help if you are driving long distances.   Standing  When standing for long periods, shift most of your weight to one leg at a time. Switch legs every few minutes.   Sleeping  The best way to sleep is on your side with your knees bent. Put a low pillow under your head to support your neck in a neutral spine position. Don't use thick pillows that bend your neck to one side. Put a pillow between your legs to further relax your lower back. If you  sleep on your back, put pillows under your knees to support your legs in a slightly flexed position. Use a firm mattress. If your mattress sags, replace it, or use a 1/2-inch plywood board under the mattress to add support.  Follow-up care  Follow up with your healthcare provider, or as advised.  If X-rays, a CT scan or an MRI scan were taken, they may be reviewed by a radiologist. You will be told of any new findings that may affect your care.  Call 911  Call 911 if any of the following occur:    Trouble breathing    Confusion    Very drowsy    Fainting or loss of consciousness    Rapid or very slow heart rate    Loss of  bowel or bladder control  When to seek medical advice  Call your healthcare provider right away if any of the following occur:    Pain becomes worse or spreads to your arms or legs    Weakness or numbness in one or both arms or legs    Numbness in the groin area  Date Last Reviewed: 6/1/2016 2000-2019 [employer]. [address] [place] [address], [place] [address]. All rights reserved. This information is not intended as a substitute for professional medical care. Always follow your healthcare professional's instructions.

## 2020-05-12 NOTE — PROGRESS NOTES
Subjective     Rober Renee is a 39 year old female who presents to clinic today for the following health issues:    HPI        Upper Back Pain, posterior shoulder region.       Duration: x 6 weeks        Specific cause: none    Description:   Location of pain: upper back left- behind the shoulder re  Character of pain: dull ache, stabbing and intermittent  Pain radiation:to the neck, left shoulder and left arm  New numbness or weakness in legs, not attributed to pain:  no     Intensity: Currently 4/10, At its worst 9/10    History:   Pain interferes with job: YES- duties at home. Currently not working due to the pandemic.   History of back problems: no prior back problems  Any previous MRI or X-rays: None  Sees a specialist for back pain:  No  Therapies tried without relief: prednisone- states that this was effective, chiropractor, muscle relaxants, NSAIDs and opioids    Alleviating factors:   Improved by: prednisone      Precipitating factors:  Worsened by: Standing      Accompanying Signs & Symptoms:  Risk of Fracture:  None  Risk of Cauda Equina:  None  Risk of Infection:  None  Risk of Cancer:  None  Risk of Ankylosing Spondylitis:  Onset at age <35, male, AND morning back stiffness. no     Therapies Tried and outcome:  Has had 2 evisits in regards to this pain.         Patient Active Problem List   Diagnosis     CARDIOVASCULAR SCREENING; LDL GOAL LESS THAN 160     Migraine headache     Renal stones     Anxiety     Endometriosis determined by laparoscopy     Benign essential hypertension     Past Surgical History:   Procedure Laterality Date      SECTION, TUBAL LIGATION, COMBINED  2013    c/section with BTL     HYSTERECTOMY, PAP NO LONGER INDICATED  04/10/2018    No history of LIBERTY 2 or greater     LAPAROSCOPIC ASSISTED HYSTERECTOMY VAGINAL, BILATERAL SALPINGO-OOPHORECTOMY, COMBINED N/A 4/10/2018    Procedure: COMBINED LAPAROSCOPIC ASSISTED HYSTERECTOMY VAGINAL, SALPINGO-OOPHORECTOMY;  Laparoscopic  assisted vaginal hysterectomy with possible bilateral salpingoophorectomy.;  Surgeon: Taisha Wilson MD;  Location: [facility]       Social History     Tobacco Use     Smoking status: Former Smoker     Packs/day: 0.50     Years: 10.00     Pack years: 5.00     Types: Cigarettes     Last attempt to quit: 2008     Years since quittin.2     Smokeless tobacco: Never Used   Substance Use Topics     Alcohol use: Yes     Comment: very seldom     Family History   Problem Relation Age of Onset     Cancer Maternal Grandfather         liver CA     Cancer Paternal Grandfather         prostate CA     Cancer Maternal Grandmother         ovarian CA     Other - See Comments Maternal Grandmother         migraine     Diabetes Paternal Grandmother      Hypertension Paternal Grandmother      Hypertension Father      Depression Father      Other - See Comments Mother         migraine     Hypertension Brother      Other - See Comments Maternal Uncle         migraine     Depression Sister      Melanoma No family hx of          Current Outpatient Medications   Medication Sig Dispense Refill     diclofenac (VOLTAREN) 75 MG EC tablet Take 1 tablet (75 mg) by mouth 2 times daily as needed for moderate pain (take with food) 30 tablet 0     estradiol (ESTRACE) 2 MG tablet Take 1 tablet (2 mg) by mouth daily 90 tablet 3     estrogens-methylTESTOSTERone (ESTRATEST HS) 0.625-1.25 MG per tablet Take 1 tablet by mouth daily 90 tablet 1     hydrochlorothiazide (HYDRODIURIL) 25 MG tablet Take 1 tablet (25 mg) by mouth daily 90 tablet 3     LORazepam (ATIVAN) 1 MG tablet Take 1 tablet (1 mg) by mouth every 8 hours as needed for anxiety 20 tablet 1     predniSONE (DELTASONE) 20 MG tablet Take 1 tablet (20 mg) by mouth daily for 5 days 5 tablet 0     tiZANidine (ZANAFLEX) 2 MG tablet Take 1 tablet (2 mg) by mouth 3 times daily 30 tablet 0     Allergies   Allergen Reactions     Imitrex [Sumatriptan] Other (See Comments)     Felt terrible,  "\"like pores were on fire\"     Sulfa Drugs Hives         Reviewed and updated as needed this visit by Provider         Review of Systems   Constitutional, HEENT, cardiovascular, pulmonary, gi and gu systems are negative, except as otherwise noted.      Objective    BP (!) 136/92   Pulse 77   Temp 98.7  F (37.1  C) (Tympanic)   Wt 64 kg (141 lb 3.2 oz)   LMP 04/02/2018   SpO2 99%   BMI 26.68 kg/m    Body mass index is 26.68 kg/m .  Physical Exam   GENERAL: healthy, alert and no distress  NECK: no adenopathy, no asymmetry, masses, or scars and thyroid normal to palpation  MS: LUE exam shows normal strength and muscle mass, no deformities, no erythema, induration, or nodules, no evidence of joint effusion, ROM of all joints is normal and no evidence of joint instability  Comprehensive back pain exam:  No tenderness, Range of motion not limited by pain, Lower extremity strength functional and equal on both sides, Lower extremity reflexes within normal limits bilaterally,       Diagnostic Test Results:  Labs reviewed in Epic        Assessment & Plan     Rober was seen today for shoulder pain.    Diagnoses and all orders for this visit:    Upper back pain on left side  -     predniSONE (DELTASONE) 20 MG tablet; Take 1 tablet (20 mg) by mouth daily for 5 days  -     tiZANidine (ZANAFLEX) 2 MG tablet; Take 1 tablet (2 mg) by mouth 3 times daily  -     PHYSICAL THERAPY REFERRAL; Future    Left shoulder pain, posterior aspect  -     XR Shoulder Left G/E 3 Views  -     PHYSICAL THERAPY REFERRAL; Future       Will notify her with X ray results.     Return in about 1 week (around 5/19/2020), or if symptoms worsen or fail to improve.    Sally Joe MD  [facility]        "

## 2020-05-13 ASSESSMENT — ANXIETY QUESTIONNAIRES: GAD7 TOTAL SCORE: 3

## 2020-05-17 DIAGNOSIS — M54.9 UPPER BACK PAIN ON LEFT SIDE: ICD-10-CM

## 2020-05-17 NOTE — LETTER
[facility]  [address] [facility] [address] [phone]        May 28, 2020    Rober Renee  [address] [place] [id number]              Dear Rober Renee    Unfortunately, we have not been able to reach you by phone.   Dr. Joe would like you to schedule a telephone visit follow up on your back pain if this is still bothersome to you.     You may call our office at [phone] to schedule an appointment.          Sincerely,          Dr. Joe's Care Team  [facility]

## 2020-05-21 RX ORDER — PREDNISONE 20 MG/1
TABLET ORAL
Qty: 5 TABLET | Refills: 0 | OUTPATIENT
Start: 2020-05-21

## 2020-05-21 NOTE — TELEPHONE ENCOUNTER
Request forwarded to Dr. Joe. Unsure that refill of prednisone for back pain is appropriate and will let her decide.

## 2020-09-01 ENCOUNTER — VIRTUAL VISIT (OUTPATIENT)
Dept: FAMILY MEDICINE | Facility: CLINIC | Age: 40
End: 2020-09-01

## 2020-09-01 DIAGNOSIS — F41.1 GENERALIZED ANXIETY DISORDER: ICD-10-CM

## 2020-09-01 DIAGNOSIS — I10 HYPERTENSION GOAL BP (BLOOD PRESSURE) < 140/90: Primary | ICD-10-CM

## 2020-09-01 PROCEDURE — 99214 OFFICE O/P EST MOD 30 MIN: CPT | Mod: 95 | Performed by: FAMILY MEDICINE

## 2020-09-01 RX ORDER — CITALOPRAM HYDROBROMIDE 10 MG/1
10 TABLET ORAL DAILY
Qty: 90 TABLET | Refills: 1 | Status: SHIPPED | OUTPATIENT
Start: 2020-09-01 | End: 2020-09-15 | Stop reason: ALTCHOICE

## 2020-09-01 RX ORDER — HYDROCHLOROTHIAZIDE 25 MG/1
25 TABLET ORAL DAILY
Qty: 90 TABLET | Refills: 3 | Status: SHIPPED | OUTPATIENT
Start: 2020-09-01 | End: 2020-12-01

## 2020-09-01 ASSESSMENT — ANXIETY QUESTIONNAIRES
2. NOT BEING ABLE TO STOP OR CONTROL WORRYING: NOT AT ALL
1. FEELING NERVOUS, ANXIOUS, OR ON EDGE: NOT AT ALL
5. BEING SO RESTLESS THAT IT IS HARD TO SIT STILL: NOT AT ALL
GAD7 TOTAL SCORE: 3
7. FEELING AFRAID AS IF SOMETHING AWFUL MIGHT HAPPEN: NOT AT ALL
3. WORRYING TOO MUCH ABOUT DIFFERENT THINGS: SEVERAL DAYS
6. BECOMING EASILY ANNOYED OR IRRITABLE: SEVERAL DAYS
IF YOU CHECKED OFF ANY PROBLEMS ON THIS QUESTIONNAIRE, HOW DIFFICULT HAVE THESE PROBLEMS MADE IT FOR YOU TO DO YOUR WORK, TAKE CARE OF THINGS AT HOME, OR GET ALONG WITH OTHER PEOPLE: SOMEWHAT DIFFICULT

## 2020-09-01 ASSESSMENT — PATIENT HEALTH QUESTIONNAIRE - PHQ9
5. POOR APPETITE OR OVEREATING: SEVERAL DAYS
SUM OF ALL RESPONSES TO PHQ QUESTIONS 1-9: 1

## 2020-09-01 NOTE — PROGRESS NOTES
"Rober Renee is a 39 year old female who is being evaluated via a billable telephone visit.      The patient has been notified of following:     \"This telephone visit will be conducted via a call between you and your physician/provider. We have found that certain health care needs can be provided without the need for a physical exam.  This service lets us provide the care you need with a short phone conversation.  If a prescription is necessary we can send it directly to your pharmacy.  If lab work is needed we can place an order for that and you can then stop by our lab to have the test done at a later time.    Telephone visits are billed at different rates depending on your insurance coverage. During this emergency period, for some insurers they may be billed the same as an in-person visit.  Please reach out to your insurance provider with any questions.    If during the course of the call the physician/provider feels a telephone visit is not appropriate, you will not be charged for this service.\"    Patient has given verbal consent for Telephone visit?  Yes    What phone number would you like to be contacted at? [phone]    How would you like to obtain your AVS? Vel Palacios     Rober Renee is a 39 year old female who presents via phone visit today for the following health issues:    HPI    Hypertension Follow-up  On Hydrochlorothiazide 25 mg/day- states that she ran out of meds about a month ago but states that she BP was well controlled on meds, last BP checked while on meds was 122/80      Do you check your blood pressure regularly outside of the clinic? No     Are you following a low salt diet? Yes    Are your blood pressures ever more than 140 on the top number (systolic) OR more   than 90 on the bottom number (diastolic), for example 140/90? No    Anxiety Follow-Up    How are you doing with your anxiety since your last visit? Pt had left over citalopram 10 mg tab.  She started taking this " medication x3 weeks ago because she was noticing a major increase in her anxiety symptoms.  Since restarting this medication she is reporting a lot of improvement.  Requesting further refills on this med.     Are you having other symptoms that might be associated with anxiety? Yes:  feeling worried about everything, did not want to leave her house, feeling very overwhleemed with everything she is worried about.  Crying a lot.     Have you had a significant life event? OTHER: was laid off work for x3 months due to COVID, is now back to work full time     Are you feeling depressed? Yes:  feeling sad    Do you have any concerns with your use of alcohol or other drugs? No       HEALTH CARE MAINTENANCE: Due for Tap vaccine. Due for a Physical.     Reporting headaches triggered from neck pain- plans to schedule a Face to face visit.     Social History     Tobacco Use     Smoking status: Former Smoker     Packs/day: 0.50     Years: 10.00     Pack years: 5.00     Types: Cigarettes     Last attempt to quit: 2008     Years since quittin.5     Smokeless tobacco: Never Used   Substance Use Topics     Alcohol use: Yes     Comment: very seldom     Drug use: No     JACOB-7 SCORE 2019   Total Score 0 3 3     PHQ 2019   PHQ-9 Total Score 6 1 1   Q9: Thoughts of better off dead/self-harm past 2 weeks Not at all Not at all Not at all     Last PHQ-9 2020   1.  Little interest or pleasure in doing things 0   2.  Feeling down, depressed, or hopeless 0   3.  Trouble falling or staying asleep, or sleeping too much 0   4.  Feeling tired or having little energy 0   5.  Poor appetite or overeating 0   6.  Feeling bad about yourself 0   7.  Trouble concentrating 1   8.  Moving slowly or restless 0   Q9: Thoughts of better off dead/self-harm past 2 weeks 0   PHQ-9 Total Score 1   Difficulty at work, home, or with people Not difficult at all     JACOB-7  2020   1. Feeling nervous,  anxious, or on edge 0   2. Not being able to stop or control worrying 0   3. Worrying too much about different things 1   4. Trouble relaxing 1   5. Being so restless that it is hard to sit still 0   6. Becoming easily annoyed or irritable 1   7. Feeling afraid, as if something awful might happen 0   JACOB-7 Total Score 3   If you checked any problems, how difficult have they made it for you to do your work, take care of things at home, or get along with other people? Somewhat difficult         How many servings of fruits and vegetables do you eat daily?  2-3    On average, how many sweetened beverages do you drink each day (Examples: soda, juice, sweet tea, etc.  Do NOT count diet or artificially sweetened beverages)?   0    How many days per week do you exercise enough to make your heart beat faster? na    How many minutes a day do you exercise enough to make your heart beat faster? na    How many days per week do you miss taking your medication? Has been off BP medication for the past x1 month because she ran out of refills.              Review of Systems   Constitutional, HEENT, cardiovascular, pulmonary, gi and gu systems are negative, except as otherwise noted.       Objective        Vitals:  No vitals were obtained today due to virtual visit.    alert and no distress  PSYCH: Alert and oriented times 3; coherent speech, normal   rate and volume, able to articulate logical thoughts, able   to abstract reason, no tangential thoughts, no hallucinations   or delusions  Her affect is normal and pleasant  RESP: No cough, no audible wheezing, able to talk in full sentences  Remainder of exam unable to be completed due to telephone visits    DATA  Recent labs reviewed.   Component      Latest Ref Rng & Units 1/8/2020   Sodium      133 - 144 mmol/L 137   Potassium      3.4 - 5.3 mmol/L 3.6   Chloride      94 - 109 mmol/L 103   Carbon Dioxide      20 - 32 mmol/L 29   Anion Gap      3 - 14 mmol/L 5   Glucose      70 - 99  mg/dL 109 (H)   Urea Nitrogen      7 - 30 mg/dL 12   Creatinine      0.52 - 1.04 mg/dL 0.68   GFR Estimate      >60 mL/min/1.73:m2 >90   GFR Estimate If Black      >60 mL/min/1.73:m2 >90           Assessment/Plan:    Assessment & Plan     Diagnoses and all orders for this visit:    Hypertension goal BP (blood pressure) < 140/90  -     Refill; hydrochlorothiazide (HYDRODIURIL) 25 MG tablet; Take 1 tablet (25 mg) by mouth daily    Generalized anxiety disorder, stable on current medications  - PHQ-9/JACOB 7 completed, see above/Epic for details     -  Refill: citalopram (CELEXA) 10 MG tablet; Take 1 tablet (10 mg) by mouth daily      Return in about 1 month (around 10/1/2020) for Medication check, a Physical Exam at your earliest convenience..    Sally Joe MD  [facility]    Phone call duration:  15 minutes

## 2020-09-02 ASSESSMENT — ANXIETY QUESTIONNAIRES: GAD7 TOTAL SCORE: 3

## 2020-09-22 ENCOUNTER — ANESTHESIA (OUTPATIENT)
Dept: EMERGENCY MEDICINE | Facility: CLINIC | Age: 40
End: 2020-09-22

## 2020-09-22 ENCOUNTER — HOSPITAL ENCOUNTER (EMERGENCY)
Facility: CLINIC | Age: 40
Discharge: HOME | End: 2020-09-22
Attending: EMERGENCY MEDICINE | Admitting: EMERGENCY MEDICINE

## 2020-09-22 ENCOUNTER — APPOINTMENT (OUTPATIENT)
Dept: CT IMAGING | Facility: CLINIC | Age: 40
End: 2020-09-22
Attending: EMERGENCY MEDICINE

## 2020-09-22 ENCOUNTER — ANESTHESIA EVENT (OUTPATIENT)
Dept: EMERGENCY MEDICINE | Facility: CLINIC | Age: 40
End: 2020-09-22

## 2020-09-22 VITALS
SYSTOLIC BLOOD PRESSURE: 124 MMHG | DIASTOLIC BLOOD PRESSURE: 82 MMHG | OXYGEN SATURATION: 98 % | RESPIRATION RATE: 17 BRPM | BODY MASS INDEX: 24.56 KG/M2 | HEART RATE: 92 BPM | WEIGHT: 130 LBS | TEMPERATURE: 98 F

## 2020-09-22 DIAGNOSIS — R51.9 NONINTRACTABLE HEADACHE, UNSPECIFIED CHRONICITY PATTERN, UNSPECIFIED HEADACHE TYPE: ICD-10-CM

## 2020-09-22 LAB
ANION GAP SERPL CALCULATED.3IONS-SCNC: 7 MMOL/L (ref 3–14)
BASOPHILS # BLD AUTO: 0.1 10E9/L (ref 0–0.2)
BASOPHILS NFR BLD AUTO: 0.9 %
BUN SERPL-MCNC: 10 MG/DL (ref 7–30)
CALCIUM SERPL-MCNC: 9.3 MG/DL (ref 8.5–10.1)
CHLORIDE SERPL-SCNC: 108 MMOL/L (ref 94–109)
CO2 SERPL-SCNC: 26 MMOL/L (ref 20–32)
CREAT SERPL-MCNC: 0.47 MG/DL (ref 0.52–1.04)
CRP SERPL-MCNC: <2.9 MG/L (ref 0–8)
DIFFERENTIAL METHOD BLD: ABNORMAL
EOSINOPHIL # BLD AUTO: 0.1 10E9/L (ref 0–0.7)
EOSINOPHIL NFR BLD AUTO: 1.9 %
ERYTHROCYTE [DISTWIDTH] IN BLOOD BY AUTOMATED COUNT: 11.9 % (ref 10–15)
GFR SERPL CREATININE-BSD FRML MDRD: >90 ML/MIN/{1.73_M2}
GLUCOSE BLDC GLUCOMTR-MCNC: 98 MG/DL (ref 70–99)
GLUCOSE SERPL-MCNC: 102 MG/DL (ref 70–99)
HCT VFR BLD AUTO: 43.6 % (ref 35–47)
HGB BLD-MCNC: 15.2 G/DL (ref 11.7–15.7)
IMM GRANULOCYTES # BLD: 0 10E9/L (ref 0–0.4)
IMM GRANULOCYTES NFR BLD: 0.1 %
LYMPHOCYTES # BLD AUTO: 2.9 10E9/L (ref 0.8–5.3)
LYMPHOCYTES NFR BLD AUTO: 42.6 %
MCH RBC QN AUTO: 33.8 PG (ref 26.5–33)
MCHC RBC AUTO-ENTMCNC: 34.9 G/DL (ref 31.5–36.5)
MCV RBC AUTO: 97 FL (ref 78–100)
MONOCYTES # BLD AUTO: 0.5 10E9/L (ref 0–1.3)
MONOCYTES NFR BLD AUTO: 6.9 %
NEUTROPHILS # BLD AUTO: 3.2 10E9/L (ref 1.6–8.3)
NEUTROPHILS NFR BLD AUTO: 47.6 %
NRBC # BLD AUTO: 0 10*3/UL
NRBC BLD AUTO-RTO: 0 /100
PLATELET # BLD AUTO: 211 10E9/L (ref 150–450)
POTASSIUM SERPL-SCNC: 3.7 MMOL/L (ref 3.4–5.3)
RBC # BLD AUTO: 4.5 10E12/L (ref 3.8–5.2)
SODIUM SERPL-SCNC: 141 MMOL/L (ref 133–144)
WBC # BLD AUTO: 6.8 10E9/L (ref 4–11)

## 2020-09-22 PROCEDURE — 25000128 H RX IP 250 OP 636: Performed by: EMERGENCY MEDICINE

## 2020-09-22 PROCEDURE — 25800030 ZZH RX IP 258 OP 636: Performed by: EMERGENCY MEDICINE

## 2020-09-22 PROCEDURE — 80048 BASIC METABOLIC PNL TOTAL CA: CPT | Performed by: EMERGENCY MEDICINE

## 2020-09-22 PROCEDURE — 96361 HYDRATE IV INFUSION ADD-ON: CPT | Performed by: EMERGENCY MEDICINE

## 2020-09-22 PROCEDURE — 99285 EMERGENCY DEPT VISIT HI MDM: CPT | Mod: Z6 | Performed by: EMERGENCY MEDICINE

## 2020-09-22 PROCEDURE — 96365 THER/PROPH/DIAG IV INF INIT: CPT | Performed by: EMERGENCY MEDICINE

## 2020-09-22 PROCEDURE — 70450 CT HEAD/BRAIN W/O DYE: CPT

## 2020-09-22 PROCEDURE — 00000146 ZZHCL STATISTIC GLUCOSE BY METER IP

## 2020-09-22 PROCEDURE — 96375 TX/PRO/DX INJ NEW DRUG ADDON: CPT | Performed by: EMERGENCY MEDICINE

## 2020-09-22 PROCEDURE — 99285 EMERGENCY DEPT VISIT HI MDM: CPT | Mod: 25 | Performed by: EMERGENCY MEDICINE

## 2020-09-22 PROCEDURE — 86140 C-REACTIVE PROTEIN: CPT | Performed by: EMERGENCY MEDICINE

## 2020-09-22 PROCEDURE — 85025 COMPLETE CBC W/AUTO DIFF WBC: CPT | Performed by: EMERGENCY MEDICINE

## 2020-09-22 RX ORDER — KETOROLAC TROMETHAMINE 15 MG/ML
15 INJECTION, SOLUTION INTRAMUSCULAR; INTRAVENOUS ONCE
Status: COMPLETED | OUTPATIENT
Start: 2020-09-22 | End: 2020-09-22

## 2020-09-22 RX ORDER — MAGNESIUM SULFATE 1 G/100ML
1 INJECTION INTRAVENOUS ONCE
Status: COMPLETED | OUTPATIENT
Start: 2020-09-22 | End: 2020-09-22

## 2020-09-22 RX ORDER — HYDROMORPHONE HYDROCHLORIDE 1 MG/ML
0.5 INJECTION, SOLUTION INTRAMUSCULAR; INTRAVENOUS; SUBCUTANEOUS ONCE
Status: COMPLETED | OUTPATIENT
Start: 2020-09-22 | End: 2020-09-22

## 2020-09-22 RX ORDER — SODIUM CHLORIDE 9 MG/ML
INJECTION, SOLUTION INTRAVENOUS CONTINUOUS
Status: DISCONTINUED | OUTPATIENT
Start: 2020-09-22 | End: 2020-09-22 | Stop reason: HOSPADM

## 2020-09-22 RX ORDER — METOCLOPRAMIDE HYDROCHLORIDE 5 MG/ML
10 INJECTION INTRAMUSCULAR; INTRAVENOUS ONCE
Status: COMPLETED | OUTPATIENT
Start: 2020-09-22 | End: 2020-09-22

## 2020-09-22 RX ORDER — DIPHENHYDRAMINE HYDROCHLORIDE 50 MG/ML
25 INJECTION INTRAMUSCULAR; INTRAVENOUS ONCE
Status: DISCONTINUED | OUTPATIENT
Start: 2020-09-22 | End: 2020-09-22 | Stop reason: HOSPADM

## 2020-09-22 RX ADMIN — KETOROLAC TROMETHAMINE 15 MG: 15 INJECTION, SOLUTION INTRAMUSCULAR; INTRAVENOUS at 15:23

## 2020-09-22 RX ADMIN — SODIUM CHLORIDE 1000 ML: 9 INJECTION, SOLUTION INTRAVENOUS at 15:22

## 2020-09-22 RX ADMIN — HYDROMORPHONE HYDROCHLORIDE 0.5 MG: 1 INJECTION, SOLUTION INTRAMUSCULAR; INTRAVENOUS; SUBCUTANEOUS at 16:53

## 2020-09-22 RX ADMIN — MAGNESIUM SULFATE HEPTAHYDRATE 1 G: 1 INJECTION, SOLUTION INTRAVENOUS at 16:04

## 2020-09-22 RX ADMIN — METOCLOPRAMIDE HYDROCHLORIDE 10 MG: 5 INJECTION INTRAMUSCULAR; INTRAVENOUS at 15:24

## 2020-09-22 NOTE — ED AVS SNAPSHOT
[facility] Emergency Department  [address] [facility] [address]  Phone:  [phone]  Fax:  [phone]                                    Rober Renee   MRN: [id number]    Department:  [facility] Emergency Department   Date of Visit:  9/22/2020           After Visit Summary Signature Page    I have received my discharge instructions, and my questions have been answered. I have discussed any challenges I see with this plan with the nurse or doctor.    ..........................................................................................................................................  Patient/Patient Representative Signature      ..........................................................................................................................................  Patient Representative Print Name and Relationship to Patient    ..................................................               ................................................  Date                                   Time    ..........................................................................................................................................  Reviewed by Signature/Title    ...................................................              ..............................................  Date                                               Time          22EPIC Rev 08/18

## 2020-09-22 NOTE — ED PROVIDER NOTES
"  History     Chief Complaint   Patient presents with     Headache     HX of migraines onset on  of HA, blurred vision      HPI  Rober Renee is a 40 year old female who reports awakening with the worst headache of her life 2 mornings ago.  Denies inciting trauma or strain.  Reports drinking large amount of alcohol over the weekend, \"girls weekend\".  Denies illicit drug use.  Reports nausea, feeling lightheaded, ongoing occipital headache.  Denies meningismus.  Denies ill contacts, although works as a , but reports wearing a mask.  Reports COVID infection in , diarrhea liquidy stool 5 times daily since.  Denies fever, chest pain, cough, shortness of breath, sore throat, abdominal pain, urinary symptoms rash.    Allergies:  Allergies   Allergen Reactions     Imitrex [Sumatriptan] Other (See Comments)     Felt terrible, \"like pores were on fire\"     Sulfa Drugs Hives       Problem List:    Patient Active Problem List    Diagnosis Date Noted     Benign essential hypertension 10/11/2019     Priority: Medium     Endometriosis determined by laparoscopy 04/10/2018     Priority: Medium     Anxiety 2017     Priority: Medium     Renal stones 10/09/2015     Priority: Medium     Migraine headache 2012     Priority: Medium     CARDIOVASCULAR SCREENING; LDL GOAL LESS THAN 160 10/31/2010     Priority: Medium        Past Medical History:    Past Medical History:   Diagnosis Date     Calculus of kidney      Cervical high risk HPV (human papillomavirus) test positive 2018     Chickenpox      Gestational diabetes ,     History of postpartum depression, currently pregnant      Intervertebral lumbar disc disorder with myelopathy, lumbar region      Intramural leiomyoma of uterus 4/3/2018     Migraines      Urinary tract bacterial infections        Past Surgical History:    Past Surgical History:   Procedure Laterality Date      SECTION, TUBAL LIGATION, COMBINED  2013    " c/section with BTL     HYSTERECTOMY, PAP NO LONGER INDICATED  04/10/2018    No history of LIBERTY 2 or greater     LAPAROSCOPIC ASSISTED HYSTERECTOMY VAGINAL, BILATERAL SALPINGO-OOPHORECTOMY, COMBINED N/A 4/10/2018    Procedure: COMBINED LAPAROSCOPIC ASSISTED HYSTERECTOMY VAGINAL, SALPINGO-OOPHORECTOMY;  Laparoscopic assisted vaginal hysterectomy with possible bilateral salpingoophorectomy.;  Surgeon: Taisha Wilson MD;  Location: [facility]       Family History:    Family History   Problem Relation Age of Onset     Cancer Maternal Grandfather         liver CA     Cancer Paternal Grandfather         prostate CA     Cancer Maternal Grandmother         ovarian CA     Other - See Comments Maternal Grandmother         migraine     Diabetes Paternal Grandmother      Hypertension Paternal Grandmother      Hypertension Father      Depression Father      Other - See Comments Mother         migraine     Hypertension Brother      Other - See Comments Maternal Uncle         migraine     Depression Sister      Melanoma No family hx of        Social History:  Marital Status:  Single [1]  Social History     Tobacco Use     Smoking status: Former Smoker     Packs/day: 0.50     Years: 10.00     Pack years: 5.00     Types: Cigarettes     Last attempt to quit: 2008     Years since quittin.5     Smokeless tobacco: Never Used   Substance Use Topics     Alcohol use: Yes     Comment: very seldom     Drug use: No        Medications:    escitalopram (LEXAPRO) 10 MG tablet  estradiol (ESTRACE) 2 MG tablet  hydrochlorothiazide (HYDRODIURIL) 25 MG tablet  LORazepam (ATIVAN) 1 MG tablet          Review of Systems  All other systems reviewed and are negative.    Physical Exam   BP: (!) 172/120  Pulse: 86  Temp: 98  F (36.7  C)  Resp: 16  Weight: 59 kg (130 lb)  SpO2: 98 %      Physical Exam  Nontoxic-appearing no respiratory distress alert and oriented x3. GCS 15 on arrival, throughout stay and at discharge.    Head atraumatic  normocephalic    Cranial nerves; vision baseline fields intact, PERRL, EOMI, facial sensation intact to light touch, facial muscle tone intact and symmetrical, hearing grossly intact,swallowing without difficulty, voice baseline and normal, SCM  strength intact, tongue protrudes midline.  Palatal elevation symmetric    TM's unremarkable, EACs clear, oropharynx moist without lesions or erythema.    Neck supple full active painless range of motion no posterior midline tenderness.    No cervical adenopathy    Lungs clear to auscultation no rales rhonchi or wheezes    Heart regular no murmur S3 or rub    Abdomen soft nontender bowel sounds positive no masses or HSM    Strength and sensation intact throughout the extremities, skin clear from rash or lesion.    Extremities are atraumatic, full painless active range of motion all joints      ED Course  Patient is reevaluated at 1610, CT scan head is negative lab work-up is unremarkable, no real response to meds with respect to headache.  LP ordered, Dilaudid 0.4 mg ordered.    Patient subsequently deferred LP, despite discussion surrounding unknown with respect to possible meningitis possible sub-occult subarachnoid hemorrhage.  She felt comfortable with risk.  Recommend follow-up neurology with regard to complex chronic headaches.        Procedures               Critical Care time:  none               Results for orders placed or performed during the hospital encounter of 09/22/20 (from the past 24 hour(s))   Glucose by meter   Result Value Ref Range    Glucose 98 70 - 99 mg/dL   CBC with platelets differential   Result Value Ref Range    WBC 6.8 4.0 - 11.0 10e9/L    RBC Count 4.50 3.8 - 5.2 10e12/L    Hemoglobin 15.2 11.7 - 15.7 g/dL    Hematocrit 43.6 35.0 - 47.0 %    MCV 97 78 - 100 fl    MCH 33.8 (H) 26.5 - 33.0 pg    MCHC 34.9 31.5 - 36.5 g/dL    RDW 11.9 10.0 - 15.0 %    Platelet Count 211 150 - 450 10e9/L    Diff Method Automated Method     % Neutrophils 47.6 %     % Lymphocytes 42.6 %    % Monocytes 6.9 %    % Eosinophils 1.9 %    % Basophils 0.9 %    % Immature Granulocytes 0.1 %    Nucleated RBCs 0 0 /100    Absolute Neutrophil 3.2 1.6 - 8.3 10e9/L    Absolute Lymphocytes 2.9 0.8 - 5.3 10e9/L    Absolute Monocytes 0.5 0.0 - 1.3 10e9/L    Absolute Eosinophils 0.1 0.0 - 0.7 10e9/L    Absolute Basophils 0.1 0.0 - 0.2 10e9/L    Abs Immature Granulocytes 0.0 0 - 0.4 10e9/L    Absolute Nucleated RBC 0.0    Basic metabolic panel   Result Value Ref Range    Sodium 141 133 - 144 mmol/L    Potassium 3.7 3.4 - 5.3 mmol/L    Chloride 108 94 - 109 mmol/L    Carbon Dioxide 26 20 - 32 mmol/L    Anion Gap 7 3 - 14 mmol/L    Glucose 102 (H) 70 - 99 mg/dL    Urea Nitrogen 10 7 - 30 mg/dL    Creatinine 0.47 (L) 0.52 - 1.04 mg/dL    GFR Estimate >90 >60 mL/min/[1.73_m2]    GFR Estimate If Black >90 >60 mL/min/[1.73_m2]    Calcium 9.3 8.5 - 10.1 mg/dL   CRP inflammation   Result Value Ref Range    CRP Inflammation <2.9 0.0 - 8.0 mg/L   Head CT w/o contrast    Narrative    CT SCAN OF THE HEAD WITHOUT CONTRAST   9/22/2020 3:54 PM     HISTORY: Worst headache of her life.    TECHNIQUE: Axial images of the head and coronal reformations without  IV contrast material. Radiation dose for this scan was reduced using  automated exposure control, adjustment of the mA and/or kV according  to patient size, or iterative reconstruction technique.    COMPARISON: 8/20/2007    FINDINGS: The ventricles are normal in size, shape and configuration.  The brain parenchyma and subarachnoid spaces are normal. There is no  evidence of intracranial hemorrhage, mass, acute infarct or anomaly.     The visualized portions of the sinuses and mastoids appear normal.  There is no evidence of trauma.      Impression    IMPRESSION: Normal CT scan of the head.      STAS SHEN MD       Medications   0.9% sodium chloride BOLUS (0 mLs Intravenous Stopped 9/22/20 1957)     Followed by   sodium chloride 0.9% infusion (has no  administration in time range)   diphenhydrAMINE (BENADRYL) injection 25 mg (25 mg Intravenous Not Given 9/22/20 1521)   magnesium sulfate 1 gm in 100mL D5W intermittent infusion (0 g Intravenous Stopped 9/22/20 1650)   ketorolac (TORADOL) injection 15 mg (15 mg Intravenous Given 9/22/20 1523)   metoclopramide (REGLAN) injection 10 mg (10 mg Intravenous Given 9/22/20 1524)   HYDROmorphone (PF) (DILAUDID) injection 0.5 mg (0.5 mg Intravenous Given 9/22/20 1653)       Assessments & Plan (with Medical Decision Making)  Headache for 2 days, atypical with respect to chronic headaches.  Neuro exam is unremarkable, no historical or physical findings consistent with infection, cannot rule out meningitis or occult subarachnoid.  CT scan head without contrast negative for acute finding by radiology read.  Discussed lumbar puncture patient defers despite known risk regarding possible meningitis/occult subarachnoid hemorrhage.  Recommend neurology follow-up.  Return criteria discussed.     I have reviewed the nursing notes.    I have reviewed the findings, diagnosis, plan and need for follow up with the patient.       Discharge Medication List as of 9/22/2020  4:59 PM          Final diagnoses:   Nonintractable headache, unspecified chronicity pattern, unspecified headache type       9/22/2020   [facility] EMERGENCY DEPARTMENT     Jeff Lewis MD  09/22/20 2830

## 2020-09-22 NOTE — DISCHARGE INSTRUCTIONS
Tylenol/ibuprofen for headache.  Follow-up neurology.  Return if worsening headache, focal neurologic change, vomiting fever or other concern

## 2020-10-21 ENCOUNTER — OFFICE VISIT (OUTPATIENT)
Dept: FAMILY MEDICINE | Facility: CLINIC | Age: 40
End: 2020-10-21
Payer: MEDICAID

## 2020-10-21 VITALS
HEIGHT: 61 IN | RESPIRATION RATE: 18 BRPM | TEMPERATURE: 98.1 F | BODY MASS INDEX: 25.68 KG/M2 | WEIGHT: 136 LBS | DIASTOLIC BLOOD PRESSURE: 123 MMHG | SYSTOLIC BLOOD PRESSURE: 163 MMHG | HEART RATE: 78 BPM | OXYGEN SATURATION: 98 %

## 2020-10-21 DIAGNOSIS — Z00.00 ROUTINE GENERAL MEDICAL EXAMINATION AT A HEALTH CARE FACILITY: Primary | ICD-10-CM

## 2020-10-21 DIAGNOSIS — Z12.31 ENCOUNTER FOR SCREENING MAMMOGRAM FOR BREAST CANCER: ICD-10-CM

## 2020-10-21 DIAGNOSIS — Z13.220 LIPID SCREENING: ICD-10-CM

## 2020-10-21 DIAGNOSIS — F41.1 GENERALIZED ANXIETY DISORDER: ICD-10-CM

## 2020-10-21 DIAGNOSIS — I10 HYPERTENSION GOAL BP (BLOOD PRESSURE) < 140/90: ICD-10-CM

## 2020-10-21 DIAGNOSIS — Z23 NEED FOR TDAP VACCINATION: ICD-10-CM

## 2020-10-21 DIAGNOSIS — R19.7 DIARRHEA, UNSPECIFIED TYPE: ICD-10-CM

## 2020-10-21 PROCEDURE — 99396 PREV VISIT EST AGE 40-64: CPT | Mod: 25 | Performed by: FAMILY MEDICINE

## 2020-10-21 PROCEDURE — 90715 TDAP VACCINE 7 YRS/> IM: CPT | Performed by: FAMILY MEDICINE

## 2020-10-21 PROCEDURE — 83516 IMMUNOASSAY NONANTIBODY: CPT | Performed by: FAMILY MEDICINE

## 2020-10-21 PROCEDURE — 90471 IMMUNIZATION ADMIN: CPT | Performed by: FAMILY MEDICINE

## 2020-10-21 PROCEDURE — 36415 COLL VENOUS BLD VENIPUNCTURE: CPT | Performed by: FAMILY MEDICINE

## 2020-10-21 PROCEDURE — 99214 OFFICE O/P EST MOD 30 MIN: CPT | Mod: 25 | Performed by: FAMILY MEDICINE

## 2020-10-21 PROCEDURE — 84443 ASSAY THYROID STIM HORMONE: CPT | Performed by: FAMILY MEDICINE

## 2020-10-21 RX ORDER — LOPERAMIDE HYDROCHLORIDE 2 MG/1
2 TABLET ORAL 4 TIMES DAILY PRN
COMMUNITY
Start: 2020-10-21 | End: 2021-02-17

## 2020-10-21 ASSESSMENT — ANXIETY QUESTIONNAIRES
1. FEELING NERVOUS, ANXIOUS, OR ON EDGE: NOT AT ALL
6. BECOMING EASILY ANNOYED OR IRRITABLE: NOT AT ALL
2. NOT BEING ABLE TO STOP OR CONTROL WORRYING: NOT AT ALL
GAD7 TOTAL SCORE: 2
7. FEELING AFRAID AS IF SOMETHING AWFUL MIGHT HAPPEN: NOT AT ALL
5. BEING SO RESTLESS THAT IT IS HARD TO SIT STILL: NOT AT ALL
IF YOU CHECKED OFF ANY PROBLEMS ON THIS QUESTIONNAIRE, HOW DIFFICULT HAVE THESE PROBLEMS MADE IT FOR YOU TO DO YOUR WORK, TAKE CARE OF THINGS AT HOME, OR GET ALONG WITH OTHER PEOPLE: NOT DIFFICULT AT ALL
3. WORRYING TOO MUCH ABOUT DIFFERENT THINGS: SEVERAL DAYS

## 2020-10-21 ASSESSMENT — MIFFLIN-ST. JEOR: SCORE: 1224.27

## 2020-10-21 ASSESSMENT — PATIENT HEALTH QUESTIONNAIRE - PHQ9
5. POOR APPETITE OR OVEREATING: SEVERAL DAYS
SUM OF ALL RESPONSES TO PHQ QUESTIONS 1-9: 0

## 2020-10-21 NOTE — PROGRESS NOTES
SUBJECTIVE:   CC: Rober Renee is an 40 year old woman who presents for preventive health visit.       Patient has been advised of split billing requirements and indicates understanding: Yes  Healthy Habits:    Do you get at least three servings of calcium containing foods daily (dairy, green leafy vegetables, etc.)? yes    Amount of exercise or daily activities, outside of work: none    Problems taking medications regularly No    Medication side effects: No    Have you had an eye exam in the past two years? no    Do you see a dentist twice per year? yes    Do you have sleep apnea, excessive snoring or daytime drowsiness?no      ADDITIONAL CONCERNS:    Hypertension Follow-up  Blood pressure is significantly elevated in the clinic today.   Was recently seen on 9/1/2020 for a Virtual visit, reported that she had been out of BP meds for 2 months, resumed taking Hydrochlorothiazide 25 mg/day.     Do you check your blood pressure regularly outside of the clinic? No     Are you following a low salt diet? Yes    Are your blood pressures ever more than 140 on the top number (systolic) OR more   than 90 on the bottom number (diastolic), for example 140/90? Yes      Anxiety Follow-Up  Was recently started on Lexapro 10 mg/day.  States that she wishes to discontinue it and try another medication as it's causing her night sweats.     How are you doing with your anxiety since your last visit? Worsened     Are you having other symptoms that might be associated with anxiety? No    Have you had a significant life event? OTHER: COVID pandemic.     Are you feeling depressed? No    Do you have any concerns with your use of alcohol or other drugs? No     Also concerned about ongoing diarrhea; worse after meals. Denies having any blood in the stool.   No nausea, vomiting or weight loss.   No previous work up.     Social History     Tobacco Use     Smoking status: Former Smoker     Packs/day: 0.50     Years: 10.00     Pack years:  5.00     Types: Cigarettes     Quit date: 2008     Years since quittin.6     Smokeless tobacco: Never Used   Substance Use Topics     Alcohol use: Yes     Comment: very seldom     Drug use: No     JACOB-7 SCORE 2020 2020 10/21/2020   Total Score 3 3 2     PHQ 2020 2020 10/21/2020   PHQ-9 Total Score 1 1 0   Q9: Thoughts of better off dead/self-harm past 2 weeks Not at all Not at all Not at all     Last PHQ-9 10/21/2020   1.  Little interest or pleasure in doing things 0   2.  Feeling down, depressed, or hopeless 0   3.  Trouble falling or staying asleep, or sleeping too much 0   4.  Feeling tired or having little energy 0   5.  Poor appetite or overeating 0   6.  Feeling bad about yourself 0   7.  Trouble concentrating 0   8.  Moving slowly or restless 0   Q9: Thoughts of better off dead/self-harm past 2 weeks 0   PHQ-9 Total Score 0   Difficulty at work, home, or with people -     JACOB-7  10/21/2020   1. Feeling nervous, anxious, or on edge 0   2. Not being able to stop or control worrying 0   3. Worrying too much about different things 1   4. Trouble relaxing 1   5. Being so restless that it is hard to sit still 0   6. Becoming easily annoyed or irritable 0   7. Feeling afraid, as if something awful might happen 0   JACOB-7 Total Score 2   If you checked any problems, how difficult have they made it for you to do your work, take care of things at home, or get along with other people? Not difficult at all             Patient informed that anything we discuss that is not related to preventative medicine, may be billed for; patient verbalizes understanding.      Today's PHQ-2 Score:   PHQ-2 (  [employer]) 10/21/2020 2018   Q1: Little interest or pleasure in doing things 0 1   Q2: Feeling down, depressed or hopeless 0 1   PHQ-2 Score 0 2       Abuse: Current or Past(Physical, Sexual or Emotional)- No  Do you feel safe in your environment? Yes        Social History     Tobacco Use      Smoking status: Former Smoker     Packs/day: 0.50     Years: 10.00     Pack years: 5.00     Types: Cigarettes     Quit date: 2008     Years since quittin.6     Smokeless tobacco: Never Used   Substance Use Topics     Alcohol use: Yes     Comment: very seldom     If you drink alcohol do you typically have >3 drinks per day or >7 drinks per week? No                     Reviewed orders with patient.  Reviewed health maintenance and updated orders accordingly - Yes  Lab work is in process  Labs reviewed in EPIC  BP Readings from Last 3 Encounters:   10/21/20 (!) 163/123   20 124/82   20 (!) 136/92    Wt Readings from Last 3 Encounters:   10/21/20 61.7 kg (136 lb)   20 59 kg (130 lb)   20 64 kg (141 lb 3.2 oz)                  Patient Active Problem List   Diagnosis     CARDIOVASCULAR SCREENING; LDL GOAL LESS THAN 160     Migraine headache     Renal stones     Anxiety     Endometriosis determined by laparoscopy     Benign essential hypertension     Past Surgical History:   Procedure Laterality Date      SECTION, TUBAL LIGATION, COMBINED  2013    c/section with BTL     HYSTERECTOMY, PAP NO LONGER INDICATED  04/10/2018    No history of LIBERTY 2 or greater     LAPAROSCOPIC ASSISTED HYSTERECTOMY VAGINAL, BILATERAL SALPINGO-OOPHORECTOMY, COMBINED N/A 4/10/2018    Procedure: COMBINED LAPAROSCOPIC ASSISTED HYSTERECTOMY VAGINAL, SALPINGO-OOPHORECTOMY;  Laparoscopic assisted vaginal hysterectomy with possible bilateral salpingoophorectomy.;  Surgeon: Taisha Wilson MD;  Location: [facility] OR       Social History     Tobacco Use     Smoking status: Former Smoker     Packs/day: 0.50     Years: 10.00     Pack years: 5.00     Types: Cigarettes     Quit date: 2008     Years since quittin.6     Smokeless tobacco: Never Used   Substance Use Topics     Alcohol use: Yes     Comment: very seldom     Family History   Problem Relation Age of Onset     Cancer Maternal Grandfather        "  liver CA     Cancer Paternal Grandfather         prostate CA     Cancer Maternal Grandmother         ovarian CA     Other - See Comments Maternal Grandmother         migraine     Diabetes Paternal Grandmother      Hypertension Paternal Grandmother      Hypertension Father      Depression Father      Other - See Comments Mother         migraine     Hypertension Brother      Other - See Comments Maternal Uncle         migraine     Depression Sister      Melanoma No family hx of          Current Outpatient Medications   Medication Sig Dispense Refill     estradiol (ESTRACE) 2 MG tablet Take 1.5 tablets (3 mg) by mouth daily 135 tablet 3     hydrochlorothiazide (HYDRODIURIL) 25 MG tablet Take 1 tablet (25 mg) by mouth daily 90 tablet 3     loperamide (IMODIUM A-D) 2 MG tablet Take 1 tablet (2 mg) by mouth 4 times daily as needed for diarrhea       LORazepam (ATIVAN) 1 MG tablet Take 1 tablet (1 mg) by mouth every 8 hours as needed for anxiety 20 tablet 1     sertraline (ZOLOFT) 50 MG tablet Take 1 tablet (50 mg) by mouth daily Take 25 mg (1/2 tab) daily x 1 week then increase to 50 mg (1 tabs) daily thereafter. 30 tablet 1     Allergies   Allergen Reactions     Imitrex [Sumatriptan] Other (See Comments)     Felt terrible, \"like pores were on fire\"     Sulfa Drugs Hives       Mammogram Screening: Patient under age 50, mutual decision reflected in health maintenance.      Pertinent mammograms are reviewed under the imaging tab.  History of abnormal Pap smear:   Last 3 Pap and HPV Results:   PAP / HPV Latest Ref Rng & Units 4/3/2018 7/17/2013 1/27/2012   PAP - NIL NIL NIL   HPV 16 DNA NEG:Negative Negative - -   HPV 18 DNA NEG:Negative Negative - -   OTHER HR HPV NEG:Negative Positive(A) - -     PAP / HPV Latest Ref Rng & Units 4/3/2018 7/17/2013 1/27/2012   PAP - NIL NIL NIL   HPV 16 DNA NEG:Negative Negative - -   HPV 18 DNA NEG:Negative Negative - -   OTHER HR HPV NEG:Negative Positive(A) - -     Reviewed and updated " "as needed this visit by clinical staff  Tobacco  Allergies  Meds              Reviewed and updated as needed this visit by Provider                    ROS:  CONSTITUTIONAL: NEGATIVE for fever, chills, change in weight  INTEGUMENTARU/SKIN: NEGATIVE for worrisome rashes, moles or lesions  EYES: NEGATIVE for vision changes or irritation  ENT: NEGATIVE for ear, mouth and throat problems  RESP: NEGATIVE for significant cough or SOB  BREAST: NEGATIVE for masses, tenderness or discharge  CV: NEGATIVE for chest pain, palpitations or peripheral edema  GI: diarrhea  : NEGATIVE for unusual urinary or vaginal symptoms. Periods are regular.  MUSCULOSKELETAL: NEGATIVE for significant arthralgias or myalgia  NEURO: NEGATIVE for weakness, dizziness or paresthesias  PSYCHIATRIC: NEGATIVE for changes in mood or affect    OBJECTIVE:   BP (!) 163/123   Pulse 78   Temp 98.1  F (36.7  C) (Tympanic)   Resp 18   Ht 1.549 m (5' 1\")   Wt 61.7 kg (136 lb)   LMP 04/02/2018   SpO2 98%   Breastfeeding No   BMI 25.70 kg/m    EXAM:  GENERAL: healthy, alert and no distress  EYES: Eyes grossly normal to inspection, PERRL and conjunctivae and sclerae normal  HENT: ear canals and TM's normal, nose and mouth without ulcers or lesions  NECK: no adenopathy, no asymmetry, masses, or scars and thyroid normal to palpation  RESP: lungs clear to auscultation - no rales, rhonchi or wheezes  BREAST: normal without masses, tenderness or nipple discharge and no palpable axillary masses or adenopathy  CV: regular rate and rhythm, normal S1 S2, no S3 or S4, no murmur, click or rub, no peripheral edema and peripheral pulses strong  ABDOMEN: soft, nontender, no hepatosplenomegaly, no masses and bowel sounds normal  MS: no gross musculoskeletal defects noted, no edema  SKIN: no suspicious lesions or rashes  NEURO: Normal strength and tone, mentation intact and speech normal  PSYCH: mentation appears normal, affect normal/bright    Diagnostic Test " "Results:  Recent Labs done on 9/22/2020 reviewed in Epic      ASSESSMENT/PLAN:   Rober was seen today for physical.    Diagnoses and all orders for this visit:    Routine general medical examination at a health care facility    Lipid screening  -     Lipid Profile; Future    Encounter for screening mammogram for breast cancer  -     *MA Screening Digital Bilateral; Future    Hypertension goal BP (blood pressure) < 140/90, uncontrolled        -      Resume taking BP medications.       -      Recheck BP in 2 weeks.     Generalized anxiety disorder  -   PHQ-9/JACOB 7 completed, see above/Epic for details       -   Discontinue Lexapro - intolerance side effects per patient.   -   Start: Sertraline (ZOLOFT) 50 MG tablet; Take 1 tablet (50 mg) by mouth daily Take 25 mg (1/2 tab) daily x 1 week then increase to 50 mg (1 tabs) daily thereafter.    Diarrhea, unspecified type  -     Tissue transglutaminase nelson IgA and IgG  -     TSH with free T4 reflex  -     Enteric Bacteria and Virus Panel by YUNIOR Stool; Future  -     Ova and Parasite Exam Routine; Future  -     Clostridium difficile Toxin B PCR; Future  -     loperamide (IMODIUM A-D) 2 MG tablet; Take 1 tablet (2 mg) by mouth 4 times daily as needed for diarrhea    Need for Tdap vaccination  -     TDAP VACCINE (Adacel, Boostrix)  [[id number]]  -     ADMIN 1st VACCINE        Patient has been advised of split billing requirements and indicates understanding: Yes  COUNSELING:   Reviewed preventive health counseling, as reflected in patient instructions       Regular exercise       Healthy diet/nutrition    Estimated body mass index is 25.7 kg/m  as calculated from the following:    Height as of this encounter: 1.549 m (5' 1\").    Weight as of this encounter: 61.7 kg (136 lb).    Weight management plan: Discussed healthy diet and exercise guidelines    She reports that she quit smoking about 12 years ago. Her smoking use included cigarettes. She has a 5.00 pack-year smoking history. " She has never used smokeless tobacco.      Counseling Resources:  ATP IV Guidelines  Pooled Cohorts Equation Calculator  Breast Cancer Risk Calculator  BRCA-Related Cancer Risk Assessment: FHS-7 Tool  FRAX Risk Assessment  ICSI Preventive Guidelines  Dietary Guidelines for Americans, 2010  USDA's MyPlate  ASA Prophylaxis  Lung CA Screening    Follow up in 1 months- med check.   Next Annual Physical due in 10 /2021    Sally Joe MD  [facility]

## 2020-10-22 LAB — TSH SERPL DL<=0.005 MIU/L-ACNC: 1.53 MU/L (ref 0.4–4)

## 2020-10-22 ASSESSMENT — ANXIETY QUESTIONNAIRES: GAD7 TOTAL SCORE: 2

## 2020-10-23 LAB
TTG IGA SER-ACNC: 1 U/ML
TTG IGG SER-ACNC: 1 U/ML

## 2020-11-03 ENCOUNTER — E-VISIT (OUTPATIENT)
Dept: OBGYN | Facility: CLINIC | Age: 40
End: 2020-11-03
Payer: COMMERCIAL

## 2020-11-03 DIAGNOSIS — G43.919 INTRACTABLE MIGRAINE WITHOUT STATUS MIGRAINOSUS, UNSPECIFIED MIGRAINE TYPE: ICD-10-CM

## 2020-11-03 DIAGNOSIS — G43.019 INTRACTABLE MIGRAINE WITHOUT AURA AND WITHOUT STATUS MIGRAINOSUS: Primary | ICD-10-CM

## 2020-11-03 PROCEDURE — 99421 OL DIG E/M SVC 5-10 MIN: CPT | Performed by: OBSTETRICS & GYNECOLOGY

## 2020-11-03 RX ORDER — HYDROCODONE BITARTRATE AND ACETAMINOPHEN 5; 325 MG/1; MG/1
1-2 TABLET ORAL EVERY 6 HOURS PRN
Qty: 8 TABLET | Refills: 0 | Status: SHIPPED | OUTPATIENT
Start: 2020-11-03 | End: 2020-11-24

## 2020-11-03 NOTE — PATIENT INSTRUCTIONS
Thank you for choosing us for your care. I have placed an order for a prescription so that you can start treatment. View your full visit summary for details by clicking on the link below. Your pharmacist will able to address any questions you may have about the medication.     If you're not feeling better within 5-7 days, please schedule an appointment.  You can schedule an appointment right here in [employer], or call [phone]  If the visit is for the same symptoms as your e-visit, we'll refund the cost of your e-visit if seen within seven days.      Migraine Headache   A migraine headache is an often severe type of headache. It's different from other types of headaches in that symptoms other than pain occur with the it. For instance, a classic migraine headache means visual symptoms (or aura) such as flashes of light, blind spots or other vision changes, warns you a headache is coming on. Nausea and vomiting, lightheadedness, sensitivity to light or sound, and other visual disturbances are common migraine symptoms. The pain may last from a few hours to several days. It's not clear why migraines occur, but certain factors called triggers can raise the risk of having a migraine attack. A migraine may be triggered by emotional stress or depression, or by hormone changes during the menstrual cycle. Other triggers include certain birth control pills, overuse of migraine medicines, alcohol or caffeine, foods with tyramine such as aged cheese and wine, eyestrain, weather changes, missed meals, or too little or too much sleep.  Home care  Follow these tips when taking care of yourself at home:    Don t drive yourself home if you were given pain medicine for your headache or are having visual symptoms. Instead, have someone else drive you home. Try to sleep when you get home. You should feel much better when you wake up.    Cold can help ease migraine symptoms. Put an ice pack wrapped in a thin towel on your forehead or  at the base of your skull. Put heat on the back of your neck to help ease any neck spasm.    Drink only clear liquids or eat a light diet until your symptoms get better. This will help you prevent nausea and vomiting.  How to prevent migraines  Pay attention to what seems to trigger your headache. Try to stay away from the triggers when you can. If you have headaches often, consider keeping a headache diary. In it, write down what you were doing, feeling, or eating in the hours before each headache. Show this to your healthcare provider to help find the cause of your headaches.  If stress seems to be a trigger for your headaches, figure out what is causing stress in your life. Learn new ways to handle your stress. Ideas include regular exercise, biofeedback, self-hypnosis, yoga, and meditation. Talk with your healthcare provider to find out more information about managing stress. Many books and digital media are also available on this subject.  Tyramine is a substance found in many foods. It can trigger a migraine in some people. These foods contain tyramine:    Chocolate    Yogurt    All cheeses, but especially aged cheeses    Smoked or pickled fish and meat, including herring, caviar, bologna, pepperoni, and salami    Liver    Avocados    Bananas    Figs    Raisins    Red wine  Try staying away from these foods for 1 to 2 months to see if you have fewer headaches.  How to treat future headaches    Take time out at the first sign of a headache, if possible. Find a quiet, dark, comfortable place to sit or lie down. Let yourself relax or sleep.    Put an ice pack wrapped in a thin towel on your forehead or on the area of greatest pain. A heating pad and massage may help if you are having a muscle spasm and tightness in your neck.    If you have been prescribed a medicine to stop a migraine headache, use this at the first warning sign of the headache for best results. First signs may be an aura or pain.    If you have  been prescribed a medicine to prevent the headaches, it's important to take the medicine as directed. Many of these medicines may take a few weeks to start preventing headaches, so it's important to not give up on them right away. If you continue to have just as many headaches after taking these medicines for a while, talk with your doctor to see if the dose needs to be changed or if a different medicine is advised.    If you need to take medicine often for your migraine, talk with your healthcare provider about other ways to prevent your headaches.    Follow-up care  Follow up with your healthcare provider, or as advised. Talk with your provider if you have frequent headaches. He or she can figure out a treatment plan. Ask if you can have medicine to take at home the next time you get a bad headache. This may keep you from having to visit the emergency department in the future. You may need to see a headache specialist (neurologist) if you continue to have headaches.  When to seek medical advice  Call your healthcare provider right away if any of these occur:    Your head pain gets worse, or doesn t get better within 24 hours    You can t keep liquids down (repeated vomiting)    Pain in your sinuses, ears, or throat    Fever of 100.4  F (38  C) or higher, or as directed by your healthcare provider    Stiff neck    Extreme drowsiness, confusion, or fainting    Dizziness, or dizziness with spinning sensation (vertigo)    Weakness or trouble feeling in an arm or leg, or on one side of your face    Trouble talking or seeing  [employer] last reviewed this educational content on 9/1/2019 2000-2020 [employer]. [address] [place] [address], [place] [address]. All rights reserved. This information is not intended as a substitute for professional medical care. Always follow your healthcare professional's instructions.

## 2020-11-16 ENCOUNTER — HEALTH MAINTENANCE LETTER (OUTPATIENT)
Age: 40
End: 2020-11-16

## 2020-11-18 DIAGNOSIS — R19.7 DIARRHEA, UNSPECIFIED TYPE: ICD-10-CM

## 2020-11-18 PROCEDURE — 87493 C DIFF AMPLIFIED PROBE: CPT | Mod: 59 | Performed by: FAMILY MEDICINE

## 2020-11-18 PROCEDURE — 87506 IADNA-DNA/RNA PROBE TQ 6-11: CPT | Performed by: FAMILY MEDICINE

## 2020-11-18 PROCEDURE — 87209 SMEAR COMPLEX STAIN: CPT | Performed by: FAMILY MEDICINE

## 2020-11-18 PROCEDURE — 87177 OVA AND PARASITES SMEARS: CPT | Performed by: FAMILY MEDICINE

## 2020-11-19 LAB
C COLI+JEJUNI+LARI FUSA STL QL NAA+PROBE: NOT DETECTED
C DIFF TOX B STL QL: NEGATIVE
EC STX1 GENE STL QL NAA+PROBE: NOT DETECTED
EC STX2 GENE STL QL NAA+PROBE: NOT DETECTED
ENTERIC PATHOGEN COMMENT: NORMAL
NOROV GI+II ORF1-ORF2 JNC STL QL NAA+PR: NOT DETECTED
O+P STL MICRO: NORMAL
O+P STL MICRO: NORMAL
RVA NSP5 STL QL NAA+PROBE: NOT DETECTED
SALMONELLA SP RPOD STL QL NAA+PROBE: NOT DETECTED
SHIGELLA SP+EIEC IPAH STL QL NAA+PROBE: NOT DETECTED
SPECIMEN SOURCE: NORMAL
SPECIMEN SOURCE: NORMAL
V CHOL+PARA RFBL+TRKH+TNAA STL QL NAA+PR: NOT DETECTED
Y ENTERO RECN STL QL NAA+PROBE: NOT DETECTED

## 2020-11-24 ENCOUNTER — APPOINTMENT (OUTPATIENT)
Dept: CT IMAGING | Facility: CLINIC | Age: 40
End: 2020-11-24
Attending: EMERGENCY MEDICINE
Payer: COMMERCIAL

## 2020-11-24 ENCOUNTER — HOSPITAL ENCOUNTER (EMERGENCY)
Facility: CLINIC | Age: 40
Discharge: HOME | End: 2020-11-24
Attending: EMERGENCY MEDICINE | Admitting: EMERGENCY MEDICINE
Payer: COMMERCIAL

## 2020-11-24 VITALS
HEIGHT: 61 IN | SYSTOLIC BLOOD PRESSURE: 154 MMHG | OXYGEN SATURATION: 96 % | DIASTOLIC BLOOD PRESSURE: 102 MMHG | BODY MASS INDEX: 25.49 KG/M2 | RESPIRATION RATE: 20 BRPM | WEIGHT: 135 LBS | TEMPERATURE: 98.3 F | HEART RATE: 73 BPM

## 2020-11-24 DIAGNOSIS — N39.0 URINARY TRACT INFECTION WITHOUT HEMATURIA, SITE UNSPECIFIED: ICD-10-CM

## 2020-11-24 DIAGNOSIS — R10.30 LOWER ABDOMINAL PAIN: ICD-10-CM

## 2020-11-24 LAB
ALBUMIN UR-MCNC: 30 MG/DL
ANION GAP SERPL CALCULATED.3IONS-SCNC: 8 MMOL/L (ref 3–14)
APPEARANCE UR: CLEAR
BASOPHILS # BLD AUTO: 0.1 10E9/L (ref 0–0.2)
BASOPHILS NFR BLD AUTO: 0.8 %
BILIRUB UR QL STRIP: NEGATIVE
BUN SERPL-MCNC: 5 MG/DL (ref 7–30)
CALCIUM SERPL-MCNC: 8.7 MG/DL (ref 8.5–10.1)
CHLORIDE SERPL-SCNC: 110 MMOL/L (ref 94–109)
CO2 SERPL-SCNC: 24 MMOL/L (ref 20–32)
COLOR UR AUTO: ABNORMAL
CREAT SERPL-MCNC: 0.55 MG/DL (ref 0.52–1.04)
DIFFERENTIAL METHOD BLD: ABNORMAL
EOSINOPHIL # BLD AUTO: 0.3 10E9/L (ref 0–0.7)
EOSINOPHIL NFR BLD AUTO: 3.2 %
ERYTHROCYTE [DISTWIDTH] IN BLOOD BY AUTOMATED COUNT: 11.8 % (ref 10–15)
GFR SERPL CREATININE-BSD FRML MDRD: >90 ML/MIN/{1.73_M2}
GLUCOSE SERPL-MCNC: 93 MG/DL (ref 70–99)
GLUCOSE UR STRIP-MCNC: NEGATIVE MG/DL
HCT VFR BLD AUTO: 38.5 % (ref 35–47)
HGB BLD-MCNC: 13.6 G/DL (ref 11.7–15.7)
HGB UR QL STRIP: ABNORMAL
IMM GRANULOCYTES # BLD: 0 10E9/L (ref 0–0.4)
IMM GRANULOCYTES NFR BLD: 0.4 %
KETONES UR STRIP-MCNC: NEGATIVE MG/DL
LEUKOCYTE ESTERASE UR QL STRIP: ABNORMAL
LYMPHOCYTES # BLD AUTO: 1.9 10E9/L (ref 0.8–5.3)
LYMPHOCYTES NFR BLD AUTO: 22.9 %
MCH RBC QN AUTO: 34.3 PG (ref 26.5–33)
MCHC RBC AUTO-ENTMCNC: 35.3 G/DL (ref 31.5–36.5)
MCV RBC AUTO: 97 FL (ref 78–100)
MONOCYTES # BLD AUTO: 0.6 10E9/L (ref 0–1.3)
MONOCYTES NFR BLD AUTO: 7.1 %
MUCOUS THREADS #/AREA URNS LPF: PRESENT /LPF
NEUTROPHILS # BLD AUTO: 5.5 10E9/L (ref 1.6–8.3)
NEUTROPHILS NFR BLD AUTO: 65.6 %
NITRATE UR QL: NEGATIVE
NRBC # BLD AUTO: 0 10*3/UL
NRBC BLD AUTO-RTO: 0 /100
PH UR STRIP: 6 PH (ref 5–7)
PLATELET # BLD AUTO: 125 10E9/L (ref 150–450)
POTASSIUM SERPL-SCNC: 3.4 MMOL/L (ref 3.4–5.3)
RBC # BLD AUTO: 3.96 10E12/L (ref 3.8–5.2)
RBC #/AREA URNS AUTO: 74 /HPF (ref 0–2)
SODIUM SERPL-SCNC: 142 MMOL/L (ref 133–144)
SOURCE: ABNORMAL
SP GR UR STRIP: 1.02 (ref 1–1.03)
SQUAMOUS #/AREA URNS AUTO: 2 /HPF (ref 0–1)
UROBILINOGEN UR STRIP-MCNC: 0 MG/DL (ref 0–2)
WBC # BLD AUTO: 8.3 10E9/L (ref 4–11)
WBC #/AREA URNS AUTO: 60 /HPF (ref 0–5)

## 2020-11-24 PROCEDURE — 250N000011 HC RX IP 250 OP 636: Performed by: EMERGENCY MEDICINE

## 2020-11-24 PROCEDURE — 85025 COMPLETE CBC W/AUTO DIFF WBC: CPT | Performed by: EMERGENCY MEDICINE

## 2020-11-24 PROCEDURE — 87186 SC STD MICRODIL/AGAR DIL: CPT | Performed by: EMERGENCY MEDICINE

## 2020-11-24 PROCEDURE — 74177 CT ABD & PELVIS W/CONTRAST: CPT

## 2020-11-24 PROCEDURE — 81001 URINALYSIS AUTO W/SCOPE: CPT | Performed by: EMERGENCY MEDICINE

## 2020-11-24 PROCEDURE — 258N000003 HC RX IP 258 OP 636: Performed by: EMERGENCY MEDICINE

## 2020-11-24 PROCEDURE — 96375 TX/PRO/DX INJ NEW DRUG ADDON: CPT | Performed by: EMERGENCY MEDICINE

## 2020-11-24 PROCEDURE — 80048 BASIC METABOLIC PNL TOTAL CA: CPT | Performed by: EMERGENCY MEDICINE

## 2020-11-24 PROCEDURE — 96365 THER/PROPH/DIAG IV INF INIT: CPT | Mod: 59 | Performed by: EMERGENCY MEDICINE

## 2020-11-24 PROCEDURE — 96361 HYDRATE IV INFUSION ADD-ON: CPT | Performed by: EMERGENCY MEDICINE

## 2020-11-24 PROCEDURE — 99285 EMERGENCY DEPT VISIT HI MDM: CPT | Mod: 25 | Performed by: EMERGENCY MEDICINE

## 2020-11-24 PROCEDURE — 87086 URINE CULTURE/COLONY COUNT: CPT | Performed by: EMERGENCY MEDICINE

## 2020-11-24 PROCEDURE — 250N000009 HC RX 250: Performed by: EMERGENCY MEDICINE

## 2020-11-24 PROCEDURE — 99284 EMERGENCY DEPT VISIT MOD MDM: CPT | Performed by: EMERGENCY MEDICINE

## 2020-11-24 PROCEDURE — 87088 URINE BACTERIA CULTURE: CPT | Performed by: EMERGENCY MEDICINE

## 2020-11-24 RX ORDER — CIPROFLOXACIN 500 MG/1
500 TABLET, FILM COATED ORAL 2 TIMES DAILY
Qty: 14 TABLET | Refills: 0 | Status: SHIPPED | OUTPATIENT
Start: 2020-11-24 | End: 2020-12-01

## 2020-11-24 RX ORDER — FLUCONAZOLE 150 MG/1
TABLET ORAL
Qty: 2 TABLET | Refills: 0 | Status: SHIPPED | OUTPATIENT
Start: 2020-11-24 | End: 2020-11-27

## 2020-11-24 RX ORDER — CEFTRIAXONE SODIUM 2 G/50ML
2 INJECTION, SOLUTION INTRAVENOUS ONCE
Status: COMPLETED | OUTPATIENT
Start: 2020-11-24 | End: 2020-11-24

## 2020-11-24 RX ORDER — ONDANSETRON 2 MG/ML
4 INJECTION INTRAMUSCULAR; INTRAVENOUS ONCE
Status: COMPLETED | OUTPATIENT
Start: 2020-11-24 | End: 2020-11-24

## 2020-11-24 RX ORDER — IOPAMIDOL 755 MG/ML
66 INJECTION, SOLUTION INTRAVASCULAR ONCE
Status: COMPLETED | OUTPATIENT
Start: 2020-11-24 | End: 2020-11-24

## 2020-11-24 RX ORDER — KETOROLAC TROMETHAMINE 15 MG/ML
15 INJECTION, SOLUTION INTRAMUSCULAR; INTRAVENOUS ONCE
Status: COMPLETED | OUTPATIENT
Start: 2020-11-24 | End: 2020-11-24

## 2020-11-24 RX ORDER — HYDROMORPHONE HYDROCHLORIDE 1 MG/ML
0.5 INJECTION, SOLUTION INTRAMUSCULAR; INTRAVENOUS; SUBCUTANEOUS ONCE
Status: COMPLETED | OUTPATIENT
Start: 2020-11-24 | End: 2020-11-24

## 2020-11-24 RX ORDER — HYDROCODONE BITARTRATE AND ACETAMINOPHEN 5; 325 MG/1; MG/1
1-2 TABLET ORAL EVERY 4 HOURS PRN
Qty: 12 TABLET | Refills: 0 | Status: SHIPPED | OUTPATIENT
Start: 2020-11-24 | End: 2020-12-01

## 2020-11-24 RX ORDER — PHENAZOPYRIDINE HYDROCHLORIDE 200 MG/1
200 TABLET, FILM COATED ORAL 3 TIMES DAILY PRN
Qty: 12 TABLET | Refills: 0 | Status: SHIPPED | OUTPATIENT
Start: 2020-11-24 | End: 2021-02-17

## 2020-11-24 RX ADMIN — HYDROMORPHONE HYDROCHLORIDE 0.5 MG: 1 INJECTION, SOLUTION INTRAMUSCULAR; INTRAVENOUS; SUBCUTANEOUS at 07:26

## 2020-11-24 RX ADMIN — CEFTRIAXONE SODIUM 2 G: 2 INJECTION, SOLUTION INTRAVENOUS at 07:59

## 2020-11-24 RX ADMIN — KETOROLAC TROMETHAMINE 15 MG: 15 INJECTION, SOLUTION INTRAMUSCULAR; INTRAVENOUS at 06:05

## 2020-11-24 RX ADMIN — IOPAMIDOL 66 ML: 755 INJECTION, SOLUTION INTRAVENOUS at 06:31

## 2020-11-24 RX ADMIN — SODIUM CHLORIDE 1000 ML: 9 INJECTION, SOLUTION INTRAVENOUS at 07:23

## 2020-11-24 RX ADMIN — ONDANSETRON 4 MG: 2 INJECTION INTRAMUSCULAR; INTRAVENOUS at 05:39

## 2020-11-24 RX ADMIN — SODIUM CHLORIDE 1000 ML: 9 INJECTION, SOLUTION INTRAVENOUS at 05:39

## 2020-11-24 RX ADMIN — SODIUM CHLORIDE 56 ML: 9 INJECTION, SOLUTION INTRAVENOUS at 06:32

## 2020-11-24 ASSESSMENT — MIFFLIN-ST. JEOR: SCORE: 1219.74

## 2020-11-24 NOTE — ED AVS SNAPSHOT
[facility] Emergency Dept  [address] [address]  Phone: [phone]  Fax: [phone]                                    Rober Renee   MRN: [id number]    Department: [facility] Emergency Dept   Date of Visit: 11/24/2020           After Visit Summary Signature Page    I have received my discharge instructions, and my questions have been answered. I have discussed any challenges I see with this plan with the nurse or doctor.    ..........................................................................................................................................  Patient/Patient Representative Signature      ..........................................................................................................................................  Patient Representative Print Name and Relationship to Patient    ..................................................               ................................................  Date                                   Time    ..........................................................................................................................................  Reviewed by Signature/Title    ...................................................              ..............................................  Date                                               Time          22EPIC Rev 08/18

## 2020-11-24 NOTE — ED TRIAGE NOTES
RLQ started yesterday when she woke up. Took 600 mg ibuprofen and it went away. Woke up with the pain around 0330. Hx kidney stones

## 2020-11-24 NOTE — ED PROVIDER NOTES
"  History     Chief Complaint   Patient presents with     Abdominal Pain     HPI  Rober Renee is a 40 year old female who presents to the emergency department for evaluation of approximately 24 hours of right lower quadrant and mid lower abdominal pain which was noted upon awaking yesterday morning.  Constant aching pain (\"like a charley horse\") in the right lower quadrant and lower right flank which is severe, radiates to the back and is unrelieved by OTC analgesic.  No fever, chills or vomiting.  Chronic diarrhea since  of this year without evidence of GI bleeding.  In the past several days she has had dysuria at the end of urination (severe) and urinary urgency.  No hematuria.  No vaginal bleeding or discharge.  She is status post KAYLA/BSO.    Allergies:  Allergies   Allergen Reactions     Imitrex [Sumatriptan] Other (See Comments)     Felt terrible, \"like pores were on fire\"     Sulfa Drugs Hives       Problem List:    Patient Active Problem List    Diagnosis Date Noted     Benign essential hypertension 10/11/2019     Priority: Medium     Endometriosis determined by laparoscopy 04/10/2018     Priority: Medium     Anxiety 2017     Priority: Medium     Renal stones 10/09/2015     Priority: Medium     Migraine headache 2012     Priority: Medium     CARDIOVASCULAR SCREENING; LDL GOAL LESS THAN 160 10/31/2010     Priority: Medium        Past Medical History:    Past Medical History:   Diagnosis Date     Calculus of kidney      Cervical high risk HPV (human papillomavirus) test positive 2018     Chickenpox      Gestational diabetes ,     History of postpartum depression, currently pregnant      Intervertebral lumbar disc disorder with myelopathy, lumbar region      Intramural leiomyoma of uterus 4/3/2018     Migraines      Urinary tract bacterial infections        Past Surgical History:    Past Surgical History:   Procedure Laterality Date      SECTION, TUBAL LIGATION, COMBINED "  2013    c/section with BTL     HYSTERECTOMY, PAP NO LONGER INDICATED  04/10/2018    No history of LIBERTY 2 or greater     LAPAROSCOPIC ASSISTED HYSTERECTOMY VAGINAL, BILATERAL SALPINGO-OOPHORECTOMY, COMBINED N/A 4/10/2018    Procedure: COMBINED LAPAROSCOPIC ASSISTED HYSTERECTOMY VAGINAL, SALPINGO-OOPHORECTOMY;  Laparoscopic assisted vaginal hysterectomy with possible bilateral salpingoophorectomy.;  Surgeon: Taisha Wilson MD;  Location: [facility] OR       Family History:    Family History   Problem Relation Age of Onset     Cancer Maternal Grandfather         liver CA     Cancer Paternal Grandfather         prostate CA     Cancer Maternal Grandmother         ovarian CA     Other - See Comments Maternal Grandmother         migraine     Diabetes Paternal Grandmother      Hypertension Paternal Grandmother      Hypertension Father      Depression Father      Other - See Comments Mother         migraine     Hypertension Brother      Other - See Comments Maternal Uncle         migraine     Depression Sister      Melanoma No family hx of        Social History:  Marital Status:  Single [1]  Social History     Tobacco Use     Smoking status: Former Smoker     Packs/day: 0.50     Years: 10.00     Pack years: 5.00     Types: Cigarettes     Quit date: 2008     Years since quittin.7     Smokeless tobacco: Never Used   Substance Use Topics     Alcohol use: Yes     Comment: very seldom     Drug use: No        Medications:         ciprofloxacin (CIPRO) 500 MG tablet       fluconazole (DIFLUCAN) 150 MG tablet       HYDROcodone-acetaminophen (NORCO) 5-325 MG tablet       phenazopyridine (PYRIDIUM) 200 MG tablet       estradiol (ESTRACE) 2 MG tablet       hydrochlorothiazide (HYDRODIURIL) 25 MG tablet       loperamide (IMODIUM A-D) 2 MG tablet       LORazepam (ATIVAN) 1 MG tablet       sertraline (ZOLOFT) 50 MG tablet        Review of Systems  As mentioned above in the history present illness.  All other systems were  "reviewed and are negative.    Physical Exam   BP: (!) 165/107  Pulse: 74  Temp: 98.3  F (36.8  C)  Resp: 20  Height: 154.9 cm (5' 1\")  Weight: 61.2 kg (135 lb)  SpO2: 97 %      Physical Exam  Vitals signs and nursing note reviewed.   Constitutional:       General: She is not in acute distress.     Appearance: Normal appearance. She is well-developed. She is not ill-appearing or diaphoretic.   HENT:      Head: Normocephalic and atraumatic.      Right Ear: External ear normal.      Left Ear: External ear normal.      Nose: Nose normal.   Eyes:      General: No scleral icterus.     Extraocular Movements: Extraocular movements intact.      Conjunctiva/sclera: Conjunctivae normal.   Neck:      Musculoskeletal: Normal range of motion and neck supple.      Trachea: No tracheal deviation.   Cardiovascular:      Rate and Rhythm: Normal rate and regular rhythm.      Heart sounds: Normal heart sounds. No murmur. No friction rub. No gallop.    Pulmonary:      Effort: Pulmonary effort is normal. No respiratory distress.      Breath sounds: Normal breath sounds. No wheezing, rhonchi or rales.   Abdominal:      General: Bowel sounds are normal. There is no distension.      Palpations: Abdomen is soft.      Tenderness: There is abdominal tenderness in the right lower quadrant and suprapubic area. There is no right CVA tenderness, left CVA tenderness, guarding or rebound. Negative signs include Reynolds's sign.      Hernia: No hernia is present.       Musculoskeletal: Normal range of motion.         General: No tenderness.      Right lower leg: No edema.      Left lower leg: No edema.   Skin:     General: Skin is warm and dry.      Coloration: Skin is not pale.      Findings: No erythema or rash.   Neurological:      General: No focal deficit present.      Mental Status: She is alert and oriented to person, place, and time.      Coordination: Coordination normal.   Psychiatric:         Mood and Affect: Mood normal.         Behavior: " Behavior normal.         ED Course        Procedures                 Results for orders placed or performed during the hospital encounter of 11/24/20 (from the past 24 hour(s))   CBC with platelets differential   Result Value Ref Range    WBC 8.3 4.0 - 11.0 10e9/L    RBC Count 3.96 3.8 - 5.2 10e12/L    Hemoglobin 13.6 11.7 - 15.7 g/dL    Hematocrit 38.5 35.0 - 47.0 %    MCV 97 78 - 100 fl    MCH 34.3 (H) 26.5 - 33.0 pg    MCHC 35.3 31.5 - 36.5 g/dL    RDW 11.8 10.0 - 15.0 %    Platelet Count 125 (L) 150 - 450 10e9/L    Diff Method Automated Method     % Neutrophils 65.6 %    % Lymphocytes 22.9 %    % Monocytes 7.1 %    % Eosinophils 3.2 %    % Basophils 0.8 %    % Immature Granulocytes 0.4 %    Nucleated RBCs 0 0 /100    Absolute Neutrophil 5.5 1.6 - 8.3 10e9/L    Absolute Lymphocytes 1.9 0.8 - 5.3 10e9/L    Absolute Monocytes 0.6 0.0 - 1.3 10e9/L    Absolute Eosinophils 0.3 0.0 - 0.7 10e9/L    Absolute Basophils 0.1 0.0 - 0.2 10e9/L    Abs Immature Granulocytes 0.0 0 - 0.4 10e9/L    Absolute Nucleated RBC 0.0    Basic metabolic panel   Result Value Ref Range    Sodium 142 133 - 144 mmol/L    Potassium 3.4 3.4 - 5.3 mmol/L    Chloride 110 (H) 94 - 109 mmol/L    Carbon Dioxide 24 20 - 32 mmol/L    Anion Gap 8 3 - 14 mmol/L    Glucose 93 70 - 99 mg/dL    Urea Nitrogen 5 (L) 7 - 30 mg/dL    Creatinine 0.55 0.52 - 1.04 mg/dL    GFR Estimate >90 >60 mL/min/[1.73_m2]    GFR Estimate If Black >90 >60 mL/min/[1.73_m2]    Calcium 8.7 8.5 - 10.1 mg/dL   CT Abdomen Pelvis w Contrast    Narrative    EXAM: CT ABDOMEN PELVIS W CONTRAST  LOCATION: [facility]  DATE/TIME: 11/24/2020 6:31 AM    INDICATION: Right lower quadrant pain.  COMPARISON: 01/08/2020.  TECHNIQUE: CT scan of the abdomen and pelvis was performed following injection of IV contrast. Multiplanar reformats were obtained. Dose reduction techniques were used.  CONTRAST: 66 mL Isovue 370.    FINDINGS:   LOWER CHEST: Normal.    HEPATOBILIARY: Fatty  liver.    PANCREAS: Normal.    SPLEEN: Normal.    ADRENAL GLANDS: Normal.    KIDNEYS/BLADDER: No obstructing renal or ureteral stone. No hydroureteronephrosis. Mildly asymmetric enhancement of the wall of the right renal collecting system, correlate with urinalysis to exclude ascending urinary tract infection. Normal-appearing   bladder.    BOWEL: Normal appendix. No bowel obstruction, colitis, or diverticulitis.    LYMPH NODES: Normal.    VASCULATURE: Unremarkable.    PELVIC ORGANS: Absent uterus.    MUSCULOSKELETAL: Normal.      Impression    IMPRESSION:   1.  No obstructing renal or ureteral stone.  2.  Mild hyperenhancement of the right renal collecting system, correlate to exclude ascending urinary tract infection. No CT evidence of pyelonephritis.  3.  Normal appendix. Normal bowel.  4.  Status post hysterectomy.  5.  Fatty liver.             UA with Microscopic   Result Value Ref Range    Color Urine Straw     Appearance Urine Clear     Glucose Urine Negative NEG^Negative mg/dL    Bilirubin Urine Negative NEG^Negative    Ketones Urine Negative NEG^Negative mg/dL    Specific Gravity Urine 1.016 1.003 - 1.035    Blood Urine Moderate (A) NEG^Negative    pH Urine 6.0 5.0 - 7.0 pH    Protein Albumin Urine 30 (A) NEG^Negative mg/dL    Urobilinogen mg/dL 0.0 0.0 - 2.0 mg/dL    Nitrite Urine Negative NEG^Negative    Leukocyte Esterase Urine Moderate (A) NEG^Negative    Source Midstream Urine     WBC Urine 60 (H) 0 - 5 /HPF    RBC Urine 74 (H) 0 - 2 /HPF    Squamous Epithelial /HPF Urine 2 (H) 0 - 1 /HPF    Mucous Urine Present (A) NEG^Negative /LPF       Medications   0.9% sodium chloride BOLUS (0 mLs Intravenous Stopped 11/24/20 4310)   ondansetron (ZOFRAN) injection 4 mg (4 mg Intravenous Given 11/24/20 1275)   ketorolac (TORADOL) injection 15 mg (15 mg Intravenous Given 11/24/20 0605)   iopamidol (ISOVUE-370) solution 66 mL (66 mLs Intravenous Given 11/24/20 7883)   sodium chloride 0.9 % bag 500mL for CT scan  flush use (56 mLs Intravenous Given 11/24/20 0632)   0.9% sodium chloride BOLUS (0 mLs Intravenous Stopped 11/24/20 0821)   HYDROmorphone (PF) (DILAUDID) injection 0.5 mg (0.5 mg Intravenous Given 11/24/20 0726)   cefTRIAXone IN D5W (ROCEPHIN) intermittent infusion 2 g (0 g Intravenous Stopped 11/24/20 0822)     7:00 AM - I reviewed the results of her laboratory evaluation and CT study with her.  She is still not provided a urine specimen.      Assessments & Plan (with Medical Decision Making)   40-year-old female with approximately 24 hours of suprapubic and medial right lower quadrant abdominal pain with radiation to the right low back which appears secondary to UTI with CT evidence mild enhancement of the right urinary collecting system concerning for developing  ascending urinary tract infection.  No CVA tenderness or evidence of pyelonephritis on CT.  Afebrile with normal WBC and no evidence of urosepsis.  Doubt stone.  She was given an IV fluid bolus, ceftriaxone IV and will be discharged with Rx ciprofloxacin x 7 days, Pyridium and Norco use for pain refractory to NSAID.  She requested Rx for Diflucan to use if needed for development of vaginal yeast infection from antibiotic use.  Urine culture pending.  Recommend clinic recheck in the next several days if symptoms not promptly improving with antibiotic therapy and she will return as needed for new or worsening symptoms.    I have reviewed the nursing notes.    I have reviewed the findings, diagnosis, plan and need for follow up with the patient.    New Prescriptions    CIPROFLOXACIN (CIPRO) 500 MG TABLET    Take 1 tablet (500 mg) by mouth 2 times daily for 7 days    FLUCONAZOLE (DIFLUCAN) 150 MG TABLET    Take one tablet now, and one tablet in three days if needed for yeast infection due to antibiotic use.    HYDROCODONE-ACETAMINOPHEN (NORCO) 5-325 MG TABLET    Take 1-2 tablets by mouth every 4 hours as needed for moderate to severe pain maximum 6 tablet(s)  per day    PHENAZOPYRIDINE (PYRIDIUM) 200 MG TABLET    Take 1 tablet (200 mg) by mouth 3 times daily as needed for irritation (as needed for urinary discomfort)       Final diagnoses:   Lower abdominal pain   Urinary tract infection without hematuria, site unspecified       11/24/2020   [facility] EMERGENCY DEPT     Paulino Stevens MD  11/24/20 4832

## 2020-11-26 ENCOUNTER — MYC MEDICAL ADVICE (OUTPATIENT)
Dept: UROLOGY | Facility: CLINIC | Age: 40
End: 2020-11-26

## 2020-11-26 ENCOUNTER — TELEPHONE (OUTPATIENT)
Dept: EMERGENCY MEDICINE | Facility: CLINIC | Age: 40
End: 2020-11-26

## 2020-11-26 DIAGNOSIS — N39.0 URINARY TRACT INFECTION WITHOUT HEMATURIA, SITE UNSPECIFIED: ICD-10-CM

## 2020-11-26 LAB
BACTERIA SPEC CULT: ABNORMAL
Lab: ABNORMAL
SPECIMEN SOURCE: ABNORMAL

## 2020-11-26 RX ORDER — CEFDINIR 300 MG/1
300 CAPSULE ORAL 2 TIMES DAILY
Qty: 20 CAPSULE | Refills: 0 | Status: SHIPPED | OUTPATIENT
Start: 2020-11-26 | End: 2020-12-06

## 2020-11-26 NOTE — TELEPHONE ENCOUNTER
"[facility] Emergency Department Lab result notification [Adult-Female]    [facility] ED lab result protocol used  Urine culture    Reason for call  Notify of lab results, assess symptoms,  review ED providers recommendations/discharge instructions (if necessary) and advise per ED lab result f/u protocol    Lab Result (including Rx patient on, if applicable)  Final Urine Culture Report on 11/26/20  Emergency Dept/Urgent Care discharge antibiotic prescribed: Ciprofloxacin (Cipro) 500 mg tablet, 1 tablet (500 mg) by mouth 2 times daily for 7 days.  #1. Bacteria, >100,000 colonies/mL Escherichia coli,  is RESISTANT to antibiotic.   Change in treatment as per [facility] ED Lab result protocol.  Information table from ED Provider visit on 11/24/20  Symptoms reported at ED visit (Chief complaint, HPI) Patient presents with     Abdominal Pain      HPI  Rober Renee is a 40 year old female who presents to the emergency department for evaluation of approximately 24 hours of right lower quadrant and mid lower abdominal pain which was noted upon awaking yesterday morning.  Constant aching pain (\"like a charley horse\") in the right lower quadrant and lower right flank which is severe, radiates to the back and is unrelieved by OTC analgesic.  No fever, chills or vomiting.  Chronic diarrhea since June of this year without evidence of GI bleeding.  In the past several days she has had dysuria at the end of urination (severe) and urinary urgency.  No hematuria.  No vaginal bleeding or discharge.  She is status post KAYLA/BSO.     Significant Medical hx, if applicable (i.e. CKD, diabetes) Renal stones, Gestational diabetes, Urinary tract bacterial infections   Allergies Allergies   Allergen Reactions     Imitrex [Sumatriptan] Other (See Comments)     Felt terrible, \"like pores were on fire\"     Sulfa Drugs Hives      Weight, if applicable Wt Readings from Last 2 Encounters:   11/24/20 61.2 kg (135 lb)   10/21/20 61.7 kg (136 " lb)      Coumadin/Warfarin [Yes /No] No   Creatinine Level (mg/dl) Creatinine   Date Value Ref Range Status   11/24/2020 0.55 0.52 - 1.04 mg/dL Final      Creatinine clearance (ml/min), if applicable Serum creatinine: 0.55 mg/dL 11/24/20 0534  Estimated creatinine clearance: 114.2 mL/min   Pregnant (Yes/No/NA) No   Breastfeeding (Yes/No/NA) No   ED providers Impression and Plan (applicable information) 40-year-old female with approximately 24 hours of suprapubic and medial right lower quadrant abdominal pain with radiation to the right low back which appears secondary to UTI with CT evidence mild enhancement of the right urinary collecting system concerning for developing  ascending urinary tract infection.  No CVA tenderness or evidence of pyelonephritis on CT.  Afebrile with normal WBC and no evidence of urosepsis.  Doubt stone.  She was given an IV fluid bolus, ceftriaxone IV and will be discharged with Rx ciprofloxacin x 7 days, Pyridium and Norco use for pain refractory to NSAID.  She requested Rx for Diflucan to use if needed for development of vaginal yeast infection from antibiotic use.  Urine culture pending.  Recommend clinic recheck in the next several days if symptoms not promptly improving with antibiotic therapy and she will return as needed for new or worsening symptoms.   ED diagnosis Lower abdominal pain   Urinary tract infection without hematuria, site unspecified      ED provider Paulino Stevens MD  11/24/20 [id number]      RN Assessment (Patient s current Symptoms), include time called.  [Insert Left message here if message left]  11:30 Attempt to call patient and reached a voice mailbox that was full. Unable to leave message.  5:29 pm Message left to call us back at [phone], between 10 am and 6 pm, seven days a week.     RN Assessment (Patient s current Symptoms), include time called.  [Insert Left message here if message left]  5:33 pm Pt called me back. Says she is feeling better, but not  100%. No new symptoms.     RN Recommendations/Instructions per [facility] ED lab result protocol  Patient notified of lab result and treatment recommendations.  Rx for Cefdinir (Omnicef) 300 mg capsule, 1 capsule (300 mg) by mouth 2 times daily for 7 days   sent to [Pharmacy - [facility] pharmacy].  To start Cefdinir tomorrow morning since that is the soonest she can get it. Take Cipro tonight and then switch to Cefdinir tomorrow and stop Cipro. RN Advised to finish full course of antibiotics as prescribed and drink plenty of fluids. Eat yogurt, cottage cheese or take probiotics as needed. And follow up with your PCP as the ED provider recommended.      Please Contact your PCP clinic or return to the Emergency department if your:    Symptoms return.    Symptoms do not improve after 3 days on antibiotic.    Symptoms do not resolve after completing antibiotic.    Symptoms worsen or other concerning symptom's.    PCP follow-up Questions asked: YES       Sarah Ayoub RN  [email or web address]   Lung Nodule and ED Lab Result F/U RN  Epic pool (ED late result f/u RN): P [id number]  # [phone]    Copy of Lab result   Urine Culture  Order: [id number]  Status:  Final result   Visible to patient:  Yes (MyChart) Dx:  Lower abdominal pain  Specimen Information: Midstream Urine        Component 2d ago   Specimen Description Midstream Urine    Special Requests Specimen received in preservative    Culture Micro Abnormal   >100,000 colonies/mL   Escherichia coli     [facility]   Susceptibility     Escherichia coli     VIOLETA     AMPICILLIN >=32 ug/mL Resistant     AMPICILLIN/SULBACTAM >=32 ug/mL Resistant     CEFAZOLIN 8 ug/mL Sensitive1     CEFEPIME <=1 ug/mL Sensitive     CEFOXITIN <=4 ug/mL Sensitive     CEFTAZIDIME <=1 ug/mL Sensitive     CEFTRIAXONE <=1 ug/mL Sensitive     CIPROFLOXACIN >=4 ug/mL Resistant     GENTAMICIN <=1 ug/mL Sensitive     LEVOFLOXACIN >=8 ug/mL Resistant     NITROFURANTOIN <=16 ug/mL  Sensitive     Piperacillin/Tazo 64 ug/mL Intermediate     TOBRAMYCIN <=1 ug/mL Sensitive     Trimethoprim/Sulfa >=16/304 ug/mL Resistant           1 Cefazolin VIOLETA breakpoints are for the treatment of uncomplicated urinary tract    infections.  For the treatment of systemic infections, please contact the   laboratory for additional testing.            Specimen Collected: 11/24/20  7:08 AM Last Resulted: 11/26/20  4:48 AM

## 2020-12-01 ENCOUNTER — VIRTUAL VISIT (OUTPATIENT)
Dept: OBGYN | Facility: CLINIC | Age: 40
End: 2020-12-01
Payer: COMMERCIAL

## 2020-12-01 ENCOUNTER — TELEPHONE (OUTPATIENT)
Dept: OBGYN | Facility: CLINIC | Age: 40
End: 2020-12-01

## 2020-12-01 VITALS — WEIGHT: 128 LBS | BODY MASS INDEX: 24.19 KG/M2

## 2020-12-01 DIAGNOSIS — M54.2 NECK PAIN: ICD-10-CM

## 2020-12-01 DIAGNOSIS — E89.40 SURGICAL MENOPAUSE: Primary | ICD-10-CM

## 2020-12-01 DIAGNOSIS — I10 BENIGN ESSENTIAL HYPERTENSION: ICD-10-CM

## 2020-12-01 PROCEDURE — 99214 OFFICE O/P EST MOD 30 MIN: CPT | Mod: 95 | Performed by: OBSTETRICS & GYNECOLOGY

## 2020-12-01 RX ORDER — HYDROCODONE BITARTRATE AND ACETAMINOPHEN 5; 325 MG/1; MG/1
1-2 TABLET ORAL EVERY 4 HOURS PRN
Qty: 12 TABLET | Refills: 0 | Status: SHIPPED | OUTPATIENT
Start: 2020-12-01 | End: 2021-07-30

## 2020-12-01 RX ORDER — ESTRADIOL 2 MG/1
3 TABLET ORAL DAILY
Qty: 135 TABLET | Refills: 3 | Status: SHIPPED | OUTPATIENT
Start: 2020-12-01 | End: 2022-01-13

## 2020-12-01 RX ORDER — METOPROLOL TARTRATE AND HYDROCHLOROTHIAZIDE 50; 25 MG/1; MG/1
1 TABLET ORAL DAILY
Qty: 90 TABLET | Refills: 3 | Status: SHIPPED | OUTPATIENT
Start: 2020-12-01 | End: 2021-07-30

## 2020-12-01 NOTE — TELEPHONE ENCOUNTER
Reason for Call:  Other     Detailed comments: Pt missed her appt today to be seen for medication review and headaches. Would like to discuss with RN - Please contact pt     PHARMACY:  [facility]     Phone Number Patient can be reached at: Cell number on file:    Telephone Information:   Mobile [phone]       Best Time: Any    Can we leave a detailed message on this number? YES    Call taken on 12/1/2020 at 2:17 PM by Denise Behrendt

## 2020-12-01 NOTE — PROGRESS NOTES
"Rober Renee is a 40 year old female who is being evaluated via a billable telephone visit.      The patient has been notified of following:     \"This telephone visit will be conducted via a call between you and your physician/provider. We have found that certain health care needs can be provided without the need for a physical exam.  This service lets us provide the care you need with a short phone conversation.  If a prescription is necessary we can send it directly to your pharmacy.  If lab work is needed we can place an order for that and you can then stop by our lab to have the test done at a later time.    Telephone visits are billed at different rates depending on your insurance coverage. During this emergency period, for some insurers they may be billed the same as an in-person visit.  Please reach out to your insurance provider with any questions.    If during the course of the call the physician/provider feels a telephone visit is not appropriate, you will not be charged for this service.\"    Patient has given verbal consent for Telephone visit?  Yes    What phone number would you like to be contacted at?[phone]    How would you [facility] to [facility]    Phone call duration: 25 minutes    Taisha Wilson MD    Rober is a 40 year old   female who presents for phone visit to discuss current meds and medical conditions   1) she is taking Estradiol 3mg every day; feels it is helping a lot with hot flashes, but does still get some night sweats  2) getting headaches, that seem to emanate from her neck, with pain radiating into left arm; has seen Chiropractor, which helps for a few days, then returns; takes Norco for severe pain;willing to see PT  3) monitors BP at home 130-140/80-90, on hydrochlorothiazide only; willing to try something else.    Patient Active Problem List    Diagnosis Date Noted     Benign essential hypertension 10/11/2019     Priority: Medium     Endometriosis " determined by laparoscopy 04/10/2018     Priority: Medium     Anxiety 2017     Priority: Medium     Renal stones 10/09/2015     Priority: Medium     Migraine headache 2012     Priority: Medium     CARDIOVASCULAR SCREENING; LDL GOAL LESS THAN 160 10/31/2010     Priority: Medium       All systems were reviewed and pertinent information in noted in subjective/HPI.    Past Medical History:   Diagnosis Date     Calculus of kidney     right side     Cervical high risk HPV (human papillomavirus) test positive 2018    NIL pap, +HR HPV (Neg 16/18). Rpt Pap+HPV in 1 year (Due )     Chickenpox      Gestational diabetes ,     History of postpartum depression, currently pregnant     mild     Intervertebral lumbar disc disorder with myelopathy, lumbar region     herniated disks, L4-5, s/p steroid injection     Intramural leiomyoma of uterus 4/3/2018     Migraines      Urinary tract bacterial infections        Past Surgical History:   Procedure Laterality Date      SECTION, TUBAL LIGATION, COMBINED  2013    c/section with BTL     HYSTERECTOMY, PAP NO LONGER INDICATED  04/10/2018    No history of LIBERTY 2 or greater     LAPAROSCOPIC ASSISTED HYSTERECTOMY VAGINAL, BILATERAL SALPINGO-OOPHORECTOMY, COMBINED N/A 4/10/2018    Procedure: COMBINED LAPAROSCOPIC ASSISTED HYSTERECTOMY VAGINAL, SALPINGO-OOPHORECTOMY;  Laparoscopic assisted vaginal hysterectomy with possible bilateral salpingoophorectomy.;  Surgeon: Taisha Wilson MD;  Location: [facility] OR         Current Outpatient Medications:      cefdinir (OMNICEF) 300 MG capsule, Take 1 capsule (300 mg) by mouth 2 times daily for 10 days, Disp: 20 capsule, Rfl: 0     estradiol (ESTRACE) 2 MG tablet, Take 1.5 tablets (3 mg) by mouth daily, Disp: 135 tablet, Rfl: 3     HYDROcodone-acetaminophen (NORCO) 5-325 MG tablet, Take 1-2 tablets by mouth every 4 hours as needed for moderate to severe pain maximum 6 tablet(s) per day, Disp: 12 tablet,  Rfl: 0     metoprolol-hydrochlorothiazide (LOPRESSOR HCT) 50-25 MG tablet, Take 1 tablet by mouth daily, Disp: 90 tablet, Rfl: 3     sertraline (ZOLOFT) 50 MG tablet, Take 1 tablet (50 mg) by mouth daily Take 25 mg (1/2 tab) daily x 1 week then increase to 50 mg (1 tabs) daily thereafter., Disp: 30 tablet, Rfl: 1     ciprofloxacin (CIPRO) 500 MG tablet, Take 1 tablet (500 mg) by mouth 2 times daily for 7 days (Patient not taking: Reported on 2020), Disp: 14 tablet, Rfl: 0     loperamide (IMODIUM A-D) 2 MG tablet, Take 1 tablet (2 mg) by mouth 4 times daily as needed for diarrhea (Patient not taking: Reported on 2020), Disp:  , Rfl:      LORazepam (ATIVAN) 1 MG tablet, Take 1 tablet (1 mg) by mouth every 8 hours as needed for anxiety (Patient not taking: Reported on 2020), Disp: 20 tablet, Rfl: 1     phenazopyridine (PYRIDIUM) 200 MG tablet, Take 1 tablet (200 mg) by mouth 3 times daily as needed for irritation (as needed for urinary discomfort) (Patient not taking: Reported on 2020), Disp: 12 tablet, Rfl: 0    ALLERGIES:  Imitrex [sumatriptan]    Social History     Socioeconomic History     Marital status: Single     Spouse name: None     Number of children: 4     Years of education: None     Highest education level: None   Occupational History     Occupation: [employer]     Employer: [employer]     Employer: [employer]    Social Needs     Financial resource strain: None     Food insecurity     Worry: None     Inability: None     Transportation needs     Medical: None     Non-medical: None   Tobacco Use     Smoking status: Former Smoker     Packs/day: 0.50     Years: 10.00     Pack years: 5.00     Types: Cigarettes     Quit date: 2008     Years since quittin.7     Smokeless tobacco: Never Used   Substance and Sexual Activity     Alcohol use: Yes     Comment: very seldom     Drug use: No     Sexual activity: Not Currently     Partners: Male     Birth control/protection: Surgical,  Female Surgical     Comment: tubal   Lifestyle     Physical activity     Days per week: None     Minutes per session: None     Stress: None   Relationships     Social connections     Talks on phone: None     Gets together: None     Attends Zoroastrian service: None     Active member of club or organization: None     Attends meetings of clubs or organizations: None     Relationship status: None     Intimate partner violence     Fear of current or ex partner: None     Emotionally abused: None     Physically abused: None     Forced sexual activity: None   Other Topics Concern     Parent/sibling w/ CABG, MI or angioplasty before 65F 55M? No   Social History Narrative     None       Family History   Problem Relation Age of Onset     Cancer Maternal Grandfather         liver CA     Cancer Paternal Grandfather         prostate CA     Cancer Maternal Grandmother         ovarian CA     Other - See Comments Maternal Grandmother         migraine     Diabetes Paternal Grandmother      Hypertension Paternal Grandmother      Hypertension Father      Depression Father      Other - See Comments Mother         migraine     Hypertension Brother      Other - See Comments Maternal Uncle         migraine     Depression Sister      Melanoma No family hx of        OBJECTIVE:  Vitals: Wt 58.1 kg (128 lb)   LMP 04/02/2018   BMI 24.19 kg/m   BMI= Body mass index is 24.19 kg/m .   Patient's last menstrual period was 04/02/2018.       ASSESSMENT:      ICD-10-CM    1. Surgical menopause  E89.40 estradiol (ESTRACE) 2 MG tablet   2. Benign essential hypertension  I10 metoprolol-hydrochlorothiazide (LOPRESSOR HCT) 50-25 MG tablet   3. Neck pain  M54.2 PHYSICAL THERAPY REFERRAL     HYDROcodone-acetaminophen (NORCO) 5-325 MG tablet       PLAN:  I discussed changing her Estradiol to evening dosing to see if can help with nocturnal symptoms  Recommend PT referral to see if can help with her neck, which sounds like a discogenic issue; consider future  MRI  Recommend change to Metoprolol-hydrochlorothiazide combo to see if can better improve BP  Mammogram advised  Lilia Wilson MD  [facility]    Duration of visit:  25 minutes, 100% in discussion of current issues, treatment options and treatment planning.  LILIA Wilson MD

## 2021-01-14 DIAGNOSIS — F41.9 ANXIETY: ICD-10-CM

## 2021-01-14 NOTE — TELEPHONE ENCOUNTER
Last Written Prescription Date:  4/14/2020  Last Fill Quantity: 20,  # refills: 1   Last office visit: 10/11/2019 with prescribing provider: virtual visit 12/1/2020  Future Office Visit:  Currently not scheduled.    Outside of RN guidelines for refill.  Will send to Dr. Wilson for review and recommendations.    Chinyere Hall   [facility]  RN

## 2021-01-15 RX ORDER — LORAZEPAM 1 MG/1
1 TABLET ORAL EVERY 8 HOURS PRN
Qty: 20 TABLET | Refills: 1 | OUTPATIENT
Start: 2021-01-15

## 2021-03-25 ENCOUNTER — ANCILLARY PROCEDURE (OUTPATIENT)
Dept: MAMMOGRAPHY | Facility: CLINIC | Age: 41
End: 2021-03-25
Attending: FAMILY MEDICINE
Payer: COMMERCIAL

## 2021-03-25 DIAGNOSIS — Z12.31 ENCOUNTER FOR SCREENING MAMMOGRAM FOR BREAST CANCER: ICD-10-CM

## 2021-03-25 PROCEDURE — 77063 BREAST TOMOSYNTHESIS BI: CPT | Mod: TC | Performed by: RADIOLOGY

## 2021-03-25 PROCEDURE — 77067 SCR MAMMO BI INCL CAD: CPT | Mod: TC | Performed by: RADIOLOGY

## 2021-07-16 ENCOUNTER — MYC MEDICAL ADVICE (OUTPATIENT)
Dept: OBGYN | Facility: CLINIC | Age: 41
End: 2021-07-16

## 2021-07-30 ENCOUNTER — OFFICE VISIT (OUTPATIENT)
Dept: FAMILY MEDICINE | Facility: CLINIC | Age: 41
End: 2021-07-30
Payer: COMMERCIAL

## 2021-07-30 VITALS
DIASTOLIC BLOOD PRESSURE: 100 MMHG | HEIGHT: 61 IN | OXYGEN SATURATION: 99 % | HEART RATE: 65 BPM | TEMPERATURE: 97.8 F | WEIGHT: 134.5 LBS | SYSTOLIC BLOOD PRESSURE: 162 MMHG | RESPIRATION RATE: 18 BRPM | BODY MASS INDEX: 25.39 KG/M2

## 2021-07-30 DIAGNOSIS — Z78.9 MEDICATION INTOLERANCE: ICD-10-CM

## 2021-07-30 DIAGNOSIS — I10 UNCONTROLLED HYPERTENSION: ICD-10-CM

## 2021-07-30 DIAGNOSIS — I10 BENIGN ESSENTIAL HYPERTENSION: Primary | ICD-10-CM

## 2021-07-30 LAB
ALBUMIN SERPL-MCNC: 4 G/DL (ref 3.4–5)
ALBUMIN UR-MCNC: NEGATIVE MG/DL
ANION GAP SERPL CALCULATED.3IONS-SCNC: 3 MMOL/L (ref 3–14)
APPEARANCE UR: CLEAR
BASOPHILS # BLD AUTO: 0 10E3/UL (ref 0–0.2)
BASOPHILS NFR BLD AUTO: 1 %
BILIRUB UR QL STRIP: NEGATIVE
BUN SERPL-MCNC: 11 MG/DL (ref 7–30)
CALCIUM SERPL-MCNC: 9.6 MG/DL (ref 8.5–10.1)
CHLORIDE BLD-SCNC: 104 MMOL/L (ref 94–109)
CHOLEST SERPL-MCNC: 186 MG/DL
CO2 SERPL-SCNC: 30 MMOL/L (ref 20–32)
COLOR UR AUTO: YELLOW
CREAT SERPL-MCNC: 0.61 MG/DL (ref 0.52–1.04)
EOSINOPHIL # BLD AUTO: 0.2 10E3/UL (ref 0–0.7)
EOSINOPHIL NFR BLD AUTO: 3 %
ERYTHROCYTE [DISTWIDTH] IN BLOOD BY AUTOMATED COUNT: 11.4 % (ref 10–15)
FASTING STATUS PATIENT QL REPORTED: ABNORMAL
GFR SERPL CREATININE-BSD FRML MDRD: >90 ML/MIN/1.73M2
GLUCOSE BLD-MCNC: 108 MG/DL (ref 70–99)
GLUCOSE UR STRIP-MCNC: NEGATIVE MG/DL
HBA1C MFR BLD: 4.9 % (ref 0–5.6)
HCT VFR BLD AUTO: 40.6 % (ref 35–47)
HDLC SERPL-MCNC: 62 MG/DL
HGB BLD-MCNC: 14 G/DL (ref 11.7–15.7)
HGB UR QL STRIP: NEGATIVE
KETONES UR STRIP-MCNC: NEGATIVE MG/DL
LDLC SERPL CALC-MCNC: 80 MG/DL
LEUKOCYTE ESTERASE UR QL STRIP: NEGATIVE
LYMPHOCYTES # BLD AUTO: 1.4 10E3/UL (ref 0.8–5.3)
LYMPHOCYTES NFR BLD AUTO: 26 %
MCH RBC QN AUTO: 33.3 PG (ref 26.5–33)
MCHC RBC AUTO-ENTMCNC: 34.5 G/DL (ref 31.5–36.5)
MCV RBC AUTO: 97 FL (ref 78–100)
MONOCYTES # BLD AUTO: 0.5 10E3/UL (ref 0–1.3)
MONOCYTES NFR BLD AUTO: 9 %
NEUTROPHILS # BLD AUTO: 3.4 10E3/UL (ref 1.6–8.3)
NEUTROPHILS NFR BLD AUTO: 61 %
NITRATE UR QL: NEGATIVE
NONHDLC SERPL-MCNC: 124 MG/DL
PH UR STRIP: 7.5 [PH] (ref 5–7)
PHOSPHATE SERPL-MCNC: 3.6 MG/DL (ref 2.5–4.5)
PLATELET # BLD AUTO: 125 10E3/UL (ref 150–450)
POTASSIUM BLD-SCNC: 4.1 MMOL/L (ref 3.4–5.3)
RBC # BLD AUTO: 4.2 10E6/UL (ref 3.8–5.2)
RBC #/AREA URNS AUTO: NORMAL /HPF
SODIUM SERPL-SCNC: 137 MMOL/L (ref 133–144)
SP GR UR STRIP: 1.02 (ref 1–1.03)
TRIGL SERPL-MCNC: 220 MG/DL
TSH SERPL DL<=0.005 MIU/L-ACNC: 1.84 MU/L (ref 0.4–4)
UROBILINOGEN UR STRIP-ACNC: 0.2 E.U./DL
WBC # BLD AUTO: 5.5 10E3/UL (ref 4–11)
WBC #/AREA URNS AUTO: NORMAL /HPF

## 2021-07-30 PROCEDURE — 83036 HEMOGLOBIN GLYCOSYLATED A1C: CPT | Performed by: NURSE PRACTITIONER

## 2021-07-30 PROCEDURE — 93000 ELECTROCARDIOGRAM COMPLETE: CPT | Performed by: NURSE PRACTITIONER

## 2021-07-30 PROCEDURE — 80061 LIPID PANEL: CPT | Performed by: NURSE PRACTITIONER

## 2021-07-30 PROCEDURE — 80069 RENAL FUNCTION PANEL: CPT | Performed by: NURSE PRACTITIONER

## 2021-07-30 PROCEDURE — 84443 ASSAY THYROID STIM HORMONE: CPT | Performed by: NURSE PRACTITIONER

## 2021-07-30 PROCEDURE — 81001 URINALYSIS AUTO W/SCOPE: CPT | Performed by: NURSE PRACTITIONER

## 2021-07-30 PROCEDURE — 99214 OFFICE O/P EST MOD 30 MIN: CPT | Performed by: NURSE PRACTITIONER

## 2021-07-30 PROCEDURE — 36415 COLL VENOUS BLD VENIPUNCTURE: CPT | Performed by: NURSE PRACTITIONER

## 2021-07-30 PROCEDURE — 85025 COMPLETE CBC W/AUTO DIFF WBC: CPT | Performed by: NURSE PRACTITIONER

## 2021-07-30 RX ORDER — LISINOPRIL 20 MG/1
20 TABLET ORAL DAILY
Qty: 90 TABLET | Refills: 1 | Status: SHIPPED | OUTPATIENT
Start: 2021-07-30 | End: 2022-02-17

## 2021-07-30 ASSESSMENT — PAIN SCALES - GENERAL: PAINLEVEL: NO PAIN (0)

## 2021-07-30 ASSESSMENT — MIFFLIN-ST. JEOR: SCORE: 1217.47

## 2021-07-30 NOTE — PROGRESS NOTES
"    Assessment & Plan     Benign essential hypertension    - lisinopril (ZESTRIL) 20 MG tablet; Take 1 tablet (20 mg) by mouth daily Let me know if this dose is enough for BP, as it can be increased.  - EKG 12-lead complete w/read - Clinics    Uncontrolled hypertension    - Renal panel (Alb, BUN, Ca, Cl, CO2, Creat, Gluc, Phos, K, Na); Future  - UA with Microscopic reflex to Culture - lab collect; Future  - CBC with platelets and differential; Future  - TSH with free T4 reflex; Future  - Hemoglobin A1c; Future  - Lipid panel reflex to direct LDL Non-fasting; Future    Medication intolerance    20 minutes spent on the date of the encounter doing chart review, history and exam, documentation and further activities per the note     BMI:   Estimated body mass index is 25.41 kg/m  as calculated from the following:    Height as of this encounter: 1.549 m (5' 1\").    Weight as of this encounter: 61 kg (134 lb 8 oz).   Weight management plan: Discussed healthy diet and exercise guidelines    See Patient Instructions: advised pt to let me know how BP is doing, if not in range, can increase medication.  If symptomatic, advised to check BP.  Follow up as needed. Pt in agreement.     Return in about 4 weeks (around 8/27/2021), or if symptoms worsen or fail to improve.    EDITA Calles  [facility] BLAINE Richter is a 40 year old who presents for the following health issues     HPI     Hypertension Follow-up  Metoprolol-hydrochlorothiazide 50-25mg qd    Do you check your blood pressure regularly outside of the clinic? Yes     Are you following a low salt diet? Yes    Are your blood pressures ever more than 140 on the top number (systolic) OR more   than 90 on the bottom number (diastolic), for example 140/90? Yes     Pt sits for 5 minutes prior to checking BP, she has her feet flat on floor. Checks both arms. Not controlled. Initially started on hydrochlorothiazide- did nothing. Now on " "metoprolol-hydrochlorothiazide- has been very symptomatic finally improving but BP not controlled. Exercises, eats low salt diet. Minimal ETOH, does not smoke. Family hx of HTN. Denies CP, SOB, headaches, dizziness- but those are the symptoms she was having with starting metoprolol pt admits.     BP Readings from Last 6 Encounters:   07/30/21 (!) 162/100   11/24/20 (!) 154/102   10/21/20 (!) 163/123   09/22/20 124/82   05/12/20 (!) 136/92   01/08/20 (!) 154/111         How many servings of fruits and vegetables do you eat daily?  2-3    On average, how many sweetened beverages do you drink each day (Examples: soda, juice, sweet tea, etc.  Do NOT count diet or artificially sweetened beverages)?   0    How many days per week do you exercise enough to make your heart beat faster? 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 0    What makes it hard for you to take your medications?  Fatigue        Review of Systems   Constitutional, HEENT, cardiovascular, pulmonary, GI, , musculoskeletal, neuro, skin, endocrine and psych systems are negative, except as otherwise noted.      Objective    BP (!) 162/100   Pulse 65   Temp 97.8  F (36.6  C) (Tympanic)   Resp 18   Ht 1.549 m (5' 1\")   Wt 61 kg (134 lb 8 oz)   LMP 04/02/2018   SpO2 99%   BMI 25.41 kg/m    Body mass index is 25.41 kg/m .  Physical Exam   GENERAL: healthy, alert and no distress  EYES: Eyes grossly normal to inspection, PERRL and conjunctivae and sclerae normal  RESP: lungs clear to auscultation - no rales, rhonchi or wheezes  CV: regular rate and rhythm, normal S1 S2, no S3 or S4, no murmur, click or rub, no peripheral edema and peripheral pulses strong  MS: no gross musculoskeletal defects noted, no edema, no CVA tenderness  PSYCH: mentation appears normal, affect normal/bright    EKG - Reviewed and interpreted by me appears " normal, NSR  Labs to check for other causes of HTN

## 2021-07-30 NOTE — PATIENT INSTRUCTIONS
Patient Education     Checking Your Own Blood Pressure    Blood pressure checks are a common reason for visits to your healthcare provider. Yet, for less than the cost of a single appointment, you may be able to purchase your own blood pressure monitor. This way you can check the reading yourself at home.  Blood pressure is the force of blood against the walls of your arteries. Blood pressure readings tend to vary, depending on many factors, including stress levels and time of day. Your blood pressure reading in a healthcare provider's office can be as much as 20 or 30 points higher. The nervousness of being there can be enough to increase blood pressure.  Home blood pressure kits  You may buy blood pressure monitors at pharmacies, medical supply stores, and discount chain stores. An electronic digital monitor that is battery operated is often easier to use than the more traditional blood pressure cuff. Electronic monitors usually cost more.  It s important to check the accuracy of either type of monitor every so often. One way to make sure your monitor is accurate is to take it with you to your next healthcare provider appointment. Take your blood pressure with your monitor and compare it with the reading from your provider's monitor.  Ask your healthcare provider or pharmacist to recommend a monitor for you. Keep in mind that if you have a large upper arm, you'll need a special, large cuff to get a proper reading.  Read the instructions  Each type of blood pressure monitor works differently. Be sure to read the instructions that come with yours. Ask your healthcare provider, nurse, or pharmacist to teach you how to use it. Many people can check their own blood pressure at home without difficulty. Some need help from a family member or friend.  Your home blood pressure reading is more likely to be accurate if you do the following:    Don't take readings within a half-hour after smoking, exercising, or drinking  beverages with caffeine.    Take 2 or 3 readings at least 1 minute apart, and average the results.    Take readings at different times during the day, or on several days at different times.    Before you take your blood pressure, sit for 5 minutes with your back supported and your feet flat on the ground. Rest your arm on a table at the level of your heart.    Use the bathroom before taking your reading. A full bladder can change the results.  Blood pressure measurements are given as 2 numbers. Systolic blood pressure is the upper number. This is the pressure when the heart contracts. Diastolic blood pressure is the lower number. This is the pressure when the heart relaxes between beats. Both numbers in a blood pressure reading are important. As we grow older, systolic blood pressure is particularly important.  Blood pressure is categorized as normal, elevated, or stage 1 or stage 2 high blood pressure:    Normal blood pressure is systolic of less than 120 and diastolic of less than 80 (120/80)    Elevated blood pressure is systolic of 120 to 129 and diastolic less than 80    Stage 1 high blood pressure is systolic is 130 to 139 or diastolic between 80 to 89    Stage 2 high blood pressure is when systolic is 140 or higher or the diastolic is 90 or higher  Get immediate medical care if your blood pressure is much higher or lower than expected. Whenever you visit your healthcare provider, take your blood pressure record with you.  As a monitor ages, it may become less accurate. If the equipment or monitor you have is older than 5 years, you may need a new monitor. Take your monitor with you to your healthcare appointments and check the accuracy of the monitor against the reading the providers are getting.    2702-3461 [employer]. All rights reserved. This information is not intended as a substitute for professional medical care. Always follow your healthcare professional's instructions.            Patient Education     Low-Salt Choices  Eating salt (sodium) can make your body retain too much water. Extra water makes your heart work harder. Canned, packaged, and frozen foods are easy to prepare. But they are often high in sodium. Here are some ideas for low-salt foods you can easily make yourself.   For breakfast    Fruit or 100% fruit juice. It's better to have whole fruit instead of 100% fruit juice.    Whole-wheat bread or an English muffin. Look for sodium content on Nutrition Facts labels.    Low-fat milk or yogurt    Unsalted eggs    Shredded wheat    Corn tortillas    Unsalted steamed rice    Regular (not instant) hot cereal, made without salt    Stay away from    Sausage, granados, and ham    Flour tortillas    Packaged muffins, pancakes, and biscuits    Instant hot cereals    Cottage cheese    For lunch and dinner    Fresh fish, chicken, turkey, or meat--baked, broiled, or roasted without salt    Dry beans, cooked without salt    Tofu, stir-fried without salt    Unsalted fresh fruit and vegetables, or frozen or canned fruit and vegetables with no added salt  Stay away from    Lunch or deli meat that is cured or smoked    Cheese    Tomato juice and ketchup    Canned vegetables, soups, and fish not labeled as no-salt-added or reduced sodium    Packaged gravies and sauces    Olives, pickles, and relish    Bottled salad dressings    For snacks and desserts    Yogurt    Unsalted, air-popped popcorn    Unsalted nuts or seeds  Stay away from    Pies and cakes    Packaged dessert mixes    Pizza    Canned and packaged puddings    Pretzels, chips, crackers, and nuts--unless the label says unsalted    [employer] last reviewed this educational content on 11/1/2019 2000-2021 [employer]. All rights reserved. This information is not intended as a substitute for professional medical care. Always follow your healthcare professional's instructions.           Patient Education     Uncontrolled High  Blood Pressure (Established)    Your blood pressure was unusually high today. Your blood pressure may be high for several reasons. For example, this can occur if you ve missed doses of your blood pressure medicine. Or it can happen if you are taking other medicines such as some asthma inhalers, decongestants, diet pills, and illegal drugs like cocaine and amphetamine.   Other causes of high blood pressure include:     Weight gain    Too much salt in your diet    Smoking    Caffeine    Lack of exercise    Intense pain    Becoming upset. This means you feel fear, anger, or another strong emotion.  Blood pressure measurements are given as 2 numbers. Systolic blood pressure is the upper number. This is the pressure when the heart contracts. Diastolic blood pressure is the lower number. This is the pressure when the heart relaxes between beats. You will see your blood pressure readings written together. For example, a person with a systolic pressure of 118 and a diastolic pressure of 78 will have 118/78 written in the medical record. To be diagnosed with high blood pressure, your numbers must be higher than the normal range when tested over a period of time.   Blood pressure is categorized as normal, elevated, or stage 1 or stage 2 high blood pressure:     Normal blood pressure is systolic of less than 120 and diastolic of less than 80 (120/80)    Elevated blood pressure is systolic of 120 to 129 and diastolic less than 80    Stage 1 high blood pressure is systolic of 130 to 139 or diastolic between 80 to 89    Stage 2 high blood pressure is when systolic is 140 or higher or the diastolic is 90 or higher  Uncontrolled high blood pressure can cause serious health problems. It raises your risk for heart attack, stroke, and heart failure. But you can do many things to manage your blood pressure. In general, if you have high blood pressure, keeping your blood pressure below 130/80 mmHg may help prevent these problems. Your  healthcare provider may prescribe medicine to help control blood pressure if lifestyle changes are not enough.   Home care  It s important to take steps to lower your blood pressure. If you are taking blood pressure medicine, the guidelines below may help you need less or no medicines in the future.     Start a weight-loss program if you are overweight.    Cut back on the amount of salt in your diet:  ? Don't have high-salt foods such as olives, pickles, smoked meats, canned soups, deli meats, or salted potato chips.  ? Don t add salt to your food at the table.  ? Use only small amounts of salt when cooking.    Start an exercise program. Talk with your healthcare provider about what exercise program is best for you. It doesn t have to be difficult. Even brisk walking for 20 minutes 3 times a week is a good form of exercise.    Don't use medicines that stimulate the heart. This includes many over-the-counter cold and sinus decongestant pills and sprays, as well as diet pills. Check the warnings about high blood pressure on the label. Before purchasing any over-the-counter medicines or supplements, always ask the pharmacist about the product's potential interaction with your high blood pressure and your medicines.    Stimulants such as amphetamine or cocaine could be lethal for someone with high blood pressure. Never take these.    Limit how much caffeine you drink. Consider switching to noncaffeinated beverages.    Stop smoking. If you are a long-time smoker, this can be hard. Enroll in a stop-smoking program to make it more likely that you will succeed. Talk with your provider about ways to quit.    Learn how to handle stress better. This is an important part of any program to lower blood pressure. Learn ways to relax. These include meditation, yoga, and biofeedback.    If medicines were prescribed, take them exactly as directed. Missing doses may cause your blood pressure to get out of control. Don't stop taking  your medicines, even if you feel better or you feel like you don't need them anymore. Talk with your healthcare provider.    If you miss a dose or doses of your medicines, check with your healthcare provider or pharmacist about what to do.    Consider buying an automatic blood pressure machine. Your provider may advise a certain type. These are available at most pharmacies. It's ideal to measure your blood pressure twice a day, once in the morning, and once in the late afternoon. Try to be consistent. Check your blood pressure around the same time each day for a good comparison. Keep a written record of your home blood pressure readings and take the record to your medical appointments.  Here are some other guidelines on home blood pressure monitoring from the American Heart Association.     Don't smoke or drink coffee for 30 minutes.    Go to the bathroom before the test.    Relax for 5 minutes before taking the measurement.    Sit correctly. Be sure your back is supported. Don't sit on a couch or soft chair. Uncross your feet and place them flat on the floor. Place your arm on a solid, flat surface like a table with the upper arm at heart level. Make certain the middle of the cuff is directly above the bend of the elbow. Check the monitor's instruction manual for an illustration.    Take multiple readings. When you measure, take 2 or 3 readings one minute apart and record all of the results.    Take your blood pressure at the same time every day, or as your healthcare provider recommends.    Record the date, time, and blood pressure reading.    Take the record with you to your next appointment. If your blood pressure monitor has a built-in memory, simply take the monitor with you to your next appointment.    Call your provider if you have several high readings. Don't be frightened by a single high reading, but if you get several high readings, check in with your healthcare provider.    Note: When blood pressure  "reaches a systolic (top number) of 180 or higher or a diastolic (bottom number) of 110 or higher, you need emergency medical treatment. Call your healthcare provider right away.  Follow-up care  Regular visits to your own healthcare provider for blood pressure and medicine checks are an important part of your care. Make a follow-up appointment as directed. Bring the record of your home blood pressure readings to the appointment.   When to seek medical advice  Call your healthcare provider right away if any of these occur:    Blood pressure reaches a systolic (top number) of 180 or higher or diastolic (bottom number) of 110 or higher, emergency medical treatment is required.    Chest, arm, shoulder, neck, or upper back pain    Shortness of breath    Severe headache    Throbbing or rushing sound in the ears    Nosebleed that comes back or doesn't go away    Extreme drowsiness, confusion, or fainting    Dizziness or dizziness with spinning sensation (vertigo)    Weakness in an arm or leg or on one side of the face    Trouble speaking or seeing   [employer] last reviewed this educational content on 10/1/2019    1049-2021 [employer]. All rights reserved. This information is not intended as a substitute for professional medical care. Always follow your healthcare professional's instructions.           Patient Education     High Blood Pressure and Chronic Kidney Disease (CKD)  What is high blood pressure?  For CKD patients, a normal blood pressure is 120/80 (or \"120 over 80\"). When blood pressure stays at 130/80 or higher, it is called high blood pressure (hypertension).  How are kidney disease and high blood pressure related?    More than half of the patients who have chronic kidney disease also have high blood pressure.    High blood pressure increases the chance that kidney disease will get worse.    High blood pressure is a major cause of CKD.   Damaged blood vessels send less blood to the important organs " in your body, including your kidneys. Your kidneys filter waste from your blood. When your kidneys can't clean your blood as well as they should, this waste builds up in your body.    CKD can also cause high blood pressure. Your kidneys help keep your blood pressure in a healthy range. Controlling your blood pressure will keep your kidneys from getting worse. This will make CKD easier for you and your doctor to manage.  How do I treat high blood pressure and CKD?    Your doctor will give you blood pressure goals to meet and show you how to check your blood pressure at home.  ? It is important that you check your pressure as directed. High blood pressure often has no symptoms.  ? Make sure to write down the date, time and result of your readings.  ? If you take blood pressure medicine, take it before you measure blood pressure. This helps us know if your medicine is working the way it should.    Stop smoking.    Follow your diet and exercise plan.    Take all medicines as directed.  ? If you have kidney disease from diabetes or if you have protein in your urine, the best blood pressure medicines for your treatment are angiotensin converting enzyme (ACE) inhibitors or angiotensin receptor blockers (ARB).  ? If you have any side effects from your blood pressure medicine, tell your doctor. He or she may switch you to a different medicine.  How often should I see my doctor?    Your CKD team will outline a treatment plan for you after you are diagnosed. Most patients come to the clinic 1 or 2 times per year. We'll ask you to come in more often if:  ? You start a new medicine or we change your medicine dose.  ? Your kidney function is getting worse.  ? Your blood pressure is not controlled.    At each visit, we will test your blood and urine and measure your blood pressure. (Remember to check your blood pressure at home to help guide your treatment.)    DON'T be afraid to ask questions. We are here to help you.  How to  check your blood pressure at home:  1. Sit in a quiet area.  2. Remove thick clothing covering the arm.  3. Sit up straight with feet flat on the ground and legs uncrossed.  4. Wrap the cuff around your upper arm above the elbow.  5. Rest the arm you are testing on a steady surface  6. Try to relax for 5 to 10 minutes before testing your blood pressure. This will make the reading more accurate.  7. Record your blood pressure, heart rate, day and time in a blood pressure log.  8. It is best to check your blood pressure around the same time every day--about an hour after taking your medicine.  NOTE: If at any point you are having trouble taking your blood pressure, please call the clinic. One of the nurses will be happy to meet with you. The nurse will review the steps and make sure your blood pressure cuff is working.  Things to avoid when checking blood pressure    Do not check your blood pressure right after waking up in the morning--wait at least an hour.    Do not check your blood pressure after exercise or heavy activity.    Avoid food, caffeine, alcohol and tobacco 30 minutes before testing.    Do not measure your blood pressure with a full bladder.    Avoid talking while checking your blood pressure  For more information  National Kidney Foundation   www.kidney.org  5-020-094-3657  Other resources  National Kidney Foundation   www.kidney.org  4-830-740-6603  For informational purposes only. Not to replace the advice of your health care provider.   Copyright   2016 [employer]. All rights reserved. [place] 195411 - Rev 02/18.

## 2021-08-04 ENCOUNTER — MYC MEDICAL ADVICE (OUTPATIENT)
Dept: FAMILY MEDICINE | Facility: CLINIC | Age: 41
End: 2021-08-04

## 2021-08-04 DIAGNOSIS — Z01.89 LABORATORY EXAMINATION: Primary | ICD-10-CM

## 2021-08-06 NOTE — RESULT ENCOUNTER NOTE
Alan Richter,    Thank you for your recent office visit.    Here are your recent results.  Normmal appearing non-fasting labs.     Feel free to contact me via [employer] or call the clinic at [phone].    Sincerely,    ANTONIO Alaniz, FNP-BC

## 2021-08-18 ENCOUNTER — LAB (OUTPATIENT)
Dept: LAB | Facility: CLINIC | Age: 41
End: 2021-08-18
Payer: COMMERCIAL

## 2021-08-18 DIAGNOSIS — Z13.220 LIPID SCREENING: ICD-10-CM

## 2021-08-18 DIAGNOSIS — Z01.89 LABORATORY EXAMINATION: ICD-10-CM

## 2021-08-18 LAB
ALBUMIN SERPL-MCNC: 4 G/DL (ref 3.4–5)
ALP SERPL-CCNC: 36 U/L (ref 40–150)
ALT SERPL W P-5'-P-CCNC: 48 U/L (ref 0–50)
ANION GAP SERPL CALCULATED.3IONS-SCNC: 6 MMOL/L (ref 3–14)
AST SERPL W P-5'-P-CCNC: 23 U/L (ref 0–45)
BASOPHILS # BLD AUTO: 0 10E3/UL (ref 0–0.2)
BASOPHILS NFR BLD AUTO: 1 %
BILIRUB SERPL-MCNC: 0.5 MG/DL (ref 0.2–1.3)
BUN SERPL-MCNC: 9 MG/DL (ref 7–30)
CALCIUM SERPL-MCNC: 9.7 MG/DL (ref 8.5–10.1)
CHLORIDE BLD-SCNC: 108 MMOL/L (ref 94–109)
CHOLEST SERPL-MCNC: 183 MG/DL
CO2 SERPL-SCNC: 25 MMOL/L (ref 20–32)
CREAT SERPL-MCNC: 0.61 MG/DL (ref 0.52–1.04)
EOSINOPHIL # BLD AUTO: 0.1 10E3/UL (ref 0–0.7)
EOSINOPHIL NFR BLD AUTO: 2 %
ERYTHROCYTE [DISTWIDTH] IN BLOOD BY AUTOMATED COUNT: 11.5 % (ref 10–15)
FASTING STATUS PATIENT QL REPORTED: YES
GFR SERPL CREATININE-BSD FRML MDRD: >90 ML/MIN/1.73M2
GLUCOSE BLD-MCNC: 100 MG/DL (ref 70–99)
HCT VFR BLD AUTO: 39.4 % (ref 35–47)
HDLC SERPL-MCNC: 68 MG/DL
HGB BLD-MCNC: 13.6 G/DL (ref 11.7–15.7)
IMM GRANULOCYTES # BLD: 0 10E3/UL
IMM GRANULOCYTES NFR BLD: 0 %
LDLC SERPL CALC-MCNC: 77 MG/DL
LYMPHOCYTES # BLD AUTO: 1.2 10E3/UL (ref 0.8–5.3)
LYMPHOCYTES NFR BLD AUTO: 22 %
MCH RBC QN AUTO: 33 PG (ref 26.5–33)
MCHC RBC AUTO-ENTMCNC: 34.5 G/DL (ref 31.5–36.5)
MCV RBC AUTO: 96 FL (ref 78–100)
MONOCYTES # BLD AUTO: 0.3 10E3/UL (ref 0–1.3)
MONOCYTES NFR BLD AUTO: 7 %
NEUTROPHILS # BLD AUTO: 3.5 10E3/UL (ref 1.6–8.3)
NEUTROPHILS NFR BLD AUTO: 68 %
NONHDLC SERPL-MCNC: 115 MG/DL
NRBC # BLD AUTO: 0 10E3/UL
NRBC BLD AUTO-RTO: 0 /100
PLATELET # BLD AUTO: 107 10E3/UL (ref 150–450)
POTASSIUM BLD-SCNC: 4.4 MMOL/L (ref 3.4–5.3)
PROT SERPL-MCNC: 7.3 G/DL (ref 6.8–8.8)
RBC # BLD AUTO: 4.12 10E6/UL (ref 3.8–5.2)
SODIUM SERPL-SCNC: 139 MMOL/L (ref 133–144)
TRIGL SERPL-MCNC: 188 MG/DL
TSH SERPL DL<=0.005 MIU/L-ACNC: 1.68 MU/L (ref 0.4–4)
WBC # BLD AUTO: 5.2 10E3/UL (ref 4–11)

## 2021-08-18 PROCEDURE — 80061 LIPID PANEL: CPT

## 2021-08-18 PROCEDURE — 80050 GENERAL HEALTH PANEL: CPT

## 2021-08-18 PROCEDURE — 36415 COLL VENOUS BLD VENIPUNCTURE: CPT

## 2021-08-20 ENCOUNTER — MYC MEDICAL ADVICE (OUTPATIENT)
Dept: FAMILY MEDICINE | Facility: CLINIC | Age: 41
End: 2021-08-20

## 2021-08-20 DIAGNOSIS — D69.6 PLATELET COUNT LESS 100,000 PER CUBIC MILLIMETER (H): Primary | ICD-10-CM

## 2021-08-20 DIAGNOSIS — D69.1 PLATELET DYSFUNCTION (H): ICD-10-CM

## 2021-08-20 NOTE — RESULT ENCOUNTER NOTE
Alan Richter,    Thank you for your recent office visit.    Here are your recent results.  Your labs show that your triglycerides are slightly elevated.  This can be improved by taking a daily fish oil supplement. All other labs are considered normal at this time.     Feel free to contact me via [employer] or call the clinic at [phone].    Sincerely,    ANTONIO Alaniz, FNP-BC

## 2021-09-12 ENCOUNTER — HEALTH MAINTENANCE LETTER (OUTPATIENT)
Age: 41
End: 2021-09-12

## 2021-09-29 ENCOUNTER — LAB (OUTPATIENT)
Dept: INFUSION THERAPY | Facility: HOSPITAL | Age: 41
End: 2021-09-29
Attending: INTERNAL MEDICINE
Payer: COMMERCIAL

## 2021-09-29 ENCOUNTER — ONCOLOGY VISIT (OUTPATIENT)
Dept: ONCOLOGY | Facility: HOSPITAL | Age: 41
End: 2021-09-29
Attending: NURSE PRACTITIONER
Payer: COMMERCIAL

## 2021-09-29 VITALS
OXYGEN SATURATION: 98 % | BODY MASS INDEX: 24.48 KG/M2 | TEMPERATURE: 98.1 F | HEART RATE: 81 BPM | RESPIRATION RATE: 12 BRPM | WEIGHT: 133 LBS | HEIGHT: 62 IN | SYSTOLIC BLOOD PRESSURE: 150 MMHG | DIASTOLIC BLOOD PRESSURE: 100 MMHG

## 2021-09-29 DIAGNOSIS — D69.1 PLATELET DYSFUNCTION (H): ICD-10-CM

## 2021-09-29 DIAGNOSIS — D69.6 PLATELET COUNT LESS 100,000 PER CUBIC MILLIMETER (H): ICD-10-CM

## 2021-09-29 PROCEDURE — 86769 SARS-COV-2 COVID-19 ANTIBODY: CPT | Performed by: INTERNAL MEDICINE

## 2021-09-29 PROCEDURE — G0463 HOSPITAL OUTPT CLINIC VISIT: HCPCS

## 2021-09-29 PROCEDURE — 36415 COLL VENOUS BLD VENIPUNCTURE: CPT | Performed by: INTERNAL MEDICINE

## 2021-09-29 PROCEDURE — 99204 OFFICE O/P NEW MOD 45 MIN: CPT | Performed by: INTERNAL MEDICINE

## 2021-09-29 ASSESSMENT — MIFFLIN-ST. JEOR: SCORE: 1221.53

## 2021-09-29 ASSESSMENT — PAIN SCALES - GENERAL: PAINLEVEL: NO PAIN (0)

## 2021-09-29 NOTE — PROGRESS NOTES
"Oncology Rooming Note    September 29, 2021 1:19 PM   Rober Renee is a 41 year old female who presents for:    Chief Complaint   Patient presents with     Oncology Clinic Visit     New low platelet count     Initial Vitals: BP (!) 185/107 (BP Location: Right arm, Patient Position: Sitting, Cuff Size: Adult Regular)   Pulse 81   Temp 98.1  F (36.7  C) (Oral)   Resp 12   Ht 1.575 m (5' 2\")   Wt 60.3 kg (133 lb)   LMP 04/02/2018   SpO2 98%   BMI 24.33 kg/m   Estimated body mass index is 24.33 kg/m  as calculated from the following:    Height as of this encounter: 1.575 m (5' 2\").    Weight as of this encounter: 60.3 kg (133 lb). Body surface area is 1.62 meters squared.  No Pain (0) Comment: Data Unavailable   Patient's last menstrual period was 04/02/2018.  Allergies reviewed: Yes  Medications reviewed: Yes    Medications: Medication refills not needed today.  Pharmacy name entered into [facility]: [facility] - [id number] [facility]    Clinical concerns:  New low platelet count      Lorin Betancourt, HAO            "

## 2021-09-29 NOTE — LETTER
9/29/2021         RE: Rober Renee  [address] [facility] [place] [address]        Dear Colleague,    Thank you for referring your patient, Rober Renee, to the [facility] [place]. Please see a copy of my visit note below.    [facility] Hematology and Oncology Consult Note    Patient: Rober Renee  MRN: [id number]  Date of Service: Sep 29, 2021       Reason for Consult    Problem List Items Addressed This Visit     None      Visit Diagnoses     Platelet count less 100,000 per cubic millimeter (H)        Platelet dysfunction (H)              Assessment    1.  A very pleasant 41-year-old woman with thrombocytopenia.  Exact etiology of it is unknown.  However the fact that this has been around for about 9 years makes it a very benign process.  Theoretically this could be a low-grade ITP.  This could also be secondary to hypersplenism due to splenic congestion.  2.  History of Covid infection in June 2020.  This can also occasionally cause some thrombocytopenia.  3.  Follow-up with her primary care physician.    Plan    1.  Continue with a healthy lifestyle.  2.  Check Covid antibody titers.  If the titers have come down she should get vaccinated.  3.  She can take oral iron if it helps her symptoms.  4.  Follow-up on the as-needed basis.  She should follow-up with her primary care provider for other medical issues.    Staging History  Cancer Staging  No matching staging information was found for the patient.      History    Ms. Rober Renee is a 41 year old who has been referred to me for evaluation of thrombocytopenia.  She has been recently seen by her primary care provider.  Her platelet was about 107,000.  Therefore she has been referred.  She otherwise had noes other significant issues.  No leukopenia or anemia issues.  She is otherwise healthy except for some blood pressure issues.    Review of her clinical it indicate that she has had low platelet count off and on since at  least .  It really never has gotten into any trouble with it.  Her lab results also indicate occasional elevation of ALT.    Her CT scan is indicated that she might have some fatty infiltration of the liver.  She also had a Covid infection back in 2020.  She tells me that she it was very severe.    Her main complaint is that she is has achiness in her back and neck area.  She is trying to find a solution for that.  She tells me when she was taking oral iron therapy her neck and back pain was better.    Review of systems.  No fever or night sweats.  No loss of weight.  No lumps or bumps anywhere.  No unusual headaches or eyesight issues.  No dizziness.  No bleeding from the nose.  No sores in the mouth. No problems with swallowing.  No chest pain. No shortness of breath. No cough.  No abdominal pain. No nausea or vomiting.  No diarrhea or constipation.  No blood in stool or black colored stools.  No problems passing urine.  No numbness or tingling in hands or feet.  No skin rashes.  A 14 point review of systems is otherwise negative.      Past History    Past Medical History:   Diagnosis Date     Calculus of kidney     right side     Cervical high risk HPV (human papillomavirus) test positive 2018    NIL pap, +HR HPV (Neg 16/18). Rpt Pap+HPV in 1 year (Due )     Chickenpox      Gestational diabetes ,     History of postpartum depression, currently pregnant     mild     Intervertebral lumbar disc disorder with myelopathy, lumbar region     herniated disks, L4-5, s/p steroid injection     Intramural leiomyoma of uterus 4/3/2018     Migraines      Urinary tract bacterial infections      Past Surgical History:   Procedure Laterality Date      SECTION, TUBAL LIGATION, COMBINED  2013    c/section with BTL     HYSTERECTOMY, PAP NO LONGER INDICATED  04/10/2018    No history of LIBERTY 2 or greater     LAPAROSCOPIC ASSISTED HYSTERECTOMY VAGINAL, BILATERAL SALPINGO-OOPHORECTOMY, COMBINED  N/A 4/10/2018    Procedure: COMBINED LAPAROSCOPIC ASSISTED HYSTERECTOMY VAGINAL, SALPINGO-OOPHORECTOMY;  Laparoscopic assisted vaginal hysterectomy with possible bilateral salpingoophorectomy.;  Surgeon: Taisha Wilson MD;  Location: [facility] OR     Family History   Problem Relation Age of Onset     Cancer Maternal Grandfather         liver [place]     Cancer Paternal Grandfather         prostate CA     Cancer Maternal Grandmother         ovarian CA     Other - See Comments Maternal Grandmother         migraine     Diabetes Paternal Grandmother      Hypertension Paternal Grandmother      Hypertension Father      Depression Father      Other - See Comments Mother         migraine     Hypertension Brother      Other - See Comments Maternal Uncle         migraine     Depression Sister      Melanoma No family hx of      Social History     Socioeconomic History     Marital status: Single     Spouse name: Not on file     Number of children: 4     Years of education: Not on file     Highest education level: Not on file   Occupational History     Occupation: [employer]     Employer: [employer]     Employer: [employer]    Tobacco Use     Smoking status: Former Smoker     Packs/day: 0.50     Years: 10.00     Pack years: 5.00     Types: Cigarettes     Quit date: 2008     Years since quittin.6     Smokeless tobacco: Never Used   Substance and Sexual Activity     Alcohol use: Yes     Comment: very seldom     Drug use: No     Sexual activity: Not Currently     Partners: Male     Birth control/protection: Surgical, Female Surgical     Comment: tubal   Other Topics Concern     Parent/sibling w/ CABG, MI or angioplasty before 65F 55M? No   Social History Narrative     Not on file     Social Determinants of Health     Financial Resource Strain:      Difficulty of Paying Living Expenses:    Food Insecurity:      Worried About Running Out of Food in the Last Year:      Ran Out of Food in the Last Year:   "  Transportation Needs:      Lack of Transportation (Medical):      Lack of Transportation (Non-Medical):    Physical Activity:      Days of Exercise per Week:      Minutes of Exercise per Session:    Stress:      Feeling of Stress :    Social Connections:      Frequency of Communication with Friends and Family:      Frequency of Social Gatherings with Friends and Family:      Attends Moravian Services:      Active Member of Clubs or Organizations:      Attends Club or Organization Meetings:      Marital Status:    Intimate Partner Violence:      Fear of Current or Ex-Partner:      Emotionally Abused:      Physically Abused:      Sexually Abused:          Allergies    Allergies   Allergen Reactions     Imitrex [Sumatriptan] Other (See Comments)     Felt terrible, \"like pores were on fire\"           Physical Exam    LMP 04/02/2018       GENERAL: Alert and oriented to time place and person. Seated comfortably. In no distress.    HEAD: Atraumatic and normocephalic.    EYES: ANDRE, EOMI. No pallor. No icterus.    Oral cavity: no mucosal lesion or tonsillar enlargement.    NECK: supple. JVP normal.No thyroid enlargement.    LYMPH NODES: No palpable, cervical, axillary or inguinal lymphadenopathy.    CHEST: clear to auscultation bilaterally. Symmetrical breath movements bilaterally.    CVS: S1 and S2 are Regular rate and rhythm. No murmur or gallop or rub heard. No peripheral edema.    ABDOMEN: Soft. Not tender. Not distended. No palpable hepatomegaly or splenomegaly. No other mass palpable. Bowel sounds heard.    EXTREMITIES: Warm.    SKIN: no rash, or bruising or purpura.      Lab Results    I reviewed her blood counts.  She has had slightly low platelet count off and on for past several years.  In June 2012 she had a platelet count of 100,000.  Since then she has had several other blood counts for her blood count is entirely normal and then some others where it is around 110,000.  She has never been really symptomatic " "from it as far as I can tell.  She has had some episodes of hematuria.    Imaging Results    No results found.        This note has been dictated using voice recognition software. Any grammatical or context distortions are unintentional and inherent to the software      Signed by: Eyad Mchugh MD, MD      Oncology Rooming Note    September 29, 2021 1:19 PM   Rober Renee is a 41 year old female who presents for:    Chief Complaint   Patient presents with     Oncology Clinic Visit     New low platelet count     Initial Vitals: BP (!) 185/107 (BP Location: Right arm, Patient Position: Sitting, Cuff Size: Adult Regular)   Pulse 81   Temp 98.1  F (36.7  C) (Oral)   Resp 12   Ht 1.575 m (5' 2\")   Wt 60.3 kg (133 lb)   LMP 04/02/2018   SpO2 98%   BMI 24.33 kg/m   Estimated body mass index is 24.33 kg/m  as calculated from the following:    Height as of this encounter: 1.575 m (5' 2\").    Weight as of this encounter: 60.3 kg (133 lb). Body surface area is 1.62 meters squared.  No Pain (0) Comment: Data Unavailable   Patient's last menstrual period was 04/02/2018.  Allergies reviewed: Yes  Medications reviewed: Yes    Medications: Medication refills not needed today.  Pharmacy name entered into [facility]: [facility] - [id number] [facility] [place]    Clinical concerns:  New low platelet count      Lorin Betancourt [facility]                Again, thank you for allowing me to participate in the care of your patient.        Sincerely,        Eyad Mchugh MD, MD    "

## 2021-09-29 NOTE — PROGRESS NOTES
[facility] Hematology and Oncology Consult Note    Patient: Rober Renee  MRN: [id number]  Date of Service: Sep 29, 2021       Reason for Consult    Problem List Items Addressed This Visit     None      Visit Diagnoses     Platelet count less 100,000 per cubic millimeter (H)        Platelet dysfunction (H)              Assessment    1.  A very pleasant 41-year-old woman with thrombocytopenia.  Exact etiology of it is unknown.  However the fact that this has been around for about 9 years makes it a very benign process.  Theoretically this could be a low-grade ITP.  This could also be secondary to hypersplenism due to splenic congestion.  2.  History of Covid infection in June 2020.  This can also occasionally cause some thrombocytopenia.  3.  Follow-up with her primary care physician.    Plan    1.  Continue with a healthy lifestyle.  2.  Check Covid antibody titers.  If the titers have come down she should get vaccinated.  3.  She can take oral iron if it helps her symptoms.  4.  Follow-up on the as-needed basis.  She should follow-up with her primary care provider for other medical issues.    Staging History  Cancer Staging  No matching staging information was found for the patient.      History    Ms. Rober Renee is a 41 year old who has been referred to me for evaluation of thrombocytopenia.  She has been recently seen by her primary care provider.  Her platelet was about 107,000.  Therefore she has been referred.  She otherwise had noes other significant issues.  No leukopenia or anemia issues.  She is otherwise healthy except for some blood pressure issues.    Review of her clinical it indicate that she has had low platelet count off and on since at least 2012.  It really never has gotten into any trouble with it.  Her lab results also indicate occasional elevation of ALT.    Her CT scan is indicated that she might have some fatty infiltration of the liver.  She also had a Covid infection back in June  .  She tells me that she it was very severe.    Her main complaint is that she is has achiness in her back and neck area.  She is trying to find a solution for that.  She tells me when she was taking oral iron therapy her neck and back pain was better.    Review of systems.  No fever or night sweats.  No loss of weight.  No lumps or bumps anywhere.  No unusual headaches or eyesight issues.  No dizziness.  No bleeding from the nose.  No sores in the mouth. No problems with swallowing.  No chest pain. No shortness of breath. No cough.  No abdominal pain. No nausea or vomiting.  No diarrhea or constipation.  No blood in stool or black colored stools.  No problems passing urine.  No numbness or tingling in hands or feet.  No skin rashes.  A 14 point review of systems is otherwise negative.      Past History    Past Medical History:   Diagnosis Date     Calculus of kidney     right side     Cervical high risk HPV (human papillomavirus) test positive 2018    NIL pap, +HR HPV (Neg 16/18). Rpt Pap+HPV in 1 year (Due )     Chickenpox      Gestational diabetes ,     History of postpartum depression, currently pregnant     mild     Intervertebral lumbar disc disorder with myelopathy, lumbar region     herniated disks, L4-5, s/p steroid injection     Intramural leiomyoma of uterus 4/3/2018     Migraines      Urinary tract bacterial infections      Past Surgical History:   Procedure Laterality Date      SECTION, TUBAL LIGATION, COMBINED  2013    c/section with BTL     HYSTERECTOMY, PAP NO LONGER INDICATED  04/10/2018    No history of LIBERTY 2 or greater     LAPAROSCOPIC ASSISTED HYSTERECTOMY VAGINAL, BILATERAL SALPINGO-OOPHORECTOMY, COMBINED N/A 4/10/2018    Procedure: COMBINED LAPAROSCOPIC ASSISTED HYSTERECTOMY VAGINAL, SALPINGO-OOPHORECTOMY;  Laparoscopic assisted vaginal hysterectomy with possible bilateral salpingoophorectomy.;  Surgeon: Taisha Wilson MD;  Location: [facility] OR      Family History   Problem Relation Age of Onset     Cancer Maternal Grandfather         liver [place]     Cancer Paternal Grandfather         prostate CA     Cancer Maternal Grandmother         ovarian CA     Other - See Comments Maternal Grandmother         migraine     Diabetes Paternal Grandmother      Hypertension Paternal Grandmother      Hypertension Father      Depression Father      Other - See Comments Mother         migraine     Hypertension Brother      Other - See Comments Maternal Uncle         migraine     Depression Sister      Melanoma No family hx of      Social History     Socioeconomic History     Marital status: Single     Spouse name: Not on file     Number of children: 4     Years of education: Not on file     Highest education level: Not on file   Occupational History     Occupation: [employer]     Employer: [employer]     Employer: [employer]    Tobacco Use     Smoking status: Former Smoker     Packs/day: 0.50     Years: 10.00     Pack years: 5.00     Types: Cigarettes     Quit date: 2008     Years since quittin.6     Smokeless tobacco: Never Used   Substance and Sexual Activity     Alcohol use: Yes     Comment: very seldom     Drug use: No     Sexual activity: Not Currently     Partners: Male     Birth control/protection: Surgical, Female Surgical     Comment: tubal   Other Topics Concern     Parent/sibling w/ CABG, MI or angioplasty before 65F 55M? No   Social History Narrative     Not on file     Social Determinants of Health     Financial Resource Strain:      Difficulty of Paying Living Expenses:    Food Insecurity:      Worried About Running Out of Food in the Last Year:      Ran Out of Food in the Last Year:    Transportation Needs:      Lack of Transportation (Medical):      Lack of Transportation (Non-Medical):    Physical Activity:      Days of Exercise per Week:      Minutes of Exercise per Session:    Stress:      Feeling of Stress :    Social Connections:       "Frequency of Communication with Friends and Family:      Frequency of Social Gatherings with Friends and Family:      Attends Orthodox Services:      Active Member of Clubs or Organizations:      Attends Club or Organization Meetings:      Marital Status:    Intimate Partner Violence:      Fear of Current or Ex-Partner:      Emotionally Abused:      Physically Abused:      Sexually Abused:          Allergies    Allergies   Allergen Reactions     Imitrex [Sumatriptan] Other (See Comments)     Felt terrible, \"like pores were on fire\"           Physical Exam    LMP 04/02/2018       GENERAL: Alert and oriented to time place and person. Seated comfortably. In no distress.    HEAD: Atraumatic and normocephalic.    EYES: ANDRE, EOMI. No pallor. No icterus.    Oral cavity: no mucosal lesion or tonsillar enlargement.    NECK: supple. JVP normal.No thyroid enlargement.    LYMPH NODES: No palpable, cervical, axillary or inguinal lymphadenopathy.    CHEST: clear to auscultation bilaterally. Symmetrical breath movements bilaterally.    CVS: S1 and S2 are Regular rate and rhythm. No murmur or gallop or rub heard. No peripheral edema.    ABDOMEN: Soft. Not tender. Not distended. No palpable hepatomegaly or splenomegaly. No other mass palpable. Bowel sounds heard.    EXTREMITIES: Warm.    SKIN: no rash, or bruising or purpura.      Lab Results    I reviewed her blood counts.  She has had slightly low platelet count off and on for past several years.  In June 2012 she had a platelet count of 100,000.  Since then she has had several other blood counts for her blood count is entirely normal and then some others where it is around 110,000.  She has never been really symptomatic from it as far as I can tell.  She has had some episodes of hematuria.    Imaging Results    No results found.        This note has been dictated using voice recognition software. Any grammatical or context distortions are unintentional and inherent to the " software      Signed by: Eyad Mchugh MD, MD

## 2021-10-01 LAB — SARS-COV-2 AB SERPL QL IA: POSITIVE

## 2021-10-04 ENCOUNTER — TELEPHONE (OUTPATIENT)
Dept: ONCOLOGY | Facility: HOSPITAL | Age: 41
End: 2021-10-04

## 2021-10-04 NOTE — TELEPHONE ENCOUNTER
LM on  to call regarding her lab results.  Per Dr. Mchugh, her SARS-CoV-2 Nucleocapsid Total Ab results came back positive.      She has had COVID in the past.  Dr. Mchugh would still recommend that she be vaccinated

## 2021-10-13 ENCOUNTER — VIRTUAL VISIT (OUTPATIENT)
Dept: FAMILY MEDICINE | Facility: CLINIC | Age: 41
End: 2021-10-13
Payer: COMMERCIAL

## 2021-10-13 DIAGNOSIS — F41.0 PANIC ATTACK: ICD-10-CM

## 2021-10-13 DIAGNOSIS — G43.019 INTRACTABLE MIGRAINE WITHOUT AURA AND WITHOUT STATUS MIGRAINOSUS: Primary | ICD-10-CM

## 2021-10-13 DIAGNOSIS — R11.0 NAUSEA: ICD-10-CM

## 2021-10-13 DIAGNOSIS — G47.00 PERSISTENT INSOMNIA: ICD-10-CM

## 2021-10-13 DIAGNOSIS — F41.1 GENERALIZED ANXIETY DISORDER: ICD-10-CM

## 2021-10-13 DIAGNOSIS — M54.2 NECK PAIN: ICD-10-CM

## 2021-10-13 PROCEDURE — 96127 BRIEF EMOTIONAL/BEHAV ASSMT: CPT | Mod: 59 | Performed by: NURSE PRACTITIONER

## 2021-10-13 PROCEDURE — 99214 OFFICE O/P EST MOD 30 MIN: CPT | Mod: 95 | Performed by: NURSE PRACTITIONER

## 2021-10-13 RX ORDER — HYDROXYZINE PAMOATE 25 MG/1
25 CAPSULE ORAL 4 TIMES DAILY PRN
Qty: 120 CAPSULE | Refills: 1 | Status: SHIPPED | OUTPATIENT
Start: 2021-10-13 | End: 2021-11-01

## 2021-10-13 RX ORDER — CLONAZEPAM 0.5 MG/1
0.5 TABLET ORAL 2 TIMES DAILY PRN
Qty: 60 TABLET | Refills: 1 | Status: SHIPPED | OUTPATIENT
Start: 2021-10-13 | End: 2021-12-08

## 2021-10-13 RX ORDER — ONDANSETRON 4 MG/1
4-8 TABLET, FILM COATED ORAL EVERY 8 HOURS PRN
Qty: 30 TABLET | Refills: 1 | Status: SHIPPED | OUTPATIENT
Start: 2021-10-13 | End: 2021-12-15

## 2021-10-13 RX ORDER — CYCLOBENZAPRINE HCL 5 MG
5 TABLET ORAL 3 TIMES DAILY PRN
Qty: 90 TABLET | Refills: 1 | Status: SHIPPED | OUTPATIENT
Start: 2021-10-13 | End: 2022-03-03

## 2021-10-13 ASSESSMENT — ANXIETY QUESTIONNAIRES
3. WORRYING TOO MUCH ABOUT DIFFERENT THINGS: SEVERAL DAYS
7. FEELING AFRAID AS IF SOMETHING AWFUL MIGHT HAPPEN: NOT AT ALL
5. BEING SO RESTLESS THAT IT IS HARD TO SIT STILL: SEVERAL DAYS
4. TROUBLE RELAXING: NEARLY EVERY DAY
GAD7 TOTAL SCORE: 10
7. FEELING AFRAID AS IF SOMETHING AWFUL MIGHT HAPPEN: NOT AT ALL
6. BECOMING EASILY ANNOYED OR IRRITABLE: SEVERAL DAYS
1. FEELING NERVOUS, ANXIOUS, OR ON EDGE: NEARLY EVERY DAY
8. IF YOU CHECKED OFF ANY PROBLEMS, HOW DIFFICULT HAVE THESE MADE IT FOR YOU TO DO YOUR WORK, TAKE CARE OF THINGS AT HOME, OR GET ALONG WITH OTHER PEOPLE?: SOMEWHAT DIFFICULT
GAD7 TOTAL SCORE: 10
GAD7 TOTAL SCORE: 10
2. NOT BEING ABLE TO STOP OR CONTROL WORRYING: SEVERAL DAYS

## 2021-10-13 ASSESSMENT — PATIENT HEALTH QUESTIONNAIRE - PHQ9
SUM OF ALL RESPONSES TO PHQ QUESTIONS 1-9: 6
SUM OF ALL RESPONSES TO PHQ QUESTIONS 1-9: 6
10. IF YOU CHECKED OFF ANY PROBLEMS, HOW DIFFICULT HAVE THESE PROBLEMS MADE IT FOR YOU TO DO YOUR WORK, TAKE CARE OF THINGS AT HOME, OR GET ALONG WITH OTHER PEOPLE: SOMEWHAT DIFFICULT

## 2021-10-13 NOTE — PATIENT INSTRUCTIONS
Patient Education     Preventing Tension Headaches  Lifestyle changes are the key to preventing tension-type headaches. Learn what changes in your environment and daily activities can prevent the strain and tension that lead to headaches. If emotional stress is a factor, stress reduction may bring relief. Other lifestyle changes can help keep you healthier and better able to cope with pain.  What can cause tension headaches?  Causes of tension-type headaches include:    Posture and movement. Your posture while you sit, work, drive, and even sleep can put stress on your shoulders and neck. This can tighten muscles in the back of your head, causing headaches.    Lifting and carrying. These can cause strain on your back and neck, especially if you carry too much weight, carry weight unevenly, or use poor lifting technique.    Certain sports. Activities that involve jumping, running, and sudden starts, stops, or changes of direction can jar your neck and head. This may lead to tight muscles and pain. Weightlifting and other activities that require upper body strength can lead to tight neck and shoulder muscles.    Jaw tension. Clenching your jaw or grinding your teeth can result in tension and pain. This may happen while you sleep without your knowing it.    Eye problems. Eyestrain can cause tension in the muscles around the eyes. Or a problem with your eyeglass prescription can make you hold your head at an awkward angle. This can cause neck strain and headaches.    Emotional stress. Many factors lead to emotional stress: overwork, family problems, financial difficulties, or sudden life changes. This may cause muscle tension.  What you can do  Once you know what s causing your headaches, you can work to prevent them. You may need to seek professional help to make some of the needed changes.    Posture and movement changes. Change the setup of your workspace and car. Learn and maintain good posture. Avoid positions  that strain your neck or shoulders. Make sure your bedding and pillows provide good support. Avoid sleeping on couches, chairs, or floors when a bed is available.     Lifting and carrying. Learn good lifting technique. Make sure to use the proper tools and equipment for lifting.    Change your sport. Switch to a low-impact sport. To help relieve stress on your neck and head, cut back on activities that depend on upper body strength or that put a lot of twisting motion on the back, such as golf.     Dental work. See your dentist if you think your headaches are caused by jaw tension or teeth grinding.    New eyewear. Buy an extra pair of glasses adjusted for reading or working at a computer. This helps to reduce eyestrain and keep your neck at a comfortable angle.    Stress management. Learn techniques for relaxing and reducing emotional stress, like deep breathing, visualization, progressive relaxation, and biofeedback. Regular sleep habits can also help to control stress.    Regular sleep and meals. It is important to have a regular sleep cycle and to avoid skipping meals.  Exercise can help  Exercise can improve overall health and help you to relax.    Neck exercises help keep your neck muscles relaxed during the day. Try the neck exercises shown on this sheet. Start in a neutral (relaxed, centered) position. Do 3 repetitions every 2 to 4 hours throughout the day.    Low-impact aerobic exercise helps keep your muscles strong and flexible. This helps prevent tension and the pain it causes.    Stretching, shawn chi, and yoga help keep your muscles flexible. They may also help relieve emotional stress.    Lower your left ear toward your left shoulder. Return to neutral. Repeat on the right side.   Lower your chin to your chest and slowly return to neutral.     Look to the left. Return to neutral. Then look to the right.    Move your head forward and back while keeping the top of your head level.   [employer] last reviewed  this educational content on 4/1/2018 2000-2021 [employer]. All rights reserved. This information is not intended as a substitute for professional medical care. Always follow your healthcare professional's instructions.           Patient Education     Managing Tension-type Headache Symptoms  A tension-type headache can develop slowly. Being aware of the symptoms helps you recognize a headache early. Then you can act to reduce pain and relieve tension. Methods for relieving your symptoms include self-care and medicine.     Tension-type symptoms  The earlier you recognize the symptoms of a tension-type headache, the easier it is to treat. Tension-type headaches may:     Start with fatigue, tension, or pain in the neck and shoulders    Feel like a band around the head    Be concentrated in the temple, the back of the head, behind the eyes, or in the face    Come and go, or last for days, weeks, or even longer    Involve referred pain -- this means that the area that hurts may not be where the problem starts  Self-care during a tension-type headache  When you have a tension-type headache, there are things you can do to relax, loosen muscles, and reduce the pain:     Brush your scalp lightly with a soft hairbrush.    Give yourself a massage. Knead the muscles running from your shoulders up the back of your skull. Or ask a friend to gently massage your neck and shoulders.    Use an ice pack. Wrap a thin cloth around a cold pack, a cold can of soda, or a bag of frozen vegetables. Apply this directly to the place where you feel pain (such as your neck or temples).    Use moist heat to relax your muscles. Take a warm shower or bath. Or drape a warm, moist towel around your neck and shoulders.  Relieving pain and tension  Over-the-counter or prescription medicine can help relieve pain. Another way to reduce your pain is to use relaxation techniques to loosen tight muscles.   Medicine  Medicine used for  tension-type headaches include the following:    NSAIDs (nonsteroidal anti-inflammatories), such as aspirin and ibuprofen, relieve inflammation and help block pain signals.    Acetaminophen treats pain, and some formulations contain caffeine.     Muscle relaxants can reduce painful muscle contractions.  Taking medicine safely  Be aware that:    Taking analgesics (pain relievers) or drinking too much coffee may lead to rebound headaches (frequent or severe headaches that can happen if you miss a dose of medication), so take pain medicines only as needed. If you think you have these headaches, contact your healthcare provider.    Taking too much medicine can cause sleep problems or stomach upset. Some over-the-counter headache medications may contain caffeine. These may disrupt sleep and worsen pain.  Relaxation techniques  A , class, book, or tape may help you learn these techniques. One or more of these methods may work for you:     Deep breathing. Slow, calm, deep breathing can help you relax. Breathe in for a count of 5 or more. Then slowly let the breath out.    Visualization. Imagining a peaceful, secure scene can give you a sense of control over your body and surroundings.    Progressive relaxation. This is done by tightening and then releasing muscle groups. Start at the top of your head and work your way down your body. Tighten each muscle group for 5 to 10 seconds. Then release the muscle group for the same amount of time.    Biofeedback. This is a type of training in which you learn to control certain physical functions and responses. This helps you learn to reduce muscle tension.  [employer] last reviewed this educational content on 4/1/2018 2000-2021 [employer]. All rights reserved. This information is not intended as a substitute for professional medical care. Always follow your healthcare professional's instructions.           Patient Education     Migraine Headache: Stages and  "Treatment    A migraine headache tends to progress in stages. Learning these stages can help you better understand what is happening. Then you can learn ways to reduce pain and relieve other symptoms. Methods for relieving your symptoms include self-care and medicines.   Migraine stages  Migraines tend to progress through 4 stages. Many people don't have all stages, and stages may differ with each headache:     Prodrome. A few hours to a day or so before the headache, you may feel tired, (yawning many times), uneasy, or vallejo. You may also feel bloated or crave certain foods.    Aura. Up to an hour before the headache starts, some migraine sufferers experience aura--flashing lights, blind spots, other vision problems, confusion, difficulty speaking, or other neurologic symptoms.    Headache. Moderate to severe pain affects one side of the head and then can spread to both sides, often along with nausea. You may be highly sensitive to light, sound, and odors. Vomiting or diarrhea may also happen. This stage lasts 4 to 72 hours.    Postdrome. After your headache ends, you may feel tired, achy, and \"washed out.\" This may last for a day or so.  Self-care during a migraine  Here is what you can do:    Use a cold compress. Wrap a thin cloth around a cold pack, a cold can of soda, or a bag of frozen vegetables. Apply this to your temple or other pain site.    Drink fluids. If nausea makes it hard to drink, try sucking on ice.    Rest. If possible, lie down. Try not to bend over, as this may increase your pain. Sometimes laying in a dark quiet room can help the migraine from being aggravated.      Try caffeine. Some people find that drinking fluids with caffeine, such as coffee or tea, helps to lessen migraine pain.  Using medicines  Work with your healthcare provider to find the right medicines for you. Medicines for migraine may relieve pain (analgesics), relieve nausea, or attack the migraine's root causes " (migraine-specific medicines).   Rebound headache  Taking analgesics each day, or even several times a week, may lead to more frequent and severe headaches. These are called rebound headaches. If you think you're having rebound headaches, tell your healthcare provider. He or she can help you safely decrease your medicine. Rebound caffeine withdrawal headaches can also happen. Certain medicines are addictive and can cause rebound headaches when discontinued abruptly.   [employer] last reviewed this educational content on 5/1/2018 2000-2021 [employer]. All rights reserved. This information is not intended as a substitute for professional medical care. Always follow your healthcare professional's instructions.           Patient Education     Preventing Migraine Headaches: Triggers  The first step in preventing migraines is to learn what triggers them. You may then be able to control your triggers to avoid or reduce the severity of your migraines.   Know your triggers  Be aware that you may have more than one trigger, and that some triggers may work together. Common migraine triggers include:     Food and nutrition. Skipping meals or not drinking enough water can trigger headaches. So can certain foods, such as caffeine, chocolate, artificial sweeteners, monosodium glutamate (MSG), aged cheese, or sausage.    Alcohol. Red wine and other alcoholic beverages are common migraine triggers.    Chemicals. Scents, cleaning products, gasoline, glue, perfume, and paint can be triggers. So can tobacco smoke, including secondhand smoke.    Emotions. Stress can trigger headaches or make them worse once they start.    Sleep disruption. Staying up late, sleeping late, and traveling across time zones can disrupt your sleep cycle, triggering headaches.    Hormones. Many women notice that migraines tend to happen at a certain point in their menstrual cycle. Birth control pills or hormone replacement therapy may also  trigger migraines.    Environment and weather. Air travel, changes in altitude, air pressure changes, hot sun, or bright or flashing lights can be triggers.    Medicine overuse. Frequent use of pain medicines for headache pain can also cause a headache. This may also be called rebound headache.  Control your triggers  These are some of the things you can do to try to control triggers:    Avoid triggers if you can. For example, stay clear of alcohol and foods that trigger your headaches. Use unscented household products. Keep regular sleep habits. Manage stress to help control emotional triggers.    Change your behavior at times when triggers can't be avoided. For example, make sure to get enough rest and drink plenty of water while you're traveling. Make sure to carry a hat, sunglasses, and your medicines. Be alert for migraine symptoms, so you can treat a migraine early if it happens.  [employer] last reviewed this educational content on 5/1/2018 2000-2021 [employer]. All rights reserved. This information is not intended as a substitute for professional medical care. Always follow your healthcare professional's instructions.           Patient Education     Preventing Migraine Headaches: Medicines and Lifestyle Changes      Going to bed and getting up at the same time each day, including weekends, may help prevent migraines.   A migraine is a type of severe headache. Having a migraine can be very painful. But there are steps you can take to help prevent migraines.   Medicines to help prevent migraines     Your healthcare provider may prescribe certain medicines to help prevent migraines. These medicines may need to be taken daily. Or they may only need to be taken at times when you re likely to have a migraine.    Common medicines used to help prevent migraines include:  ? Triptans (serotonin receptor agonists)  ? Nonsteroidal anti-inflammatory drugs (such as ibuprofen, available  over-the-counter)  ? Beta-blockers  ? Anticonvulsants  ? Tricyclic antidepressants  ? Calcium channel blockers  ? Certain vitamins, minerals, and plant extracts  ? Botulinum toxin injection for certain chronic migraines   ? CGRP (calcitonin gene-related peptide) agnonists are being reviewed by the Food and Drug Administration (FDA)    Lifestyle changes for long-term prevention   Here are some suggestions:    Exercise. Regular exercise can help prevent migraines and improve your health. (If exercise triggers your migraines, talk to your healthcare provider.)    Keep regular habits. Don t skip or delay meals. Drink plenty of water. And go to bed and get up at about the same time each day. This includes weekends.    Try alternative treatments. These are treatments that don't involve the use of medicines or surgery. They may help relieve symptoms and prevent migraines. Some treatment options include biofeedback and acupuncture. Ask your healthcare provider to tell you more about these treatments if you have questions.    Limit caffeine. You may find that caffeine helps relieve pain during an attack. But too much caffeine can also trigger migraines. So, limit the amount of caffeine you consume.  [employer] last reviewed this educational content on 5/1/2018 2000-2021 [employer]. All rights reserved. This information is not intended as a substitute for professional medical care. Always follow your healthcare professional's instructions.

## 2021-10-13 NOTE — PROGRESS NOTES
Rober is a 41 year old who is being evaluated via a billable telephone visit.      What phone number would you like to be contacted at? [phone]  How would you like to obtain your AVS? MyChart    Assessment & Plan     Intractable migraine without aura and without status migrainosus    - cyclobenzaprine (FLEXERIL) 5 MG tablet; Take 1 tablet (5 mg) by mouth 3 times daily as needed for muscle spasms  - MR Cervical Spine w/o Contrast; Future  - Adult Neurology Referral; Future  - diclofenac (VOLTAREN) 50 MG EC tablet; Take 1 tablet (50 mg) by mouth 2 times daily as needed for moderate pain (with food)    Neck pain    - cyclobenzaprine (FLEXERIL) 5 MG tablet; Take 1 tablet (5 mg) by mouth 3 times daily as needed for muscle spasms  - MR Cervical Spine w/o Contrast; Future  - Adult Neurology Referral; Future  - diclofenac (VOLTAREN) 50 MG EC tablet; Take 1 tablet (50 mg) by mouth 2 times daily as needed for moderate pain (with food)    Panic attack    - clonazePAM (KLONOPIN) 0.5 MG tablet; Take 1 tablet (0.5 mg) by mouth 2 times daily as needed (For panic attack or insomnia ONLY)    Persistent insomnia    - clonazePAM (KLONOPIN) 0.5 MG tablet; Take 1 tablet (0.5 mg) by mouth 2 times daily as needed (For panic attack or insomnia ONLY)  - hydrOXYzine (VISTARIL) 25 MG capsule; Take 1 capsule (25 mg) by mouth 4 times daily as needed for anxiety or other (insomnia)    Nausea    - ondansetron (ZOFRAN) 4 MG tablet; Take 1-2 tablets (4-8 mg) by mouth every 8 hours as needed for nausea    Generalized anxiety disorder    - hydrOXYzine (VISTARIL) 25 MG capsule; Take 1 capsule (25 mg) by mouth 4 times daily as needed for anxiety or other (insomnia)    20 minutes spent on the date of the encounter doing chart review, history and exam, documentation and further activities per the note       See Patient Instructions    Return in about 4 weeks (around 11/10/2021), or if symptoms worsen or fail to improve.    EDITA Calles  "[facility] BLAINE Richter is a 41 year old who presents for the following health issues     History of Present Illness       Mental Health Follow-up:  Patient presents to follow-up on Anxiety.    Patient's anxiety since last visit has been:  Medium  The patient is not having other symptoms associated with anxiety.  Any significant life events: No  Patient is feeling anxious or having panic attacks.  Patient has no concerns about alcohol or drug use.     Social History  Tobacco Use    Smoking status: Former Smoker      Packs/day: 0.50      Years: 10.00      Pack years: 5      Types: Cigarettes      Quit date: 2008      Years since quittin.6    Smokeless tobacco: Never Used  Alcohol use: Yes    Comment: very seldom  Drug use: No      Today's PHQ-9         PHQ-9 Total Score:     (P) 6   PHQ-9 Q9 Thoughts of better off dead/self-harm past 2 weeks :   (P) Not at all   Thoughts of suicide or self harm:      Self-harm Plan:        Self-harm Action:          Safety concerns for self or others:           Hypertension: She presents for follow up of hypertension.  She does check blood pressure  regularly outside of the clinic. Outpatient blood pressures have not been over 140/90. She follows a low salt diet.     Migraines:   Since the patient's last clinic visit, headaches are: worsened  The patient is getting headaches:  Wvery single day  She is not able to do normal daily activities when she has a migraine.  The patient is taking the following rescue/relief medications:  Ibuprofen (Advil, Motrin) and Tylenol   Patient states \"I get no relief\" from the rescue/relief medications.   The patient is taking the following medications to prevent migraines:  No medications to prevent migraines  In the past 4 weeks, the patient has gone to an Urgent Care or Emergency Room 0 times times due to headaches.    She eats 2-3 servings of fruits and vegetables daily.She consumes 0 sweetened beverage(s) " "daily.She exercises with enough effort to increase her heart rate 30 to 60 minutes per day.  She exercises with enough effort to increase her heart rate 4 days per week.   She is taking medications regularly.     From appointment note:  \"Constant daily painful headache sometimes turn into severe cluster migraines.   A lot of pain in my neck.   I ve done several massages and chiropractor visits,   Stretching and lots of ibuprofen and/Tylenol. Heating pads, cold packs and I   bought a tens unit I use at night to try and sleep   I drink plenty of water and eat a reasonably healthy diet.   Something is wrong and I can t miss anymore work because of this.\"      Chart review shows brain CT 2020. Discussed neck imaging due to pain starting in heck.  Discussed referral neurology. Medication for pain/ nausea. Review tips on after visit summary. Follow up if treatments not helping. Get FMLA from work if needed. Is having more anxiety issues.  Has not done well with antidepressants in the past. Not sure if it is due to not sleeping well; not feeling well. Missing work.  Having to take care of kids.     Review of Systems   Constitutional, HEENT, cardiovascular, pulmonary, GI, , musculoskeletal, neuro, skin, endocrine and psych systems are negative, except as otherwise noted.      Objective           Vitals:  No vitals were obtained today due to virtual visit.    Physical Exam   healthy, alert and no distress  PSYCH: Alert and oriented times 3; coherent speech, normal   rate and volume, able to articulate logical thoughts, able   to abstract reason, no tangential thoughts, no hallucinations   or delusions  Her affect is normal  RESP: No cough, no audible wheezing, able to talk in full sentences  Remainder of exam unable to be completed due to telephone visits    See orders          Phone call duration: 12 minutes  Answers for HPI/ROS submitted by the patient on 10/13/2021  If you checked off any problems, how difficult have " these problems made it for you to do your work, take care of things at home, or get along with other people?: Somewhat difficult  PHQ9 TOTAL SCORE: 6  JACOB 7 TOTAL SCORE: 10

## 2021-10-14 ASSESSMENT — PATIENT HEALTH QUESTIONNAIRE - PHQ9: SUM OF ALL RESPONSES TO PHQ QUESTIONS 1-9: 6

## 2021-10-14 ASSESSMENT — ANXIETY QUESTIONNAIRES: GAD7 TOTAL SCORE: 10

## 2021-10-20 ENCOUNTER — ANCILLARY PROCEDURE (OUTPATIENT)
Dept: MRI IMAGING | Facility: CLINIC | Age: 41
End: 2021-10-20
Attending: NURSE PRACTITIONER
Payer: COMMERCIAL

## 2021-10-20 DIAGNOSIS — G43.019 INTRACTABLE MIGRAINE WITHOUT AURA AND WITHOUT STATUS MIGRAINOSUS: ICD-10-CM

## 2021-10-20 DIAGNOSIS — M54.2 NECK PAIN: ICD-10-CM

## 2021-10-20 PROCEDURE — 72141 MRI NECK SPINE W/O DYE: CPT | Mod: TC | Performed by: RADIOLOGY

## 2021-10-21 DIAGNOSIS — M48.02 CERVICAL STENOSIS OF SPINAL CANAL: Primary | ICD-10-CM

## 2021-10-21 NOTE — RESULT ENCOUNTER NOTE
Alan Richter,    Thank you for your recent office visit.    Here are your recent results.  Report per radiology, please schedule with spine for further evaluation and treatment.        FINDINGS: Slight reversal of the normal cervical lordosis centered at  C5-C6. Alignment otherwise appears normal. Normal vertebral body  heights. Small foci of intrinsic T1 hyperintense signal in the  anterior aspect of C2 and C3 vertebral bodies, which may represent  focal fatty marrow or tiny intraosseous hemangiomas. Mild Modic type I  degenerative endplate signal change at C5-C6. Otherwise unremarkable  marrow signal for the patient's age. No spinal cord signal  abnormality. The visualized paraspinous soft tissues are unremarkable.     Segmental analysis:  Craniovertebral Junction/C1-C2: Unremarkable.     C2-C3: Normal disc height. No herniation. Normal facets. No spinal  canal or neural foraminal stenosis.     C3-C4: Normal disc height. No herniation. Normal facets. No spinal  canal or neural foraminal stenosis.     C4-C5: Normal disc height. No herniation. Normal facets. No spinal  canal or neural foraminal stenosis.     C5-C6: Moderate disc height loss. There is a disc bulge eccentric to  the left with posterior/posterolateral endplate osteophytic ridging  and left-sided uncovertebral spurring. Normal facets. Mild spinal  canal stenosis. Moderate left neural foraminal stenosis with some mass  effect on the exiting left C6 nerve root. The right neural foramen is  patent.     C6-C7: Normal disc height. No herniation. Normal facets. No spinal  canal or neural foraminal stenosis.     C7-T1: Normal disc height. No herniation. Normal facets. No spinal  canal or neural foraminal stenosis.       IMPRESSION:  Degenerative changes at C5-C6, as described, contributing to mild  spinal canal stenosis and at least moderate left neural foraminal  stenosis at that level. Please see the body of report for additional  details. Otherwise essentially  unremarkable cervical spine MRI.       Feel free to contact me via [employer] or call the clinic at [phone].    Sincerely,    ANTONIO Alaniz, FNP-BC

## 2021-10-22 ENCOUNTER — MYC MEDICAL ADVICE (OUTPATIENT)
Dept: FAMILY MEDICINE | Facility: CLINIC | Age: 41
End: 2021-10-22
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
## 2021-11-01 ENCOUNTER — OFFICE VISIT (OUTPATIENT)
Dept: NEUROSURGERY | Facility: CLINIC | Age: 41
End: 2021-11-01
Attending: NURSE PRACTITIONER
Payer: COMMERCIAL

## 2021-11-01 VITALS
BODY MASS INDEX: 24.48 KG/M2 | HEART RATE: 74 BPM | SYSTOLIC BLOOD PRESSURE: 148 MMHG | DIASTOLIC BLOOD PRESSURE: 102 MMHG | HEIGHT: 62 IN | WEIGHT: 133 LBS

## 2021-11-01 DIAGNOSIS — M48.02 CERVICAL STENOSIS OF SPINAL CANAL: ICD-10-CM

## 2021-11-01 DIAGNOSIS — G43.019 INTRACTABLE MIGRAINE WITHOUT AURA AND WITHOUT STATUS MIGRAINOSUS: Primary | ICD-10-CM

## 2021-11-01 DIAGNOSIS — R29.898 MUSCULAR DECONDITIONING: ICD-10-CM

## 2021-11-01 PROCEDURE — 99205 OFFICE O/P NEW HI 60 MIN: CPT | Performed by: PREVENTIVE MEDICINE

## 2021-11-01 ASSESSMENT — PAIN SCALES - GENERAL: PAINLEVEL: EXTREME PAIN (9)

## 2021-11-01 ASSESSMENT — MIFFLIN-ST. JEOR: SCORE: 1221.53
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
## 2021-11-01 NOTE — NURSING NOTE
"Reason For Visit:   Chief Complaint   Patient presents with     Consult     Neck pain         Occupation:   Currently working? Yes.  Work status?  Full time.    Sports: No  Activities: workout.hiking walking             BP (!) 148/102   Pulse 74   Ht 1.575 m (5' 2\")   Wt 60.3 kg (133 lb)   LMP 04/02/2018   BMI 24.33 kg/m        Allergies   Allergen Reactions     Imitrex [Sumatriptan] Other (See Comments)     Felt terrible, \"like pores were on fire\"       Current Outpatient Medications   Medication     clonazePAM (KLONOPIN) 0.5 MG tablet     cyclobenzaprine (FLEXERIL) 5 MG tablet     estradiol (ESTRACE) 2 MG tablet     lisinopril (ZESTRIL) 20 MG tablet     ondansetron (ZOFRAN) 4 MG tablet     diclofenac (VOLTAREN) 50 MG EC tablet     hydrOXYzine (VISTARIL) 25 MG capsule     No current facility-administered medications for this visit.         Darla Severin-Brown, LPN  "

## 2021-11-01 NOTE — PATIENT INSTRUCTIONS
It was nice to meet you today Rober. See the assessment and plan below for the plan we discussed. I will be happy to see you again in the future, if needed.    ASSESSMENT: Rober Renee is a 41 year old female  who presents today for new patient evaluation of chronic headaches with a history of migraines, and the current headache quality has changed in the last 2 to 3 years.  2 and half years ago there is a history of a hyperflexion neck plus head injury trauma.  There is also evidence to suggest that she has bilateral carpal tunnel syndrome, likely associated with her work as a .  I do not think that is related to her neck or her headaches however.      DISCUSSION/PLAN:  The carpal tunnel syndrome, I recommend that she use cock-up splints at night and I showed her how to wear them loosely so she does not reproduce a carpal compression test.    We discussed gabapentin which I am not strongly advocating and she would like to hold off for now. He is interested in a Med[employer] strengthening program and I reviewed what that entails and we will make the referral for that.    She has an appointment to see a neurologist in February, and in the meantime I will also make a referral to the headache clinic and she can go to whichever comes first. I do recommend she stop the chiropractic care, because if it was going to help by now it would have. Follow-up with me is on a as needed basis, but my next step would either be to add on gabapentin, which the supervising program at her therapy program could do without me, or alternatively to make a referral to an interventional list to try various injections in her neck if this does not work.

## 2021-11-05 ENCOUNTER — OFFICE VISIT (OUTPATIENT)
Dept: URGENT CARE | Facility: URGENT CARE | Age: 41
End: 2021-11-05
Payer: COMMERCIAL

## 2021-11-05 VITALS
WEIGHT: 133 LBS | BODY MASS INDEX: 24.33 KG/M2 | HEART RATE: 87 BPM | SYSTOLIC BLOOD PRESSURE: 172 MMHG | DIASTOLIC BLOOD PRESSURE: 108 MMHG | TEMPERATURE: 97.9 F | OXYGEN SATURATION: 99 %

## 2021-11-05 DIAGNOSIS — R51.9 CHRONIC INTRACTABLE HEADACHE, UNSPECIFIED HEADACHE TYPE: Primary | ICD-10-CM

## 2021-11-05 DIAGNOSIS — I10 BENIGN ESSENTIAL HYPERTENSION: ICD-10-CM

## 2021-11-05 DIAGNOSIS — G89.29 CHRONIC INTRACTABLE HEADACHE, UNSPECIFIED HEADACHE TYPE: Primary | ICD-10-CM

## 2021-11-05 PROCEDURE — 99214 OFFICE O/P EST MOD 30 MIN: CPT | Performed by: NURSE PRACTITIONER

## 2021-11-05 NOTE — LETTER
November 5, 2021      Rober Renee  [address] [place] [id number]        To Whom It May Concern:    Rober FLOYD Víctor  was seen on 11/5/21 for severe headache.  Please excuse from work today.      Sincerely,        CORAZON Garza

## 2021-11-05 NOTE — PROGRESS NOTES
Assessment & Plan     Chronic intractable headache, unspecified headache type    Benign essential hypertension       Due to recent ibuprofen use, unable to administer IM ketorolac during visit. Patient neurologically stable currently, but recommend further evaluation in emergency room for 10/10 headache pain with elevated blood pressure elevated, dizziness, numbness, tingling. She declines at this time. Again recommended further evaluation in emergency room due to severity of symptoms and OTC medications not working and she declines. Letter given for work. Patient is discharged in stable condition.     Kayla Pastor NP  [facility] [place]          Suzanne Richter is a 41 year old female who presents to clinic today for the following health issues:  Chief Complaint   Patient presents with     Headache     ongoing headaches. got worse about 6 hours ago. tylenol and ibuprofen and zofran within the past 4 hours     Headache    Onset of symptoms was 6 hour(s).  Course of illness is worsening  Severity severe 10/10  Character of pain:throbbing and burning   Current and associated symptoms: nausea, noise sensitivity, light sensitivity, moderate dizziness, numbness and tingling in her hands  Location of pain: back of neck  History of Migranes: Yes   Is headache similar to previous: Yes  Treatment and measures tried: 1000 mg tylenol at 1pm, 800 mg ibuprofen at 1pm, and zofran within the past hour  She has a history of intractable migraine without aura. She was referred to neurosurgery for chronic headaches, just had appointment on 11/1/21 and she will be evaluated by neurology and the headache clinic, consider gabapentin or internationalist to consider injections. She has been able to drink fluids and void.     Problem list, Medication list, Allergies, and Medical history reviewed in EPIC.    ROS:  Review of systems negative except for noted above        Objective    BP (!) 172/108   Pulse 87    Temp 97.9  F (36.6  C) (Tympanic)   Wt 60.3 kg (133 lb)   LMP 04/02/2018   SpO2 99%   BMI 24.33 kg/m    Physical Exam  Constitutional:       General: She is not in acute distress.     Appearance: She is not toxic-appearing or diaphoretic.   HENT:      Head: Normocephalic and atraumatic.      Mouth/Throat:      Mouth: Mucous membranes are dry.      Pharynx: Oropharynx is clear. No oropharyngeal exudate or posterior oropharyngeal erythema.      Comments: Tongue midline, smile symmetric  Eyes:      Extraocular Movements: Extraocular movements intact.      Conjunctiva/sclera: Conjunctivae normal.      Pupils: Pupils are equal, round, and reactive to light.   Cardiovascular:      Rate and Rhythm: Normal rate and regular rhythm.      Heart sounds: Normal heart sounds.   Pulmonary:      Effort: Pulmonary effort is normal. No respiratory distress.   Lymphadenopathy:      Cervical: No cervical adenopathy.   Skin:     General: Skin is warm and dry.   Neurological:      General: No focal deficit present.      Mental Status: She is alert and oriented to person, place, and time.      Cranial Nerves: No cranial nerve deficit.      Sensory: No sensory deficit.      Motor: No weakness.      Coordination: Coordination normal.      Gait: Gait normal.

## 2021-11-05 NOTE — ED AVS SNAPSHOT
[facility]  [address] [address]  Phone:  [phone]  Fax:  [phone]                                    Rober Renee   MRN: [id number]    Department:  [facility]   Date of Visit:  1/6/2019           After Visit Summary Signature Page    I have received my discharge instructions, and my questions have been answered. I have discussed any challenges I see with this plan with the nurse or doctor.    ..........................................................................................................................................  Patient/Patient Representative Signature      ..........................................................................................................................................  Patient Representative Print Name and Relationship to Patient    ..................................................               ................................................  Date                                   Time    ..........................................................................................................................................  Reviewed by Signature/Title    ...................................................              ..............................................  Date                                               Time          22EPIC Rev 08/18       
Yes

## 2021-12-06 ENCOUNTER — MYC MEDICAL ADVICE (OUTPATIENT)
Dept: FAMILY MEDICINE | Facility: CLINIC | Age: 41
End: 2021-12-06
Payer: COMMERCIAL

## 2021-12-06 DIAGNOSIS — G47.00 PERSISTENT INSOMNIA: ICD-10-CM

## 2021-12-06 DIAGNOSIS — F41.0 PANIC ATTACK: ICD-10-CM

## 2021-12-06 DIAGNOSIS — F41.8 SITUATIONAL ANXIETY: ICD-10-CM

## 2021-12-06 DIAGNOSIS — F40.240 CLAUSTROPHOBIA: Primary | ICD-10-CM

## 2021-12-06 DIAGNOSIS — M54.2 NECK PAIN: ICD-10-CM

## 2021-12-06 DIAGNOSIS — G43.019 INTRACTABLE MIGRAINE WITHOUT AURA AND WITHOUT STATUS MIGRAINOSUS: ICD-10-CM

## 2021-12-07 NOTE — TELEPHONE ENCOUNTER
FUTURE VISIT INFORMATION      FUTURE VISIT INFORMATION:    Date: 12/23/2021    Time: 9am    Location: [facility]  REFERRAL INFORMATION:    Referring provider:  Dr. Diallo     Referring providers clinic:  [facility]     Reason for visit/diagnosis  Migraines     RECORDS REQUESTED FROM:       Clinic name Comments Records Status Imaging Status   Internal Dr. Diallo-11/1/2021    GERARDO Moore-10/13/2021    [facility] Head[facility]/22/2020 [facility]

## 2021-12-08 RX ORDER — LORAZEPAM 0.5 MG/1
0.5 TABLET ORAL 2 TIMES DAILY PRN
Qty: 60 TABLET | Refills: 0 | Status: SHIPPED | OUTPATIENT
Start: 2021-12-08 | End: 2022-03-03

## 2021-12-10 ENCOUNTER — VIRTUAL VISIT (OUTPATIENT)
Dept: FAMILY MEDICINE | Facility: CLINIC | Age: 41
End: 2021-12-10
Payer: COMMERCIAL

## 2021-12-10 DIAGNOSIS — M54.12 CERVICAL RADICULOPATHY: ICD-10-CM

## 2021-12-10 DIAGNOSIS — G43.019 INTRACTABLE MIGRAINE WITHOUT AURA AND WITHOUT STATUS MIGRAINOSUS: Primary | ICD-10-CM

## 2021-12-10 PROCEDURE — 99213 OFFICE O/P EST LOW 20 MIN: CPT | Mod: 95 | Performed by: NURSE PRACTITIONER

## 2021-12-10 RX ORDER — TRAMADOL HYDROCHLORIDE 50 MG/1
50 TABLET ORAL EVERY 8 HOURS PRN
Qty: 6 TABLET | Refills: 0 | Status: SHIPPED | OUTPATIENT
Start: 2021-12-10 | End: 2021-12-13

## 2021-12-10 RX ORDER — METHYLPREDNISOLONE 4 MG
TABLET, DOSE PACK ORAL
Qty: 21 TABLET | Refills: 0 | Status: SHIPPED | OUTPATIENT
Start: 2021-12-10 | End: 2021-12-15

## 2021-12-10 ASSESSMENT — ENCOUNTER SYMPTOMS: HEADACHES: 1

## 2021-12-10 NOTE — PROGRESS NOTES
Rober is a 41 year old who is being evaluated via a billable video visit.      How would you like to obtain your AVS? MyChart  If the video visit is dropped, the invitation should be resent by: Text to cell phone: [phone]  Will anyone else be joining your video visit? No      Video Start Time: 1:55 PM    Assessment & Plan     Intractable migraine without aura and without status migrainosus  Trial of a Medrol dosepak, as headache sees to be associated with neck pain.  Discussed proper use and possible side effects of medication.  Advised against using any NSAIDs while using this.  I did give patient a small amount of Tramadol to use for severe pain.  Her  does not show any other narcotic prescriptions.  Instructed patient not to mix with her Lorazepam and only take for severe pain.  Ultimately, if symptoms do not improve - I would recommend emergent care for additional pain relief.   - methylPREDNISolone (MEDROL DOSEPAK) 4 MG tablet therapy pack  Dispense: 21 tablet; Refill: 0  - traMADol (ULTRAM) 50 MG tablet  Dispense: 6 tablet; Refill: 0    Cervical radiculopathy      Return Recommend emergent care if symptoms worsen or fail to improve.    Penny Gasca NP  [facility]    Subjective   Rober is a 41 year old who presents with complaints of a headaches.  This has been a chronic issue for the patient and she is schedule to see neurology at the end of the month.  Symptoms worsened today.  She has a burning in her neck that radiates to her head.  She has also had some nausea and vomiting today.  No dizziness or vision changes.  This is not a typical migraine headache for her, as she usually loses her peripheral vision with those.  She did recently have a cervical MRI, which showed a disc herniation and stenosis.  She has radicular pain down both her arms.  Patient has been taking Tylenol and Advil without significant improvement.  She has Lorazepam and Flexeril available to her,  "but has not taken as she has to go to work tonight.  She has taken 1000 mg of Tylenol and 800 mg Ibuprofen.     Headache     History of Present Illness       Migraines:   Since the patient's last clinic visit, headaches are: worsened  The patient is getting headaches:  Daily  She is not able to do normal daily activities when she has a migraine.  The patient is taking the following rescue/relief medications:  Ibuprofen (Advil, Motrin) and Tylenol   Patient states \"I get only a small amount of relief\" from the rescue/relief medications.   The patient is taking the following medications to prevent migraines:  No medications to prevent migraines  In the past 4 weeks, the patient has gone to an Urgent Care or Emergency Room 1 time times due to headaches.    She eats 2-3 servings of fruits and vegetables daily.She consumes 0 sweetened beverage(s) daily.She exercises with enough effort to increase her heart rate 20 to 29 minutes per day.  She exercises with enough effort to increase her heart rate 5 days per week.   She is taking medications regularly.       Review of Systems   Neurological: Positive for headaches.          Objective    Vitals - Patient Reported  Weight (Patient Reported): 59 kg (130 lb)      Vitals:  No vitals were obtained today due to virtual visit.    Physical Exam   GENERAL: Healthy, alert, laying in bed, appears uncomfortable.  EYES: Eyes grossly normal to inspection.  No discharge or erythema, or obvious scleral/conjunctival abnormalities.  RESP: No audible wheeze, cough, or visible cyanosis.  No visible retractions or increased work of breathing.    NEURO: Cranial nerves grossly intact.  Mentation and speech appropriate for age.  PSYCH: Mentation appears normal, affect normal/bright, judgement and insight intact, normal speech and appearance well-groomed.          Video-Visit Details    Type of service:  Video Visit    Video End Time:2:06 PM    Originating Location (pt. Location): Home    Distant " Location (provider location):  [facility]     Platform used for Video Visit: Shari

## 2021-12-13 ENCOUNTER — OFFICE VISIT (OUTPATIENT)
Dept: ORTHOPEDICS | Facility: CLINIC | Age: 41
End: 2021-12-13
Attending: NURSE PRACTITIONER
Payer: COMMERCIAL

## 2021-12-13 ENCOUNTER — ANCILLARY PROCEDURE (OUTPATIENT)
Dept: GENERAL RADIOLOGY | Facility: CLINIC | Age: 41
End: 2021-12-13
Attending: ORTHOPAEDIC SURGERY
Payer: COMMERCIAL

## 2021-12-13 VITALS — BODY MASS INDEX: 24.1 KG/M2 | WEIGHT: 136 LBS | HEIGHT: 63 IN

## 2021-12-13 DIAGNOSIS — M48.02 FORAMINAL STENOSIS OF CERVICAL REGION: ICD-10-CM

## 2021-12-13 DIAGNOSIS — M50.20 HERNIATED CERVICAL INTERVERTEBRAL DISC: Primary | ICD-10-CM

## 2021-12-13 DIAGNOSIS — M54.2 NECK PAIN: ICD-10-CM

## 2021-12-13 DIAGNOSIS — M54.2 NECK PAIN: Primary | ICD-10-CM

## 2021-12-13 DIAGNOSIS — G43.019 INTRACTABLE MIGRAINE WITHOUT AURA AND WITHOUT STATUS MIGRAINOSUS: ICD-10-CM

## 2021-12-13 DIAGNOSIS — M48.02 CERVICAL SPINAL STENOSIS: ICD-10-CM

## 2021-12-13 PROCEDURE — 72050 X-RAY EXAM NECK SPINE 4/5VWS: CPT | Performed by: RADIOLOGY

## 2021-12-13 PROCEDURE — 99205 OFFICE O/P NEW HI 60 MIN: CPT | Performed by: ORTHOPAEDIC SURGERY

## 2021-12-13 RX ORDER — GABAPENTIN 100 MG/1
100 CAPSULE ORAL 3 TIMES DAILY
Qty: 60 CAPSULE | Refills: 0 | Status: SHIPPED | OUTPATIENT
Start: 2021-12-13 | End: 2022-03-03

## 2021-12-13 ASSESSMENT — MIFFLIN-ST. JEOR: SCORE: 1244.63

## 2021-12-13 NOTE — PROGRESS NOTES
Spine Surgery Consultation    REFERRING PHYSICIAN: Serena Moore   PRIMARY CARE PHYSICIAN: Serena Moore           Chief Complaint:   Consult ( Neck pain/MRI/Serena Moore, NP in  FAMILY PRACTICE)      History of Present Illness:  Symptom Profile Including: location of symptoms, onset, severity, exacerbating/alleviating factors, previous treatments:        Rober Renee is a 41 year old female who presents today for evaluation of neck pain with associated severe headaches and right arm symptoms.  Symptoms have been present for approximately a year.  Initially started as neck pain with headache but this is progressed to severe headaches which are quite different than her typical migraines which have been well managed in the past.  She also has intermittent numbness affecting the entire right upper extremity.  She has not experienced pain or numbness in the left upper extremity.  She has increasing symptoms with prolonged standing but also has a lot of neck pain headache and arm numbness/pain when laying down to sleep.  She has been using ibuprofen 800 mg 4 times daily and is also been using Tylenol 1 g up to 6 times daily.  She has had a Medrol dose pack which provided some relief but short-lived.  She has had muscle relaxants including Flexeril.  She recent was prescribed tramadol which did take the edge off her symptoms made a little easier to tolerate a full shift at work.    Upon further questioning she does think maybe she has noticed some slight difficulty with putting in and removing earrings.  No incontinence.  No balance difficulties but does believe she is got vertigo.    Past treatments tried:  - Physical therapy: Has not had directed physical therapy for this condition yet  - Injections: Has not had injection at this point  - Medications: Ibuprofen, Tylenol, Flexeril, Medrol Dosepak, tramadol             Past Medical History:     Past Medical History:   Diagnosis Date     Calculus of kidney 12/06     right side     Cervical high risk HPV (human papillomavirus) test positive 2018    NIL pap, +HR HPV (Neg 16/18). Rpt Pap+HPV in 1 year (Due )     Chickenpox      Gestational diabetes ,     History of postpartum depression, currently pregnant     mild     Hypertension      Intervertebral lumbar disc disorder with myelopathy, lumbar region     herniated disks, L4-5, s/p steroid injection     Intramural leiomyoma of uterus 4/3/2018     Major depressive disorder, single episode, moderate (H)      Migraines      Urinary tract bacterial infections             Past Surgical History:     Past Surgical History:   Procedure Laterality Date      SECTION, TUBAL LIGATION, COMBINED  2013    c/section with BTL     HYSTERECTOMY, PAP NO LONGER INDICATED  04/10/2018    No history of LIBERTY 2 or greater     LAPAROSCOPIC ASSISTED HYSTERECTOMY VAGINAL, BILATERAL SALPINGO-OOPHORECTOMY, COMBINED N/A 4/10/2018    Procedure: COMBINED LAPAROSCOPIC ASSISTED HYSTERECTOMY VAGINAL, SALPINGO-OOPHORECTOMY;  Laparoscopic assisted vaginal hysterectomy with possible bilateral salpingoophorectomy.;  Surgeon: Taisha Wilson MD;  Location: [facility] OR            Social History:     Social History     Tobacco Use     Smoking status: Former Smoker     Packs/day: 0.50     Years: 10.00     Pack years: 5.00     Types: Cigarettes     Quit date: 2008     Years since quittin.8     Smokeless tobacco: Never Used   Substance Use Topics     Alcohol use: Yes     Comment: very seldom            Family History:     Family History   Problem Relation Age of Onset     Cancer Maternal Grandfather         liver CA     Cancer Paternal Grandfather         prostate CA     Cancer Maternal Grandmother         ovarian CA     Other - See Comments Maternal Grandmother         migraine     Hypertension Maternal Grandmother      Diabetes Paternal Grandmother      Hypertension Paternal Grandmother      Hypertension Father      Depression  "Father      Anxiety Disorder Father      Other - See Comments Mother         migraine     Hypertension Brother      Other - See Comments Maternal Uncle         migraine     Depression Sister      Anxiety Disorder Sister      Melanoma No family hx of             Allergies:     Allergies   Allergen Reactions     Imitrex [Sumatriptan] Other (See Comments)     Felt terrible, \"like pores were on fire\"            Medications:     Current Outpatient Medications   Medication     cyclobenzaprine (FLEXERIL) 5 MG tablet     estradiol (ESTRACE) 2 MG tablet     lisinopril (ZESTRIL) 20 MG tablet     LORazepam (ATIVAN) 0.5 MG tablet     methylPREDNISolone (MEDROL DOSEPAK) 4 MG tablet therapy pack     ondansetron (ZOFRAN) 4 MG tablet     traMADol (ULTRAM) 50 MG tablet     No current facility-administered medications for this visit.             Review of Systems:     A 10 point ROS was performed and reviewed. Specific responses to these questions are noted at the end of the document.         Physical Exam:     PHYSICAL EXAM:   Constitutional - Patient is healthy, well-nourished and appears stated age, is in no acute distress    Vitals: Ht 1.59 m (5' 2.6\")   Wt 61.7 kg (136 lb)   LMP 04/02/2018   BMI 24.40 kg/m     Respiratory - Patient is breathing normally, no audible wheeze or respiratory distress.   Skin - No suspicious rashes or lesions.   Psychiatric - Normal mood and affect.   Cardiovascular - Extremities warm and well perfused.   GI - No abdominal distention.   Musculoskeletal - Non-antalgic gait without use of assistive devices.  No real tenderness to palpation of the cervical spine.  No limitation in range of motion in extension or flexion, side to side bend or lateral rotation.  Negative Spurling's bilaterally.  5/5 strength with scapular elevation, shoulder abduction, elbow flexion extension, wrist flexion extension, finger abduction and composite fist.  Sensation is intact bilaterally from C4-T1.  2+ bicep, tricep and " brachioradialis reflexes without inverted BR.  Negative Laverne's bilaterally.    Patella tendon and Achilles tendon reflexes are 2+.  1 beat of clonus bilaterally.    Negative Romberg.    She can stand and walk on tiptoes and heels without loss of balance.         Imaging:   We ordered and independently reviewed new radiographs at this clinic visit. The results were discussed with the patient.  Findings include:    AP lateral flexion-extension views of the cervical spine demonstrate decreased disc height particular at the C5-C6 level along with evident marginal osteophytes.  There is loss of cervical lordosis from the C4-C6 segment.  Dynamic anterolisthesis at C3-4 and C4-5 which reduces on neutral and extension views.    MRI of the cervical spine demonstrates a large left paracentral disc herniation at the C5-C6 level resulting in cord effacement and stenosis of the left neuroforamen.             Assessment and Plan:   Assessment:  41 year old female with cervical disc herniation resulting in central stenosis with cord effacement and moderate left neuroforaminal stenosis.  Her symptoms are primarily right upper extremity which does not clearly match with her imaging finding.  We discussed this at length.  Regardless of recommended maximizing conservative measures including physical therapy, who recommended a cervical epidural steroid injection, gabapentin and offered prescription anti-inflammatory she should so choose.  She states she has tried Voltaren in the past but it did not work nearly as good as ibuprofen so will stay with that for now.    Counseled her regarding the concerning signs for cervical myelopathy instructed her that if she notices these that she should call to be evaluated right away.     Plan:  1. Referral for bilateral transforaminal epidural steroid injection C5-C6  2. Referral for physical therapy for cervical stenosis with radiculopathy  3. Prescription for gabapentin.  Patient was  counseled on safe dose escalation for this medication  4. Continue with over-the-counter ibuprofen and Tylenol.  Counseled patient on safe dosing for both these medications  5. Like to see her back in 4 weeks for reevaluation            Respectfully,  Jatinder York MD  Spine Surgery  [facility]    Time spent on this clinical encounter including previsit chart review, history and physical examination, patient counseling and documentation was  60 minutes.
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
## 2021-12-13 NOTE — LETTER
12/13/2021         RE: Rober Renee  [address] [place] [address] [facility] [address]        Dear Colleague,    Thank you for referring your patient, Rober Renee, to the [facility]. Please see a copy of my visit note below.    Spine Surgery Consultation    REFERRING PHYSICIAN: Serena Moore   PRIMARY CARE PHYSICIAN: Serena Moore           Chief Complaint:   Consult ( Neck pain/MRI/Serean Moore, NP in  FAMILY PRACTICE)      History of Present Illness:  Symptom Profile Including: location of symptoms, onset, severity, exacerbating/alleviating factors, previous treatments:        Rober Renee is a 41 year old female who presents today for evaluation of neck pain with associated severe headaches and right arm symptoms.  Symptoms have been present for approximately a year.  Initially started as neck pain with headache but this is progressed to severe headaches which are quite different than her typical migraines which have been well managed in the past.  She also has intermittent numbness affecting the entire right upper extremity.  She has not experienced pain or numbness in the left upper extremity.  She has increasing symptoms with prolonged standing but also has a lot of neck pain headache and arm numbness/pain when laying down to sleep.  She has been using ibuprofen 800 mg 4 times daily and is also been using Tylenol 1 g up to 6 times daily.  She has had a Medrol dose pack which provided some relief but short-lived.  She has had muscle relaxants including Flexeril.  She recent was prescribed tramadol which did take the edge off her symptoms made a little easier to tolerate a full shift at work.    Upon further questioning she does think maybe she has noticed some slight difficulty with putting in and removing earrings.  No incontinence.  No balance difficulties but does believe she is got vertigo.    Past treatments tried:  - Physical therapy: Has not had directed physical therapy for  this condition yet  - Injections: Has not had injection at this point  - Medications: Ibuprofen, Tylenol, Flexeril, Medrol Dosepak, tramadol             Past Medical History:     Past Medical History:   Diagnosis Date     Calculus of kidney     right side     Cervical high risk HPV (human papillomavirus) test positive 2018    NIL pap, +HR HPV (Neg 16/18). Rpt Pap+HPV in 1 year (Due )     Chickenpox      Gestational diabetes ,     History of postpartum depression, currently pregnant     mild     Hypertension      Intervertebral lumbar disc disorder with myelopathy, lumbar region     herniated disks, L4-5, s/p steroid injection     Intramural leiomyoma of uterus 4/3/2018     Major depressive disorder, single episode, moderate (H)      Migraines      Urinary tract bacterial infections             Past Surgical History:     Past Surgical History:   Procedure Laterality Date      SECTION, TUBAL LIGATION, COMBINED  2013    c/section with BTL     HYSTERECTOMY, PAP NO LONGER INDICATED  04/10/2018    No history of LIBERTY 2 or greater     LAPAROSCOPIC ASSISTED HYSTERECTOMY VAGINAL, BILATERAL SALPINGO-OOPHORECTOMY, COMBINED N/A 4/10/2018    Procedure: COMBINED LAPAROSCOPIC ASSISTED HYSTERECTOMY VAGINAL, SALPINGO-OOPHORECTOMY;  Laparoscopic assisted vaginal hysterectomy with possible bilateral salpingoophorectomy.;  Surgeon: Taisha Wilson MD;  Location: [facility] OR            Social History:     Social History     Tobacco Use     Smoking status: Former Smoker     Packs/day: 0.50     Years: 10.00     Pack years: 5.00     Types: Cigarettes     Quit date: 2008     Years since quittin.8     Smokeless tobacco: Never Used   Substance Use Topics     Alcohol use: Yes     Comment: very seldom            Family History:     Family History   Problem Relation Age of Onset     Cancer Maternal Grandfather         liver CA     Cancer Paternal Grandfather         prostate CA     Cancer Maternal  "Grandmother         ovarian CA     Other - See Comments Maternal Grandmother         migraine     Hypertension Maternal Grandmother      Diabetes Paternal Grandmother      Hypertension Paternal Grandmother      Hypertension Father      Depression Father      Anxiety Disorder Father      Other - See Comments Mother         migraine     Hypertension Brother      Other - See Comments Maternal Uncle         migraine     Depression Sister      Anxiety Disorder Sister      Melanoma No family hx of             Allergies:     Allergies   Allergen Reactions     Imitrex [Sumatriptan] Other (See Comments)     Felt terrible, \"like pores were on fire\"            Medications:     Current Outpatient Medications   Medication     cyclobenzaprine (FLEXERIL) 5 MG tablet     estradiol (ESTRACE) 2 MG tablet     lisinopril (ZESTRIL) 20 MG tablet     LORazepam (ATIVAN) 0.5 MG tablet     methylPREDNISolone (MEDROL DOSEPAK) 4 MG tablet therapy pack     ondansetron (ZOFRAN) 4 MG tablet     traMADol (ULTRAM) 50 MG tablet     No current facility-administered medications for this visit.             Review of Systems:     A 10 point ROS was performed and reviewed. Specific responses to these questions are noted at the end of the document.         Physical Exam:     PHYSICAL EXAM:   Constitutional - Patient is healthy, well-nourished and appears stated age, is in no acute distress    Vitals: Ht 1.59 m (5' 2.6\")   Wt 61.7 kg (136 lb)   LMP 04/02/2018   BMI 24.40 kg/m     Respiratory - Patient is breathing normally, no audible wheeze or respiratory distress.   Skin - No suspicious rashes or lesions.   Psychiatric - Normal mood and affect.   Cardiovascular - Extremities warm and well perfused.   GI - No abdominal distention.   Musculoskeletal - Non-antalgic gait without use of assistive devices.  No real tenderness to palpation of the cervical spine.  No limitation in range of motion in extension or flexion, side to side bend or lateral rotation. "  Negative Spurling's bilaterally.  5/5 strength with scapular elevation, shoulder abduction, elbow flexion extension, wrist flexion extension, finger abduction and composite fist.  Sensation is intact bilaterally from C4-T1.  2+ bicep, tricep and brachioradialis reflexes without inverted BR.  Negative Laverne's bilaterally.    Patella tendon and Achilles tendon reflexes are 2+.  1 beat of clonus bilaterally.    Negative Romberg.    She can stand and walk on tiptoes and heels without loss of balance.         Imaging:   We ordered and independently reviewed new radiographs at this clinic visit. The results were discussed with the patient.  Findings include:    AP lateral flexion-extension views of the cervical spine demonstrate decreased disc height particular at the C5-C6 level along with evident marginal osteophytes.  There is loss of cervical lordosis from the C4-C6 segment.  Dynamic anterolisthesis at C3-4 and C4-5 which reduces on neutral and extension views.    MRI of the cervical spine demonstrates a large left paracentral disc herniation at the C5-C6 level resulting in cord effacement and stenosis of the left neuroforamen.             Assessment and Plan:   Assessment:  41 year old female with cervical disc herniation resulting in central stenosis with cord effacement and moderate left neuroforaminal stenosis.  Her symptoms are primarily right upper extremity which does not clearly match with her imaging finding.  We discussed this at length.  Regardless of recommended maximizing conservative measures including physical therapy, who recommended a cervical epidural steroid injection, gabapentin and offered prescription anti-inflammatory she should so choose.  She states she has tried Voltaren in the past but it did not work nearly as good as ibuprofen so will stay with that for now.    Counseled her regarding the concerning signs for cervical myelopathy instructed her that if she notices these that she should  call to be evaluated right away.     Plan:  1. Referral for bilateral transforaminal epidural steroid injection C5-C6  2. Referral for physical therapy for cervical stenosis with radiculopathy  3. Prescription for gabapentin.  Patient was counseled on safe dose escalation for this medication  4. Continue with over-the-counter ibuprofen and Tylenol.  Counseled patient on safe dosing for both these medications  5. Like to see her back in 4 weeks for reevaluation            Respectfully,  Jatinder York MD  Spine Surgery  [facility]    Time spent on this clinical encounter including previsit chart review, history and physical examination, patient counseling and documentation was  60 minutes.

## 2021-12-15 ENCOUNTER — VIRTUAL VISIT (OUTPATIENT)
Dept: FAMILY MEDICINE | Facility: CLINIC | Age: 41
End: 2021-12-15
Payer: COMMERCIAL

## 2021-12-15 DIAGNOSIS — J98.4 PNEUMONITIS: Primary | ICD-10-CM

## 2021-12-15 DIAGNOSIS — B99.9 INFECTION: ICD-10-CM

## 2021-12-15 PROCEDURE — 99213 OFFICE O/P EST LOW 20 MIN: CPT | Mod: 95 | Performed by: FAMILY MEDICINE

## 2021-12-15 RX ORDER — OMEGA-3 FATTY ACIDS/FISH OIL 300-1000MG
600 CAPSULE ORAL EVERY 4 HOURS PRN
COMMUNITY
End: 2022-03-03

## 2021-12-15 RX ORDER — ALBUTEROL SULFATE 90 UG/1
2 AEROSOL, METERED RESPIRATORY (INHALATION) 4 TIMES DAILY PRN
Qty: 18 G | Refills: 1 | Status: SHIPPED | OUTPATIENT
Start: 2021-12-15 | End: 2022-03-03

## 2021-12-15 RX ORDER — PREDNISONE 20 MG/1
40 TABLET ORAL DAILY
Qty: 14 TABLET | Refills: 0 | Status: SHIPPED | OUTPATIENT
Start: 2021-12-15 | End: 2021-12-22

## 2021-12-15 NOTE — PROGRESS NOTES
Rober is a 41 year old who is being evaluated via a billable video visit.      How would you like to obtain your AVS? MyChart  If the video visit is dropped, the invitation should be resent by: Text to cell phone: [phone]  Will anyone else be joining your video visit? No      Video Start Time: 3:48 PM    Assessment & Plan       ICD-10-CM    1. Pneumonitis  J18.9 amoxicillin-clavulanate (AUGMENTIN) 875-125 MG tablet     predniSONE (DELTASONE) 20 MG tablet     albuterol (PROAIR HFA/PROVENTIL HFA/VENTOLIN HFA) 108 (90 Base) MCG/ACT inhaler   2. Infection  B99.9 amoxicillin-clavulanate (AUGMENTIN) 875-125 MG tablet     Patient instructed to call 911 if severe shortness of breath, rapid respiratory rate or pleuritic chest pain.    Prescribed Augmentin 875 mg twice a day for 10 days.    Precribed prednisone 40 mg daily for 7 days.  To take with food and ideally in the morning.    Prescribed albuterol inhaler 2 puffs up to 4 times a day as needed for cough.    Certainly if symptoms persist we would consider pulmonary consult.      20 minutes spent on the date of the encounter doing chart review, history and exam, documentation and further activities per the note           No follow-ups on file.    Jacklyn Gipson MD  [facility]    Subjective   Rober is a 41 year old who presents for the following health issues     HPI   CC: 12/13 started not feeling well, now last few days feeling like cant catch breath, difficulting with sitting up, going up steps, non productive burning cough, fever up to 101 (cleaned a very dirty house on 12/12) negative covid test today.  Severe mucle aches.  Headache.  Yesteday could not catch her breath, a little better today.  Laying down ok, but with talking or sitting up needs to stop and catch her breath.  She does have some burning in her back with deep inspiration.  No sharp pain with taking a deep breath.  She feels like she should cough up mucus but  cannot.  She does not have a history of asthma and is never used an inhaler.  The symptoms all started after she was cleaning a very very dirty house.  The house was covered in mold and feces.  She was cleaning with bleach.              Review of Systems         Objective           Vitals:  No vitals were obtained today due to virtual visit.    Physical Exam   Gen: Patient does appear moderately ill but not in respiratory distress over video                Video-Visit Details    Type of service:  Video Visit    Video End Time:4:08 PM    Originating Location (pt. Location): Home    Distant Location (provider location):  [facility]     Platform used for Video Visit: ORDISSIMO

## 2021-12-16 NOTE — PATIENT INSTRUCTIONS
Patient instructed to call 911 if severe shortness of breath, rapid respiratory rate or pleuritic chest pain.    Prescribed Augmentin 875 mg twice a day for 10 days.    Precribed prednisone 40 mg daily for 7 days.  To take with food and ideally in the morning.    Prescribed albuterol inhaler 2 puffs up to 4 times a day as needed for cough.    Certainly if symptoms persist we would consider pulmonary consult.

## 2021-12-23 ENCOUNTER — VIRTUAL VISIT (OUTPATIENT)
Dept: NEUROLOGY | Facility: CLINIC | Age: 41
End: 2021-12-23
Payer: COMMERCIAL

## 2021-12-23 ENCOUNTER — TELEPHONE (OUTPATIENT)
Dept: NEUROLOGY | Facility: CLINIC | Age: 41
End: 2021-12-23

## 2021-12-23 ENCOUNTER — PRE VISIT (OUTPATIENT)
Dept: NEUROLOGY | Facility: CLINIC | Age: 41
End: 2021-12-23

## 2021-12-23 DIAGNOSIS — G43.019 INTRACTABLE MIGRAINE WITHOUT AURA AND WITHOUT STATUS MIGRAINOSUS: ICD-10-CM

## 2021-12-23 DIAGNOSIS — G43.719 INTRACTABLE CHRONIC MIGRAINE WITHOUT AURA AND WITHOUT STATUS MIGRAINOSUS: ICD-10-CM

## 2021-12-23 DIAGNOSIS — G44.89 CHRONIC MIXED HEADACHE SYNDROME: Primary | ICD-10-CM

## 2021-12-23 DIAGNOSIS — M79.18 CERVICAL MYOFASCIAL PAIN SYNDROME: ICD-10-CM

## 2021-12-23 PROCEDURE — 99205 OFFICE O/P NEW HI 60 MIN: CPT | Mod: 95 | Performed by: NURSE PRACTITIONER

## 2021-12-23 RX ORDER — UBROGEPANT 50 MG/1
50-100 TABLET ORAL
Qty: 16 TABLET | Refills: 9 | Status: SHIPPED | OUTPATIENT
Start: 2021-12-23 | End: 2022-11-15

## 2021-12-23 ASSESSMENT — HEADACHE IMPACT TEST (HIT 6)
HOW OFTEN DID HEADACHS LIMIT CONCENTRATION ON WORK OR DAILY ACTIVITY: VERY OFTEN
WHEN YOU HAVE A HEADACHE HOW OFTEN DO YOU WISH YOU COULD LIE DOWN: ALWAYS
HOW OFTEN DO HEADACHES LIMIT YOUR DAILY ACTIVITIES: VERY OFTEN
HOW OFTEN DID HEADACHS LIMIT CONCENTRATION ON WORK OR DAILY ACTIVITY: VERY OFTEN
HIT6 TOTAL SCORE: 70
HOW OFTEN HAVE YOU FELT FED UP OR IRRITATED BECAUSE OF YOUR HEADACHES: ALWAYS
HOW OFTEN HAVE YOU FELT FED UP OR IRRITATED BECAUSE OF YOUR HEADACHES: ALWAYS
HOW OFTEN HAVE YOU FELT TOO TIRED TO WORK BECAUSE OF YOUR HEADACHES: VERY OFTEN
WHEN YOU HAVE HEADACHES HOW OFTEN IS THE PAIN SEVERE: VERY OFTEN
HOW OFTEN DO HEADACHES LIMIT YOUR DAILY ACTIVITIES: VERY OFTEN
HIT6 TOTAL SCORE: 70
HOW OFTEN HAVE YOU FELT TOO TIRED TO WORK BECAUSE OF YOUR HEADACHES: VERY OFTEN
WHEN YOU HAVE A HEADACHE HOW OFTEN DO YOU WISH YOU COULD LIE DOWN: ALWAYS
WHEN YOU HAVE HEADACHES HOW OFTEN IS THE PAIN SEVERE: VERY OFTEN
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
## 2021-12-23 NOTE — TELEPHONE ENCOUNTER
Prior Authorization Retail Medication Request    Medication/Dose: ubrogepant (UBRELVY) 50 MG tablet  ICD code (if different than what is on RX):    G43.019  Previously Tried and Failed: sumatriptan, tylenol,  Ibuprofen, tramadol, metoprolol, medrol dose pack, tizanidine, gabapentin, zofran, compazine,   Rationale:  migraine    Insurance Name:  [employer]  Insurance ID:  [id number]       Pharmacy Information (if different than what is on RX)  Name:    Phone:
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
## 2021-12-23 NOTE — PATIENT INSTRUCTIONS
Start gabapentin -might help with headaches and neck pain  PT as scheduled   PMR -for TP, neck assessment and possible Botox consideration -safer consideration especially with irregular schedule and active kids  Rescue treatment -Ubrelvy PA for a severe migraine headache  Ondansetron as needed   Alternatively may try prochlorperazine with benedryl   Talk to Women's Health/Gyn about headaches worsening and being on estrace and risk of stroke and hypertension.   Follow up in 6-8 weeks or sooner if needed

## 2021-12-24 NOTE — TELEPHONE ENCOUNTER
Central Prior Authorization Team   Phone: [phone]      PA Initiation    Medication: ubrogepant (UBRELVY) 50 MG tablet  Insurance Company: [employer] - Phone [phone] Fax [phone]  Pharmacy Filling the Rx: [facility] [id number] [facility]  Filling Pharmacy Phone: [phone]  Filling Pharmacy Fax:    Start Date: 12/24/2021

## 2021-12-24 NOTE — TELEPHONE ENCOUNTER
Prior Authorization Approval    Authorization Effective Date: 11/24/2021  Authorization Expiration Date: 12/24/2022  Medication: ubrogepant (UBRELVY) 50 MG tablet  Approved Dose/Quantity: 16 tabs per 30 days  Reference #: [id number]   Insurance Company: [employer] - Phone [phone] Fax [phone]  Expected CoPay:       Which Pharmacy is filling the prescription (Not needed for infusion/clinic administered): [facility] - [id number] [facility] [place]  Pharmacy Notified: Yes - spoke to Caleb/pharmacist. The Ubrelvy comes in packs of 10. They typically do not break the boxes, so he processed a qty of 10 tabs per 19 days and it goes through.   Patient Notified: No

## 2021-12-27 ENCOUNTER — TELEPHONE (OUTPATIENT)
Dept: PHYSICAL MEDICINE AND REHAB | Facility: CLINIC | Age: 41
End: 2021-12-27

## 2021-12-27 NOTE — TELEPHONE ENCOUNTER
LVM with patient to schedule referral.    May schedule new consult with on of the following: Renetta Saravia Standal.    Fern Castro

## 2022-01-02 ENCOUNTER — HEALTH MAINTENANCE LETTER (OUTPATIENT)
Age: 42
End: 2022-01-02

## 2022-01-13 DIAGNOSIS — E89.40 SURGICAL MENOPAUSE: ICD-10-CM

## 2022-01-13 RX ORDER — ESTRADIOL 2 MG/1
3 TABLET ORAL DAILY
Qty: 135 TABLET | Refills: 3 | Status: SHIPPED | OUTPATIENT
Start: 2022-01-13 | End: 2023-03-08

## 2022-01-13 NOTE — TELEPHONE ENCOUNTER
"Last Written Prescription Date:  12/1/2020  Last Fill Quantity: 135,  # refills: 3   Last office visit: Visit date not found with prescribing provider: 12/1/2020 virtual visit with Dr. Wilson   Future Office Visit:   Currently not scheduled.    Requested Prescriptions   Pending Prescriptions Disp Refills     estradiol (ESTRACE) 2 MG tablet 135 tablet 3     Sig: Take 1.5 tablets (3 mg) by mouth daily       Hormone Replacement Therapy Failed - 1/13/2022  3:42 PM        Failed - Blood pressure under 140/90 in past 12 months     BP Readings from Last 3 Encounters:   11/05/21 (!) 172/108   11/01/21 (!) 148/102   09/29/21 (!) 150/100                 Failed - Recent (12 mo) or future (30 days) visit within the authorizing provider's specialty     Patient has had an office visit with the authorizing provider or a provider within the authorizing providers department within the previous 12 mos or has a future within next 30 days. See \"Patient Info\" tab in inbasket, or \"Choose Columns\" in Meds & Orders section of the refill encounter.              Passed - Medication is active on med list        Passed - Patient is 18 years of age or older        Passed - No active pregnancy on record        Passed - No positive pregnancy test on record in past 12 months           Routing refill request to provider for review/approval because:  Outside of RN guidelines.    Chinyere Hall   [facility]Gyn Clinic  RN          "

## 2022-01-18 ENCOUNTER — TELEPHONE (OUTPATIENT)
Dept: OTHER | Age: 42
End: 2022-01-18
Payer: COMMERCIAL

## 2022-01-18 NOTE — TELEPHONE ENCOUNTER
Hello,  Pt saw Dr. York in [place] for spine on 12/13 and was told to have an epidural steroid injection of her neck.  I am not finding an order for this.  Could someone please check and have the pt contacted to schedule this?  She needs this before moving forward with a neurology appt.

## 2022-01-19 ENCOUNTER — TELEPHONE (OUTPATIENT)
Dept: ORTHOPEDICS | Facility: CLINIC | Age: 42
End: 2022-01-19
Payer: COMMERCIAL

## 2022-02-24 DIAGNOSIS — F41.0 PANIC ATTACK: ICD-10-CM

## 2022-02-24 DIAGNOSIS — G47.00 PERSISTENT INSOMNIA: ICD-10-CM

## 2022-02-25 RX ORDER — LORAZEPAM 0.5 MG/1
0.5 TABLET ORAL 2 TIMES DAILY PRN
Qty: 60 TABLET | Refills: 0 | OUTPATIENT
Start: 2022-02-25

## 2022-02-25 NOTE — TELEPHONE ENCOUNTER
Routing refill request to provider for review/approval because:  Drug not on the [facility] refill protocol     Last Written Prescription Date:  12-8-21  Last Fill Quantity: 60,  # refills: 0   Last office visit: 7/30/2021 Virtual 10-13-21with prescribing provider:  Serena Moore   Future Office Visit:

## 2022-02-25 NOTE — TELEPHONE ENCOUNTER
This medication requires some type of follow up visit every 3 months. Kaila has been given. ANTONIO Alaniz, FNP-BC

## 2022-03-03 ENCOUNTER — VIRTUAL VISIT (OUTPATIENT)
Dept: FAMILY MEDICINE | Facility: CLINIC | Age: 42
End: 2022-03-03
Payer: COMMERCIAL

## 2022-03-03 DIAGNOSIS — F41.0 PANIC ATTACK: ICD-10-CM

## 2022-03-03 DIAGNOSIS — Z71.89 ADVANCE CARE PLANNING: Primary | ICD-10-CM

## 2022-03-03 DIAGNOSIS — I10 BENIGN ESSENTIAL HYPERTENSION: ICD-10-CM

## 2022-03-03 DIAGNOSIS — G47.00 PERSISTENT INSOMNIA: ICD-10-CM

## 2022-03-03 PROCEDURE — 99214 OFFICE O/P EST MOD 30 MIN: CPT | Performed by: NURSE PRACTITIONER

## 2022-03-03 PROCEDURE — 96127 BRIEF EMOTIONAL/BEHAV ASSMT: CPT | Performed by: NURSE PRACTITIONER

## 2022-03-03 RX ORDER — LORAZEPAM 0.5 MG/1
0.5 TABLET ORAL 2 TIMES DAILY PRN
Qty: 60 TABLET | Refills: 5 | Status: SHIPPED | OUTPATIENT
Start: 2022-03-03 | End: 2022-09-15

## 2022-03-03 RX ORDER — LISINOPRIL AND HYDROCHLOROTHIAZIDE 12.5; 2 MG/1; MG/1
1 TABLET ORAL DAILY
Qty: 90 TABLET | Refills: 1 | Status: SHIPPED | OUTPATIENT
Start: 2022-03-03 | End: 2022-11-15

## 2022-03-03 ASSESSMENT — ANXIETY QUESTIONNAIRES
7. FEELING AFRAID AS IF SOMETHING AWFUL MIGHT HAPPEN: NOT AT ALL
2. NOT BEING ABLE TO STOP OR CONTROL WORRYING: SEVERAL DAYS
1. FEELING NERVOUS, ANXIOUS, OR ON EDGE: MORE THAN HALF THE DAYS
5. BEING SO RESTLESS THAT IT IS HARD TO SIT STILL: NOT AT ALL
3. WORRYING TOO MUCH ABOUT DIFFERENT THINGS: SEVERAL DAYS
IF YOU CHECKED OFF ANY PROBLEMS ON THIS QUESTIONNAIRE, HOW DIFFICULT HAVE THESE PROBLEMS MADE IT FOR YOU TO DO YOUR WORK, TAKE CARE OF THINGS AT HOME, OR GET ALONG WITH OTHER PEOPLE: SOMEWHAT DIFFICULT
GAD7 TOTAL SCORE: 5
6. BECOMING EASILY ANNOYED OR IRRITABLE: NOT AT ALL

## 2022-03-03 ASSESSMENT — PATIENT HEALTH QUESTIONNAIRE - PHQ9: 5. POOR APPETITE OR OVEREATING: SEVERAL DAYS

## 2022-03-03 NOTE — PROGRESS NOTES
Rober is a 41 year old who is being evaluated via a billable telephone visit.      What phone number would you like to be contacted at? [phone]  How would you like to obtain your AVS? MyChart    Assessment & Plan       Advance care planning      Panic attack    - LORazepam (ATIVAN) 0.5 MG tablet; Take 1 tablet (0.5 mg) by mouth 2 times daily as needed for anxiety    Persistent insomnia    - LORazepam (ATIVAN) 0.5 MG tablet; Take 1 tablet (0.5 mg) by mouth 2 times daily as needed for anxiety    Benign essential hypertension    - lisinopril-hydrochlorothiazide (ZESTORETIC) 20-12.5 MG tablet; Take 1 tablet by mouth daily    20 minutes spent on the date of the encounter doing chart review, history and exam, documentation and further activities per the note     See Patient Instructions: follow up as needed.   Return in about 6 months (around 9/3/2022), or if symptoms worsen or fail to improve.    EDITA Calles  [facility]    Suzanne   Rober is a 41 year old who presents for the following health issues     HPI     Anxiety Follow-Up    How are you doing with your anxiety since your last visit? Improved - this seems to be a good dose for her. Does not make her too sleepy; helps when having a panic attack. Does not take twice daily; only as needed    Are you having other symptoms that might be associated with anxiety? No    Have you had a significant life event? OTHER: getting  Wedding is at [facility]; she has 34 guests attending. Discussed why she is on estrace- hx of hysterectomy approx 4 years ago.  Discussed many women are not on this anymore due to risks- discussed DVT (S&S)- along with pumping legs on flight, PE, MI, stroke, death, increased risk with smoking (she is a non-smoker), breast ca.  She reports she is taking it to prevent osteopetrosis. Discussed taking a daily multivitamin/ calcium with vitamin d. May get hot flashes or other symptoms if stopped; could try  reducing dose.   Are you feeling depressed? No    Do you have any concerns with your use of alcohol or other drugs? No     She reports her home BPs have improved- 124/88; she would like the bottom number a little better controlled. Discussed adding hydrochlorothiazide to her lisinopril could help. Estradiol could be raising BP.  If dizzy, check blood pressure.     Social History     Tobacco Use     Smoking status: Former Smoker     Packs/day: 0.50     Years: 10.00     Pack years: 5.00     Types: Cigarettes     Quit date: 2008     Years since quittin.0     Smokeless tobacco: Never Used   Vaping Use     Vaping Use: Never used   Substance Use Topics     Alcohol use: Yes     Comment: very seldom     Drug use: No     JACOB-7 SCORE 2021 10/13/2021 3/3/2022   Total Score 4 (minimal anxiety) 10 (moderate anxiety) -   Total Score 4 10 5     PHQ 10/21/2020 2021 10/13/2021   PHQ-9 Total Score 0 2 6   Q9: Thoughts of better off dead/self-harm past 2 weeks Not at all Not at all Not at all     Last PHQ-9 10/13/2021   1.  Little interest or pleasure in doing things 0   2.  Feeling down, depressed, or hopeless 1   3.  Trouble falling or staying asleep, or sleeping too much 3   4.  Feeling tired or having little energy 1   5.  Poor appetite or overeating 1   6.  Feeling bad about yourself 0   7.  Trouble concentrating 0   8.  Moving slowly or restless 0   Q9: Thoughts of better off dead/self-harm past 2 weeks 0   PHQ-9 Total Score 6   Difficulty at work, home, or with people -     JACOB-7  3/3/2022   1. Feeling nervous, anxious, or on edge 2   2. Not being able to stop or control worrying 1   3. Worrying too much about different things 1   4. Trouble relaxing 1   5. Being so restless that it is hard to sit still 0   6. Becoming easily annoyed or irritable 0   7. Feeling afraid, as if something awful might happen 0   JACOB-7 Total Score 5   If you checked any problems, how difficult have they made it for you to do your  work, take care of things at home, or get along with other people? Somewhat difficult       Review of Systems   Constitutional, HEENT, cardiovascular, pulmonary, GI, , musculoskeletal, neuro, skin, endocrine and psych systems are negative, except as otherwise noted.      Objective    Vitals - Patient Reported  Systolic (Patient Reported): 124  Diastolic (Patient Reported): 88      Vitals:  No vitals were obtained today due to virtual visit.    Physical Exam   healthy, alert and no distress  PSYCH: Alert and oriented times 3; coherent speech, normal   rate and volume, able to articulate logical thoughts, able   to abstract reason, no tangential thoughts, no hallucinations   or delusions  Her affect is normal  RESP: No cough, no audible wheezing, able to talk in full sentences  Remainder of exam unable to be completed due to telephone visits    See orders        Phone call duration: 11 minutes

## 2022-03-04 ASSESSMENT — ANXIETY QUESTIONNAIRES: GAD7 TOTAL SCORE: 5

## 2022-05-31 ENCOUNTER — ANCILLARY PROCEDURE (OUTPATIENT)
Dept: GENERAL RADIOLOGY | Facility: CLINIC | Age: 42
End: 2022-05-31
Attending: ORTHOPAEDIC SURGERY
Payer: COMMERCIAL

## 2022-05-31 DIAGNOSIS — M48.02 FORAMINAL STENOSIS OF CERVICAL REGION: ICD-10-CM

## 2022-05-31 DIAGNOSIS — M50.20 HERNIATED CERVICAL INTERVERTEBRAL DISC: ICD-10-CM

## 2022-05-31 PROCEDURE — 64479 NJX AA&/STRD TFRM EPI C/T 1: CPT | Performed by: RADIOLOGY

## 2022-05-31 RX ORDER — DEXAMETHASONE SODIUM PHOSPHATE 10 MG/ML
10 INJECTION, SOLUTION INTRAMUSCULAR; INTRAVENOUS ONCE
Status: COMPLETED | OUTPATIENT
Start: 2022-05-31 | End: 2022-05-31

## 2022-05-31 RX ORDER — IOPAMIDOL 408 MG/ML
2 INJECTION, SOLUTION INTRATHECAL ONCE
Status: COMPLETED | OUTPATIENT
Start: 2022-05-31 | End: 2022-05-31

## 2022-05-31 RX ADMIN — IOPAMIDOL 2 ML: 408 INJECTION, SOLUTION INTRATHECAL at 14:09

## 2022-05-31 RX ADMIN — DEXAMETHASONE SODIUM PHOSPHATE 2 MG: 10 INJECTION, SOLUTION INTRAMUSCULAR; INTRAVENOUS at 14:08

## 2022-06-14 ENCOUNTER — ANCILLARY PROCEDURE (OUTPATIENT)
Dept: GENERAL RADIOLOGY | Facility: CLINIC | Age: 42
End: 2022-06-14
Attending: ORTHOPAEDIC SURGERY
Payer: COMMERCIAL

## 2022-06-14 DIAGNOSIS — M50.20 HERNIATED CERVICAL INTERVERTEBRAL DISC: ICD-10-CM

## 2022-06-14 DIAGNOSIS — M48.02 FORAMINAL STENOSIS OF CERVICAL REGION: ICD-10-CM

## 2022-06-14 PROCEDURE — 64479 NJX AA&/STRD TFRM EPI C/T 1: CPT | Performed by: RADIOLOGY

## 2022-06-14 RX ORDER — IOPAMIDOL 408 MG/ML
2 INJECTION, SOLUTION INTRATHECAL ONCE
Status: COMPLETED | OUTPATIENT
Start: 2022-06-14 | End: 2022-06-14

## 2022-06-14 RX ORDER — DEXAMETHASONE SODIUM PHOSPHATE 10 MG/ML
10 INJECTION, SOLUTION INTRAMUSCULAR; INTRAVENOUS ONCE
Status: COMPLETED | OUTPATIENT
Start: 2022-06-14 | End: 2022-06-14

## 2022-06-14 RX ADMIN — IOPAMIDOL 2 ML: 408 INJECTION, SOLUTION INTRATHECAL at 14:43

## 2022-06-14 RX ADMIN — DEXAMETHASONE SODIUM PHOSPHATE 10 MG: 10 INJECTION, SOLUTION INTRAMUSCULAR; INTRAVENOUS at 14:43

## 2022-06-15 ENCOUNTER — E-VISIT (OUTPATIENT)
Dept: FAMILY MEDICINE | Facility: CLINIC | Age: 42
End: 2022-06-15
Payer: COMMERCIAL

## 2022-06-15 DIAGNOSIS — G44.219 EPISODIC TENSION-TYPE HEADACHE, NOT INTRACTABLE: Primary | ICD-10-CM

## 2022-06-15 PROCEDURE — 99207 PR NO CHARGE LOS: CPT | Performed by: NURSE PRACTITIONER

## 2022-06-15 RX ORDER — BUTALBITAL, ACETAMINOPHEN AND CAFFEINE 50; 325; 40 MG/1; MG/1; MG/1
1 TABLET ORAL EVERY 4 HOURS PRN
Qty: 30 TABLET | Refills: 0 | Status: SHIPPED | OUTPATIENT
Start: 2022-06-15 | End: 2022-07-06

## 2022-06-15 RX ORDER — INDOMETHACIN 50 MG/1
50 CAPSULE ORAL 2 TIMES DAILY WITH MEALS
Qty: 60 CAPSULE | Refills: 0 | Status: SHIPPED | OUTPATIENT
Start: 2022-06-15 | End: 2022-11-15

## 2022-07-01 ENCOUNTER — HOSPITAL ENCOUNTER (EMERGENCY)
Facility: CLINIC | Age: 42
Discharge: HOME | End: 2022-07-01
Attending: EMERGENCY MEDICINE | Admitting: EMERGENCY MEDICINE
Payer: COMMERCIAL

## 2022-07-01 VITALS
DIASTOLIC BLOOD PRESSURE: 77 MMHG | RESPIRATION RATE: 16 BRPM | TEMPERATURE: 97.9 F | WEIGHT: 130 LBS | SYSTOLIC BLOOD PRESSURE: 109 MMHG | OXYGEN SATURATION: 97 % | HEART RATE: 82 BPM | HEIGHT: 61 IN | BODY MASS INDEX: 24.55 KG/M2

## 2022-07-01 DIAGNOSIS — E87.6 HYPOKALEMIA: ICD-10-CM

## 2022-07-01 DIAGNOSIS — R06.02 SHORTNESS OF BREATH: ICD-10-CM

## 2022-07-01 DIAGNOSIS — R51.9 ACUTE INTRACTABLE HEADACHE, UNSPECIFIED HEADACHE TYPE: ICD-10-CM

## 2022-07-01 LAB
ANION GAP SERPL CALCULATED.3IONS-SCNC: 9 MMOL/L (ref 3–14)
BASOPHILS # BLD AUTO: 0 10E3/UL (ref 0–0.2)
BASOPHILS NFR BLD AUTO: 1 %
BUN SERPL-MCNC: 8 MG/DL (ref 7–30)
CALCIUM SERPL-MCNC: 8.1 MG/DL (ref 8.5–10.1)
CHLORIDE BLD-SCNC: 109 MMOL/L (ref 94–109)
CO2 SERPL-SCNC: 23 MMOL/L (ref 20–32)
CREAT SERPL-MCNC: 0.47 MG/DL (ref 0.52–1.04)
EOSINOPHIL # BLD AUTO: 0.1 10E3/UL (ref 0–0.7)
EOSINOPHIL NFR BLD AUTO: 2 %
ERYTHROCYTE [DISTWIDTH] IN BLOOD BY AUTOMATED COUNT: 11 % (ref 10–15)
GFR SERPL CREATININE-BSD FRML MDRD: >90 ML/MIN/1.73M2
GLUCOSE BLD-MCNC: 165 MG/DL (ref 70–99)
HCT VFR BLD AUTO: 34.6 % (ref 35–47)
HGB BLD-MCNC: 12.6 G/DL (ref 11.7–15.7)
IMM GRANULOCYTES # BLD: 0 10E3/UL
IMM GRANULOCYTES NFR BLD: 0 %
LYMPHOCYTES # BLD AUTO: 1.8 10E3/UL (ref 0.8–5.3)
LYMPHOCYTES NFR BLD AUTO: 33 %
MCH RBC QN AUTO: 33.2 PG (ref 26.5–33)
MCHC RBC AUTO-ENTMCNC: 36.4 G/DL (ref 31.5–36.5)
MCV RBC AUTO: 91 FL (ref 78–100)
MONOCYTES # BLD AUTO: 0.4 10E3/UL (ref 0–1.3)
MONOCYTES NFR BLD AUTO: 6 %
NEUTROPHILS # BLD AUTO: 3.2 10E3/UL (ref 1.6–8.3)
NEUTROPHILS NFR BLD AUTO: 58 %
NRBC # BLD AUTO: 0 10E3/UL
NRBC BLD AUTO-RTO: 0 /100
PLATELET # BLD AUTO: 90 10E3/UL (ref 150–450)
POTASSIUM BLD-SCNC: 2.9 MMOL/L (ref 3.4–5.3)
RBC # BLD AUTO: 3.79 10E6/UL (ref 3.8–5.2)
SODIUM SERPL-SCNC: 141 MMOL/L (ref 133–144)
TROPONIN I SERPL HS-MCNC: 6 NG/L
WBC # BLD AUTO: 5.5 10E3/UL (ref 4–11)

## 2022-07-01 PROCEDURE — 96366 THER/PROPH/DIAG IV INF ADDON: CPT | Performed by: EMERGENCY MEDICINE

## 2022-07-01 PROCEDURE — 99284 EMERGENCY DEPT VISIT MOD MDM: CPT | Mod: 25 | Performed by: EMERGENCY MEDICINE

## 2022-07-01 PROCEDURE — 96375 TX/PRO/DX INJ NEW DRUG ADDON: CPT | Performed by: EMERGENCY MEDICINE

## 2022-07-01 PROCEDURE — 96365 THER/PROPH/DIAG IV INF INIT: CPT | Performed by: EMERGENCY MEDICINE

## 2022-07-01 PROCEDURE — 250N000011 HC RX IP 250 OP 636: Performed by: EMERGENCY MEDICINE

## 2022-07-01 PROCEDURE — 93005 ELECTROCARDIOGRAM TRACING: CPT | Performed by: EMERGENCY MEDICINE

## 2022-07-01 PROCEDURE — 258N000003 HC RX IP 258 OP 636: Performed by: EMERGENCY MEDICINE

## 2022-07-01 PROCEDURE — 80048 BASIC METABOLIC PNL TOTAL CA: CPT | Performed by: EMERGENCY MEDICINE

## 2022-07-01 PROCEDURE — 85025 COMPLETE CBC W/AUTO DIFF WBC: CPT | Performed by: EMERGENCY MEDICINE

## 2022-07-01 PROCEDURE — 93010 ELECTROCARDIOGRAM REPORT: CPT | Performed by: EMERGENCY MEDICINE

## 2022-07-01 PROCEDURE — 36415 COLL VENOUS BLD VENIPUNCTURE: CPT | Performed by: EMERGENCY MEDICINE

## 2022-07-01 PROCEDURE — 250N000013 HC RX MED GY IP 250 OP 250 PS 637: Performed by: EMERGENCY MEDICINE

## 2022-07-01 PROCEDURE — 84484 ASSAY OF TROPONIN QUANT: CPT | Performed by: EMERGENCY MEDICINE

## 2022-07-01 RX ORDER — POTASSIUM CHLORIDE 7.45 MG/ML
10 INJECTION INTRAVENOUS ONCE
Status: COMPLETED | OUTPATIENT
Start: 2022-07-01 | End: 2022-07-01

## 2022-07-01 RX ORDER — POTASSIUM CHLORIDE 1500 MG/1
40 TABLET, EXTENDED RELEASE ORAL ONCE
Status: COMPLETED | OUTPATIENT
Start: 2022-07-01 | End: 2022-07-01

## 2022-07-01 RX ORDER — DROPERIDOL 2.5 MG/ML
2.5 INJECTION, SOLUTION INTRAMUSCULAR; INTRAVENOUS ONCE
Status: COMPLETED | OUTPATIENT
Start: 2022-07-01 | End: 2022-07-01

## 2022-07-01 RX ADMIN — POTASSIUM CHLORIDE 10 MEQ: 7.46 INJECTION, SOLUTION INTRAVENOUS at 10:45

## 2022-07-01 RX ADMIN — SODIUM CHLORIDE, POTASSIUM CHLORIDE, SODIUM LACTATE AND CALCIUM CHLORIDE 500 ML: 600; 310; 30; 20 INJECTION, SOLUTION INTRAVENOUS at 10:43

## 2022-07-01 RX ADMIN — POTASSIUM CHLORIDE 40 MEQ: 20 TABLET, EXTENDED RELEASE ORAL at 10:39

## 2022-07-01 RX ADMIN — DROPERIDOL 2.5 MG: 2.5 INJECTION, SOLUTION INTRAMUSCULAR; INTRAVENOUS at 09:40

## 2022-07-01 NOTE — ED PROVIDER NOTES
"  History     Chief Complaint   Patient presents with     Allergic Reaction     Possible reaction to butabarbital this morning, itchy/hives, throat tight/tongue swelling reported     HPI  Rober Renee is a 41 year old female with a history of migraines who presents for concerns of an allergic reaction.  The patient says that she was recently prescribed butabarbital to help with headaches.  The first time she took this she developed hives shortly afterwards that were all over her body.  She thought maybe it was related to having cleaned her pool and so did not think anything of it.  Last evening she had a headache while at work and took butabarbital around midnight.  Then approximately 8 hours later she was awoken from sleep feeling very uncomfortable, reporting numbness and tingling of her bilateral upper extremities, feeling like she had tightness in her throat and lungs and feeling like her tongue was thick and dry.  She called EMS who gave her intramuscular epinephrine and IV diphenhydramine.  She feels better now from the standpoint of her breathing and anxiety.  However she says she continues to have a bad headache.  The headache is a typical headache for her, has been ongoing for several days but became worse last evening.  Pain is severe, throbbing, hurts in the back of her head and behind her eyes.  She has nausea but no vomiting.  No recent traumas or head injuries.  She denies fever, chills, chest pain, shortness of breath, cough, abdominal pain, dysuria, hematuria, or rash.  She is also try taking ibuprofen for this.    Allergies:  Allergies   Allergen Reactions     Imitrex [Sumatriptan] Other (See Comments)     Felt terrible, \"like pores were on fire\"       Problem List:    Patient Active Problem List    Diagnosis Date Noted     Herniated cervical intervertebral disc 12/13/2021     Priority: Medium     Cervical spinal stenosis 12/13/2021     Priority: Medium     Foraminal stenosis of cervical region " 2021     Priority: Medium     Benign essential hypertension 10/11/2019     Priority: Medium     Endometriosis determined by laparoscopy 04/10/2018     Priority: Medium     Anxiety 2017     Priority: Medium     Renal stones 10/09/2015     Priority: Medium     Migraine headache 2012     Priority: Medium     CARDIOVASCULAR SCREENING; LDL GOAL LESS THAN 160 10/31/2010     Priority: Medium        Past Medical History:    Past Medical History:   Diagnosis Date     Calculus of kidney      Cervical high risk HPV (human papillomavirus) test positive 2018     Chickenpox      Gestational diabetes ,     History of postpartum depression, currently pregnant      Hypertension      Intervertebral lumbar disc disorder with myelopathy, lumbar region      Intramural leiomyoma of uterus 4/3/2018     Major depressive disorder, single episode, moderate (H)      Migraines      Urinary tract bacterial infections        Past Surgical History:    Past Surgical History:   Procedure Laterality Date      SECTION, TUBAL LIGATION, COMBINED  2013    c/section with BTL     HYSTERECTOMY, PAP NO LONGER INDICATED  04/10/2018    No history of LIBERTY 2 or greater     LAPAROSCOPIC ASSISTED HYSTERECTOMY VAGINAL, BILATERAL SALPINGO-OOPHORECTOMY, COMBINED N/A 4/10/2018    Procedure: COMBINED LAPAROSCOPIC ASSISTED HYSTERECTOMY VAGINAL, SALPINGO-OOPHORECTOMY;  Laparoscopic assisted vaginal hysterectomy with possible bilateral salpingoophorectomy.;  Surgeon: Taisha Wilson MD;  Location: [facility] OR       Family History:    Family History   Problem Relation Age of Onset     Cancer Maternal Grandfather         liver [place]     Cancer Paternal Grandfather         prostate CA     Cancer Maternal Grandmother         ovarian CA     Other - See Comments Maternal Grandmother         migraine     Hypertension Maternal Grandmother      Diabetes Paternal Grandmother      Hypertension Paternal Grandmother      Hypertension  "Father      Depression Father      Anxiety Disorder Father      Other - See Comments Mother         migraine     Hypertension Brother      Other - See Comments Maternal Uncle         migraine     Depression Sister      Anxiety Disorder Sister      Melanoma No family hx of        Social History:  Marital Status:  Single [1]  Social History     Tobacco Use     Smoking status: Former Smoker     Packs/day: 0.50     Years: 10.00     Pack years: 5.00     Types: Cigarettes     Quit date: 2008     Years since quittin.3     Smokeless tobacco: Never Used   Vaping Use     Vaping Use: Never used   Substance Use Topics     Alcohol use: Yes     Comment: very seldom     Drug use: No        Medications:    butalbital-acetaminophen-caffeine (ESGIC) -40 MG tablet  estradiol (ESTRACE) 2 MG tablet  indomethacin (INDOCIN) 50 MG capsule  lisinopril-hydrochlorothiazide (ZESTORETIC) 20-12.5 MG tablet  LORazepam (ATIVAN) 0.5 MG tablet  ubrogepant (UBRELVY) 50 MG tablet          Review of Systems  Pertinent positives and negatives listed in the HPI, all other systems reviewed and are negative.    Physical Exam   BP: (!) 153/91  Pulse: 94  Temp: 97.9  F (36.6  C)  Resp: 18  Height: 154.9 cm (5' 1\")  Weight: 59 kg (130 lb)  SpO2: 100 %      Physical Exam  Vitals and nursing note reviewed.   Constitutional:       General: She is in acute distress.      Appearance: She is well-developed. She is not diaphoretic.   HENT:      Head: Normocephalic and atraumatic.      Right Ear: External ear normal.      Left Ear: External ear normal.      Nose: Nose normal.   Eyes:      General: No scleral icterus.     Conjunctiva/sclera: Conjunctivae normal.   Cardiovascular:      Rate and Rhythm: Normal rate and regular rhythm.   Pulmonary:      Effort: Pulmonary effort is normal. No respiratory distress.      Breath sounds: No stridor.   Abdominal:      General: There is no distension.      Palpations: Abdomen is soft.   Musculoskeletal:      " Cervical back: Normal range of motion.   Skin:     General: Skin is warm and dry.   Neurological:      Mental Status: She is alert and oriented to person, place, and time.      GCS: GCS eye subscore is 4. GCS verbal subscore is 5. GCS motor subscore is 6.      Cranial Nerves: Cranial nerves 2-12 are intact.      Motor: No tremor, abnormal muscle tone or pronator drift.      Coordination: Finger-Nose-Finger Test normal. Rapid alternating movements normal.   Psychiatric:         Behavior: Behavior normal.         ED Course                 Procedures              EKG Interpretation:      Interpreted by Roger Charlton MD  Time reviewed: 0907  Symptoms at time of EKG: None   Rhythm: normal sinus   Rate: normal  Axis: normal  Ectopy: none  Conduction: normal  ST Segments/ T Waves: Non-specific T wave changes  Q Waves: none  Comparison to prior: New non-specific T wave changes    Clinical Impression: non-specific EKG      Critical Care time:  none               Results for orders placed or performed during the hospital encounter of 07/01/22 (from the past 24 hour(s))   Basic metabolic panel   Result Value Ref Range    Sodium 141 133 - 144 mmol/L    Potassium 2.9 (L) 3.4 - 5.3 mmol/L    Chloride 109 94 - 109 mmol/L    Carbon Dioxide (CO2) 23 20 - 32 mmol/L    Anion Gap 9 3 - 14 mmol/L    Urea Nitrogen 8 7 - 30 mg/dL    Creatinine 0.47 (L) 0.52 - 1.04 mg/dL    Calcium 8.1 (L) 8.5 - 10.1 mg/dL    Glucose 165 (H) 70 - 99 mg/dL    GFR Estimate >90 >60 mL/min/1.73m2   CBC with Platelets & Differential    Narrative    The following orders were created for panel order CBC with Platelets & Differential.  Procedure                               Abnormality         Status                     ---------                               -----------         ------                     CBC with platelets and d...[[id number]]  Abnormal            Final result                 Please view results for these tests on the individual orders.    Troponin I   Result Value Ref Range    Troponin I High Sensitivity 6 <54 ng/L   CBC with platelets and differential   Result Value Ref Range    WBC Count 5.5 4.0 - 11.0 10e3/uL    RBC Count 3.79 (L) 3.80 - 5.20 10e6/uL    Hemoglobin 12.6 11.7 - 15.7 g/dL    Hematocrit 34.6 (L) 35.0 - 47.0 %    MCV 91 78 - 100 fL    MCH 33.2 (H) 26.5 - 33.0 pg    MCHC 36.4 31.5 - 36.5 g/dL    RDW 11.0 10.0 - 15.0 %    Platelet Count 90 (L) 150 - 450 10e3/uL    % Neutrophils 58 %    % Lymphocytes 33 %    % Monocytes 6 %    % Eosinophils 2 %    % Basophils 1 %    % Immature Granulocytes 0 %    NRBCs per 100 WBC 0 <1 /100    Absolute Neutrophils 3.2 1.6 - 8.3 10e3/uL    Absolute Lymphocytes 1.8 0.8 - 5.3 10e3/uL    Absolute Monocytes 0.4 0.0 - 1.3 10e3/uL    Absolute Eosinophils 0.1 0.0 - 0.7 10e3/uL    Absolute Basophils 0.0 0.0 - 0.2 10e3/uL    Absolute Immature Granulocytes 0.0 <=0.4 10e3/uL    Absolute NRBCs 0.0 10e3/uL       Medications   droperidol (INAPSINE) injection 2.5 mg (2.5 mg Intravenous Given 7/1/22 0940)   potassium chloride 10 mEq in 100 mL sterile water intermittent infusion (premix) (0 mEq Intravenous Stopped 7/1/22 1237)   potassium chloride ER (KLOR-CON M) CR tablet 40 mEq (40 mEq Oral Given 7/1/22 1039)   lactated ringers BOLUS 500 mL (0 mLs Intravenous Stopped 7/1/22 1236)       Assessments & Plan (with Medical Decision Making)   41-year-old female who presents for concerns of an allergic reaction with feelings of shortness of breath and anxiety this morning about 8 hours after taking a new medication.  She is feeling better now after receiving epinephrine and diphenhydramine from EMS.  Blood pressure is 153/91, temperature is 36.6  C, heart rate is 94, SPO2 is 100% on room air.  I have a low suspicion for an IgE mediated allergic reaction given the delayed onset of symptoms and presentation.  Her EKG is sinus rhythm with nonspecific T wave changes.  She is still having a bad headache and is given IV droperidol  for this.  Electrolytes are within normal limits with exception of mild hypokalemia of 2.9.  She is given oral and IV replacement for this.  Troponin is normal making ACS unlikely.  Blood cell count is 5.5 and hemoglobin is 12.6.  No signs of anemia.  Recheck she is breathing comfortably, denies any symptoms related to the reaction earlier but is still having a headache.  She was offered further pain medications for this but declined.  She felt comfortable going home and just wanted to be able to rest in her own bed.  This is reasonable and she is safe to discharge with instructions to return if she has worsening of her symptoms or other concerns, otherwise follow-up in clinic.  The patient is in agreement with this plan.    I have reviewed the nursing notes.    I have reviewed the findings, diagnosis, plan and need for follow up with the patient.       New Prescriptions    No medications on file       Final diagnoses:   Shortness of breath   Acute intractable headache, unspecified headache type   Hypokalemia       7/1/2022   [facility] EMERGENCY DEPT     Roger Charlton MD  07/01/22 2940

## 2022-07-01 NOTE — ED TRIAGE NOTES
Possible reaction to butabarbital this morning, itchy/hives, throat tight/tongue swelling reported. Pt able to handle secretions. Received epi and benadryl en route via EMS. Previous reaction to medication that included itchiness and hives     Triage Assessment     Row Name 07/01/22 0902       Triage Assessment (Adult)    Airway WDL WDL       Respiratory WDL    Respiratory WDL WDL       Skin Circulation/Temperature WDL    Skin Circulation/Temperature WDL WDL       Cardiac WDL    Cardiac WDL WDL       Peripheral/Neurovascular WDL    Peripheral Neurovascular WDL WDL       Cognitive/Neuro/Behavioral WDL    Cognitive/Neuro/Behavioral WDL WDL

## 2022-07-01 NOTE — DISCHARGE INSTRUCTIONS
Drink plenty fluids.  Return to the emergency department for chest pain, shortness of breath, repeated vomiting, worsening symptoms, or other concerns.  Increase the potassium in your diet.  Follow-up in clinic for recheck in the next 1 to 2 weeks.

## 2022-07-04 ENCOUNTER — MYC MEDICAL ADVICE (OUTPATIENT)
Dept: FAMILY MEDICINE | Facility: CLINIC | Age: 42
End: 2022-07-04

## 2022-07-05 NOTE — TELEPHONE ENCOUNTER
Please work in with Christopher Higgins.  Brittani Crow RN  [facility]

## 2022-07-06 ENCOUNTER — OFFICE VISIT (OUTPATIENT)
Dept: FAMILY MEDICINE | Facility: CLINIC | Age: 42
End: 2022-07-06

## 2022-07-06 VITALS
TEMPERATURE: 97.8 F | DIASTOLIC BLOOD PRESSURE: 88 MMHG | BODY MASS INDEX: 24.19 KG/M2 | WEIGHT: 128 LBS | RESPIRATION RATE: 14 BRPM | OXYGEN SATURATION: 100 % | HEART RATE: 85 BPM | SYSTOLIC BLOOD PRESSURE: 170 MMHG

## 2022-07-06 DIAGNOSIS — E87.6 HYPOKALEMIA: Primary | ICD-10-CM

## 2022-07-06 DIAGNOSIS — I10 BENIGN ESSENTIAL HYPERTENSION: ICD-10-CM

## 2022-07-06 LAB
ANION GAP SERPL CALCULATED.3IONS-SCNC: 5 MMOL/L (ref 3–14)
BUN SERPL-MCNC: 7 MG/DL (ref 7–30)
CALCIUM SERPL-MCNC: 10.4 MG/DL (ref 8.5–10.1)
CHLORIDE BLD-SCNC: 107 MMOL/L (ref 94–109)
CO2 SERPL-SCNC: 27 MMOL/L (ref 20–32)
CREAT SERPL-MCNC: 0.51 MG/DL (ref 0.52–1.04)
GFR SERPL CREATININE-BSD FRML MDRD: >90 ML/MIN/1.73M2
GLUCOSE BLD-MCNC: 112 MG/DL (ref 70–99)
MAGNESIUM SERPL-MCNC: 2 MG/DL (ref 1.6–2.3)
POTASSIUM BLD-SCNC: 4.6 MMOL/L (ref 3.4–5.3)
SODIUM SERPL-SCNC: 139 MMOL/L (ref 133–144)

## 2022-07-06 PROCEDURE — 99214 OFFICE O/P EST MOD 30 MIN: CPT | Performed by: PHYSICIAN ASSISTANT

## 2022-07-06 PROCEDURE — 80048 BASIC METABOLIC PNL TOTAL CA: CPT | Performed by: PHYSICIAN ASSISTANT

## 2022-07-06 PROCEDURE — 36415 COLL VENOUS BLD VENIPUNCTURE: CPT | Performed by: PHYSICIAN ASSISTANT

## 2022-07-06 PROCEDURE — 83735 ASSAY OF MAGNESIUM: CPT | Performed by: PHYSICIAN ASSISTANT

## 2022-07-06 RX ORDER — LISINOPRIL 20 MG/1
20 TABLET ORAL DAILY
COMMUNITY
End: 2022-11-15

## 2022-07-06 ASSESSMENT — PAIN SCALES - GENERAL: PAINLEVEL: SEVERE PAIN (7)

## 2022-07-06 NOTE — PATIENT INSTRUCTIONS
Nadine Richter,    Thank you for allowing [facility] to manage your care.    Your blood pressure was high today. Take and record your blood pressure daily for 2 weeks. If your blood pressure is less than 140/90mm Hg more than half of the time, you can decrease your lisinopril to 10mg daily, otherwise, stay on the 20mg daily.    If you develop worsening/changing symptoms at any time, please call 911 or go to the emergency department for evaluation.    I ordered some lab work, please go to the laboratory to get your studies.    Drink 8-10 glasses of fluid daily to stay well-hydrated.    Follow up with your neurologist for your headaches and your OBGYN to see if you can come off of the estrogen.    Please allow 1-2 business days for our office to contact you in regards to your laboratory/radiological studies.  If not done so, I encourage you to login into [employer] ([email or web address] to review your results as well.     If you have any questions or concerns, please feel free to call us at [phone]    Sincerely,    Christopher Higgins PA-C    Did you know?      You can schedule a video visit for follow-up appointments as well as future appointments for certain conditions.  Please see the below link.     [email or web address]    If you have not already done so,  I encourage you to sign up for [email or web address] ([email or web address]).  This will allow you to review your results, securely communicate with a provider, and schedule virtual visits as well.

## 2022-07-06 NOTE — PROGRESS NOTES
Assessment & Plan   Problem List Items Addressed This Visit        Circulatory    Benign essential hypertension    Relevant Medications    lisinopril (ZESTRIL) 20 MG tablet      Other Visit Diagnoses     Hypokalemia    -  Primary    Relevant Orders    Basic metabolic panel  (Ca, Cl, CO2, Creat, Gluc, K, Na, BUN) (Completed)    Magnesium (Completed)         Impression is resolved anaphylactic reaction. Hypertensive today as she has not tolerated Zestoretic well and has reverted to taking lisinopril only, which she had been on previously. Chem and Mg checked given hypokalemic in ED. Follow up in one month for bp and chem recheck.    Complete history and physical exam as below. AF with normal VS except for elevated bp, which they will monitor at home and contact us if >140/90mmHg greater than 50% of the time.    DDx and Dx discussed with and explained to the pt to their satisfaction.  All questions were answered at this time. Pt expressed understanding of and agreement with this dx, tx, and plan. No further workup warranted and standard medication warnings given. I have given the patient a list of pertinent indications for re-evaluation. Will go to the Emergency Department if symptoms worsen or new concerning symptoms arise. Patient left in no apparent distress.     31 minutes spent on the date of the encounter doing chart review, history and exam, documentation and further activities per the note     See Patient Instructions    Return in about 1 month (around 8/6/2022) for a recheck with your primary care provider, or call 911/go to an ER anytime if worsening.    KRISTINA Tubbs  [facility] BLAINE Richter is a 41 year old presenting for the following health issues:  Hospital F/U      [facility]       Hospital Follow-up Visit:    Hospital/Nursing Home/IP Rehab Facility: [facility]  Date of Admission: 7/1/22  Date of Discharge: 7/1/22  Reason(s) for Admission: Allergic  reaction to butalbital    Was your hospitalization related to COVID-19? No   Problems taking medications regularly:  None  Medication changes since discharge: None  Problems adhering to non-medication therapy:  None    Summary of hospitalization:  [facility] discharge summary reviewed. Improved with epi and antihistamines.  Diagnostic Tests/Treatments reviewed.  Follow up needed: none  Other Healthcare Providers Involved in Patient s Care:         None  Update since discharge: improved.   Post Discharge Medication Reconciliation: medication reconcilation previously completed during another office visit.  Plan of care communicated with patient     Dealing with insomnia, both falling and staying asleep.     Review of Systems         Objective    BP (!) 170/88   Pulse 85   Temp 97.8  F (36.6  C) (Tympanic)   Resp 14   Wt 58.1 kg (128 lb)   LMP 04/02/2018   SpO2 100%   BMI 24.19 kg/m    Body mass index is 24.19 kg/m .  Physical Exam  Vitals and nursing note reviewed.   Constitutional:       General: She is not in acute distress.     Appearance: She is not ill-appearing or diaphoretic.   HENT:      Head: Normocephalic and atraumatic.      Mouth/Throat:      Mouth: Mucous membranes are moist.      Pharynx: Oropharynx is clear.   Eyes:      Conjunctiva/sclera: Conjunctivae normal.   Cardiovascular:      Rate and Rhythm: Normal rate and regular rhythm.      Heart sounds: Normal heart sounds. No murmur heard.    No friction rub. No gallop.   Pulmonary:      Effort: Pulmonary effort is normal. No respiratory distress.      Breath sounds: Normal breath sounds. No stridor. No wheezing, rhonchi or rales.   Abdominal:      General: Bowel sounds are normal. There is no distension.      Palpations: Abdomen is soft. There is no mass.      Tenderness: There is no abdominal tenderness. There is no guarding or rebound.      Hernia: No hernia is present.   Musculoskeletal:      Cervical back: Neck supple. No  rigidity.   Lymphadenopathy:      Cervical: No cervical adenopathy.   Skin:     General: Skin is warm and dry.   Neurological:      General: No focal deficit present.      Mental Status: She is alert. Mental status is at baseline.   Psychiatric:         Mood and Affect: Mood normal.         Behavior: Behavior normal.          Results for orders placed or performed in visit on 07/06/22   Magnesium     Status: Normal   Result Value Ref Range    Magnesium 2.0 1.6 - 2.3 mg/dL   Basic metabolic panel  (Ca, Cl, CO2, Creat, Gluc, K, Na, BUN)     Status: Abnormal   Result Value Ref Range    Sodium 139 133 - 144 mmol/L    Potassium 4.6 3.4 - 5.3 mmol/L    Chloride 107 94 - 109 mmol/L    Carbon Dioxide (CO2) 27 20 - 32 mmol/L    Anion Gap 5 3 - 14 mmol/L    Urea Nitrogen 7 7 - 30 mg/dL    Creatinine 0.51 (L) 0.52 - 1.04 mg/dL    Calcium 10.4 (H) 8.5 - 10.1 mg/dL    Glucose 112 (H) 70 - 99 mg/dL    GFR Estimate >90 >60 mL/min/1.73m2            .  ..

## 2022-11-15 ENCOUNTER — E-VISIT (OUTPATIENT)
Dept: FAMILY MEDICINE | Facility: CLINIC | Age: 42
End: 2022-11-15
Payer: COMMERCIAL

## 2022-11-15 DIAGNOSIS — M54.2 NECK PAIN: Primary | ICD-10-CM

## 2022-11-15 DIAGNOSIS — M54.12 CERVICAL RADICULOPATHY: ICD-10-CM

## 2022-11-15 PROCEDURE — 99422 OL DIG E/M SVC 11-20 MIN: CPT | Performed by: NURSE PRACTITIONER

## 2022-11-15 RX ORDER — HYDROCODONE BITARTRATE AND ACETAMINOPHEN 5; 325 MG/1; MG/1
1 TABLET ORAL EVERY 6 HOURS PRN
Qty: 12 TABLET | Refills: 0 | Status: SHIPPED | OUTPATIENT
Start: 2022-11-15 | End: 2022-11-18

## 2022-11-15 RX ORDER — PREDNISONE 20 MG/1
40 TABLET ORAL DAILY
Qty: 10 TABLET | Refills: 0 | Status: SHIPPED | OUTPATIENT
Start: 2022-11-15 | End: 2022-11-20

## 2022-11-19 ENCOUNTER — HEALTH MAINTENANCE LETTER (OUTPATIENT)
Age: 42
End: 2022-11-19

## 2022-12-07 ENCOUNTER — VIRTUAL VISIT (OUTPATIENT)
Dept: FAMILY MEDICINE | Facility: CLINIC | Age: 42
End: 2022-12-07
Payer: COMMERCIAL

## 2022-12-07 DIAGNOSIS — Z81.8 FAMILY HISTORY OF ANXIETY DISORDER: ICD-10-CM

## 2022-12-07 DIAGNOSIS — F41.0 PANIC ATTACK: ICD-10-CM

## 2022-12-07 DIAGNOSIS — F41.9 SEVERE ANXIETY: Primary | ICD-10-CM

## 2022-12-07 DIAGNOSIS — F51.5 NIGHTMARE: ICD-10-CM

## 2022-12-07 PROCEDURE — 99214 OFFICE O/P EST MOD 30 MIN: CPT | Mod: 95 | Performed by: NURSE PRACTITIONER

## 2022-12-07 RX ORDER — ALPRAZOLAM 0.5 MG
0.5 TABLET ORAL 2 TIMES DAILY PRN
Qty: 60 TABLET | Refills: 2 | Status: SHIPPED | OUTPATIENT
Start: 2022-12-07 | End: 2023-06-09

## 2022-12-07 RX ORDER — BUSPIRONE HYDROCHLORIDE 10 MG/1
10 TABLET ORAL 3 TIMES DAILY PRN
Qty: 90 TABLET | Refills: 2 | Status: SHIPPED | OUTPATIENT
Start: 2022-12-07 | End: 2023-01-17

## 2022-12-07 RX ORDER — PRAZOSIN HYDROCHLORIDE 1 MG/1
CAPSULE ORAL
Qty: 63 CAPSULE | Refills: 2 | Status: SHIPPED | OUTPATIENT
Start: 2022-12-07 | End: 2023-01-17

## 2022-12-07 ASSESSMENT — ANXIETY QUESTIONNAIRES
7. FEELING AFRAID AS IF SOMETHING AWFUL MIGHT HAPPEN: MORE THAN HALF THE DAYS
2. NOT BEING ABLE TO STOP OR CONTROL WORRYING: NEARLY EVERY DAY
8. IF YOU CHECKED OFF ANY PROBLEMS, HOW DIFFICULT HAVE THESE MADE IT FOR YOU TO DO YOUR WORK, TAKE CARE OF THINGS AT HOME, OR GET ALONG WITH OTHER PEOPLE?: EXTREMELY DIFFICULT
4. TROUBLE RELAXING: NEARLY EVERY DAY
1. FEELING NERVOUS, ANXIOUS, OR ON EDGE: NEARLY EVERY DAY
3. WORRYING TOO MUCH ABOUT DIFFERENT THINGS: NEARLY EVERY DAY
IF YOU CHECKED OFF ANY PROBLEMS ON THIS QUESTIONNAIRE, HOW DIFFICULT HAVE THESE PROBLEMS MADE IT FOR YOU TO DO YOUR WORK, TAKE CARE OF THINGS AT HOME, OR GET ALONG WITH OTHER PEOPLE: EXTREMELY DIFFICULT
GAD7 TOTAL SCORE: 18
GAD7 TOTAL SCORE: 18
7. FEELING AFRAID AS IF SOMETHING AWFUL MIGHT HAPPEN: MORE THAN HALF THE DAYS
6. BECOMING EASILY ANNOYED OR IRRITABLE: SEVERAL DAYS
5. BEING SO RESTLESS THAT IT IS HARD TO SIT STILL: NEARLY EVERY DAY
GAD7 TOTAL SCORE: 18

## 2022-12-07 ASSESSMENT — PATIENT HEALTH QUESTIONNAIRE - PHQ9
10. IF YOU CHECKED OFF ANY PROBLEMS, HOW DIFFICULT HAVE THESE PROBLEMS MADE IT FOR YOU TO DO YOUR WORK, TAKE CARE OF THINGS AT HOME, OR GET ALONG WITH OTHER PEOPLE: VERY DIFFICULT
SUM OF ALL RESPONSES TO PHQ QUESTIONS 1-9: 2
SUM OF ALL RESPONSES TO PHQ QUESTIONS 1-9: 2

## 2022-12-07 NOTE — PROGRESS NOTES
Rober is a 42 year old who is being evaluated via a billable video visit.      How would you like to obtain your AVS? MyChart  If the video visit is dropped, the invitation should be resent by: Text to cell phone: [phone]  Will anyone else be joining your video visit? No      Assessment & Plan     Severe anxiety    - busPIRone (BUSPAR) 10 MG tablet; Take 1 tablet (10 mg) by mouth 3 times daily as needed (anxiety)  - Adult Mental Health  Referral; Future    Panic attack    - ALPRAZolam (XANAX) 0.5 MG tablet; Take 1 tablet (0.5 mg) by mouth 2 times daily as needed (for panic attack)  - Adult Mental [facility]  Referral; Future    Nightmare    - Adult [facility]  Referral; Future  - prazosin (MINIPRESS) 1 MG capsule; Take 1 capsule (1 mg) by mouth At Bedtime for 3 days, THEN 2 capsules (2 mg) At Bedtime for 30 days.    Family history of anxiety disorder    - [facility]; Future    30 minutes spent on the date of the encounter doing chart review, history and exam, documentation and further activities per the note     See Patient Instructions: Take medication as prescribed, use Xanax sparingly as it can be habit-forming.  Review after visit summary tips.  Schedule with psychiatry in case symptoms are not improving so that you have an appointment.  Controlled medications require some type of follow-up visit every 3 months for refill.  Cannot refill if lost or stolen.  If symptoms are worsening follow-up right away.  Patient in agreement    Return in about 3 months (around 3/7/2023), or if symptoms worsen or fail to improve.    ZEKE SAMANIEGO, EDITA  [facility] BLAINE Richter is a 42 year old, presenting for the following health issues:  Recheck Medication    She reports she has had times where her anxiety flared in the past.  She has tried antidepressants such as sertraline E citalopram and Lexapro which seemed to make her anxiety  worse or cause worsening side effects.  Anxiety has been worse for the last month, she reports panic attacks, waking up with nightmares-she is not sure what is triggering this.  She reports family history of anxiety in father and sister.  Her father messaged her to let her know that he takes Xanax to help with his anxiety.  Discussed this medication can be used for panic attacks but it is a habit-forming medication and if he is regularly it will not help you when needed.  Discussed getting her on a daily anxiety med, she does not think she has tried BuSpar in the past.  Discussed this can be taken 2-3 times daily or as needed for anxiety.  Discussed if anxiety does not improve we should consider labs for thyroid or parathyroid conditions.  Additionally she should schedule with psychiatry, it takes many months to get in with them and if her symptoms improve she can cancel the appointment but if symptoms persist or worsen she will have an appointment available.  Discussed trying sleep medication to help with nightmares.  Tips given on after visit summary.  Advised to follow-up at any time if worsening.  Patient in agreement.  History of Present Illness       Mental Health Follow-up:  Patient presents to follow-up on Anxiety.    Patient's anxiety since last visit has been:  Bad  The patient is not having other symptoms associated with anxiety.  Any significant life events: job concerns and financial concerns  Patient is feeling anxious or having panic attacks.  Patient has no concerns about alcohol or drug use.     Today's PHQ-9         PHQ-9 Total Score: 2    PHQ-9 Q9 Thoughts of better off dead/self-harm past 2 weeks :   Not at all    How difficult have these problems made it for you to do your work, take care of things at home, or get along with other people: Very difficult  Today's JACOB-7 Score: 18       Review of Systems   Constitutional, HEENT, cardiovascular, pulmonary, GI, , musculoskeletal, neuro, skin,  endocrine and psych systems are negative, except as otherwise noted.      Objective           Vitals:  No vitals were obtained today due to virtual visit.    Physical Exam   GENERAL: Healthy, alert and no distress  EYES: Eyes grossly normal to inspection.  No discharge or erythema, or obvious scleral/conjunctival abnormalities.  RESP: No audible wheeze, cough, or visible cyanosis.  No visible retractions or increased work of breathing.    SKIN: Visible skin clear. No significant rash, abnormal pigmentation or lesions.  NEURO: Cranial nerves grossly intact.  Mentation and speech appropriate for age.  PSYCH: Mentation appears normal, affect normal/bright, judgement and insight intact, normal speech and appearance well-groomed.    See orders    Video-Visit Details    Video Start Time: 914    Type of service:  Video Visit    Video End Time:932    Originating Location (pt. Location): Home    Distant Location (provider location):  Off-site    Platform used for Video Visit: [employer]

## 2022-12-08 ENCOUNTER — MYC MEDICAL ADVICE (OUTPATIENT)
Dept: FAMILY MEDICINE | Facility: CLINIC | Age: 42
End: 2022-12-08

## 2022-12-08 DIAGNOSIS — F43.10 PTSD (POST-TRAUMATIC STRESS DISORDER): ICD-10-CM

## 2022-12-09 ENCOUNTER — TELEPHONE (OUTPATIENT)
Dept: PSYCHIATRY | Facility: CLINIC | Age: 42
End: 2022-12-09

## 2022-12-09 PROBLEM — F43.10 PTSD (POST-TRAUMATIC STRESS DISORDER): Status: ACTIVE | Noted: 2022-12-09

## 2022-12-09 NOTE — TELEPHONE ENCOUNTER
PSYCHIATRY CLINIC PHONE INTAKE     SERVICES REQUESTED / INTERESTED IN          Med Management    Presenting Problem and Brief History                              What would you like to be seen for? (brief description):    Pt reports always having anxiety. Says that over the last five years she's come to understand that it is anxiety. Pt describes trouble sleeping, waking up and feeling like she needs to do something or be somewhere, wakes up feeling numb or tingly and feeling panicky. Pt also describes some anxiety symptoms during the day. Says that these panicky feelings have been happening every night for the last month at least.    Have you received a mental health diagnosis? Yes   Which one (s): anxiety, possibly ptsd  Is there any history of developmental delay?  No   Are you currently seeing a mental health provider?  No            Who / month last seen:  na  Do you have mental health records elsewhere?  No  Will you sign a release so we can obtain them?  Yes    Have you ever been hospitalized for psychiatric reasons?  Yes  Describe:  Just one ambulance trip to ER for panic attack, no inpatient    Do you have current thoughts of self-harm?  No    Do you currently have thoughts of harming others?  No       Substance Use History     Do you have any history of alcohol / illicit drug use?  No  Describe:  Na (casual drink with dinner sometimes)  Have you ever received treatment for this?  No    Describe:  na     Social History     Who is the patient's a guardian?  self    Name / number: self  Have you had an ACT team in last 12 months?  No  Describe: na   OK to leave a detailed voicemail?  Yes    Would you be interested in learning more about research opportunities for which you or your child may qualify? We can connect you with a team member for more information.  Yes  If yes, send an [employer] message to Margo Davis    Medical/ Surgical History                                   Patient Active Problem List    Diagnosis     CARDIOVASCULAR SCREENING; LDL GOAL LESS THAN 160     Migraine headache     Renal stones     Anxiety     Endometriosis determined by laparoscopy     Benign essential hypertension     Herniated cervical intervertebral disc     Cervical spinal stenosis     Foraminal stenosis of cervical region     Severe anxiety     Panic attack     Nightmare     Family history of anxiety disorder     PTSD (post-traumatic stress disorder)          Medications             Current Outpatient Medications   Medication Sig Dispense Refill     ALPRAZolam (XANAX) 0.5 MG tablet Take 1 tablet (0.5 mg) by mouth 2 times daily as needed (for panic attack) 60 tablet 2     busPIRone (BUSPAR) 10 MG tablet Take 1 tablet (10 mg) by mouth 3 times daily as needed (anxiety) 90 tablet 2     estradiol (ESTRACE) 2 MG tablet Take 1.5 tablets (3 mg) by mouth daily 135 tablet 3     prazosin (MINIPRESS) 1 MG capsule Take 1 capsule (1 mg) by mouth At Bedtime for 3 days, THEN 2 capsules (2 mg) At Bedtime for 30 days. 63 capsule 2     Ubrogepant (UBRELVY PO) Take  mg by mouth daily as needed for migraine           DISPOSITION      Phone screen completed with patient, scheduled for AGE in resident clinic     Alma Rosa Vigil

## 2023-01-09 ENCOUNTER — TELEPHONE (OUTPATIENT)
Dept: FAMILY MEDICINE | Facility: CLINIC | Age: 43
End: 2023-01-09

## 2023-01-09 NOTE — TELEPHONE ENCOUNTER
Prior Authorization Retail Medication Request    Medication/Dose: Ubrelvy  ICD code (if different than what is on RX):    Previously Tried and Failed:    Rationale:  RESUBMIT AS 3 DS FOR EMERGENCY FILL WITH   PA TYPE 1 & PA NUMBER [id number]    Insurance Name:  [facility]  Insurance ID:  [id number]      Pharmacy Information (if different than what is on RX)  Name:    Phone:            Thank you,  Leonarda Cueva [facility] [place]  [phone]

## 2023-01-11 NOTE — TELEPHONE ENCOUNTER
Prior Authorization Approval    Authorization Effective Date: 12/12/2022  Authorization Expiration Date: 1/11/2024  Medication: Ubrelvy- APPROVED   Approved Dose/Quantity:   Reference #:     Insurance Company: [employer] - Phone [phone] Fax [phone]  Expected CoPay:       CoPay Card Available:      Foundation Assistance Needed:    Which Pharmacy is filling the prescription (Not needed for infusion/clinic administered): [facility] - [address] [facility]  Pharmacy Notified: Yes  Patient Notified:  **Instructed pharmacy to notify patient when script is ready to /ship.**

## 2023-01-11 NOTE — TELEPHONE ENCOUNTER
Central Prior Authorization Team  Phone: [phone]    PA Initiation    Medication: Ubrelvy  Insurance Company: [employer] - Phone [phone] Fax [phone]  Pharmacy Filling the Rx: [facility] - [address] [facility] [place]  Filling Pharmacy Phone: [phone]  Filling Pharmacy Fax:    Start Date: 1/11/2023

## 2023-01-12 ENCOUNTER — OFFICE VISIT (OUTPATIENT)
Dept: OPTOMETRY | Facility: CLINIC | Age: 43
End: 2023-01-12
Payer: COMMERCIAL

## 2023-01-12 ENCOUNTER — VIRTUAL VISIT (OUTPATIENT)
Dept: PSYCHIATRY | Facility: CLINIC | Age: 43
End: 2023-01-12
Attending: PSYCHIATRY & NEUROLOGY
Payer: COMMERCIAL

## 2023-01-12 DIAGNOSIS — H52.223 REGULAR ASTIGMATISM OF BOTH EYES: ICD-10-CM

## 2023-01-12 DIAGNOSIS — Z01.00 ROUTINE EYE EXAM: Primary | ICD-10-CM

## 2023-01-12 DIAGNOSIS — R69 DIAGNOSIS DEFERRED: Primary | ICD-10-CM

## 2023-01-12 PROCEDURE — 92004 COMPRE OPH EXAM NEW PT 1/>: CPT | Performed by: OPTOMETRIST

## 2023-01-12 PROCEDURE — 92015 DETERMINE REFRACTIVE STATE: CPT | Performed by: OPTOMETRIST

## 2023-01-12 ASSESSMENT — TONOMETRY
OS_IOP_MMHG: 11
IOP_METHOD: APPLANATION
OD_IOP_MMHG: 11

## 2023-01-12 ASSESSMENT — CONF VISUAL FIELD
OS_INFERIOR_NASAL_RESTRICTION: 0
METHOD: COUNTING FINGERS
OD_SUPERIOR_NASAL_RESTRICTION: 0
OD_INFERIOR_NASAL_RESTRICTION: 0
OS_INFERIOR_TEMPORAL_RESTRICTION: 0
OS_SUPERIOR_TEMPORAL_RESTRICTION: 0
OS_NORMAL: 1
OD_SUPERIOR_TEMPORAL_RESTRICTION: 0
OD_NORMAL: 1
OD_INFERIOR_TEMPORAL_RESTRICTION: 0
OS_SUPERIOR_NASAL_RESTRICTION: 0

## 2023-01-12 ASSESSMENT — VISUAL ACUITY
OD_CC: 20/30
OS_SC+: -1
OD_SC: 20/40
OS_SC: 20/30
OS_SC: 20/20
OS_CC: 20/25
OD_SC: 20/25
METHOD: SNELLEN - LINEAR
OD_CC+: -1
OS_CC+: -1

## 2023-01-12 ASSESSMENT — REFRACTION_MANIFEST
OD_CYLINDER: +0.75
OS_CYLINDER: +1.00
OS_CYLINDER: +0.75
OS_AXIS: 180
OD_SPHERE: -0.50
OS_SPHERE: -0.50
OS_AXIS: 179
OD_SPHERE: -0.25
OS_SPHERE: -1.25
OD_AXIS: 025
OD_CYLINDER: +0.75
OD_AXIS: 020

## 2023-01-12 ASSESSMENT — KERATOMETRY
OD_K2POWER_DIOPTERS: 44.25
OD_K1POWER_DIOPTERS: 43.75
OS_AXISANGLE2_DEGREES: 35
OD_AXISANGLE2_DEGREES: 166
OS_K2POWER_DIOPTERS: 44.25
OS_K1POWER_DIOPTERS: 44.00

## 2023-01-12 ASSESSMENT — REFRACTION_WEARINGRX
OD_SPHERE: -1.00
OS_SPHERE: -0.75
OS_AXIS: 169
OS_CYLINDER: +0.75
OD_AXIS: 035
SPECS_TYPE: SVL
OD_CYLINDER: +0.75

## 2023-01-12 ASSESSMENT — ANXIETY QUESTIONNAIRES
5. BEING SO RESTLESS THAT IT IS HARD TO SIT STILL: SEVERAL DAYS
3. WORRYING TOO MUCH ABOUT DIFFERENT THINGS: NEARLY EVERY DAY
2. NOT BEING ABLE TO STOP OR CONTROL WORRYING: NEARLY EVERY DAY
GAD7 TOTAL SCORE: 18
7. FEELING AFRAID AS IF SOMETHING AWFUL MIGHT HAPPEN: NEARLY EVERY DAY
8. IF YOU CHECKED OFF ANY PROBLEMS, HOW DIFFICULT HAVE THESE MADE IT FOR YOU TO DO YOUR WORK, TAKE CARE OF THINGS AT HOME, OR GET ALONG WITH OTHER PEOPLE?: VERY DIFFICULT
GAD7 TOTAL SCORE: 18
7. FEELING AFRAID AS IF SOMETHING AWFUL MIGHT HAPPEN: NEARLY EVERY DAY
GAD7 TOTAL SCORE: 18
IF YOU CHECKED OFF ANY PROBLEMS ON THIS QUESTIONNAIRE, HOW DIFFICULT HAVE THESE PROBLEMS MADE IT FOR YOU TO DO YOUR WORK, TAKE CARE OF THINGS AT HOME, OR GET ALONG WITH OTHER PEOPLE: VERY DIFFICULT
4. TROUBLE RELAXING: NEARLY EVERY DAY
1. FEELING NERVOUS, ANXIOUS, OR ON EDGE: NEARLY EVERY DAY
6. BECOMING EASILY ANNOYED OR IRRITABLE: MORE THAN HALF THE DAYS

## 2023-01-12 ASSESSMENT — SLIT LAMP EXAM - LIDS
COMMENTS: NORMAL
COMMENTS: NORMAL

## 2023-01-12 ASSESSMENT — CUP TO DISC RATIO
OS_RATIO: 0.25
OD_RATIO: 0.25

## 2023-01-12 NOTE — PROGRESS NOTES
VIDEO VISIT  Rober Renee is a 42 year old who is being evaluated via a billable video visit.      Telehealth Details  Type of service:  diagnostic assessment  Time of service:    Start Time:  1:50 PM     End Time:  2:30 PM    Reason for Telehealth Visit: Patient has requested telehealth visit  Originating Site (patient location):  [facility]   Location- Patient's car  Distant Site (provider location):  On-site  Mode of Communication:  AmYoan    BRIEF NOTE:  This diagnostic assessment could not be completed and won't be billed to the patient. The LOS was entered as no charge. About 10 minutes into the appointment, the VF informed me that she was still trying to room the patient; the patient checked in for her visit and told the VF that she would join the video after her eye appointment. The well indicator showed that the patient was in the video room; I joined the call; however, the only thing visible was a dashboard camera view showing car seats. No response was heard when the patient's name was called. I checked back with the VF and found out that she had the same experience and also was having difficulty contacting the patient via telephone. After several unsuccessful attempts to connect with the patient, the video visit ended at 2:30 pm. Attending Dr. Schwartz was informed. Patient will be rescheduled for another diagnostic assessment visit.       MEDICAL HISTORY and ALLERGY     ALLERGIES: Butalbital and Imitrex [sumatriptan]    Patient Active Problem List   Diagnosis     CARDIOVASCULAR SCREENING; LDL GOAL LESS THAN 160     Migraine headache     Renal stones     Anxiety     Endometriosis determined by laparoscopy     Benign essential hypertension     Herniated cervical intervertebral disc     Cervical spinal stenosis     Foraminal stenosis of cervical region     Severe anxiety     Panic attack     Nightmare     Family history of anxiety disorder     PTSD (post-traumatic stress disorder)        MEDICATIONS      Current Outpatient Medications   Medication Sig Dispense Refill     ALPRAZolam (XANAX) 0.5 MG tablet Take 1 tablet (0.5 mg) by mouth 2 times daily as needed (for panic attack) 60 tablet 2     busPIRone (BUSPAR) 10 MG tablet Take 1 tablet (10 mg) by mouth 3 times daily as needed (anxiety) 90 tablet 2     estradiol (ESTRACE) 2 MG tablet Take 1.5 tablets (3 mg) by mouth daily 135 tablet 3     ubrogepant (UBRELVY) 50 MG tablet Take 1-2 tablets ( mg) by mouth daily as needed (migraine) 20 tablet 1     prazosin (MINIPRESS) 1 MG capsule Take 1 capsule (1 mg) by mouth At Bedtime for 3 days, THEN 2 capsules (2 mg) At Bedtime for 30 days. 63 capsule 2        LABS and DATA     PHQ 2/16/2021 10/13/2021 12/7/2022   PHQ-9 Total Score 2 6 2   Q9: Thoughts of better off dead/self-harm past 2 weeks Not at all Not at all Not at all       Answers for HPI/ROS submitted by the patient on 1/12/2023  JACOB 7 TOTAL SCORE: 18      Recent Labs   Lab Test 07/06/22  1526 07/01/22  0958   CR 0.51* 0.47*   GFRESTIMATED >90 >90     Recent Labs   Lab Test 08/18/21  1113 01/08/20  0431   AST 23 38   ALT 48 68*   ALKPHOS 36* 55         Patient not seen today at the clinic. Note will be reviewed and signed by supervisor Dr. Rosenberg.      Milana Stanford MD  PGY-3 Psychiatry Resident.

## 2023-01-12 NOTE — PROGRESS NOTES
Chief Complaint   Patient presents with     COMPREHENSIVE EYE EXAM         Last Eye Exam: 5+ years ago   Dilated Previously: Yes    What are you currently using to see?  Glasses, they are old. Wears mainly for night time driving        Distance Vision Acuity: Noticed gradual change in both eyes    Near Vision Acuity: Patient feels like she has to strain more to see some things     Eye Comfort: good  Do you use eye drops? : No  Occupation or Hobbies: / Manager     Veena Lovell Optometric Assistant           Medical, surgical and family histories reviewed and updated 1/12/2023.       OBJECTIVE: See Ophthalmology exam    ASSESSMENT:    ICD-10-CM    1. Routine eye exam  Z01.00       2. Regular astigmatism of both eyes  H52.223           PLAN:     Patient Instructions   Fill glasses prescription  Allow 2 weeks to adapt to change in glasses  Return in 1 year for eye exam    Miroslava Cho O.D.  [facility]  [address] [place], [place] [id number] [phone]

## 2023-01-12 NOTE — PATIENT INSTRUCTIONS
Fill glasses prescription  Allow 2 weeks to adapt to change in glasses  Return in 1 year for eye exam    Miroslava Cho O.D.  [facility]  [address] [place] [address] [phone]

## 2023-01-12 NOTE — PATIENT INSTRUCTIONS
**For crisis resources, please see the information at the end of this document**   Patient Education    Thank you for coming to the [facility] [place] [facility].     Lab Testing:  If you had lab testing today and your results are reassuring or normal they will be mailed to you or sent through [employer] within 7 days. If the lab tests need quick action we will call you with the results. The phone number we will call with results is # [phone]. If this is not the best number please call our clinic and change the number.     Medication Refills:  If you need any refills please call your pharmacy and they will contact us. Our fax number for refills is [phone].   Three business days of notice are needed for general medication refill requests.   Five business days of notice are needed for controlled substance refill requests.   If you need to change to a different pharmacy, please contact the new pharmacy directly. The new pharmacy will help you get your medications transferred.     Contact Us:  Please call [phone] during business hours (8-5:00 M-F).   If you have medication related questions after clinic hours, or on the weekend, please call [phone].     Financial Assistance [phone]   Medical Records [phone]       MENTAL HEALTH CRISIS RESOURCES:  For a emergency help, please call 911 or go to the nearest Emergency Department.     Emergency Walk-In Options:   EmPATH Unit @ [facility] ([place]): [phone] - Specialized mental health emergency area designed to be calming  [facility] ([place]): [phone]  [facility] ([place]): [phone]  [facility]): [phone]    [facility] Crisis Information:   [place]: [phone]  [place]: [phone]  [place] ([place]) - Adult: [phone]     Child: [phone]  [place] - Adult: [phone]     Child: [phone]  [place]:  [phone]  List of all [facility] resources:   https://[place].gov/dhs/people-we-serve/adults/health-care/mental-health/resources/crisis-contacts.jsp    National Crisis Information:   Crisis Text Line: Text  [place]  to 300266  Suicide & Crisis Lifeline: 988  National Suicide Prevention Lifeline: 1-183-633-TALK (1-635.856.9757)       For online chat options, visit https://suicidepreventionlifeline.org/chat/  Poison Control Center: 1-838-254-8174  Trans Lifeline: 5-782-720-0467 - Hotline for transgender people of all ages  [employer]: 4-199-249-4386 - Hotline for LGBT youth     For Non-Emergency Support:   Fast Tracker: Mental Health & Substance Use Disorder Resources -   https://www.[employer]imod[employer]lockOfBirdsn.org/

## 2023-01-12 NOTE — LETTER
[facility]    [address] [place] [address]  [phone]  Fax [phone]      January 12, 2023    Rober Renee  [address] [place] [id number]        To Whom it May Concern:        Rober was seen for eye exam on January 12, 2023, at 1:30 PM.    She will have trouble focusing at near for approximately four hours.  There will also be sensitivity to light due to dilated pupils        Sincerely,        Miroslava Cho, OD

## 2023-01-12 NOTE — LETTER
1/12/2023         RE: Rober Renee  [address] [place] [facility] [address]        Dear Colleague,    Thank you for referring your patient, Rober Renee, to the [facility]. Please see a copy of my visit note below.    Chief Complaint   Patient presents with     COMPREHENSIVE EYE EXAM         Last Eye Exam: 5+ years ago   Dilated Previously: Yes    What are you currently using to see?  Glasses, they are old. Wears mainly for night time driving        Distance Vision Acuity: Noticed gradual change in both eyes    Near Vision Acuity: Patient feels like she has to strain more to see some things     Eye Comfort: good  Do you use eye drops? : No  Occupation or Hobbies: / Manager     Veena Lovell Optometric Assistant           Medical, surgical and family histories reviewed and updated 1/12/2023.       OBJECTIVE: See Ophthalmology exam    ASSESSMENT:    ICD-10-CM    1. Routine eye exam  Z01.00       2. Regular astigmatism of both eyes  H52.223           PLAN:     Patient Instructions   Fill glasses prescription  Allow 2 weeks to adapt to change in glasses  Return in 1 year for eye exam    Miroslava Cho O.D.  [facility]  [address] [place], [place] [id number] [phone]          Again, thank you for allowing me to participate in the care of your patient.        Sincerely,        Miroslava Cho, OD

## 2023-01-12 NOTE — NURSING NOTE
Made sure patient understood and was aware to NOT be driving for today's visit - patient confirmed.    Patient denies any changes regarding medication and allergies and states all information entered during echeck-in remains accurate.    Barbara Chung,  DEBBIE January 12, 2023 1:47 PM

## 2023-01-12 NOTE — PROGRESS NOTES
Rober Renee is a 42 year old who is being evaluated via a billable video visit.      Pt will join video visit via: [employer]  If there are problems joining the visit, send backup video invite via: Text to preferred phone: [phone]    Reason for telehealth visit: Patient has requested telehealth visit    Originating location (patient location): Patient's other Parked in pt's vehicle.    Will anyone else be joining the visit? No

## 2023-01-17 ENCOUNTER — OFFICE VISIT (OUTPATIENT)
Dept: FAMILY MEDICINE | Facility: CLINIC | Age: 43
End: 2023-01-17
Payer: COMMERCIAL

## 2023-01-17 ENCOUNTER — ANCILLARY PROCEDURE (OUTPATIENT)
Dept: GENERAL RADIOLOGY | Facility: CLINIC | Age: 43
End: 2023-01-17
Attending: FAMILY MEDICINE
Payer: COMMERCIAL

## 2023-01-17 VITALS
SYSTOLIC BLOOD PRESSURE: 138 MMHG | OXYGEN SATURATION: 97 % | BODY MASS INDEX: 24.3 KG/M2 | TEMPERATURE: 97.2 F | RESPIRATION RATE: 18 BRPM | DIASTOLIC BLOOD PRESSURE: 88 MMHG | WEIGHT: 128.6 LBS | HEART RATE: 92 BPM

## 2023-01-17 DIAGNOSIS — M53.3 COCCYDYNIA: Primary | ICD-10-CM

## 2023-01-17 DIAGNOSIS — F41.0 GENERALIZED ANXIETY DISORDER WITH PANIC ATTACKS: ICD-10-CM

## 2023-01-17 DIAGNOSIS — A09 TRAVELER'S DIARRHEA: ICD-10-CM

## 2023-01-17 DIAGNOSIS — F41.1 GENERALIZED ANXIETY DISORDER WITH PANIC ATTACKS: ICD-10-CM

## 2023-01-17 DIAGNOSIS — R53.83 FATIGUE, UNSPECIFIED TYPE: ICD-10-CM

## 2023-01-17 DIAGNOSIS — M53.3 COCCYDYNIA: ICD-10-CM

## 2023-01-17 LAB
ERYTHROCYTE [DISTWIDTH] IN BLOOD BY AUTOMATED COUNT: 11.1 % (ref 10–15)
HCT VFR BLD AUTO: 39.6 % (ref 35–47)
HGB BLD-MCNC: 13.7 G/DL (ref 11.7–15.7)
MCH RBC QN AUTO: 31.9 PG (ref 26.5–33)
MCHC RBC AUTO-ENTMCNC: 34.6 G/DL (ref 31.5–36.5)
MCV RBC AUTO: 92 FL (ref 78–100)
PLATELET # BLD AUTO: 91 10E3/UL (ref 150–450)
RBC # BLD AUTO: 4.29 10E6/UL (ref 3.8–5.2)
VIT B12 SERPL-MCNC: 933 PG/ML (ref 232–1245)
WBC # BLD AUTO: 4.8 10E3/UL (ref 4–11)

## 2023-01-17 PROCEDURE — 83540 ASSAY OF IRON: CPT | Performed by: FAMILY MEDICINE

## 2023-01-17 PROCEDURE — 82607 VITAMIN B-12: CPT | Performed by: FAMILY MEDICINE

## 2023-01-17 PROCEDURE — 85027 COMPLETE CBC AUTOMATED: CPT | Performed by: FAMILY MEDICINE

## 2023-01-17 PROCEDURE — 83550 IRON BINDING TEST: CPT | Performed by: FAMILY MEDICINE

## 2023-01-17 PROCEDURE — 80048 BASIC METABOLIC PNL TOTAL CA: CPT | Performed by: FAMILY MEDICINE

## 2023-01-17 PROCEDURE — 99214 OFFICE O/P EST MOD 30 MIN: CPT | Performed by: FAMILY MEDICINE

## 2023-01-17 PROCEDURE — 36415 COLL VENOUS BLD VENIPUNCTURE: CPT | Performed by: FAMILY MEDICINE

## 2023-01-17 PROCEDURE — 72220 X-RAY EXAM SACRUM TAILBONE: CPT | Mod: TC | Performed by: RADIOLOGY

## 2023-01-17 PROCEDURE — 82728 ASSAY OF FERRITIN: CPT | Performed by: FAMILY MEDICINE

## 2023-01-17 PROCEDURE — 84443 ASSAY THYROID STIM HORMONE: CPT | Performed by: FAMILY MEDICINE

## 2023-01-17 RX ORDER — AZITHROMYCIN 500 MG/1
500 TABLET, FILM COATED ORAL DAILY
Qty: 3 TABLET | Refills: 0 | Status: SHIPPED | OUTPATIENT
Start: 2023-01-17 | End: 2023-01-20

## 2023-01-17 RX ORDER — CYCLOBENZAPRINE HCL 5 MG
5 TABLET ORAL 3 TIMES DAILY PRN
Qty: 42 TABLET | Refills: 0 | Status: SHIPPED | OUTPATIENT
Start: 2023-01-17 | End: 2023-06-12

## 2023-01-17 RX ORDER — DICLOFENAC SODIUM 75 MG/1
75 TABLET, DELAYED RELEASE ORAL 2 TIMES DAILY PRN
Qty: 30 TABLET | Refills: 0 | Status: SHIPPED | OUTPATIENT
Start: 2023-01-17 | End: 2023-01-25

## 2023-01-17 ASSESSMENT — PATIENT HEALTH QUESTIONNAIRE - PHQ9: SUM OF ALL RESPONSES TO PHQ QUESTIONS 1-9: 5

## 2023-01-17 ASSESSMENT — PAIN SCALES - GENERAL: PAINLEVEL: SEVERE PAIN (7)

## 2023-01-17 NOTE — PROGRESS NOTES
Assessment & Plan     Coccydynia  - XR Sacrum and Coccyx 2 Views  - diclofenac (VOLTAREN) 75 MG EC tablet  Dispense: 30 tablet; Refill: 0  - cyclobenzaprine (FLEXERIL) 5 MG tablet  Dispense: 42 tablet; Refill: 0  - Patient education and Handout with home care instructions given. See AVS for details. Recommended to use a donut pillow to sit.       Generalized anxiety disorder with panic attacks, uncontrolled  - PHQ-9/JACOB 7 completed, see below/Epic for details    - Basic metabolic panel  (Ca, Cl, CO2, Creat, Gluc, K, Na, BUN)  - CBC with platelets  - TSH with free T4 reflex  - Vitamin B12  - Patient not willing to trial daily medication- would recommend an selective serotonin reuptake inhibitor or SNRI.    - Encouraged her to keep upcoming appointment with Therapist.       Fatigue, unspecified type- multiple differentials to include anemia, thyroid disorder, electrolyte abnormality, anxiety   - Basic metabolic panel  (Ca, Cl, CO2, Creat, Gluc, K, Na, BUN)  - TSH with free T4 reflex  - Vitamin B12  - Ferritin  - Iron and iron binding capacity      Traveler's diarrhea  -     azithromycin (ZITHROMAX) 500 MG tablet; Take 1 tablet (500 mg) by mouth daily for 3 days - as needed for traveler's diarrhea.  Planning to travel to [place] in a week.       Will notify her with results.      Return in about 2 weeks (around 1/31/2023), or if symptoms worsen or fail to improve, for a Physical Exam at your earliest convenience..    Sally Joe MD  [facility] BLAINE Richter is a 42 year old, presenting for the following health issues:  Tailbone Pain            History of Present Illness       Mental Health Follow-up:  Patient presents to follow-up on Anxiety.    Patient's anxiety since last visit has been:  Medium  The patient is not having other symptoms associated with anxiety.  Any significant life events: health concerns  Patient is feeling anxious or having panic attacks.  Patient has no  concerns about alcohol or drug use.    Reason for visit:  Tailbone pain and EXTREME fatigue  Symptom onset:  3-4 weeks ago  Symptoms include:  Cant sit down comforTably  Symptom intensity:  Moderate  Symptom progression:  Worsening  Had these symptoms before:  No  What makes it worse:  Sitting  What makes it better:  Standing    She eats 2-3 servings of fruits and vegetables daily.She consumes 0 sweetened beverage(s) daily.She exercises with enough effort to increase her heart rate 30 to 60 minutes per day.  She exercises with enough effort to increase her heart rate 4 days per week.   She is taking medications regularly.     Tail bone-  Patient reports that about a month ago on 12/13- she went out to eat with family. Since then she noticed tail bone pain, felt uncomfortable to sit.   Denies any recent injury or trauma to the area. Describes it as a dull ache, worse with sitting especially for extended periods of time.       Also reports generalized anxiety with frequent panic attacks.   Has been working with Serena on medication management.   Currently on Alprazolam as needed. Declines an other medications at this time.  Planning to schedule an appointment with a Therapist    Last PHQ-9 1/17/2023   1.  Little interest or pleasure in doing things 0   2.  Feeling down, depressed, or hopeless 0   3.  Trouble falling or staying asleep, or sleeping too much 2   4.  Feeling tired or having little energy 3   5.  Poor appetite or overeating 0   6.  Feeling bad about yourself 0   7.  Trouble concentrating 0   8.  Moving slowly or restless 0   Q9: Thoughts of better off dead/self-harm past 2 weeks 0   PHQ-9 Total Score 5   Difficulty at work, home, or with people -     JACOB-7  12/7/2022   1. Feeling nervous, anxious, or on edge 3   2. Not being able to stop or control worrying 3   3. Worrying too much about different things 3   4. Trouble relaxing 3   5. Being so restless that it is hard to sit still 3   6. Becoming easily  annoyed or irritable 1   7. Feeling afraid, as if something awful might happen 2   JACOB-7 Total Score 18   If you checked any problems, how difficult have they made it for you to do your work, take care of things at home, or get along with other people? Extremely difficult   Some encounter information is confidential and restricted. Go to Review Flowsheets activity to see all data.     In the past two weeks have you had thoughts of suicide or self-harm?  No.    Do you have concerns about your personal safety or the safety of others?   No    Fatigue-  States that over the past month her fatigue has worsened from baseline- feels very exhausted. No fever or chills. Admits to trouble staying a sleep.   Prior history of anemia before her hysterectomy.   No recent illnesses.   Patient is concerned about her potassium- was found to have very low potassium in the ER in 7/2022- would like labs repeated.       Leaving for [place] next week and reporting bad GI symptoms last time she traveled.   Would like to discuss a medication she could take with her to stop from having diarrhea while on the trip.     Review of Systems   Constitutional, HEENT, cardiovascular, pulmonary, gi and gu systems are negative, except as otherwise noted.      Objective    /88   Pulse 92   Temp 97.2  F (36.2  C) (Tympanic)   Resp 18   Wt 58.3 kg (128 lb 9.6 oz)   LMP 04/02/2018   SpO2 97%   BMI 24.30 kg/m    Body mass index is 24.3 kg/m .  Physical Exam   GENERAL: healthy, alert and no distress  Comprehensive back pain exam:  Tenderness of sacral-coccyx region., Range of motion not limited by pain, Lower extremity strength functional and equal on both sides, Lower extremity reflexes within normal limits bilaterally, Lower extremity sensation normal and equal on both sides and Straight leg raise negative bilaterally    DATA  X ray and labs in process.

## 2023-01-18 LAB
ANION GAP SERPL CALCULATED.3IONS-SCNC: 10 MMOL/L (ref 3–14)
BUN SERPL-MCNC: 8 MG/DL (ref 7–30)
CALCIUM SERPL-MCNC: 10 MG/DL (ref 8.5–10.1)
CHLORIDE BLD-SCNC: 103 MMOL/L (ref 94–109)
CO2 SERPL-SCNC: 27 MMOL/L (ref 20–32)
CREAT SERPL-MCNC: 0.51 MG/DL (ref 0.52–1.04)
FERRITIN SERPL-MCNC: 217 NG/ML (ref 12–150)
GFR SERPL CREATININE-BSD FRML MDRD: >90 ML/MIN/1.73M2
GLUCOSE BLD-MCNC: 114 MG/DL (ref 70–99)
IRON SATN MFR SERPL: 29 % (ref 15–46)
IRON SERPL-MCNC: 97 UG/DL (ref 35–180)
POTASSIUM BLD-SCNC: 4.3 MMOL/L (ref 3.4–5.3)
SODIUM SERPL-SCNC: 140 MMOL/L (ref 133–144)
TIBC SERPL-MCNC: 338 UG/DL (ref 240–430)
TSH SERPL DL<=0.005 MIU/L-ACNC: 1.24 MU/L (ref 0.4–4)

## 2023-01-23 DIAGNOSIS — E89.40 SURGICAL MENOPAUSE: ICD-10-CM

## 2023-01-23 RX ORDER — ESTRADIOL 2 MG/1
3 TABLET ORAL DAILY
Start: 2023-01-23

## 2023-01-23 NOTE — TELEPHONE ENCOUNTER
"Last Written Prescription Date:  1/13/2022  Last Fill Quantity: 135,  # refills: 3   Last office visit: Visit date not found with prescribing provider:  12/1/2020 virtual visit with Dr. Wilson   Future Office Visit:  Not scheduled with OB         Requested Prescriptions   Pending Prescriptions Disp Refills     estradiol (ESTRACE) 2 MG tablet 135 tablet 3     Sig: Take 1.5 tablets (3 mg) by mouth daily       Hormone Replacement Therapy Failed - 1/23/2023 12:25 PM        Failed - Recent (12 mo) or future (30 days) visit within the authorizing provider's specialty     Patient has had an office visit with the authorizing provider or a provider within the authorizing providers department within the previous 12 mos or has a future within next 30 days. See \"Patient Info\" tab in inbasket, or \"Choose Columns\" in Meds & Orders section of the refill encounter.              Passed - Blood pressure under 140/90 in past 12 months     BP Readings from Last 3 Encounters:   01/17/23 138/88   07/06/22 (!) 170/88   07/01/22 109/77                 Passed - Medication is active on med list        Passed - Patient is 18 years of age or older        Passed - No active pregnancy on record        Passed - No positive pregnancy test on record in past 12 months           Routing refill request to provider for review/approval because:  Kaila given x1 and patient did not follow up, please advise    Last appointment virtually with Dr. Wilson 12/2020  Last in person appointment 10/11/2019    Please review and advise.  Thank you.    Chinyere Hall   [facility]  RN          "

## 2023-01-23 NOTE — TELEPHONE ENCOUNTER
Lorenedled with Dr. Ríos.  Dr. Wilson has retired.  Patient needs appointment to establish care with new provider.  Last office visit 2019 and last virtual appointment 2020.  Concerns for hypertension present which need MD follow up.    Chinyere Hall   [facility]  RN

## 2023-01-24 NOTE — PROGRESS NOTES
Rober is a 42 year old who is being evaluated via a billable video visit.      How would you like to obtain your AVS? MyChart  If the video visit is dropped, the invitation should be resent by: Text to cell phone: [phone]  Will anyone else be joining your video visit? No      Assessment & Plan     Follow-up exam      Coccydynia    - oxyCODONE (ROXICODONE) 5 MG tablet; Take 1 tablet (5 mg) by mouth every 4 hours as needed for pain (use sparingly, do not take with xanax.)  - naloxone (NARCAN) 4 MG/0.1ML nasal spray; Spray 1 spray (4 mg) into one nostril alternating nostrils as needed for opioid reversal every 2-3 minutes until assistance arrives  - predniSONE (DELTASONE) 20 MG tablet; Take 2 tablets (40 mg) by mouth daily for 5 days Take in the morning with food  - Anti Nuclear Debby IgG by IFA with Reflex; Future  - ESR: Erythrocyte sedimentation rate; Future  - CRP, inflammation; Future  - Uric acid; Future  - Lyme Disease Total Abs Bld with Reflex to Confirm CLIA; Future  - Pain Management  Referral; Future  - Orthopedic  Referral; Future    Right shoulder pain, unspecified chronicity    - oxyCODONE (ROXICODONE) 5 MG tablet; Take 1 tablet (5 mg) by mouth every 4 hours as needed for pain (use sparingly, do not take with xanax.)  - naloxone (NARCAN) 4 MG/0.1ML nasal spray; Spray 1 spray (4 mg) into one nostril alternating nostrils as needed for opioid reversal every 2-3 minutes until assistance arrives  - predniSONE (DELTASONE) 20 MG tablet; Take 2 tablets (40 mg) by mouth daily for 5 days Take in the morning with food  - Anti Nuclear Debby IgG by IFA with Reflex; Future  - ESR: Erythrocyte sedimentation rate; Future  - CRP, inflammation; Future  - Uric acid; Future  - Lyme Disease Total Abs Bld with Reflex to Confirm CLIA; Future  - Pain Management  Referral; Future  - Orthopedic  Referral; Future    30 minutes spent on the date of the encounter doing chart review, history and  exam, documentation and further activities per the note     Patient instructions-use pain medication sparingly, do not take pain medication with Xanax as it can cause respiratory depression.  Discussed Narcan.  Make lab appointment.  Schedule with pain management if symptoms not improving and orthopedics if needed.  Follow-up at any time.  Review after visit summary tips.  Patient in agreement.    Return in about 4 weeks (around 2/22/2023), or if symptoms worsen or fail to improve.    ZEKE SAMANIEGO, EDITA  [facility] BLAINE Richter is a 42 year old, presenting for the following health issues:  RECHECK and Recheck Medication      HPI     Discussed chart reviewed seen recently by Dr. Juve Genao for coccyx pain.  She reports medication is not helping at all.  She had gone fishing with her  and she was not able to sit on her tailbone for the 4-hour ride had to lay on her side.  She did not tolerate the Norco that was prescribed recently for her right shoulder pain but she reports the prednisone did help that pain temporarily however she still is having right shoulder and arm pain.  She reports she is flying to [place] the end of this week and she is concerned she will be able to sit for the flight.  Discussed trying prednisone again to help reduce inflammation in her coccyx.  She reports her grandma does have a history of arthritis, discussed making a lab appointment to check arthritis labs.  If symptoms do not improve with treatment schedule with pain management for evaluation of injection versus ongoing pain meds if needed.  Referral to orthopedics placed as well.  Advised to review after visit summary tips.  Use pain medication sparingly and do not take pain meds with Xanax.  Patient in agreement.    Review of Systems   Constitutional, HEENT, cardiovascular, pulmonary, GI, , musculoskeletal, neuro, skin, endocrine and psych systems are negative, except as otherwise  noted.      Objective           Vitals:  No vitals were obtained today due to virtual visit.    Physical Exam   GENERAL: Healthy, alert and no distress  EYES: Eyes grossly normal to inspection.  No discharge or erythema, or obvious scleral/conjunctival abnormalities.  RESP: No audible wheeze, cough, or visible cyanosis.  No visible retractions or increased work of breathing.    SKIN: Visible skin clear. No significant rash, abnormal pigmentation or lesions.  NEURO: Cranial nerves grossly intact.  Mentation and speech appropriate for age.  PSYCH: Mentation appears normal, affect normal/bright, judgement and insight intact, normal speech and appearance well-groomed.    See orders      Video-Visit Details    Type of service:  Video Visit   Video Start Time: 1050  Video End Time:11:10 AM    Originating Location (pt. Location): Home  Distant Location (provider location):  Off-site  Platform used for Video Visit: Shari

## 2023-01-25 ENCOUNTER — VIRTUAL VISIT (OUTPATIENT)
Dept: FAMILY MEDICINE | Facility: CLINIC | Age: 43
End: 2023-01-25
Payer: COMMERCIAL

## 2023-01-25 DIAGNOSIS — M53.3 COCCYDYNIA: ICD-10-CM

## 2023-01-25 DIAGNOSIS — Z09 FOLLOW-UP EXAM: Primary | ICD-10-CM

## 2023-01-25 DIAGNOSIS — M25.511 RIGHT SHOULDER PAIN, UNSPECIFIED CHRONICITY: ICD-10-CM

## 2023-01-25 PROCEDURE — 99214 OFFICE O/P EST MOD 30 MIN: CPT | Mod: 95 | Performed by: NURSE PRACTITIONER

## 2023-01-25 RX ORDER — OXYCODONE HYDROCHLORIDE 5 MG/1
5 TABLET ORAL EVERY 4 HOURS PRN
Qty: 18 TABLET | Refills: 0 | Status: SHIPPED | OUTPATIENT
Start: 2023-01-25 | End: 2023-01-28

## 2023-01-25 RX ORDER — PREDNISONE 20 MG/1
40 TABLET ORAL DAILY
Qty: 10 TABLET | Refills: 0 | Status: SHIPPED | OUTPATIENT
Start: 2023-01-25 | End: 2023-01-30

## 2023-01-26 DIAGNOSIS — R79.89 HIGH SERUM FERRITIN: Primary | ICD-10-CM

## 2023-03-08 ENCOUNTER — VIRTUAL VISIT (OUTPATIENT)
Dept: FAMILY MEDICINE | Facility: CLINIC | Age: 43
End: 2023-03-08
Payer: COMMERCIAL

## 2023-03-08 DIAGNOSIS — M53.3 COCCYDYNIA: ICD-10-CM

## 2023-03-08 DIAGNOSIS — E89.40 SURGICAL MENOPAUSE: ICD-10-CM

## 2023-03-08 DIAGNOSIS — Z09 FOLLOW-UP EXAM: Primary | ICD-10-CM

## 2023-03-08 PROCEDURE — 99214 OFFICE O/P EST MOD 30 MIN: CPT | Mod: VID | Performed by: NURSE PRACTITIONER

## 2023-03-08 RX ORDER — PREDNISONE 20 MG/1
40 TABLET ORAL DAILY
Qty: 10 TABLET | Refills: 0 | Status: SHIPPED | OUTPATIENT
Start: 2023-03-08 | End: 2023-06-12

## 2023-03-08 RX ORDER — ESTRADIOL 2 MG/1
3 TABLET ORAL DAILY
Qty: 135 TABLET | Refills: 3 | Status: SHIPPED | OUTPATIENT
Start: 2023-03-08 | End: 2024-04-18

## 2023-03-08 RX ORDER — OXYCODONE HYDROCHLORIDE 5 MG/1
5 TABLET ORAL EVERY 6 HOURS PRN
Qty: 12 TABLET | Refills: 0 | Status: SHIPPED | OUTPATIENT
Start: 2023-03-08 | End: 2023-03-11

## 2023-03-08 NOTE — PROGRESS NOTES
Rober is a 42 year old who is being evaluated via a billable video visit.      How would you like to obtain your AVS? MyChart  If the video visit is dropped, the invitation should be resent by: Text to cell phone: [phone]  Will anyone else be joining your video visit? No      Assessment & Plan     Surgical menopause    - estradiol (ESTRACE) 2 MG tablet; Take 1.5 tablets (3 mg) by mouth daily    Coccydynia    - oxyCODONE (ROXICODONE) 5 MG tablet; Take 1 tablet (5 mg) by mouth every 6 hours as needed for pain  - predniSONE (DELTASONE) 20 MG tablet; Take 2 tablets (40 mg) by mouth daily Take in the morning with food  - MR Sacrum and Coccyx w/o Contrast; Future  - naloxone (NARCAN) 4 MG/0.1ML nasal spray; Spray 1 spray (4 mg) into one nostril alternating nostrils as needed for opioid reversal every 2-3 minutes until assistance arrives  - Rheumatoid factor; Future    Follow-up exam    30 minutes spent on the date of the encounter doing chart review, history and exam, documentation and further activities per the note     See Patient Instructions: Make lab appointment, schedule with orthopedics, get added to cancellation list for pain management.  Review after visit summary tips.  Take medication as prescribed.  Do not take pain meds with anxiety meds.  Verify coverage of MRI prior to scheduling.  Follow-up at anytime for healthcare questions or concerns.  Controlled meds require follow-up visit every 3 months as needed for refill.  Return if symptoms worsen or fail to improve.    ZEKE SAMANIEGO NP  [facility]     Suzanne Richter is a 42 year old, presenting for the following health issues:  Pain    She reports tailbone pain improved temporarily with the prednisone.  She uses the pain medication very sparingly.  The muscle relaxer did not help.  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  She denies injury or fall to her tailbone.  X-ray done by Dr. An  he was normal.  She does have a family history of arthritis.  She reports she did save someone's life in her bar with the Narcan.  Reviewed never take pain meds with anxiety meds as it can cause respiratory depression.  She will make lab appointment.  She has not appointment with pain management and may discuss getting on cancellation list.  Discussed scheduling with orthopedics.  Possible steroid injection.  MRI ordered.  Verify coverage of procedure prior to scheduling.  She would like refills of her estradiol has been on it for years, non-smoker has tolerated it.  Discussed the risks including DVT (S&S), PE, MI, stroke, death, increased risk if smoker, breast cancer.  Controlled meds require follow-up visit every 3 months or as needed for refill.  Follow-up anytime for questions or concerns.  History of Present Illness       Reason for visit:  Tailbone and arm pain, refferal questions and estradiol refill  quea    She eats 2-3 servings of fruits and vegetables daily.She consumes 0 sweetened beverage(s) daily.She exercises with enough effort to increase her heart rate 30 to 60 minutes per day.  She exercises with enough effort to increase her heart rate 5 days per week.   She is taking medications regularly.         Review of Systems   Constitutional, HEENT, cardiovascular, pulmonary, GI, , musculoskeletal, neuro, skin, endocrine and psych systems are negative, except as otherwise noted.      Objective           Vitals:  No vitals were obtained today due to virtual visit.    Physical Exam   GENERAL: Healthy, alert and no distress  EYES: Eyes grossly normal to inspection.  No discharge or erythema, or obvious scleral/conjunctival abnormalities.  RESP: No audible wheeze, cough, or visible cyanosis.  No visible retractions or increased work of breathing.    SKIN: Visible skin clear. No significant rash, abnormal pigmentation or lesions.  NEURO: Cranial nerves grossly intact.  Mentation and speech appropriate for  age.  PSYCH: Mentation appears normal, affect normal/bright, judgement and insight intact, normal speech and appearance well-groomed.    See orders    Video-Visit Details    Type of service:  Video Visit   Video Start Time: 210  Video End Time:219    Originating Location (pt. Location): Other work  Distant Location (provider location):  Off-site  Platform used for Video Visit: Shari
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
## 2023-03-23 ENCOUNTER — VIRTUAL VISIT (OUTPATIENT)
Dept: PSYCHIATRY | Facility: CLINIC | Age: 43
End: 2023-03-23
Attending: PSYCHIATRY & NEUROLOGY
Payer: COMMERCIAL

## 2023-03-23 DIAGNOSIS — F41.9 SEVERE ANXIETY: ICD-10-CM

## 2023-03-23 DIAGNOSIS — F33.1 MAJOR DEPRESSIVE DISORDER, RECURRENT EPISODE, MODERATE (H): Primary | ICD-10-CM

## 2023-03-23 DIAGNOSIS — F51.5 NIGHTMARE: ICD-10-CM

## 2023-03-23 DIAGNOSIS — F41.0 PANIC ATTACK: ICD-10-CM

## 2023-03-23 DIAGNOSIS — Z81.8 FAMILY HISTORY OF ANXIETY DISORDER: ICD-10-CM

## 2023-03-23 PROCEDURE — 90792 PSYCH DIAG EVAL W/MED SRVCS: CPT | Mod: VID | Performed by: STUDENT IN AN ORGANIZED HEALTH CARE EDUCATION/TRAINING PROGRAM

## 2023-03-23 ASSESSMENT — PATIENT HEALTH QUESTIONNAIRE - PHQ9
10. IF YOU CHECKED OFF ANY PROBLEMS, HOW DIFFICULT HAVE THESE PROBLEMS MADE IT FOR YOU TO DO YOUR WORK, TAKE CARE OF THINGS AT HOME, OR GET ALONG WITH OTHER PEOPLE: SOMEWHAT DIFFICULT
SUM OF ALL RESPONSES TO PHQ QUESTIONS 1-9: 13
SUM OF ALL RESPONSES TO PHQ QUESTIONS 1-9: 13

## 2023-03-23 NOTE — NURSING NOTE
Is the patient currently in the state of [place]? YES    Visit mode:VIDEO    If the visit is dropped, the patient can be reconnected by: VIDEO VISIT: Text to cell phone: [phone]    Will anyone else be joining the visit? NO      How would you like to obtain your AVS? MyChart    Are changes needed to the allergy or medication list? NO    Reason for visit: Follow-up

## 2023-03-23 NOTE — Clinical Note
Alan Vigil,  Could you please sign this new patient evaluation from 3/23? I am resending  it in case it did not make it into your co-sign inbox.  Thank you,  Milana

## 2023-03-23 NOTE — PATIENT INSTRUCTIONS
**For crisis resources, please see the information at the end of this document**   Patient Education    Thank you for coming to the [facility] [place] [facility].     Lab Testing:  If you had lab testing today and your results are reassuring or normal they will be mailed to you or sent through [employer] within 7 days. If the lab tests need quick action we will call you with the results. The phone number we will call with results is # [phone]. If this is not the best number please call our clinic and change the number.     Medication Refills:  If you need any refills please call your pharmacy and they will contact us. Our fax number for refills is [phone].   Three business days of notice are needed for general medication refill requests.   Five business days of notice are needed for controlled substance refill requests.   If you need to change to a different pharmacy, please contact the new pharmacy directly. The new pharmacy will help you get your medications transferred.     Contact Us:  Please call [phone] during business hours (8-5:00 M-F).   If you have medication related questions after clinic hours, or on the weekend, please call [phone].     Financial Assistance [phone]   Medical Records [phone]       MENTAL HEALTH CRISIS RESOURCES:  For a emergency help, please call 911 or go to the nearest Emergency Department.     Emergency Walk-In Options:   Em[facility] Unit @ [facility] ([place]): [phone] - Specialized mental health emergency area designed to be calming  [facility]ly [facility] ([place]): [phone]  [facility] ([place]): [phone]  [facility]): [phone]    [facility] Crisis Information:   [place]: [phone]  [place]: [phone]  [place] ([place]OV[place]) - Adult: [phone]     Child: [phone]  [place] - Adult: [phone]     Child: [phone]  [place]:  [phone]  List of all [facility] resources:   https://[place].gov/dhs/people-we-serve/adults/health-care/mental-health/resources/crisis-contacts.jsp    National Crisis Information:   Crisis Text Line: Text  [place]  to 698205  Suicide & Crisis Lifeline: 988  National Suicide Prevention Lifeline: 7-567-316-TALK (1-551.156.8881)       For online chat options, visit https://suicidepreventionlifeline.org/chat/  Poison Control Center: 9-241-221-6247  Trans Lifeline: 5-458-613-0291 - Hotline for transgender people of all ages  [employer]: 1-896-519-7991 - Hotline for LGBT youth     For Non-Emergency Support:   Fast Tracker: Mental Health & Substance Use Disorder Resources -   https://www.RAREFORM[employer]Xeebeln.org/

## 2023-03-25 RX ORDER — MIRTAZAPINE 7.5 MG/1
7.5 TABLET, FILM COATED ORAL AT BEDTIME
Qty: 30 TABLET | Refills: 1 | Status: SHIPPED | OUTPATIENT
Start: 2023-03-25 | End: 2023-06-12

## 2023-03-29 ENCOUNTER — LAB (OUTPATIENT)
Dept: LAB | Facility: CLINIC | Age: 43
End: 2023-03-29
Payer: COMMERCIAL

## 2023-03-29 ENCOUNTER — APPOINTMENT (OUTPATIENT)
Dept: OPTOMETRY | Facility: CLINIC | Age: 43
End: 2023-03-29
Payer: COMMERCIAL

## 2023-03-29 DIAGNOSIS — Z11.59 NEED FOR HEPATITIS C SCREENING TEST: Primary | ICD-10-CM

## 2023-03-29 DIAGNOSIS — R79.89 HIGH SERUM FERRITIN: ICD-10-CM

## 2023-03-29 DIAGNOSIS — M53.3 COCCYDYNIA: ICD-10-CM

## 2023-03-29 DIAGNOSIS — M25.511 RIGHT SHOULDER PAIN, UNSPECIFIED CHRONICITY: ICD-10-CM

## 2023-03-29 LAB
CRP SERPL-MCNC: <2.9 MG/L (ref 0–8)
ERYTHROCYTE [SEDIMENTATION RATE] IN BLOOD BY WESTERGREN METHOD: 4 MM/HR (ref 0–20)
FERRITIN SERPL-MCNC: 230 NG/ML (ref 12–150)
TRANSFERRIN SERPL-MCNC: 257 MG/DL (ref 200–360)
URATE SERPL-MCNC: 4.1 MG/DL (ref 2.6–6)

## 2023-03-29 PROCEDURE — 86039 ANTINUCLEAR ANTIBODIES (ANA): CPT

## 2023-03-29 PROCEDURE — 84550 ASSAY OF BLOOD/URIC ACID: CPT

## 2023-03-29 PROCEDURE — 86618 LYME DISEASE ANTIBODY: CPT

## 2023-03-29 PROCEDURE — 36415 COLL VENOUS BLD VENIPUNCTURE: CPT

## 2023-03-29 PROCEDURE — 86140 C-REACTIVE PROTEIN: CPT

## 2023-03-29 PROCEDURE — 86803 HEPATITIS C AB TEST: CPT

## 2023-03-29 PROCEDURE — 92340 FIT SPECTACLES MONOFOCAL: CPT | Performed by: OPTOMETRIST

## 2023-03-29 PROCEDURE — 86038 ANTINUCLEAR ANTIBODIES: CPT

## 2023-03-29 PROCEDURE — 85652 RBC SED RATE AUTOMATED: CPT

## 2023-03-29 PROCEDURE — 86431 RHEUMATOID FACTOR QUANT: CPT

## 2023-03-29 PROCEDURE — 82728 ASSAY OF FERRITIN: CPT

## 2023-03-29 PROCEDURE — 84466 ASSAY OF TRANSFERRIN: CPT

## 2023-03-30 LAB
ANA PAT SER IF-IMP: ABNORMAL
ANA SER QL IF: ABNORMAL
ANA TITR SER IF: ABNORMAL {TITER}
B BURGDOR IGG+IGM SER QL: 0.2
HCV AB SERPL QL IA: NONREACTIVE
RHEUMATOID FACT SER NEPH-ACNC: <7 IU/ML

## 2023-03-31 ENCOUNTER — MYC MEDICAL ADVICE (OUTPATIENT)
Dept: FAMILY MEDICINE | Facility: CLINIC | Age: 43
End: 2023-03-31
Payer: COMMERCIAL

## 2023-03-31 ENCOUNTER — TELEPHONE (OUTPATIENT)
Dept: FAMILY MEDICINE | Facility: CLINIC | Age: 43
End: 2023-03-31
Payer: COMMERCIAL

## 2023-03-31 DIAGNOSIS — G43.001 MIGRAINE WITHOUT AURA AND WITH STATUS MIGRAINOSUS, NOT INTRACTABLE: Primary | ICD-10-CM

## 2023-03-31 DIAGNOSIS — M53.3 TAIL BONE PAIN: ICD-10-CM

## 2023-03-31 DIAGNOSIS — M54.50 LUMBAR PAIN: Primary | ICD-10-CM

## 2023-03-31 NOTE — TELEPHONE ENCOUNTER
Patient Quality Outreach    Patient is due for the following:   Physical Preventive Adult Physical      Topic Date Due     Hepatitis B Vaccine (1 of 3 - 3-dose series) Never done     COVID-19 Vaccine (1) Never done     Flu Vaccine (1) 09/01/2022     Chronic Opioid Use -  Treatment Agreement (CSA) and Urine Drug Screen    Next Steps:   Schedule a Adult Preventative    Type of outreach:    Sent [employer] message.      Questions for provider review:    None     Yvette Goddard MA

## 2023-04-03 RX ORDER — RIZATRIPTAN BENZOATE 10 MG/1
10 TABLET ORAL
Qty: 10 TABLET | Refills: 0 | Status: SHIPPED | OUTPATIENT
Start: 2023-04-03 | End: 2024-01-10

## 2023-04-03 NOTE — RESULT ENCOUNTER NOTE
Alan Richter,    Thank you for your recent office visit.    Here are your recent results.  Your AYSHA is borderline positive, indicating that it is possible that your tailbone pain could be from some type of arthritis/autoimmune issue.  We will see what your imaging looks like.  It is possible that rheumatology would do a further work-up based off this lab results but all your other labs are looking normal.    Feel free to contact me via [employer] or call the clinic at [phone].    Sincerely,    ANTONIO Alaniz, FNP-BC

## 2023-04-09 ENCOUNTER — HEALTH MAINTENANCE LETTER (OUTPATIENT)
Age: 43
End: 2023-04-09

## 2023-04-10 ENCOUNTER — ANCILLARY PROCEDURE (OUTPATIENT)
Dept: MRI IMAGING | Facility: CLINIC | Age: 43
End: 2023-04-10
Attending: NURSE PRACTITIONER
Payer: COMMERCIAL

## 2023-04-10 DIAGNOSIS — M53.3 COCCYDYNIA: ICD-10-CM

## 2023-04-10 PROCEDURE — 72195 MRI PELVIS W/O DYE: CPT | Performed by: RADIOLOGY

## 2023-04-10 NOTE — RESULT ENCOUNTER NOTE
Alan Richter,    Thank you for your recent office visit.    Here are your recent results.  Per radiology- Impression: No findings to explain patient's symptom of coccydynia.  Specifically, no substantial  degenerative change.        History: Coccydynia      Comparison: Pelvis radiograph 1/17/2023, CT abdomen pelvis 11/24/2020     Findings:     Motion especially on the sagittal T1-weighted sequence degrading  images.     Osseous structures  Osseous structures: No fracture, stress reaction, avascular necrosis,  or focal osseous lesion is seen.     No substantial degenerative change at the sacrococcygeal junction or  intercoccygeal segments. No evidence of coccygeal edema like marrow  signal intensity. No evidence of precoccygeal edema.     Small right sacroiliac joint effusion anterior and caudally.     Suspect small T2 hyperintense zone in the L5-S1 disc posteriorly,  likely representing annular fissure presence. No high-grade spinal  canal or neural foraminal stenosis at this level.     Internal derangement of joints are not well assessed owing to chosen  field of view.     Joint and Periarticular soft tissue:     Sacroiliac joints and pubic symphysis are congruent.     Joint effusion: A physiologic amount of joint fluid in bilateral hip.     Muscles and tendons  Muscles and tendons: Piriformis muscle bulk are symmetric.      Nerves:  The visualized course of the sciatic nerves are unremarkable  bilaterally.     Other Findings:  Trace free fluid in the pelvis, physiologic. Postsurgical change of  hysterectomy. Tiny 4 mm cystic focus upper vaginal area at midline  (image 30 series 8), presumably incidental such as mucus cyst.       Impression: No findings to explain patient's symptom of coccydynia.  Specifically, no substantial  degenerative change.    Feel free to contact me via [employer] or call the clinic at [phone].    Sincerely,    Serena Moore, APRN, FNP-BC

## 2023-04-17 ENCOUNTER — E-VISIT (OUTPATIENT)
Dept: FAMILY MEDICINE | Facility: CLINIC | Age: 43
End: 2023-04-17
Payer: COMMERCIAL

## 2023-04-17 DIAGNOSIS — G43.001 MIGRAINE WITHOUT AURA AND WITH STATUS MIGRAINOSUS, NOT INTRACTABLE: Primary | ICD-10-CM

## 2023-04-17 PROCEDURE — 99422 OL DIG E/M SVC 11-20 MIN: CPT | Performed by: NURSE PRACTITIONER

## 2023-04-18 RX ORDER — KETOROLAC TROMETHAMINE 10 MG/1
10 TABLET, FILM COATED ORAL EVERY 8 HOURS PRN
Qty: 20 TABLET | Refills: 1 | Status: SHIPPED | OUTPATIENT
Start: 2023-04-18

## 2023-04-18 RX ORDER — PROPRANOLOL HYDROCHLORIDE 20 MG/1
20 TABLET ORAL 2 TIMES DAILY
Qty: 180 TABLET | Refills: 0 | Status: SHIPPED | OUTPATIENT
Start: 2023-04-18 | End: 2023-06-12

## 2023-05-11 ENCOUNTER — E-VISIT (OUTPATIENT)
Dept: FAMILY MEDICINE | Facility: CLINIC | Age: 43
End: 2023-05-11
Payer: COMMERCIAL

## 2023-05-11 DIAGNOSIS — G43.001 MIGRAINE WITHOUT AURA AND WITH STATUS MIGRAINOSUS, NOT INTRACTABLE: Primary | ICD-10-CM

## 2023-05-11 PROCEDURE — 99422 OL DIG E/M SVC 11-20 MIN: CPT | Performed by: NURSE PRACTITIONER

## 2023-05-11 RX ORDER — OXYCODONE HYDROCHLORIDE 5 MG/1
5 TABLET ORAL EVERY 6 HOURS PRN
Qty: 12 TABLET | Refills: 0 | Status: SHIPPED | OUTPATIENT
Start: 2023-05-11 | End: 2023-05-14

## 2023-05-17 ENCOUNTER — TELEPHONE (OUTPATIENT)
Dept: FAMILY MEDICINE | Facility: CLINIC | Age: 43
End: 2023-05-17
Payer: COMMERCIAL

## 2023-05-17 NOTE — TELEPHONE ENCOUNTER
Patient Quality Outreach    Patient is due for the following:   Physical Preventive Adult Physical  Chronic Opioid Use -  Treatment Agreement (CSA) and Urine Drug Screen    Next Steps:   Schedule a Adult Preventative    Type of outreach:    Phone, spoke to patient/parent. Patient scheduled for a yearly preventative visit on 6/27/22.       Questions for provider review:    None           Yvette Goddard MA

## 2023-05-25 NOTE — PROGRESS NOTES
Review of Systems   Constitutional: Negative. HENT: Negative. Eyes: Negative. Respiratory: Negative. Cardiovascular: Negative. Gastrointestinal: Negative. Genitourinary: Negative. Musculoskeletal:  Positive for back pain, joint pain and myalgias. Negative for neck pain. Skin: Negative. Neurological:  Positive for dizziness, tingling and weakness. Endo/Heme/Allergies: Negative. Psychiatric/Behavioral:  Positive for memory loss. Rober,  Your ferritin level came back normal also.  This is the indicator of iron stores      Joe Sanz MD

## 2023-06-08 ENCOUNTER — OFFICE VISIT (OUTPATIENT)
Dept: PALLIATIVE MEDICINE | Facility: CLINIC | Age: 43
End: 2023-06-08
Payer: COMMERCIAL

## 2023-06-08 VITALS
BODY MASS INDEX: 24.17 KG/M2 | HEART RATE: 96 BPM | SYSTOLIC BLOOD PRESSURE: 159 MMHG | WEIGHT: 128 LBS | OXYGEN SATURATION: 96 % | HEIGHT: 61 IN | DIASTOLIC BLOOD PRESSURE: 104 MMHG

## 2023-06-08 DIAGNOSIS — M54.2 NECK PAIN: ICD-10-CM

## 2023-06-08 DIAGNOSIS — M53.3 COCCYDYNIA: ICD-10-CM

## 2023-06-08 DIAGNOSIS — M25.511 RIGHT SHOULDER PAIN, UNSPECIFIED CHRONICITY: ICD-10-CM

## 2023-06-08 DIAGNOSIS — M79.2 RADICULAR PAIN IN RIGHT ARM: ICD-10-CM

## 2023-06-08 DIAGNOSIS — M79.601 PAIN OF RIGHT UPPER EXTREMITY: ICD-10-CM

## 2023-06-08 DIAGNOSIS — M54.81 BILATERAL OCCIPITAL NEURALGIA: ICD-10-CM

## 2023-06-08 DIAGNOSIS — G89.29 CHRONIC LEFT SHOULDER PAIN: ICD-10-CM

## 2023-06-08 DIAGNOSIS — M79.18 MYOFASCIAL PAIN: ICD-10-CM

## 2023-06-08 DIAGNOSIS — M25.512 CHRONIC LEFT SHOULDER PAIN: ICD-10-CM

## 2023-06-08 DIAGNOSIS — G44.86 CERVICOGENIC HEADACHE: Primary | ICD-10-CM

## 2023-06-08 PROCEDURE — 99205 OFFICE O/P NEW HI 60 MIN: CPT

## 2023-06-08 RX ORDER — BACLOFEN 10 MG/1
TABLET ORAL
Qty: 60 TABLET | Refills: 1 | Status: SHIPPED | OUTPATIENT
Start: 2023-06-08 | End: 2023-10-10

## 2023-06-08 ASSESSMENT — PAIN SCALES - GENERAL: PAINLEVEL: EXTREME PAIN (9)

## 2023-06-08 ASSESSMENT — PATIENT HEALTH QUESTIONNAIRE - PHQ9: SUM OF ALL RESPONSES TO PHQ QUESTIONS 1-9: 4
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
## 2023-06-08 NOTE — PATIENT INSTRUCTIONS
Pain Physical Therapy:  YES   I am referring for targeting stretching, strengthening, and home exercise plan to support functional goals/ADLs.  If you have not been contacted to schedule within 2 business days, please call [phone] to make an appointment. Recommend scheduling with Manish Chávez PT at [facility] [place].     Pain Psychologist to address relaxation, behavioral change, coping style, and other factors important to improvement.  NO - we may consider referring to pain psychology in the future, pending outcome with initial therapies.     Diagnostic Studies:  Reviewed cervical MRI from 2021 - demonstrated mild to moderate degenerative changes at C5-6, otherwise unremarkable.     Medication Management:   Oxycodone 5 mg daily as needed - PCP is currently managing. While I do not recommend long-term opioid use for chronic, non-cancer associated pain, it is not unreasonable to continue low dose opioid for now, as we get started with multimodal treatment. Recommend no more than 1 tab daily as needed, ideally limiting use to days with very severe pain that does not respond to other non-opioid strategies. Continuation is at the discretion of prescribing provider.   Apply diclofenac gel to left shoulder at least 2 x day, ideally 3-4 x day. This medication works best when used consistently. Monitor for benefit over the next 1-2 weeks.   Start baclofen 2.5 mg daily and increase to goal dose of 5-10 mg twice daily, per instructions on prescription bottle. We will evaluate benefit at follow up, may consider dosage increase versus alternative.   Monitor for sedation - may cause dizziness or drowsiness. Be careful driving/moving around until you know effects of medication. Avoid concurrent alcohol use.     Potential procedures:   Trigger point injections and bilateral occipital nerve block ordered today - Advised to schedule with Dr. Durbin at [facility] [place].     Other Orders/Referrals:   Pain PT    Follow up with Leslie  ANTONIO Galindo CNP in 4 weeks after procedure

## 2023-06-08 NOTE — NURSING NOTE
6/8/2023     3:06 PM   PEG Score   PEG Total Score 7            Questions/Concerns:  Migraine today      Refills: Oxycodone     ARISTEO Davis [facility] Management   [facility] Number:  [phone]    Call with any questions about your care and for scheduling assistance.     Calls are returned Monday through Friday between 8 AM and 4:30 PM. We usually get back to you within 2 business days depending on the issue/request.    If we are prescribing your medications:    For opioid medication refills, call the clinic or send a BidModo message 7 days in advance.  Please include:    Name of requested medication    Name of the pharmacy.    For non-opioid medications, call your pharmacy directly to request a refill. Please allow 3-4 days to be processed.     Per [place] State Law:    All controlled substance prescriptions must be filled within 30 days of being written.      For those controlled substances allowing refills, pickup must occur within 30 days of last fill.      We believe regular attendance is key to your success in our program!      Any time you are unable to keep your appointment we ask that you call us at least 24 hours in advance to cancel.This will allow us to offer the appointment time to another patient.     Multiple missed appointments may lead to dismissal from the clinic.

## 2023-06-08 NOTE — PROGRESS NOTES
06/08/23 1506   PEG: A Thee-Item Scale Assessing Pain Intensity and Interference        0 = No pain / No interference    10 = Pain as bad as you can imagine / Completely interferes   What number best describes your pain on average in the past week? 5   What number best describes how, during the past week, pain has interfered with your enjoyment of life? 8   What number best describes how, during the past week, pain has interfered with your general activity? 8   PEG Total Score 7

## 2023-06-09 ENCOUNTER — MYC MEDICAL ADVICE (OUTPATIENT)
Dept: FAMILY MEDICINE | Facility: CLINIC | Age: 43
End: 2023-06-09
Payer: COMMERCIAL

## 2023-06-09 DIAGNOSIS — M53.3 TAIL BONE PAIN: ICD-10-CM

## 2023-06-09 DIAGNOSIS — M53.3 COCCYDYNIA: ICD-10-CM

## 2023-06-09 DIAGNOSIS — M54.50 LUMBAR PAIN: Primary | ICD-10-CM

## 2023-06-09 NOTE — TELEPHONE ENCOUNTER
See patient's mychart message regarding oxycodone, she suggests prescribing higher dose and she will take less to make them last longer.    See note copied from yesterday's pain clinic visit, she was advised to continue to have PCP manage the oxycodone:    Medication Management:     Oxycodone 5 mg daily as needed - PCP is currently managing. While I do not recommend long-term opioid use for chronic, non-cancer associated pain, it is not unreasonable to continue low dose opioid for now, as we get started with multimodal treatment. Recommend no more than 1 tab daily as needed, ideally limiting use to days with very severe pain that does not respond to other non-opioid strategies. Continuation is at the discretion of prescribing provider.       Routed to Serena Moore to address patient's [employer] request.  Jyoti Early RN  [facility]

## 2023-06-12 PROBLEM — M54.50 LUMBAR PAIN: Status: ACTIVE | Noted: 2023-06-12

## 2023-06-12 PROBLEM — M53.3 TAIL BONE PAIN: Status: ACTIVE | Noted: 2023-06-12

## 2023-06-12 RX ORDER — OXYCODONE HYDROCHLORIDE 10 MG/1
5 TABLET ORAL DAILY PRN
Qty: 15 TABLET | Refills: 0 | Status: SHIPPED | OUTPATIENT
Start: 2023-06-12 | End: 2023-06-27

## 2023-06-27 ENCOUNTER — OFFICE VISIT (OUTPATIENT)
Dept: FAMILY MEDICINE | Facility: CLINIC | Age: 43
End: 2023-06-27
Payer: COMMERCIAL

## 2023-06-27 VITALS
SYSTOLIC BLOOD PRESSURE: 138 MMHG | DIASTOLIC BLOOD PRESSURE: 80 MMHG | HEIGHT: 61 IN | BODY MASS INDEX: 22.24 KG/M2 | HEART RATE: 80 BPM | OXYGEN SATURATION: 99 % | RESPIRATION RATE: 16 BRPM | TEMPERATURE: 97.4 F | WEIGHT: 117.8 LBS

## 2023-06-27 DIAGNOSIS — F11.90 CHRONIC, CONTINUOUS USE OF OPIOIDS: ICD-10-CM

## 2023-06-27 DIAGNOSIS — Z13.1 SCREENING FOR DIABETES MELLITUS: ICD-10-CM

## 2023-06-27 DIAGNOSIS — D69.1 PLATELET DYSFUNCTION (H): ICD-10-CM

## 2023-06-27 DIAGNOSIS — Z00.00 ROUTINE GENERAL MEDICAL EXAMINATION AT A HEALTH CARE FACILITY: Primary | ICD-10-CM

## 2023-06-27 DIAGNOSIS — Z13.29 SCREENING FOR THYROID DISORDER: ICD-10-CM

## 2023-06-27 DIAGNOSIS — M48.02 FORAMINAL STENOSIS OF CERVICAL REGION: ICD-10-CM

## 2023-06-27 DIAGNOSIS — Z13.220 SCREENING CHOLESTEROL LEVEL: ICD-10-CM

## 2023-06-27 DIAGNOSIS — M50.20 HERNIATED CERVICAL INTERVERTEBRAL DISC: ICD-10-CM

## 2023-06-27 LAB
AMPHETAMINES UR QL: NOT DETECTED
BARBITURATES UR QL SCN: NOT DETECTED
BASOPHILS # BLD AUTO: 0 10E3/UL (ref 0–0.2)
BASOPHILS NFR BLD AUTO: 1 %
BENZODIAZ UR QL SCN: NOT DETECTED
BUPRENORPHINE UR QL: NOT DETECTED
CANNABINOIDS UR QL: DETECTED
COCAINE UR QL SCN: NOT DETECTED
D-METHAMPHET UR QL: NOT DETECTED
EOSINOPHIL # BLD AUTO: 0.1 10E3/UL (ref 0–0.7)
EOSINOPHIL NFR BLD AUTO: 2 %
ERYTHROCYTE [DISTWIDTH] IN BLOOD BY AUTOMATED COUNT: 11.7 % (ref 10–15)
HCT VFR BLD AUTO: 39.2 % (ref 35–47)
HGB BLD-MCNC: 13.4 G/DL (ref 11.7–15.7)
IMM GRANULOCYTES # BLD: 0 10E3/UL
IMM GRANULOCYTES NFR BLD: 0 %
LYMPHOCYTES # BLD AUTO: 1.6 10E3/UL (ref 0.8–5.3)
LYMPHOCYTES NFR BLD AUTO: 26 %
MCH RBC QN AUTO: 32 PG (ref 26.5–33)
MCHC RBC AUTO-ENTMCNC: 34.2 G/DL (ref 31.5–36.5)
MCV RBC AUTO: 94 FL (ref 78–100)
METHADONE UR QL SCN: NOT DETECTED
MONOCYTES # BLD AUTO: 0.5 10E3/UL (ref 0–1.3)
MONOCYTES NFR BLD AUTO: 9 %
NEUTROPHILS # BLD AUTO: 3.9 10E3/UL (ref 1.6–8.3)
NEUTROPHILS NFR BLD AUTO: 64 %
OPIATES UR QL SCN: NOT DETECTED
OXYCODONE UR QL SCN: DETECTED
PCP UR QL SCN: NOT DETECTED
PLATELET # BLD AUTO: 104 10E3/UL (ref 150–450)
PROPOXYPH UR QL: NOT DETECTED
RBC # BLD AUTO: 4.19 10E6/UL (ref 3.8–5.2)
TRICYCLICS UR QL SCN: NOT DETECTED
WBC # BLD AUTO: 6.2 10E3/UL (ref 4–11)

## 2023-06-27 PROCEDURE — 84443 ASSAY THYROID STIM HORMONE: CPT | Performed by: NURSE PRACTITIONER

## 2023-06-27 PROCEDURE — 36415 COLL VENOUS BLD VENIPUNCTURE: CPT | Performed by: NURSE PRACTITIONER

## 2023-06-27 PROCEDURE — 80061 LIPID PANEL: CPT | Performed by: NURSE PRACTITIONER

## 2023-06-27 PROCEDURE — 99396 PREV VISIT EST AGE 40-64: CPT | Performed by: NURSE PRACTITIONER

## 2023-06-27 PROCEDURE — 80306 DRUG TEST PRSMV INSTRMNT: CPT | Performed by: NURSE PRACTITIONER

## 2023-06-27 PROCEDURE — 99213 OFFICE O/P EST LOW 20 MIN: CPT | Mod: 25 | Performed by: NURSE PRACTITIONER

## 2023-06-27 PROCEDURE — 85025 COMPLETE CBC W/AUTO DIFF WBC: CPT | Performed by: NURSE PRACTITIONER

## 2023-06-27 PROCEDURE — 82947 ASSAY GLUCOSE BLOOD QUANT: CPT | Performed by: NURSE PRACTITIONER

## 2023-06-27 RX ORDER — OXYCODONE HYDROCHLORIDE 10 MG/1
5 TABLET ORAL DAILY PRN
Qty: 15 TABLET | Refills: 0 | Status: SHIPPED | OUTPATIENT
Start: 2023-06-27 | End: 2023-07-14

## 2023-06-27 ASSESSMENT — ANXIETY QUESTIONNAIRES
4. TROUBLE RELAXING: SEVERAL DAYS
1. FEELING NERVOUS, ANXIOUS, OR ON EDGE: MORE THAN HALF THE DAYS
7. FEELING AFRAID AS IF SOMETHING AWFUL MIGHT HAPPEN: SEVERAL DAYS
3. WORRYING TOO MUCH ABOUT DIFFERENT THINGS: MORE THAN HALF THE DAYS
5. BEING SO RESTLESS THAT IT IS HARD TO SIT STILL: NOT AT ALL
8. IF YOU CHECKED OFF ANY PROBLEMS, HOW DIFFICULT HAVE THESE MADE IT FOR YOU TO DO YOUR WORK, TAKE CARE OF THINGS AT HOME, OR GET ALONG WITH OTHER PEOPLE?: SOMEWHAT DIFFICULT
6. BECOMING EASILY ANNOYED OR IRRITABLE: SEVERAL DAYS
7. FEELING AFRAID AS IF SOMETHING AWFUL MIGHT HAPPEN: SEVERAL DAYS
2. NOT BEING ABLE TO STOP OR CONTROL WORRYING: MORE THAN HALF THE DAYS
GAD7 TOTAL SCORE: 9
IF YOU CHECKED OFF ANY PROBLEMS ON THIS QUESTIONNAIRE, HOW DIFFICULT HAVE THESE PROBLEMS MADE IT FOR YOU TO DO YOUR WORK, TAKE CARE OF THINGS AT HOME, OR GET ALONG WITH OTHER PEOPLE: SOMEWHAT DIFFICULT
GAD7 TOTAL SCORE: 9
GAD7 TOTAL SCORE: 9

## 2023-06-27 ASSESSMENT — PATIENT HEALTH QUESTIONNAIRE - PHQ9
SUM OF ALL RESPONSES TO PHQ QUESTIONS 1-9: 0
SUM OF ALL RESPONSES TO PHQ QUESTIONS 1-9: 0
10. IF YOU CHECKED OFF ANY PROBLEMS, HOW DIFFICULT HAVE THESE PROBLEMS MADE IT FOR YOU TO DO YOUR WORK, TAKE CARE OF THINGS AT HOME, OR GET ALONG WITH OTHER PEOPLE: NOT DIFFICULT AT ALL

## 2023-06-27 ASSESSMENT — ENCOUNTER SYMPTOMS
PARESTHESIAS: 0
EYE PAIN: 0
HEMATOCHEZIA: 0
NAUSEA: 1
SHORTNESS OF BREATH: 0
FREQUENCY: 0
ARTHRALGIAS: 1
HEADACHES: 1
SORE THROAT: 0
COUGH: 0
CHILLS: 0
CONSTIPATION: 0
MYALGIAS: 1
DIZZINESS: 0
DIARRHEA: 0
HEMATURIA: 0
DYSURIA: 0
PALPITATIONS: 0
NERVOUS/ANXIOUS: 1
FEVER: 0
ABDOMINAL PAIN: 0
WEAKNESS: 0
JOINT SWELLING: 0
HEARTBURN: 0

## 2023-06-27 ASSESSMENT — PAIN SCALES - GENERAL: PAINLEVEL: MILD PAIN (2)

## 2023-06-27 NOTE — LETTER
Opioid / Opioid Plus Controlled Substance Agreement    This is an agreement between you and your provider about the safe and appropriate use of controlled substance/opioids prescribed by your care team. Controlled substances are medicines that can cause physical and mental dependence (abuse).    There are strict laws about having and using these medicines. We here at [facility] are committing to working with you in your efforts to get better. To support you in this work, we ll help you schedule regular office appointments for medicine refills. If we must cancel or change your appointment for any reason, we ll make sure you have enough medicine to last until your next appointment.     As a Provider, I will:  Listen carefully to your concerns and treat you with respect.   Recommend a treatment plan that I believe is in your best interest. This plan may involve therapies other than opioid pain medication.   Talk with you often about the possible benefits, and the risk of harm of any medicine that we prescribe for you.   Provide a plan on how to taper (discontinue or go off) using this medicine if the decision is made to stop its use.    As a Patient, I understand that opioid(s):   Are a controlled substance prescribed by my care team to help me function or work and manage my condition(s).   Are strong medicines and can cause serious side effects such as:  Drowsiness, which can seriously affect my driving ability  A lower breathing rate, enough to cause death  Harm to my thinking ability   Depression   Abuse of and addiction to this medicine  Need to be taken exactly as prescribed. Combining opioids with certain medicines or chemicals (such as illegal drugs, sedatives, sleeping pills, and benzodiazepines) can be dangerous or even fatal. If I stop opioids suddenly, I may have severe withdrawal symptoms.  Do not work for all types of pain nor for all patients. If they re not helpful, I may be asked to stop  them.    I am also being prescribed a benzodiazepine (tranquilizer) controlled substance: I understand this type of medicine is sedating and can increase the risk of death when taken together with opioids. I have talked to my care team about having prescription Narcan available for reversing the opioid medicine and have been instructed in its use. I will be very careful to take my medicines only as directed    The risks, benefits and side effects of these medicine(s) were explained to me. I agree that:  I will take part in other treatments as advised by my care team. This may be psychiatry or counseling, physical therapy, behavioral therapy, group treatment or a referral to a specialist.     I will keep all my appointments. I understand that this is part of the monitoring of opioids. My care team may require an office visit for EVERY opioid/controlled substance refill. If I miss appointments or don t follow instructions, my care team may stop my medicine.    I will take my medicines as prescribed. I will not change the dose or schedule unless my care team tells me to. There will be no refills if I run out early.     I may be asked to come to the clinic and complete a urine drug test or complete a pill count at any time. If I don t give a urine sample or participate in a pill count, the care team may stop my medicine.    I will only receive prescriptions from this clinic for chronic pain. If I am treated by another provider for acute pain issues, I will tell them that I am taking opioid pain medication for chronic pain and that I have a treatment agreement with this provider. I will inform my [facility] care team within one business day if I am given a prescription for any pain medication by another healthcare provider. My [facility] care team can contact other providers and pharmacists about my use of any medicines.    It is up to me to make sure that I don t run out of my medicines on weekends or  holidays. If my care team is willing to refill my opioid prescription without a visit, I must request refills only during office hours. Refills may take up to 3 business days to process. I will use one pharmacy to fill all my opioid and other controlled substance prescriptions. I will notify the clinic about any changes to my insurance or medication availability.    I am responsible for my prescriptions. If the medicine/prescription is lost, stolen or destroyed, it will not be replaced. I also agree not to share controlled substance medicines with anyone.    I am aware I should not use any illegal or recreational drugs. I agree not to drink alcohol unless my care team says I can.       If I enroll in the [place] [facility] program, I will tell my care team prior to my next refill.     I will tell my care team right away if I become pregnant, have a new medical problem treated outside of my regular clinic, or have a change in my medications.    I understand that this medicine can affect my thinking, judgment and reaction time. Alcohol and drugs affect the brain and body, which can affect the safety of my driving. Being under the influence of alcohol or drugs can affect my decision-making, behaviors, personal safety, and the safety of others. Driving while impaired (DWI) can occur if a person is driving, operating, or in physical control of a car, motorcycle, boat, snowmobile, ATV, motorbike, off-road vehicle, or any other motor vehicle ([place] Statute 169A.20). I understand the risk if I choose to drive or operate any vehicle or machinery.    I understand that if I do not follow any of the conditions above, my prescriptions or treatment may be stopped or changed.          Opioids  What You Need to Know    What are opioids?   Opioids are pain medicines that must be prescribed by a doctor. They are also known as narcotics.     Examples are:   morphine (MS Contin, Milli)  oxycodone (Oxycontin)  oxycodone and  acetaminophen (Percocet)  hydrocodone and acetaminophen (Vicodin, Norco)   fentanyl patch (Duragesic)   hydromorphone (Dilaudid)   methadone  codeine (Tylenol #3)     What do opioids do well?   Opioids are best for severe short-term pain such as after a surgery or injury. They may work well for cancer pain. They may help some people with long-lasting (chronic) pain.     What do opioids NOT do well?   Opioids never get rid of pain entirely, and they don t work well for most patients with chronic pain. Opioids don t reduce swelling, one of the causes of pain.                                    Other ways to manage chronic pain and improve function include:     Treat the health problem that may be causing pain  Anti-inflammation medicines, which reduce swelling and tenderness, such as ibuprofen (Advil, Motrin) or naproxen (Aleve)  Acetaminophen (Tylenol)  Antidepressants and anti-seizure medicines, especially for nerve pain  Topical treatments such as patches or creams  Injections or nerve blocks  Chiropractic or osteopathic treatment  Acupuncture, massage, deep breathing, meditation, visual imagery, aromatherapy  Use heat or ice at the pain site  Physical therapy   Exercise  Stop smoking  Take part in therapy       Risks and side effects     Talk to your doctor before you start or decide to keep taking opioids. Possible side effects include:    Lowering your breathing rate enough to cause death  Overdose, including death, especially if taking higher than prescribed doses  Worse depression symptoms; less pleasure in things you usually enjoy  Feeling tired or sluggish  Slower thoughts or cloudy thinking  Being more sensitive to pain over time; pain is harder to control  Trouble sleeping or restless sleep  Changes in hormone levels (for example, less testosterone)  Changes in sex drive or ability to have sex  Constipation  Unsafe driving  Itching and sweating  Dizziness  Nausea, throwing up and dry mouth    What else  should I know about opioids?    Opioids may lead to dependence, tolerance, or addiction.    Dependence means that if you stop or reduce the medicine too quickly, you will have withdrawal symptoms. These include loose poop (diarrhea), jitters, flu-like symptoms, nervousness and tremors. Dependence is not the same as addiction.                     Tolerance means needing higher doses over time to get the same effect. This may increase the chance of serious side effects.    Addiction is when people improperly use a substance that harms their body, their mind or their relations with others. Use of opiates can cause a relapse of addiction if you have a history of drug or alcohol abuse.    People who have used opioids for a long time may have a lower quality of life, worse depression, higher levels of pain and more visits to doctors.    You can overdose on opioids. Take these steps to lower your risk of overdose:    Recognize the signs:  Signs of overdose include decrease or loss of consciousness (blackout), slowed breathing, trouble waking up and blue lips. If someone is worried about overdose, they should call 911.    Talk to your doctor about Narcan (naloxone).   If you are at risk for overdose, you may be given a prescription for Narcan. This medicine very quickly reverses the effects of opioids.   If you overdose, a friend or family member can give you Narcan while waiting for the ambulance. They need to know the signs of overdose and how to give Narcan.     Don't use alcohol or street drugs.   Taking them with opioids can cause death.    Do not take any of these medicines unless your doctor says it s OK. Taking these with opioids can cause death:  Benzodiazepines, such as lorazepam (Ativan), alprazolam (Xanax) or diazepam (Valium)  Muscle relaxers, such as cyclobenzaprine (Flexeril)  Sleeping pills like zolpidem (Ambien)   Other opioids      How to keep you and other people safe while taking opioids:    Never share  your opioids with others.  Opioid medicines are regulated by the Drug Enforcement Agency (MARIO). Selling or sharing medications is a criminal act.    2. Be sure to store opioids in a secure place, locked up if possible. Young children can easily swallow them and overdose.    3. When you are traveling with your medicines, keep them in the original bottles. If you use a pill box, be sure you also carry a copy of your medicine list from your clinic or pharmacy.    4. Safe disposal of opioids    Most pharmacies have places to get rid of medicine, called disposal kiosks. Medicine disposal options are also available in every [facility]. Search your [facility] and  medication disposal  to find more options. You can find more details at:  https://[email or web address].[place]./living-green/managing-unwanted-medications     I agree that my provider, clinic care team, and pharmacy may work with any city, state or federal law enforcement agency that investigates the misuse, sale, or other diversion of my controlled medicine. I will allow my provider to discuss my care with, or share a copy of, this agreement with any other treating provider, pharmacy or emergency room where I receive care.    I have read this agreement and have asked questions about anything I did not understand.    _______________________________________________________  Patient Signature - Rober Renee _____________________                   Date     _______________________________________________________  Provider Signature - ZEKE SAMANIEGO NP   _____________________                   Date     _______________________________________________________  Witness Signature (required if provider not present while patient signing)   _____________________                   Date
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
## 2023-06-28 LAB
CHOLEST SERPL-MCNC: 171 MG/DL
FASTING STATUS PATIENT QL REPORTED: NORMAL
GLUCOSE SERPL-MCNC: 91 MG/DL (ref 70–99)
HDLC SERPL-MCNC: 80 MG/DL
LDLC SERPL CALC-MCNC: 57 MG/DL
NONHDLC SERPL-MCNC: 91 MG/DL
TRIGL SERPL-MCNC: 171 MG/DL
TSH SERPL DL<=0.005 MIU/L-ACNC: 1.93 UIU/ML (ref 0.3–4.2)

## 2023-06-28 NOTE — RESULT ENCOUNTER NOTE
Alan Richter,    Thank you for your recent office visit.    Here are your recent results.  Normal blood labs.  Keep up the good work with your regular exercise/ diet. Platelets consistent with previous, otherwise normal CBC.     Feel free to contact me via [employer] or call the clinic at [phone].    Sincerely,    ANTONIO Alaniz, FNP-BC

## 2023-06-29 ENCOUNTER — MYC MEDICAL ADVICE (OUTPATIENT)
Dept: FAMILY MEDICINE | Facility: CLINIC | Age: 43
End: 2023-06-29
Payer: COMMERCIAL

## 2023-06-29 NOTE — TELEPHONE ENCOUNTER
Called pharmacy to confirm that 6/27/23 script for oxyCODONE IR (ROXICODONE) 10 MG tablet is to soon for refill because pt filled the last one 6/13/23. Pt can  7/11/23 for the next refill per pharmacy. Pt informed via [employer] message.    Kaila Chung RN

## 2023-07-14 ENCOUNTER — MYC REFILL (OUTPATIENT)
Dept: FAMILY MEDICINE | Facility: CLINIC | Age: 43
End: 2023-07-14
Payer: COMMERCIAL

## 2023-07-14 DIAGNOSIS — M48.02 FORAMINAL STENOSIS OF CERVICAL REGION: ICD-10-CM

## 2023-07-14 DIAGNOSIS — M50.20 HERNIATED CERVICAL INTERVERTEBRAL DISC: ICD-10-CM

## 2023-07-14 RX ORDER — OXYCODONE HYDROCHLORIDE 10 MG/1
5 TABLET ORAL DAILY PRN
Qty: 15 TABLET | Refills: 0 | Status: SHIPPED | OUTPATIENT
Start: 2023-07-14 | End: 2023-08-15

## 2023-07-19 ENCOUNTER — PATIENT OUTREACH (OUTPATIENT)
Dept: CARE COORDINATION | Facility: CLINIC | Age: 43
End: 2023-07-19
Payer: COMMERCIAL

## 2023-07-19 NOTE — PROGRESS NOTES
Clinic Care Coordination Contact  Program:  [facility]: [place]   Renewal: [employer]ARE   Date Applied:     [place] Outreach:   7/19/23: 1st outreach attempt. Left a message on voicemail with call back information and requested return call.  Plan: CTA will call again within 2 weeks.  Maryellen Fitzpatrick  Care   [facility]  Clinic Care Coordination  [phone]      Health Insurance:      Referral/Screening:

## 2023-07-25 ENCOUNTER — PATIENT OUTREACH (OUTPATIENT)
Dept: CARE COORDINATION | Facility: CLINIC | Age: 43
End: 2023-07-25
Payer: COMMERCIAL

## 2023-07-25 NOTE — PROGRESS NOTES
Clinic Care Coordination Contact  Program:  [facility]: [place]   Renewal: UCARE   Date Applied:     [facility] Outreach:   7/25/23: 2nd outreach attempt. Left message on voicemail indicating last outreach attempt. CTA left [facility] number for renewal follow up.  Plan: CTA will no longer make outreach   Maryellen Mac   [facility]  Clinic Care Coordination  [phone]    7/19/23: 1st outreach attempt. Left a message on voicemail with call back information and requested return call.  Plan: CTA will call again within 2 weeks.  Maryellen Mac   [facility]  Clinic Care Coordination  [phone]      Health Insurance:      Referral/Screening:

## 2023-08-15 ENCOUNTER — MYC REFILL (OUTPATIENT)
Dept: FAMILY MEDICINE | Facility: CLINIC | Age: 43
End: 2023-08-15
Payer: COMMERCIAL

## 2023-08-15 DIAGNOSIS — M48.02 FORAMINAL STENOSIS OF CERVICAL REGION: ICD-10-CM

## 2023-08-15 DIAGNOSIS — M50.20 HERNIATED CERVICAL INTERVERTEBRAL DISC: ICD-10-CM

## 2023-08-16 RX ORDER — OXYCODONE HYDROCHLORIDE 10 MG/1
5 TABLET ORAL DAILY PRN
Qty: 15 TABLET | Refills: 0 | Status: SHIPPED | OUTPATIENT
Start: 2023-08-16 | End: 2023-09-19

## 2023-09-19 ENCOUNTER — MYC REFILL (OUTPATIENT)
Dept: FAMILY MEDICINE | Facility: CLINIC | Age: 43
End: 2023-09-19
Payer: COMMERCIAL

## 2023-09-19 DIAGNOSIS — M50.20 HERNIATED CERVICAL INTERVERTEBRAL DISC: ICD-10-CM

## 2023-09-19 DIAGNOSIS — M48.02 FORAMINAL STENOSIS OF CERVICAL REGION: ICD-10-CM

## 2023-09-19 RX ORDER — OXYCODONE HYDROCHLORIDE 10 MG/1
5 TABLET ORAL DAILY PRN
Qty: 15 TABLET | Refills: 0 | Status: SHIPPED | OUTPATIENT
Start: 2023-09-19 | End: 2023-10-12

## 2023-10-09 DIAGNOSIS — F41.0 PANIC ATTACK: ICD-10-CM

## 2023-10-09 DIAGNOSIS — M79.2 RADICULAR PAIN IN RIGHT ARM: ICD-10-CM

## 2023-10-09 DIAGNOSIS — M79.18 MYOFASCIAL PAIN: ICD-10-CM

## 2023-10-09 DIAGNOSIS — M54.2 NECK PAIN: ICD-10-CM

## 2023-10-09 DIAGNOSIS — G44.86 CERVICOGENIC HEADACHE: ICD-10-CM

## 2023-10-09 RX ORDER — ALPRAZOLAM 0.5 MG
0.5 TABLET ORAL 2 TIMES DAILY PRN
Qty: 60 TABLET | Refills: 0 | Status: SHIPPED | OUTPATIENT
Start: 2023-10-09 | End: 2024-01-11

## 2023-10-09 NOTE — TELEPHONE ENCOUNTER
Name from pharmacy: BACLOFEN 10MG TABS          Will file in chart as: baclofen (LIORESAL) 10 MG tablet    Sig: TAKE 1/4 TABLET (2.5MG) DAILY FOR 3 DAYS, THEN TAKE 1/4 TABLET (2.5MG) TWICE A DAY FOR 3 DAYS, THEN TAKE 1/4 TABLET (2.5MG) IN THE MORNING & 1/2 TABLET (5MG) IN THE EVENING FOR 3 DAYS, THEN TAKE 1/2 TAB(5MG) TWICE A DAY. IF TOLERATING, GRADUALLY INCREASE TO 1 TAB(10MG) TWICE A DAY    Disp: 60 tablet    Refills: 1    Start: 10/9/2023    Class: E-Prescribe    Non-formulary For: Cervicogenic headache, Myofascial pain, Neck pain, Radicular pain in right arm    Last ordered: 4 months ago by ANTONIO Godinez CNP Last refill: 8/9/2023    Rx #: [id number]       To be filled at: [facility] [place] - [address] [address]

## 2023-10-09 NOTE — TELEPHONE ENCOUNTER
Received fax from pharmacy requesting refill(s) for     baclofen (LIORESAL) 10 MG tablet     Last refilled on 8/9/2023.    Pt last seen on 6/8/2023.  Next appt scheduled for NONE.    E-prescribe to:    [facility] - [address] [address] [facility] [place]     Will facilitate refill.

## 2023-10-10 RX ORDER — BACLOFEN 10 MG/1
10 TABLET ORAL 2 TIMES DAILY
Qty: 60 TABLET | Refills: 0 | Status: SHIPPED | OUTPATIENT
Start: 2023-10-10 | End: 2024-01-10

## 2023-10-10 NOTE — TELEPHONE ENCOUNTER
Follow-up scheduled for 11/1/2023 at 1100.     Jyotsna Garber RN  [facility]  [phone]

## 2023-10-12 ENCOUNTER — MYC REFILL (OUTPATIENT)
Dept: FAMILY MEDICINE | Facility: CLINIC | Age: 43
End: 2023-10-12
Payer: COMMERCIAL

## 2023-10-12 DIAGNOSIS — M50.20 HERNIATED CERVICAL INTERVERTEBRAL DISC: ICD-10-CM

## 2023-10-12 DIAGNOSIS — M48.02 FORAMINAL STENOSIS OF CERVICAL REGION: ICD-10-CM

## 2023-10-13 RX ORDER — OXYCODONE HYDROCHLORIDE 10 MG/1
5 TABLET ORAL DAILY PRN
Qty: 15 TABLET | Refills: 0 | Status: SHIPPED | OUTPATIENT
Start: 2023-10-13 | End: 2023-11-22

## 2023-11-19 ENCOUNTER — MYC REFILL (OUTPATIENT)
Dept: FAMILY MEDICINE | Facility: CLINIC | Age: 43
End: 2023-11-19
Payer: COMMERCIAL

## 2023-11-19 DIAGNOSIS — M50.20 HERNIATED CERVICAL INTERVERTEBRAL DISC: ICD-10-CM

## 2023-11-19 DIAGNOSIS — M48.02 FORAMINAL STENOSIS OF CERVICAL REGION: ICD-10-CM

## 2023-11-21 RX ORDER — OXYCODONE HYDROCHLORIDE 10 MG/1
5 TABLET ORAL DAILY PRN
Qty: 15 TABLET | Refills: 0 | OUTPATIENT
Start: 2023-11-21

## 2023-11-21 NOTE — TELEPHONE ENCOUNTER
Patient needs follow-up visit for further refills of controlled medication.  Please notify.ANTONIO Alaniz, FNP-BC

## 2023-11-22 ENCOUNTER — E-VISIT (OUTPATIENT)
Dept: FAMILY MEDICINE | Facility: CLINIC | Age: 43
End: 2023-11-22
Payer: COMMERCIAL

## 2023-11-22 DIAGNOSIS — M50.20 HERNIATED CERVICAL INTERVERTEBRAL DISC: ICD-10-CM

## 2023-11-22 DIAGNOSIS — G43.101 MIGRAINE WITH AURA AND WITH STATUS MIGRAINOSUS, NOT INTRACTABLE: ICD-10-CM

## 2023-11-22 DIAGNOSIS — G43.001 MIGRAINE WITHOUT AURA AND WITH STATUS MIGRAINOSUS, NOT INTRACTABLE: ICD-10-CM

## 2023-11-22 DIAGNOSIS — M48.02 FORAMINAL STENOSIS OF CERVICAL REGION: ICD-10-CM

## 2023-11-22 PROCEDURE — 99207 PR NON-BILLABLE SERV PER CHARTING: CPT | Performed by: NURSE PRACTITIONER

## 2023-11-22 RX ORDER — RIZATRIPTAN BENZOATE 10 MG/1
10 TABLET ORAL
Qty: 10 TABLET | Refills: 0 | Status: CANCELLED | OUTPATIENT
Start: 2023-11-22

## 2023-11-22 RX ORDER — OXYCODONE HYDROCHLORIDE 10 MG/1
5 TABLET ORAL DAILY PRN
Qty: 15 TABLET | Refills: 0 | Status: SHIPPED | OUTPATIENT
Start: 2023-11-22 | End: 2023-12-27

## 2023-12-06 ENCOUNTER — OFFICE VISIT (OUTPATIENT)
Dept: PALLIATIVE MEDICINE | Facility: CLINIC | Age: 43
End: 2023-12-06
Payer: COMMERCIAL

## 2023-12-06 VITALS — SYSTOLIC BLOOD PRESSURE: 142 MMHG | DIASTOLIC BLOOD PRESSURE: 80 MMHG | HEART RATE: 86 BPM

## 2023-12-06 DIAGNOSIS — G44.86 CERVICOGENIC HEADACHE: ICD-10-CM

## 2023-12-06 DIAGNOSIS — M54.81 BILATERAL OCCIPITAL NEURALGIA: ICD-10-CM

## 2023-12-06 PROCEDURE — 64405 NJX AA&/STRD GR OCPL NRV: CPT | Mod: 50 | Performed by: PAIN MEDICINE

## 2023-12-06 RX ADMIN — BUPIVACAINE HYDROCHLORIDE 25 MG: 2.5 INJECTION, SOLUTION INFILTRATION; PERINEURAL at 12:13

## 2023-12-06 RX ADMIN — DEXAMETHASONE SODIUM PHOSPHATE 10 MG: 10 INJECTION, SOLUTION INTRAMUSCULAR; INTRAVENOUS at 12:14

## 2023-12-06 NOTE — PATIENT INSTRUCTIONS
[facility]  Post Procedure Instructions    Today you had:  trigger point injections   occipital nerve block   bursa injection  Joint Injection    Medications used:  lidocaine   bupivacaine   kenalog   dexamethasone        Go to the emergency room if you develop any shortness of breath  Monitor the injection sites for signs and symptoms of infection-fever, chills, redness, swelling, warmth, or drainage to areas.  You may have soreness at injection sites for up to 24 hours.  It may take up to 14 days for the steroid medication to start working although you may feel the effect as early as a few days after the procedure.     You may apply ice to the painful areas to help minimize the discomfort of the needle pokes.  Do not apply heat to sites for at least 12 hours.  You may use anti-inflammatory medications or Tylenol for pain control if necessary    Pain Clinic phone number during work hours (Monday through Friday 8 am-4:30 pm) at [phone] or the Provider Line after hours at [phone]:

## 2023-12-07 RX ORDER — BUPIVACAINE HYDROCHLORIDE 2.5 MG/ML
10 INJECTION, SOLUTION INFILTRATION; PERINEURAL ONCE
Status: COMPLETED | OUTPATIENT
Start: 2023-12-06 | End: 2023-12-06

## 2023-12-07 RX ORDER — DEXAMETHASONE SODIUM PHOSPHATE 10 MG/ML
10 INJECTION, SOLUTION INTRAMUSCULAR; INTRAVENOUS ONCE
Status: COMPLETED | OUTPATIENT
Start: 2023-12-06 | End: 2023-12-06

## 2023-12-07 NOTE — PROGRESS NOTES
Pre procedure Diagnosis: occipital neuralgia   Post procedure Diagnosis: Same  Procedure performed: bilateral occipital nerve block  Anesthesia: none  Complications: none  Operators: Donnie durbin MD     Indications:   Rober Renee is a 43 year old female with a history of bilateral occipital HA. Pt has tried conservative treatment including meds/pt.    Options/alternatives, benefits and risks were discussed with the patient including bleeding, infection, hematoma, nerve damage, stroke, and spinal cord injury.  Questions were answered to her satisfaction and she agrees to proceed. Voluntary informed consent was obtained and signed.     Vitals were reviewed: Yes  Allergies were reviewed:  Yes   Medications were reviewed:  Yes   Pre-procedure pain score: 9/10    Procedure:  After getting informed consent, a Pause for the Cause was performed.    The occipital ridge, occipital protuberance, and mastoid process were palpated on the bilaterally/ The location of maximal tenderness which was consistent with the location of the occipital nerve was palpated.  The area was cleaned.    Palpation for a pulse was completed, with no pulse noted at the site of the injection.  A 25G, 1.5 inch needle was introduced, aimed cephalad, in the superficial tissues at this area of tenderness.  The injection was completed at this location, fanning in 5 different directions.  Aspiration for heme was negative before all injections.    In total, 5ml of 0.25% bupivacaine ggm86td dexamethasone was injected.     The patient tolerated the procedure well, hemostasis was achieved.      Post-procedure pain score: numb/10  Follow-up includes:   -f/u with provider  -schedule prn, can be double booked    Donnie Durbin MD  [facility]

## 2023-12-27 ENCOUNTER — MYC REFILL (OUTPATIENT)
Dept: FAMILY MEDICINE | Facility: CLINIC | Age: 43
End: 2023-12-27
Payer: COMMERCIAL

## 2023-12-27 DIAGNOSIS — M48.02 FORAMINAL STENOSIS OF CERVICAL REGION: ICD-10-CM

## 2023-12-27 DIAGNOSIS — M50.20 HERNIATED CERVICAL INTERVERTEBRAL DISC: ICD-10-CM

## 2023-12-27 RX ORDER — OXYCODONE HYDROCHLORIDE 10 MG/1
5 TABLET ORAL DAILY PRN
Qty: 15 TABLET | Refills: 0 | Status: SHIPPED | OUTPATIENT
Start: 2023-12-27 | End: 2024-01-10

## 2024-01-10 ENCOUNTER — VIRTUAL VISIT (OUTPATIENT)
Dept: FAMILY MEDICINE | Facility: CLINIC | Age: 44
End: 2024-01-10
Payer: COMMERCIAL

## 2024-01-10 DIAGNOSIS — M48.02 FORAMINAL STENOSIS OF CERVICAL REGION: ICD-10-CM

## 2024-01-10 DIAGNOSIS — D69.1 PLATELET DYSFUNCTION (H): ICD-10-CM

## 2024-01-10 DIAGNOSIS — M54.2 NECK PAIN: ICD-10-CM

## 2024-01-10 DIAGNOSIS — M79.18 MYOFASCIAL PAIN: ICD-10-CM

## 2024-01-10 DIAGNOSIS — G44.86 CERVICOGENIC HEADACHE: ICD-10-CM

## 2024-01-10 DIAGNOSIS — G43.001 MIGRAINE WITHOUT AURA AND WITH STATUS MIGRAINOSUS, NOT INTRACTABLE: ICD-10-CM

## 2024-01-10 DIAGNOSIS — F33.1 MAJOR DEPRESSIVE DISORDER, RECURRENT EPISODE, MODERATE (H): Primary | ICD-10-CM

## 2024-01-10 DIAGNOSIS — M79.2 RADICULAR PAIN IN RIGHT ARM: ICD-10-CM

## 2024-01-10 DIAGNOSIS — F43.10 PTSD (POST-TRAUMATIC STRESS DISORDER): ICD-10-CM

## 2024-01-10 DIAGNOSIS — G89.4 CHRONIC PAIN SYNDROME: ICD-10-CM

## 2024-01-10 DIAGNOSIS — G43.101 MIGRAINE WITH AURA AND WITH STATUS MIGRAINOSUS, NOT INTRACTABLE: ICD-10-CM

## 2024-01-10 DIAGNOSIS — M53.3 TAIL BONE PAIN: ICD-10-CM

## 2024-01-10 DIAGNOSIS — M50.20 HERNIATED CERVICAL INTERVERTEBRAL DISC: ICD-10-CM

## 2024-01-10 DIAGNOSIS — F41.0 PANIC ATTACK: ICD-10-CM

## 2024-01-10 PROCEDURE — 99214 OFFICE O/P EST MOD 30 MIN: CPT | Mod: 95 | Performed by: NURSE PRACTITIONER

## 2024-01-10 RX ORDER — BACLOFEN 10 MG/1
10 TABLET ORAL 2 TIMES DAILY
Qty: 60 TABLET | Refills: 0 | Status: SHIPPED | OUTPATIENT
Start: 2024-01-10 | End: 2024-04-18

## 2024-01-10 RX ORDER — RIZATRIPTAN BENZOATE 10 MG/1
10 TABLET ORAL
Qty: 10 TABLET | Refills: 0 | Status: SHIPPED | OUTPATIENT
Start: 2024-01-10 | End: 2024-02-21

## 2024-01-10 RX ORDER — OXYCODONE HYDROCHLORIDE 10 MG/1
5 TABLET ORAL DAILY PRN
Qty: 15 TABLET | Refills: 0 | Status: SHIPPED | OUTPATIENT
Start: 2024-01-10 | End: 2024-01-16

## 2024-01-10 RX ORDER — DULOXETIN HYDROCHLORIDE 30 MG/1
CAPSULE, DELAYED RELEASE ORAL
Qty: 187 CAPSULE | Refills: 1 | Status: SHIPPED | OUTPATIENT
Start: 2024-01-10 | End: 2024-04-03

## 2024-01-10 NOTE — PROGRESS NOTES
Rober is a 43 year old who is being evaluated via a billable video visit.      How would you like to obtain your AVS? MyChart  If the video visit is dropped, the invitation should be resent by: Text to cell phone: [phone]  Will anyone else be joining your video visit? No          Assessment & Plan     Major depressive disorder, recurrent episode, moderate (H)    - DULoxetine (CYMBALTA) 30 MG capsule; Take 1 capsule (30 mg) by mouth daily for 7 days, THEN 1 capsule (30 mg) 2 times daily for 90 days.  - Adult Mental Health  Referral; Future    Platelet dysfunction (H)      Herniated cervical intervertebral disc    - DULoxetine (CYMBALTA) 30 MG capsule; Take 1 capsule (30 mg) by mouth daily for 7 days, THEN 1 capsule (30 mg) 2 times daily for 90 days.  - oxyCODONE IR (ROXICODONE) 10 MG tablet; Take 0.5 tablets (5 mg) by mouth daily as needed for severe pain    Foraminal stenosis of cervical region    - DULoxetine (CYMBALTA) 30 MG capsule; Take 1 capsule (30 mg) by mouth daily for 7 days, THEN 1 capsule (30 mg) 2 times daily for 90 days.  - oxyCODONE IR (ROXICODONE) 10 MG tablet; Take 0.5 tablets (5 mg) by mouth daily as needed for severe pain    Migraine without aura and with status migrainosus, not intractable    - DULoxetine (CYMBALTA) 30 MG capsule; Take 1 capsule (30 mg) by mouth daily for 7 days, THEN 1 capsule (30 mg) 2 times daily for 90 days.  - rizatriptan (MAXALT) 10 MG tablet; Take 1 tablet (10 mg) by mouth at onset of headache for migraine May repeat in 2 hours. Max 3 tablets/24 hours.    Tail bone pain    - DULoxetine (CYMBALTA) 30 MG capsule; Take 1 capsule (30 mg) by mouth daily for 7 days, THEN 1 capsule (30 mg) 2 times daily for 90 days.    PTSD (post-traumatic stress disorder)    - Adult Mental Health  Referral; Future    Chronic pain syndrome    - Adult Mental Health  Referral; Future    Migraine with aura and with status migrainosus, not intractable    - ubrogepant  (UBRELVY) 50 MG tablet; Take 1-2 tablets ( mg) by mouth daily as needed (migraine)    Cervicogenic headache    - baclofen (LIORESAL) 10 MG tablet; Take 1 tablet (10 mg) by mouth 2 times daily    Myofascial pain    - baclofen (LIORESAL) 10 MG tablet; Take 1 tablet (10 mg) by mouth 2 times daily    Neck pain    - baclofen (LIORESAL) 10 MG tablet; Take 1 tablet (10 mg) by mouth 2 times daily    Radicular pain in right arm    - baclofen (LIORESAL) 10 MG tablet; Take 1 tablet (10 mg) by mouth 2 times daily    Review of external notes as documented elsewhere in note  Ordering of each unique test  Prescription drug management  30 minutes spent by me on the date of the encounter doing chart review, history and exam, documentation and further activities per the note     CONSULTATION/REFERRAL to mental health, will place referral for ketamine treatment once location near her home that is covered found  See Patient Instructions    ZEKE SAAMNIEGO, [facility] BLANIE Richter is a 43 year old, presenting for the following health issues:  Depression        1/10/2024     9:39 AM   Additional Questions   Roomed by Keeley     Has tried lexapro, celexa, zoloft, gabapentin, clonazepam, xanax.  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  Mood great at work as she is very busy; overworked.  Feels like all she does is cry when she is at home.  History of depression has tried several antidepressants that did not help.  Also dealing with chronic back and headache pain.  Seeing pain management believes headaches are caused by neck injury.  Has tried medical marijuana that does not help.   recommended she speak to a therapist for her PTSD.  Will place referral today.  Discussed trying duloxetine which can help depression and pain.  She is thinking about wanting to try ketamine as this has helped her sisters resistant depression.  She will check with insurance to see where she is covered  near her home and let us know where to place referral.  Discussed not giving up, tips given on after visit summary.  If feeling worse reach out.  Self-care discussed, make time for activities of enjoyment and rest.    HPI     Depression Followup  How are you doing with your depression since your last visit? Dealing with it, but it has gotten worse   Are you having other symptoms that might be associated with depression? Cries every day from the moment she wakes up to when she goes to bed   Have you had a significant life event?  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   Yes:  every day  Do you have any concerns with your use of alcohol or other drugs? No    Social History     Tobacco Use    Smoking status: Former     Packs/day: 0.50     Years: 10.00     Additional pack years: 0.00     Total pack years: 5.00     Types: Cigarettes     Quit date: 2/20/2008     Years since quitting: 15.8     Passive exposure: Past    Smokeless tobacco: Never   Vaping Use    Vaping Use: Never used   Substance Use Topics    Alcohol use: Yes     Comment: very seldom    Drug use: No         3/23/2023     1:12 PM 6/8/2023     3:03 PM 6/27/2023     4:38 PM   PHQ   PHQ-9 Total Score 13 4 0   Q9: Thoughts of better off dead/self-harm past 2 weeks Several days Not at all Not at all   F/U: Thoughts of suicide or self-harm No     F/U: Safety concerns No           12/7/2022     9:13 AM 1/12/2023     1:41 PM 6/27/2023     4:39 PM   JACOB-7 SCORE   Total Score 18 (severe anxiety) 18 (severe anxiety) 9 (mild anxiety)   Total Score 18 18 9         6/27/2023     4:38 PM   Last PHQ-9   1.  Little interest or pleasure in doing things 0   2.  Feeling down, depressed, or hopeless 0   3.  Trouble falling or staying asleep, or sleeping too much 0   4.  Feeling tired or having little energy 0   5.  Poor appetite or overeating 0   6.  Feeling bad about yourself 0   7.   Trouble concentrating 0   8.  Moving slowly or restless 0   Q9: Thoughts of better off dead/self-harm past 2 weeks 0   PHQ-9 Total Score 0         6/27/2023     4:39 PM   JACOB-7    1. Feeling nervous, anxious, or on edge 2   2. Not being able to stop or control worrying 2   3. Worrying too much about different things 2   4. Trouble relaxing 1   5. Being so restless that it is hard to sit still 0   6. Becoming easily annoyed or irritable 1   7. Feeling afraid, as if something awful might happen 1   JACOB-7 Total Score 9   If you checked any problems, how difficult have they made it for you to do your work, take care of things at home, or get along with other people? Somewhat difficult       Suicide Assessment Five-step Evaluation and Treatment (SAFE-T)          Review of Systems   Constitutional, HEENT, cardiovascular, pulmonary, GI, , musculoskeletal, neuro, skin, endocrine and psych systems are negative, except as otherwise noted.      Objective           Vitals:  No vitals were obtained today due to virtual visit.    Physical Exam   GENERAL: Healthy, alert and no distress  EYES: Eyes grossly normal to inspection.  No discharge or erythema, or obvious scleral/conjunctival abnormalities.  RESP: No audible wheeze, cough, or visible cyanosis.  No visible retractions or increased work of breathing.    SKIN: Visible skin clear. No significant rash, abnormal pigmentation or lesions.  NEURO: Cranial nerves grossly intact.  Mentation and speech appropriate for age.  PSYCH: Mentation appears normal, affect normal/bright, judgement and insight intact, normal speech and appearance well-groomed.    See orders      Video-Visit Details    Type of service:  Video Visit   Originating Location (pt. Location): Home    Distant Location (provider location):  Off-site  Platform used for Video Visit: DecoSnap

## 2024-01-11 RX ORDER — ALPRAZOLAM 0.5 MG
0.5 TABLET ORAL 2 TIMES DAILY PRN
Qty: 60 TABLET | Refills: 2 | Status: SHIPPED | OUTPATIENT
Start: 2024-01-11 | End: 2024-01-16

## 2024-01-12 ENCOUNTER — TELEPHONE (OUTPATIENT)
Dept: FAMILY MEDICINE | Facility: CLINIC | Age: 44
End: 2024-01-12

## 2024-01-12 ENCOUNTER — E-VISIT (OUTPATIENT)
Dept: FAMILY MEDICINE | Facility: CLINIC | Age: 44
End: 2024-01-12
Payer: COMMERCIAL

## 2024-01-12 DIAGNOSIS — G43.101 MIGRAINE WITH AURA AND WITH STATUS MIGRAINOSUS, NOT INTRACTABLE: ICD-10-CM

## 2024-01-12 DIAGNOSIS — G44.86 CERVICOGENIC HEADACHE: ICD-10-CM

## 2024-01-12 DIAGNOSIS — M48.02 FORAMINAL STENOSIS OF CERVICAL REGION: Primary | ICD-10-CM

## 2024-01-12 PROCEDURE — 99207 PR NON-BILLABLE SERV PER CHARTING: CPT | Performed by: NURSE PRACTITIONER

## 2024-01-12 NOTE — LETTER
2024      ATTN: Appeal Department  Re: Prior Authorization Request  Patient: Rober Renee  Policy ID#:  [id number]   : 1980        To Whom it May Concern:        I am writing to formally request a prior authorization of coverage for my patient,  Rober Renee for treatment using UBRELVY 50 MG PO TABS  .     I have prescribed UBRELVY 50 MG PO TABS  to be taken as: Take 1-2 tablets ( mg) by mouth daily as needed (migraine), Disp-20 tablet   .      She tried and failed imitrex, and had an allergic reaction. All medication insurance recommending she try come up as allergy         Thank you for reconsidering.      Please fax you decision to [phone]                              Sincerely,        ZEKE SAMANIEGO NP

## 2024-01-16 ENCOUNTER — MYC REFILL (OUTPATIENT)
Dept: FAMILY MEDICINE | Facility: CLINIC | Age: 44
End: 2024-01-16
Payer: COMMERCIAL

## 2024-01-16 ENCOUNTER — MYC REFILL (OUTPATIENT)
Dept: PALLIATIVE MEDICINE | Facility: CLINIC | Age: 44
End: 2024-01-16
Payer: COMMERCIAL

## 2024-01-16 DIAGNOSIS — F41.0 PANIC ATTACK: ICD-10-CM

## 2024-01-16 DIAGNOSIS — M50.20 HERNIATED CERVICAL INTERVERTEBRAL DISC: ICD-10-CM

## 2024-01-16 DIAGNOSIS — G89.29 CHRONIC LEFT SHOULDER PAIN: ICD-10-CM

## 2024-01-16 DIAGNOSIS — M48.02 FORAMINAL STENOSIS OF CERVICAL REGION: ICD-10-CM

## 2024-01-16 DIAGNOSIS — M25.512 CHRONIC LEFT SHOULDER PAIN: ICD-10-CM

## 2024-01-16 RX ORDER — OXYCODONE HYDROCHLORIDE 10 MG/1
5 TABLET ORAL DAILY PRN
Qty: 15 TABLET | Refills: 0 | Status: SHIPPED | OUTPATIENT
Start: 2024-01-16 | End: 2024-01-24

## 2024-01-16 RX ORDER — PREDNISONE 20 MG/1
40 TABLET ORAL DAILY
Qty: 10 TABLET | Refills: 0 | Status: SHIPPED | OUTPATIENT
Start: 2024-01-16 | End: 2024-01-21

## 2024-01-16 RX ORDER — ALPRAZOLAM 0.5 MG
0.5 TABLET ORAL 2 TIMES DAILY PRN
Qty: 60 TABLET | Refills: 2 | Status: SHIPPED | OUTPATIENT
Start: 2024-01-16 | End: 2024-01-18

## 2024-01-17 DIAGNOSIS — F41.0 PANIC ATTACK: ICD-10-CM

## 2024-01-18 ENCOUNTER — MYC MEDICAL ADVICE (OUTPATIENT)
Dept: PALLIATIVE MEDICINE | Facility: CLINIC | Age: 44
End: 2024-01-18
Payer: COMMERCIAL

## 2024-01-18 RX ORDER — ALPRAZOLAM 0.5 MG
TABLET ORAL
Qty: 60 TABLET | Refills: 2 | Status: SHIPPED | OUTPATIENT
Start: 2024-01-18 | End: 2024-07-29

## 2024-01-19 NOTE — TELEPHONE ENCOUNTER
Patient also sent [facility] on 1/18/2024 requesting appointment.     Information to schedule communicated on that encounter.     Jyotsna Garber RN  [facility] [place] [facility] [place]  [phone]

## 2024-01-19 NOTE — TELEPHONE ENCOUNTER
Chart reviewed - Request appears appropriate. One refill authorized. She is overdue for follow up, if I am prescribing any medications for her. She might consider purchasing OTC or could discuss refills with PCP.     Leslie Galindo DNP, APRN, AGNP-C  [facility]

## 2024-01-20 NOTE — TELEPHONE ENCOUNTER
PA Initiation    Medication: UBRELVY 50 MG PO TABS  Insurance Company: [employer] - Phone [phone] Fax [phone]  Pharmacy Filling the Rx: [facility] - [id number] [facility]  Filling Pharmacy Phone: [phone]  Filling Pharmacy Fax: [phone]  Start Date: 1/19/2024

## 2024-01-24 DIAGNOSIS — M50.20 HERNIATED CERVICAL INTERVERTEBRAL DISC: ICD-10-CM

## 2024-01-24 DIAGNOSIS — M48.02 FORAMINAL STENOSIS OF CERVICAL REGION: ICD-10-CM

## 2024-01-24 RX ORDER — OXYCODONE HYDROCHLORIDE 10 MG/1
5 TABLET ORAL DAILY PRN
Qty: 15 TABLET | Refills: 0 | Status: SHIPPED | OUTPATIENT
Start: 2024-01-24 | End: 2024-02-21

## 2024-01-24 NOTE — TELEPHONE ENCOUNTER
She tried and failed imitrex, and had an allergic reaction. All of the meds they are recommending she try come up as allergy. ANTONIO Alaniz, FNP-BC

## 2024-01-24 NOTE — TELEPHONE ENCOUNTER
Action Breefany Worthington on 1/24/2024 at 10:27 AM   Action Taken Attempted to call Pt to ask if seen at any other facilities for headaches. No answer, left a VMLima Gutierrezfany Worthington on 2/19/2024 at 2:45 PM Attempted to call Pt again. No answer left ALEXIS GARCIA     RECORDS RECEIVED FROM: Internal    REASON FOR VISIT: Migraine with aura and with status migrainosus, not intractable [G43.101]  Cervicogenic headache [G44.86]      Date of Appt: 4/3/24 @ 1:30 pm    NOTES (FOR ALL VISITS) STATUS DETAILS   OFFICE NOTE from referring provider Internal 1/12/24, 11/22/23 (E-visit), 6/27/23 Serena Ojeda NP @[facility]    See Additional Encounters    OFFICE NOTE from other specialist Internal 12/6/23 Donnie Durbin MD @[facility]    6/8/23 Leslie Galindo APRN CNP @[facility]     MEDICATION LIST Internal    IMAGING  (FOR ALL VISITS)     X-RAY Internal [facility]  12/13/21 XR Cervical Spine     MRI (HEAD, NECK, SPINE) Internal [facility]  10/20/21 MR Cervical Spine

## 2024-01-24 NOTE — TELEPHONE ENCOUNTER
PRIOR AUTHORIZATION DENIED    Medication: UBRELVY 50 MG PO TABS    Insurance Company: [employer] - Phone [phone] Fax [phone]    Denial Date: 1/23/2024    Denial Reason(s): Patient needs to try and fail 1 more preferred triptan     Appeal Information:

## 2024-01-29 NOTE — TELEPHONE ENCOUNTER
It was provided that patient has allergy to imitrex.  Per insurance plan patient needs to try one more triptan. We would need a letter of medical necessity for the appeal.  There doesn't appear to be any information in the chart as to why patient cannot use other triptans.

## 2024-01-29 NOTE — TELEPHONE ENCOUNTER
Triptans are a class of medication that are in the same family. If allergic to one, she can be allergic to all. ANTONIO Alaniz, FNP-BC

## 2024-01-31 NOTE — TELEPHONE ENCOUNTER
Please send appeal letter: patient cannot take triptans for migraine headaches due to drug allergy to imitrex. ANTONIO Alaniz, FNP-BC

## 2024-02-01 NOTE — TELEPHONE ENCOUNTER
Medication Appeal Initiation    We have initiated an appeal for the requested medication:  Medication: ubrogepant (UBRELVY) 50 MG tablet--DENIED  Appeal Start Date:  2/1/2024  Insurance Company:    Comments:       Faxed letter to [employer], no appeal fax given. Just faxed to [employer] [phone] and

## 2024-02-02 NOTE — TELEPHONE ENCOUNTER
Clinic please advise-    Insurance is requiring patient fill this  medication through [facility].  Clinic can E-scribe to [facility] in [facility] or call [phone].  Patient must also sign up for a  copay card.

## 2024-02-02 NOTE — TELEPHONE ENCOUNTER
MEDICATION APPEAL APPROVED    Medication: ubrogepant (UBRELVY) 50 MG tablet--DENIED  Authorization Effective Date:  2/2/2024  Authorization Expiration Date: ONGOING   Approved Dose/Quantity:   Reference #:     Insurance Company:    Expected CoPay:       CoPay Card Available:      Foundation Assistance Needed:    Which Pharmacy is filling the prescription (Not needed for infusion/clinic administered): [facility] - [id number] [facility] [place]

## 2024-02-05 ENCOUNTER — TELEPHONE (OUTPATIENT)
Dept: FAMILY MEDICINE | Facility: CLINIC | Age: 44
End: 2024-02-05
Payer: COMMERCIAL

## 2024-02-05 DIAGNOSIS — G43.101 MIGRAINE WITH AURA AND WITH STATUS MIGRAINOSUS, NOT INTRACTABLE: ICD-10-CM

## 2024-02-05 NOTE — TELEPHONE ENCOUNTER
Serena,    From the notes from the PA denial and appeal, it appears that Ubrelvy will only be allowed if filled at [facility].  I have copied and pasted the notes below from the Prior Authorization denial chat.  Please resend to [facility] pharmacy and let the patient know.    Thank You.    Please reach out to the pharmacy at [phone] if you have questions.         Ubrelvy appeal has been approved with a max qty of 10 per 30 days.  Please see below, this Rx will need to be sent to [facility], cannot fill through [place] pharmacy.  Thank you  [facility]    2/2/24  2:48 PM  Note  Clinic please advise-     Insurance is requiring patient fill this  medication through [facility].  Clinic can E-scribe to [facility] in [facility] or call [phone].  Patient must also sign up for a  copay card.

## 2024-02-19 ENCOUNTER — TELEPHONE (OUTPATIENT)
Dept: FAMILY MEDICINE | Facility: CLINIC | Age: 44
End: 2024-02-19
Payer: COMMERCIAL

## 2024-02-19 DIAGNOSIS — G43.101 MIGRAINE WITH AURA AND WITH STATUS MIGRAINOSUS, NOT INTRACTABLE: ICD-10-CM

## 2024-02-19 NOTE — TELEPHONE ENCOUNTER
Forms received from: [facility]   Phone number listed: [phone]   Fax listed: [phone]  Date received: 2/15/24  Form description: Prescription request  Once forms are completed, please return to [employer] via fax.  Is patient requesting to be contacted when forms are completed: na  Phone: na  Form placed:  Serena Casas

## 2024-02-21 ENCOUNTER — MYC REFILL (OUTPATIENT)
Dept: FAMILY MEDICINE | Facility: CLINIC | Age: 44
End: 2024-02-21
Payer: COMMERCIAL

## 2024-02-21 DIAGNOSIS — M50.20 HERNIATED CERVICAL INTERVERTEBRAL DISC: ICD-10-CM

## 2024-02-21 DIAGNOSIS — M48.02 FORAMINAL STENOSIS OF CERVICAL REGION: ICD-10-CM

## 2024-02-21 DIAGNOSIS — G43.001 MIGRAINE WITHOUT AURA AND WITH STATUS MIGRAINOSUS, NOT INTRACTABLE: ICD-10-CM

## 2024-02-21 RX ORDER — RIZATRIPTAN BENZOATE 10 MG/1
10 TABLET ORAL
Qty: 10 TABLET | Refills: 0 | Status: SHIPPED | OUTPATIENT
Start: 2024-02-21 | End: 2024-04-18

## 2024-02-21 RX ORDER — OXYCODONE HYDROCHLORIDE 10 MG/1
5 TABLET ORAL DAILY PRN
Qty: 15 TABLET | Refills: 0 | Status: SHIPPED | OUTPATIENT
Start: 2024-02-21 | End: 2024-03-22

## 2024-03-11 ENCOUNTER — E-VISIT (OUTPATIENT)
Dept: FAMILY MEDICINE | Facility: CLINIC | Age: 44
End: 2024-03-11
Payer: COMMERCIAL

## 2024-03-11 DIAGNOSIS — M50.20 HERNIATED CERVICAL INTERVERTEBRAL DISC: ICD-10-CM

## 2024-03-11 DIAGNOSIS — G43.001 MIGRAINE WITHOUT AURA AND WITH STATUS MIGRAINOSUS, NOT INTRACTABLE: Primary | ICD-10-CM

## 2024-03-11 DIAGNOSIS — M48.02 FORAMINAL STENOSIS OF CERVICAL REGION: ICD-10-CM

## 2024-03-11 PROCEDURE — 99421 OL DIG E/M SVC 5-10 MIN: CPT | Performed by: NURSE PRACTITIONER

## 2024-03-11 RX ORDER — HYDROCODONE BITARTRATE AND ACETAMINOPHEN 5; 325 MG/1; MG/1
1 TABLET ORAL EVERY 6 HOURS PRN
Qty: 12 TABLET | Refills: 0 | Status: SHIPPED | OUTPATIENT
Start: 2024-03-11 | End: 2024-03-14

## 2024-03-11 RX ORDER — CYCLOBENZAPRINE HCL 5 MG
5 TABLET ORAL 3 TIMES DAILY PRN
Qty: 90 TABLET | Refills: 0 | Status: SHIPPED | OUTPATIENT
Start: 2024-03-11 | End: 2024-04-18

## 2024-03-22 ENCOUNTER — MYC REFILL (OUTPATIENT)
Dept: FAMILY MEDICINE | Facility: CLINIC | Age: 44
End: 2024-03-22
Payer: COMMERCIAL

## 2024-03-22 DIAGNOSIS — M48.02 FORAMINAL STENOSIS OF CERVICAL REGION: ICD-10-CM

## 2024-03-22 DIAGNOSIS — M50.20 HERNIATED CERVICAL INTERVERTEBRAL DISC: ICD-10-CM

## 2024-03-22 RX ORDER — OXYCODONE HYDROCHLORIDE 10 MG/1
5 TABLET ORAL DAILY PRN
Qty: 15 TABLET | Refills: 0 | Status: SHIPPED | OUTPATIENT
Start: 2024-03-22 | End: 2024-04-04

## 2024-04-03 ENCOUNTER — TELEPHONE (OUTPATIENT)
Dept: NEUROLOGY | Facility: CLINIC | Age: 44
End: 2024-04-03

## 2024-04-03 ENCOUNTER — PRE VISIT (OUTPATIENT)
Dept: NEUROLOGY | Facility: CLINIC | Age: 44
End: 2024-04-03

## 2024-04-03 ENCOUNTER — VIRTUAL VISIT (OUTPATIENT)
Dept: NEUROLOGY | Facility: CLINIC | Age: 44
End: 2024-04-03
Attending: NURSE PRACTITIONER
Payer: COMMERCIAL

## 2024-04-03 VITALS — HEIGHT: 61 IN | WEIGHT: 112 LBS | BODY MASS INDEX: 21.14 KG/M2

## 2024-04-03 DIAGNOSIS — G43.101 MIGRAINE WITH AURA AND WITH STATUS MIGRAINOSUS, NOT INTRACTABLE: ICD-10-CM

## 2024-04-03 DIAGNOSIS — G43.719 INTRACTABLE CHRONIC MIGRAINE WITHOUT AURA AND WITHOUT STATUS MIGRAINOSUS: Primary | ICD-10-CM

## 2024-04-03 DIAGNOSIS — R51.9 WORSENING HEADACHES: ICD-10-CM

## 2024-04-03 DIAGNOSIS — M54.2 CERVICALGIA: Primary | ICD-10-CM

## 2024-04-03 DIAGNOSIS — G43.719 INTRACTABLE CHRONIC MIGRAINE WITHOUT AURA AND WITHOUT STATUS MIGRAINOSUS: ICD-10-CM

## 2024-04-03 DIAGNOSIS — G44.209 MIXED COMMON MIGRAINE AND MUSCLE CONTRACTION HEADACHE: ICD-10-CM

## 2024-04-03 DIAGNOSIS — G44.86 CERVICOGENIC HEADACHE: ICD-10-CM

## 2024-04-03 DIAGNOSIS — F40.240 CLAUSTROPHOBIA: ICD-10-CM

## 2024-04-03 DIAGNOSIS — M48.02 FORAMINAL STENOSIS OF CERVICAL REGION: ICD-10-CM

## 2024-04-03 DIAGNOSIS — G43.009 MIXED COMMON MIGRAINE AND MUSCLE CONTRACTION HEADACHE: ICD-10-CM

## 2024-04-03 DIAGNOSIS — R20.2 PARESTHESIA: ICD-10-CM

## 2024-04-03 PROCEDURE — 99417 PROLNG OP E/M EACH 15 MIN: CPT | Mod: 95 | Performed by: NURSE PRACTITIONER

## 2024-04-03 PROCEDURE — 99215 OFFICE O/P EST HI 40 MIN: CPT | Mod: 95 | Performed by: NURSE PRACTITIONER

## 2024-04-03 RX ORDER — AMITRIPTYLINE HYDROCHLORIDE 10 MG/1
10 TABLET ORAL AT BEDTIME
Qty: 30 TABLET | Refills: 3 | Status: SHIPPED | OUTPATIENT
Start: 2024-04-03 | End: 2024-04-18

## 2024-04-03 RX ORDER — DIAZEPAM 5 MG
TABLET ORAL
Qty: 1 TABLET | Refills: 0 | Status: SHIPPED | OUTPATIENT
Start: 2024-04-03 | End: 2024-04-18

## 2024-04-03 ASSESSMENT — HEADACHE IMPACT TEST (HIT 6)
HIT6 TOTAL SCORE: 76
HOW OFTEN HAVE YOU FELT FED UP OR IRRITATED BECAUSE OF YOUR HEADACHES: ALWAYS
WHEN YOU HAVE HEADACHES HOW OFTEN IS THE PAIN SEVERE: VERY OFTEN
HOW OFTEN HAVE YOU FELT TOO TIRED TO WORK BECAUSE OF YOUR HEADACHES: ALWAYS
HOW OFTEN DO HEADACHES LIMIT YOUR DAILY ACTIVITIES: ALWAYS
WHEN YOU HAVE A HEADACHE HOW OFTEN DO YOU WISH YOU COULD LIE DOWN: ALWAYS
HOW OFTEN DID HEADACHS LIMIT CONCENTRATION ON WORK OR DAILY ACTIVITY: ALWAYS

## 2024-04-03 ASSESSMENT — MIGRAINE DISABILITY ASSESSMENT (MIDAS)
HOW MANY DAYS IN THE PAST 3 MONTHS HAVE YOU HAD A HEADACHE: 90
ON A SCALE FROM 0-10 ON AVERAGE HOW PAINFUL WERE HEADACHES: 9
HOW OFTEN WERE SOCIAL ACTIVITIES MISSED DUE TO HEADACHES: 10
HOW MANY DAYS WAS HOUSEWORK PRODUCTIVITY CUT IN HALF DUE TO HEADACHES: 10
TOTAL SCORE: 150
HOW MANY DAYS DID YOU MISS WORK OR SCHOOL BECAUSE OF HEADACHES: 23
HOW MANY DAYS DID YOU NOT DO HOUSEWORK BECAUSE OF HEADACHES: 70
HOW MANY DAYS WAS YOUR PRODUCTIVITY CUT IN HALF BECAUSE OF HEADACHES: 37

## 2024-04-03 ASSESSMENT — PAIN SCALES - GENERAL: PAINLEVEL: EXTREME PAIN (8)

## 2024-04-03 NOTE — TELEPHONE ENCOUNTER
Prior Authorization Retail Medication Request    Medication/Dose: ubrogepant (UBRELVY) 100 MG tablet  Diagnosis and ICD code (if different than what is on RX):    New/renewal/insurance change PA/secondary ins. PA:  Previously Tried and Failed:    Rizatriptan did not help   Ubrelvy helps and no side effects   Have tried -chiropractor, TENS units, ice bags, ibuprofen, tylenol, naproxen -tried everything and nothing helps  Toradol as needed and it did not help   Muscle relaxant-baclofen, cyclobenzaprine  -help with sleep but not headaches   Tried sumatriptan oral and injection-caused tingly sensation and was flagged as an allergic reaction    Rationale:  acute migraine    Insurance   Primary: [employer]  Insurance ID:  [id number]    Pharmacy Information (if different than what is on RX)  Name:    Phone:    Fax:

## 2024-04-03 NOTE — PATIENT INSTRUCTIONS
Plan:  Updated eye exam   Updated brain MRI and MRV. Claustrophobia -valium 5 mg 30 min before MRI. No refills. Do not drive for at least 8 hours. Do not take any xanax, oxycodone.  Recommended a trial of migraine preventive treatment with [employer]. Side effects-allergic reaction or pain in the injection side or redness in the injection side. Unknown side effects with a long term use.    Amitriptyline trial 10 mg at bedtime. May cause drowsiness, dryness, may cause constipation, mood changes.   Rescue treatment -increase Ubrelvy to 100 mg at migraine. Prescription provided   On estrace since 2016 -question if this contributing to persistent migraines -would suggest to revisit with Ob/Gyn. Increased risk of stroke with history of migraine with aura   Follow up in 3 months or sooner if needed       Worsening UEs paresthesia. Recommended to get an updated Cervical MRI for any changes to explain paresthesia  Would suggest to see spine specialist if new changes      Recommended a pain psychology to help with pain copying -ask your Pain provider
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
## 2024-04-03 NOTE — NURSING NOTE
Is the patient currently in the state of [place]? YES    Visit mode:VIDEO    If the visit is dropped, the patient can be reconnected by: VIDEO VISIT: Text to cell phone:   Telephone Information:   Mobile [phone]       Will anyone else be joining the visit? NO  (If patient encounters technical issues they should call [phone]    How would you like to obtain your AVS? MyChart    Are changes needed to the allergy or medication list? Pt stated no changes to allergies and Pt stated no med changes    Are refills needed on medications prescribed by this physician?     Reason for visit: Consult    Deyanira LEWIS

## 2024-04-03 NOTE — TELEPHONE ENCOUNTER
Prior Authorization Retail Medication Request    Medication/Dose: galcanezumab-gnlm (EMGALITY) 120 MG/ML injection, Inject 240 mg subcutaneous first month and then inject 120 mg subcutaneous every 28 days   Diagnosis and ICD code (if different than what is on RX):    New/renewal/insurance change PA/secondary ins. PA:  Previously Tried and Failed:    Topiramate is not favorable because of history of renal stones.   Patient would like to avoid anything more sleepy or fatigued because fatigued already all the time - contraindicated to add anti-convulsants or anti-depressants  Gabapentin -did not help   Duloxetine  Lisinopril   Was on wellbutrin and buspar, citalopram -did not help with headaches     Rationale:  migraine prevention    Insurance   Primary: [employer]  Insurance ID:  [id number]    Pharmacy Information (if different than what is on RX)  Name:    Phone:    Fax:

## 2024-04-04 ENCOUNTER — OFFICE VISIT (OUTPATIENT)
Dept: PALLIATIVE MEDICINE | Facility: CLINIC | Age: 44
End: 2024-04-04
Payer: COMMERCIAL

## 2024-04-04 ENCOUNTER — MYC REFILL (OUTPATIENT)
Dept: FAMILY MEDICINE | Facility: CLINIC | Age: 44
End: 2024-04-04

## 2024-04-04 ENCOUNTER — MYC MEDICAL ADVICE (OUTPATIENT)
Dept: FAMILY MEDICINE | Facility: CLINIC | Age: 44
End: 2024-04-04

## 2024-04-04 VITALS — SYSTOLIC BLOOD PRESSURE: 149 MMHG | DIASTOLIC BLOOD PRESSURE: 95 MMHG | HEART RATE: 68 BPM

## 2024-04-04 DIAGNOSIS — M54.10 RADICULAR PAIN OF UPPER EXTREMITY: Primary | ICD-10-CM

## 2024-04-04 DIAGNOSIS — R20.2 PARESTHESIA OF HAND, BILATERAL: ICD-10-CM

## 2024-04-04 DIAGNOSIS — M54.2 NECK PAIN: ICD-10-CM

## 2024-04-04 DIAGNOSIS — M50.20 HERNIATED CERVICAL INTERVERTEBRAL DISC: ICD-10-CM

## 2024-04-04 DIAGNOSIS — M48.02 FORAMINAL STENOSIS OF CERVICAL REGION: ICD-10-CM

## 2024-04-04 DIAGNOSIS — G89.29 CHRONIC LEFT SHOULDER PAIN: ICD-10-CM

## 2024-04-04 DIAGNOSIS — M54.81 BILATERAL OCCIPITAL NEURALGIA: ICD-10-CM

## 2024-04-04 DIAGNOSIS — F33.1 MAJOR DEPRESSIVE DISORDER, RECURRENT EPISODE, MODERATE (H): Primary | ICD-10-CM

## 2024-04-04 DIAGNOSIS — G89.4 CHRONIC PAIN SYNDROME: ICD-10-CM

## 2024-04-04 DIAGNOSIS — M54.12 CERVICAL RADICULOPATHY: ICD-10-CM

## 2024-04-04 DIAGNOSIS — Z12.31 ENCOUNTER FOR SCREENING MAMMOGRAM FOR BREAST CANCER: ICD-10-CM

## 2024-04-04 DIAGNOSIS — G44.86 CERVICOGENIC HEADACHE: ICD-10-CM

## 2024-04-04 DIAGNOSIS — M25.512 CHRONIC LEFT SHOULDER PAIN: ICD-10-CM

## 2024-04-04 DIAGNOSIS — M79.18 MYOFASCIAL PAIN: ICD-10-CM

## 2024-04-04 PROCEDURE — 99215 OFFICE O/P EST HI 40 MIN: CPT

## 2024-04-04 ASSESSMENT — PAIN SCALES - GENERAL: PAINLEVEL: EXTREME PAIN (8)

## 2024-04-04 NOTE — PATIENT INSTRUCTIONS
Pain Physical Therapy:  Continue activity as tolerated. Referred to PT at initial visit, will revisit at future follow up as indicated/needed.      Pain Psychologist to address relaxation, behavioral change, coping style, and other factors important to improvement.  NO - we may consider referring to pain psychology in the future, pending outcome with initial therapies.      Diagnostic Studies:  Cervical MRI w/ and w/o contrast ordered by neurology yesterday (Follows with Darcie Lewis CNP). Progressive worsening of radicular neck pain, headaches, and radicular arm/hand pain since prior imaging in 2021. Will defer results review to ordering provider; however, will plan to review results and consider potential treatment options, pending outcome with cervical KAYLA and repeat physical exam findings.      Medication Management:   Oxycodone 5 mg daily as needed - PCP is currently managing, prescribes #15 tabs/month. She appreciates significant benefit, but would like to be able to have enough for 1 tab/day. Advised that I think 1 tab/day and #30 tabs/month is reasonable, though I do not recommend further dosage increase in future and encourage adding non-opoid therapies to treatment plan to optimize pain control. Continuation is at the discretion of prescribing provider, advised she will need to follow up with PCP to discuss increased quantity.   Baclofen trial recommended at last visit. She did try medication but had significant daytime sedation. Stopped medication. She notes poor tolerance to muscle relaxants, contributes to heart palpitations.      Potential procedures:   Cervical 7-Thoracic 1 epidural steroid injection recommended today to target radicular neck and arm/hand pain. Orders placed to schedule with Dr. Durbin.   Patient education materials provided.   Trigger point injections and bilateral occipital nerve block on 12/6/23 - reports benefit the day of injection, then pain returned to baseline.      Other  Orders/Referrals:   Message to neurology regarding care collaboration.      Follow up with ANTONIO Godinez CNP in 4 weeks after procedure     ----------------------------------------------------------------  Clinic Number:  [phone]   Call with any questions about your care and for scheduling assistance.   Calls are returned Monday through Friday between 8 AM and 4:30 PM. We usually get back to you within 2 business days depending on the issue/request.    If we are prescribing your medications:  For opioid medication refills, call the clinic or send a NXT-ID message 7 days in advance.  Please include:  Name of requested medication  Name of the pharmacy.  For non-opioid medications, call your pharmacy directly to request a refill. Please allow 3-4 days to be processed.   Per [place] State Law:  All controlled substance prescriptions must be filled within 30 days of being written.    For those controlled substances allowing refills, pickup must occur within 30 days of last fill.      We believe regular attendance is key to your success in our program!    Any time you are unable to keep your appointment we ask that you call us at least 24 hours in advance to cancel.This will allow us to offer the appointment time to another patient.   Multiple missed appointments may lead to dismissal from the clinic.

## 2024-04-04 NOTE — NURSING NOTE
Patient presents to the clinic today for a follow up with ANTONIO Godinez CNP  regarding Pain Management.           6/8/2023     3:06 PM 4/4/2024     1:23 PM   PEG Score   PEG Total Score 7 9.33      Elizabeth Kan MA  [facility] [place]
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
## 2024-04-04 NOTE — Clinical Note
Alan Sprague,  I saw Rober today for follow up in the pain clinic and am reaching out to collaborate on her care. Of note, she has only seen me once on 6/8/23.   I will be curious to see what updated cervical MRI reveals. In the meantime, I recommended C7-T1 KAYLA to target radicular arm/hand symptoms.   Please let me know if you have any questions/concerns about recommended KAYLA, and I welcome any additional input on this case.   Thanks, Leslie Galindo, JORDY, APRN, AGNP-C [facility] Pain Management

## 2024-04-07 ENCOUNTER — HEALTH MAINTENANCE LETTER (OUTPATIENT)
Age: 44
End: 2024-04-07

## 2024-04-08 ENCOUNTER — TELEPHONE (OUTPATIENT)
Dept: PALLIATIVE MEDICINE | Facility: CLINIC | Age: 44
End: 2024-04-08
Payer: COMMERCIAL

## 2024-04-08 DIAGNOSIS — M54.12 CERVICAL RADICULOPATHY: Primary | ICD-10-CM

## 2024-04-08 RX ORDER — OXYCODONE HYDROCHLORIDE 10 MG/1
10 TABLET ORAL DAILY PRN
Qty: 30 TABLET | Refills: 0 | Status: SHIPPED | OUTPATIENT
Start: 2024-04-08 | End: 2024-05-06
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
## 2024-04-17 NOTE — TELEPHONE ENCOUNTER
Retail Pharmacy Prior Authorization Team   Phone: [phone]    PA Initiation    Medication: EMGALITY 120 MG/ML SC SOAJ  Insurance Company: [employer] - Phone [phone] Fax [phone]  Pharmacy Filling the Rx: [facility] - [id number] [facility] [place]  Filling Pharmacy Phone: [phone]  Filling Pharmacy Fax:    Start Date: 4/17/2024

## 2024-04-17 NOTE — TELEPHONE ENCOUNTER
Retail Pharmacy Prior Authorization Team   Phone: [phone]    PA Initiation    Medication: UBRELVY 100 MG PO TABS  Insurance Company: [employer] - Phone [phone] Fax [phone]  Pharmacy Filling the Rx[place] [facility] [place] [id number] [facility] [place]illing Pharmacy Phone: [phone]  Filling Pharmacy Fax:    Start Date: 4/17/2024

## 2024-04-18 ENCOUNTER — OFFICE VISIT (OUTPATIENT)
Dept: FAMILY MEDICINE | Facility: CLINIC | Age: 44
End: 2024-04-18
Payer: COMMERCIAL

## 2024-04-18 ENCOUNTER — RADIOLOGY INJECTION OFFICE VISIT (OUTPATIENT)
Dept: PALLIATIVE MEDICINE | Facility: CLINIC | Age: 44
End: 2024-04-18
Payer: COMMERCIAL

## 2024-04-18 ENCOUNTER — TELEPHONE (OUTPATIENT)
Dept: FAMILY MEDICINE | Facility: CLINIC | Age: 44
End: 2024-04-18

## 2024-04-18 VITALS
BODY MASS INDEX: 23.18 KG/M2 | RESPIRATION RATE: 18 BRPM | WEIGHT: 122.8 LBS | HEART RATE: 71 BPM | DIASTOLIC BLOOD PRESSURE: 76 MMHG | SYSTOLIC BLOOD PRESSURE: 122 MMHG | TEMPERATURE: 97.7 F | HEIGHT: 61 IN | OXYGEN SATURATION: 100 %

## 2024-04-18 VITALS — SYSTOLIC BLOOD PRESSURE: 140 MMHG | HEART RATE: 71 BPM | OXYGEN SATURATION: 100 % | DIASTOLIC BLOOD PRESSURE: 90 MMHG

## 2024-04-18 DIAGNOSIS — F11.20 CONTINUOUS OPIOID DEPENDENCE (H): Primary | ICD-10-CM

## 2024-04-18 DIAGNOSIS — D69.1 PLATELET DYSFUNCTION (H): ICD-10-CM

## 2024-04-18 DIAGNOSIS — R41.840 CONCENTRATION DEFICIT: ICD-10-CM

## 2024-04-18 DIAGNOSIS — E89.40 SURGICAL MENOPAUSE: ICD-10-CM

## 2024-04-18 DIAGNOSIS — Z86.39 HX OF IRON DEFICIENCY: ICD-10-CM

## 2024-04-18 DIAGNOSIS — R20.2 PARESTHESIA OF HAND, BILATERAL: ICD-10-CM

## 2024-04-18 DIAGNOSIS — M54.10 RADICULAR PAIN OF UPPER EXTREMITY: ICD-10-CM

## 2024-04-18 DIAGNOSIS — G89.4 CHRONIC PAIN SYNDROME: ICD-10-CM

## 2024-04-18 DIAGNOSIS — F32.1 CURRENT MODERATE EPISODE OF MAJOR DEPRESSIVE DISORDER, UNSPECIFIED WHETHER RECURRENT (H): ICD-10-CM

## 2024-04-18 DIAGNOSIS — R53.83 FATIGUE, UNSPECIFIED TYPE: ICD-10-CM

## 2024-04-18 DIAGNOSIS — M54.12 CERVICAL RADICULOPATHY: ICD-10-CM

## 2024-04-18 LAB
BASOPHILS # BLD AUTO: 0 10E3/UL (ref 0–0.2)
BASOPHILS NFR BLD AUTO: 1 %
EOSINOPHIL # BLD AUTO: 0.3 10E3/UL (ref 0–0.7)
EOSINOPHIL NFR BLD AUTO: 4 %
ERYTHROCYTE [DISTWIDTH] IN BLOOD BY AUTOMATED COUNT: 11.3 % (ref 10–15)
HCT VFR BLD AUTO: 39.4 % (ref 35–47)
HGB BLD-MCNC: 13.7 G/DL (ref 11.7–15.7)
IMM GRANULOCYTES # BLD: 0 10E3/UL
IMM GRANULOCYTES NFR BLD: 0 %
LYMPHOCYTES # BLD AUTO: 1.7 10E3/UL (ref 0.8–5.3)
LYMPHOCYTES NFR BLD AUTO: 27 %
MCH RBC QN AUTO: 31.6 PG (ref 26.5–33)
MCHC RBC AUTO-ENTMCNC: 34.8 G/DL (ref 31.5–36.5)
MCV RBC AUTO: 91 FL (ref 78–100)
MONOCYTES # BLD AUTO: 0.5 10E3/UL (ref 0–1.3)
MONOCYTES NFR BLD AUTO: 8 %
NEUTROPHILS # BLD AUTO: 3.7 10E3/UL (ref 1.6–8.3)
NEUTROPHILS NFR BLD AUTO: 60 %
NRBC # BLD AUTO: 0 10E3/UL
NRBC BLD AUTO-RTO: 0 /100
PLATELET # BLD AUTO: 98 10E3/UL (ref 150–450)
RBC # BLD AUTO: 4.34 10E6/UL (ref 3.8–5.2)
WBC # BLD AUTO: 6.1 10E3/UL (ref 4–11)

## 2024-04-18 PROCEDURE — 82670 ASSAY OF TOTAL ESTRADIOL: CPT | Performed by: NURSE PRACTITIONER

## 2024-04-18 PROCEDURE — 62321 NJX INTERLAMINAR CRV/THRC: CPT | Performed by: PAIN MEDICINE

## 2024-04-18 PROCEDURE — G2211 COMPLEX E/M VISIT ADD ON: HCPCS | Performed by: NURSE PRACTITIONER

## 2024-04-18 PROCEDURE — G0481 DRUG TEST DEF 8-14 CLASSES: HCPCS | Performed by: NURSE PRACTITIONER

## 2024-04-18 PROCEDURE — 83550 IRON BINDING TEST: CPT | Performed by: NURSE PRACTITIONER

## 2024-04-18 PROCEDURE — 86376 MICROSOMAL ANTIBODY EACH: CPT | Performed by: NURSE PRACTITIONER

## 2024-04-18 PROCEDURE — 80061 LIPID PANEL: CPT | Performed by: NURSE PRACTITIONER

## 2024-04-18 PROCEDURE — 82306 VITAMIN D 25 HYDROXY: CPT | Performed by: NURSE PRACTITIONER

## 2024-04-18 PROCEDURE — 80053 COMPREHEN METABOLIC PANEL: CPT | Performed by: NURSE PRACTITIONER

## 2024-04-18 PROCEDURE — 82728 ASSAY OF FERRITIN: CPT | Performed by: NURSE PRACTITIONER

## 2024-04-18 PROCEDURE — 99214 OFFICE O/P EST MOD 30 MIN: CPT | Performed by: NURSE PRACTITIONER

## 2024-04-18 PROCEDURE — 84443 ASSAY THYROID STIM HORMONE: CPT | Performed by: NURSE PRACTITIONER

## 2024-04-18 PROCEDURE — 82533 TOTAL CORTISOL: CPT | Performed by: NURSE PRACTITIONER

## 2024-04-18 PROCEDURE — 36415 COLL VENOUS BLD VENIPUNCTURE: CPT | Performed by: NURSE PRACTITIONER

## 2024-04-18 PROCEDURE — 83540 ASSAY OF IRON: CPT | Performed by: NURSE PRACTITIONER

## 2024-04-18 PROCEDURE — 85025 COMPLETE CBC W/AUTO DIFF WBC: CPT | Performed by: NURSE PRACTITIONER

## 2024-04-18 RX ORDER — LISDEXAMFETAMINE DIMESYLATE 30 MG/1
30 CAPSULE ORAL DAILY
Qty: 30 CAPSULE | Refills: 0 | Status: SHIPPED | OUTPATIENT
Start: 2024-05-19 | End: 2024-06-18

## 2024-04-18 RX ORDER — ESTRADIOL 2 MG/1
3 TABLET ORAL DAILY
Qty: 135 TABLET | Refills: 3 | Status: SHIPPED | OUTPATIENT
Start: 2024-04-18

## 2024-04-18 RX ORDER — LISDEXAMFETAMINE DIMESYLATE 30 MG/1
30 CAPSULE ORAL DAILY
Qty: 30 CAPSULE | Refills: 0 | Status: SHIPPED | OUTPATIENT
Start: 2024-04-18 | End: 2024-05-14

## 2024-04-18 RX ORDER — LISDEXAMFETAMINE DIMESYLATE 30 MG/1
30 CAPSULE ORAL DAILY
Qty: 30 CAPSULE | Refills: 0 | Status: SHIPPED | OUTPATIENT
Start: 2024-06-19 | End: 2024-07-19

## 2024-04-18 RX ADMIN — DEXAMETHASONE SODIUM PHOSPHATE 10 MG: 10 INJECTION, SOLUTION INTRAMUSCULAR; INTRAVENOUS at 09:52

## 2024-04-18 ASSESSMENT — ANXIETY QUESTIONNAIRES
1. FEELING NERVOUS, ANXIOUS, OR ON EDGE: MORE THAN HALF THE DAYS
IF YOU CHECKED OFF ANY PROBLEMS ON THIS QUESTIONNAIRE, HOW DIFFICULT HAVE THESE PROBLEMS MADE IT FOR YOU TO DO YOUR WORK, TAKE CARE OF THINGS AT HOME, OR GET ALONG WITH OTHER PEOPLE: SOMEWHAT DIFFICULT
4. TROUBLE RELAXING: NEARLY EVERY DAY
7. FEELING AFRAID AS IF SOMETHING AWFUL MIGHT HAPPEN: SEVERAL DAYS
5. BEING SO RESTLESS THAT IT IS HARD TO SIT STILL: NOT AT ALL
8. IF YOU CHECKED OFF ANY PROBLEMS, HOW DIFFICULT HAVE THESE MADE IT FOR YOU TO DO YOUR WORK, TAKE CARE OF THINGS AT HOME, OR GET ALONG WITH OTHER PEOPLE?: SOMEWHAT DIFFICULT
6. BECOMING EASILY ANNOYED OR IRRITABLE: MORE THAN HALF THE DAYS
GAD7 TOTAL SCORE: 10
3. WORRYING TOO MUCH ABOUT DIFFERENT THINGS: SEVERAL DAYS
7. FEELING AFRAID AS IF SOMETHING AWFUL MIGHT HAPPEN: SEVERAL DAYS
2. NOT BEING ABLE TO STOP OR CONTROL WORRYING: SEVERAL DAYS

## 2024-04-18 ASSESSMENT — PATIENT HEALTH QUESTIONNAIRE - PHQ9
10. IF YOU CHECKED OFF ANY PROBLEMS, HOW DIFFICULT HAVE THESE PROBLEMS MADE IT FOR YOU TO DO YOUR WORK, TAKE CARE OF THINGS AT HOME, OR GET ALONG WITH OTHER PEOPLE: EXTREMELY DIFFICULT
SUM OF ALL RESPONSES TO PHQ QUESTIONS 1-9: 14
SUM OF ALL RESPONSES TO PHQ QUESTIONS 1-9: 14

## 2024-04-18 ASSESSMENT — PAIN SCALES - GENERAL: PAINLEVEL: SEVERE PAIN (7)

## 2024-04-18 NOTE — TELEPHONE ENCOUNTER
Prior Authorization Approval    Medication: EMGALITY 120 MG/ML SC SOAJ  Authorization Effective Date: 4/17/2024  Authorization Expiration Date:    Approved Dose/Quantity:   Reference #:     Insurance Company: [employer] [phone] Fax [phone]  Expected CoPay: $    CoPay Card Available:      Financial Assistance Needed:   Which Pharmacy is filling the prescription: [facility] - [id number] [facility] [place]

## 2024-04-18 NOTE — LETTER
Opioid / Opioid Plus Controlled Substance Agreement    This is an agreement between you and your provider about the safe and appropriate use of controlled substance/opioids prescribed by your care team. Controlled substances are medicines that can cause physical and mental dependence (abuse).    There are strict laws about having and using these medicines. We here at [facility] are committing to working with you in your efforts to get better. To support you in this work, we ll help you schedule regular office appointments for medicine refills. If we must cancel or change your appointment for any reason, we ll make sure you have enough medicine to last until your next appointment.     As a Provider, I will:  Listen carefully to your concerns and treat you with respect.   Recommend a treatment plan that I believe is in your best interest. This plan may involve therapies other than opioid pain medication.   Talk with you often about the possible benefits, and the risk of harm of any medicine that we prescribe for you.   Provide a plan on how to taper (discontinue or go off) using this medicine if the decision is made to stop its use.    As a Patient, I understand that opioid(s):   Are a controlled substance prescribed by my care team to help me function or work and manage my condition(s).   Are strong medicines and can cause serious side effects such as:  Drowsiness, which can seriously affect my driving ability  A lower breathing rate, enough to cause death  Harm to my thinking ability   Depression   Abuse of and addiction to this medicine  Need to be taken exactly as prescribed. Combining opioids with certain medicines or chemicals (such as illegal drugs, sedatives, sleeping pills, and benzodiazepines) can be dangerous or even fatal. If I stop opioids suddenly, I may have severe withdrawal symptoms.  Do not work for all types of pain nor for all patients. If they re not helpful, I may be asked to stop  them.    I am also being prescribed a benzodiazepine (tranquilizer) controlled substance: I understand this type of medicine is sedating and can increase the risk of death when taken together with opioids. I have talked to my care team about having prescription Narcan available for reversing the opioid medicine and have been instructed in its use. I will be very careful to take my medicines only as directed  I am also being prescribed a stimulant controlled substance to help manage symptoms of attention deficit, appetite suppression, and/or fatigue.    The risks, benefits and side effects of these medicine(s) were explained to me. I agree that:  I will take part in other treatments as advised by my care team. This may be psychiatry or counseling, physical therapy, behavioral therapy, group treatment or a referral to a specialist.     I will keep all my appointments. I understand that this is part of the monitoring of opioids. My care team may require an office visit for EVERY opioid/controlled substance refill. If I miss appointments or don t follow instructions, my care team may stop my medicine.    I will take my medicines as prescribed. I will not change the dose or schedule unless my care team tells me to. There will be no refills if I run out early.     I may be asked to come to the clinic and complete a urine drug test or complete a pill count at any time. If I don t give a urine sample or participate in a pill count, the care team may stop my medicine.    I will only receive prescriptions from this clinic for chronic pain. If I am treated by another provider for acute pain issues, I will tell them that I am taking opioid pain medication for chronic pain and that I have a treatment agreement with this provider. I will inform my [facility] care team within one business day if I am given a prescription for any pain medication by another healthcare provider. My [facility] care team can contact other  providers and pharmacists about my use of any medicines.    It is up to me to make sure that I don t run out of my medicines on weekends or holidays. If my care team is willing to refill my opioid prescription without a visit, I must request refills only during office hours. Refills may take up to 3 business days to process. I will use one pharmacy to fill all my opioid and other controlled substance prescriptions. I will notify the clinic about any changes to my insurance or medication availability.    I am responsible for my prescriptions. If the medicine/prescription is lost, stolen or destroyed, it will not be replaced. I also agree not to share controlled substance medicines with anyone.    I am aware I should not use any illegal or recreational drugs. I agree not to drink alcohol unless my care team says I can.       If I enroll in the [place] [facility] Cannabis program, I will tell my care team prior to my next refill.     I will tell my care team right away if I become pregnant, have a new medical problem treated outside of my regular clinic, or have a change in my medications.    I understand that this medicine can affect my thinking, judgment and reaction time. Alcohol and drugs affect the brain and body, which can affect the safety of my driving. Being under the influence of alcohol or drugs can affect my decision-making, behaviors, personal safety, and the safety of others. Driving while impaired (DWI) can occur if a person is driving, operating, or in physical control of a car, motorcycle, boat, snowmobile, ATV, motorbike, off-road vehicle, or any other motor vehicle ([place] Statute 169A.20). I understand the risk if I choose to drive or operate any vehicle or machinery.    I understand that if I do not follow any of the conditions above, my prescriptions or treatment may be stopped or changed.          Opioids  What You Need to Know    What are opioids?   Opioids are pain medicines that must be prescribed  by a doctor. They are also known as narcotics.     Examples are:   morphine (MS Contin, Milli)  oxycodone (Oxycontin)  oxycodone and acetaminophen (Percocet)  hydrocodone and acetaminophen (Vicodin, Norco)   fentanyl patch (Duragesic)   hydromorphone (Dilaudid)   methadone  codeine (Tylenol #3)     What do opioids do well?   Opioids are best for severe short-term pain such as after a surgery or injury. They may work well for cancer pain. They may help some people with long-lasting (chronic) pain.     What do opioids NOT do well?   Opioids never get rid of pain entirely, and they don t work well for most patients with chronic pain. Opioids don t reduce swelling, one of the causes of pain.                                    Other ways to manage chronic pain and improve function include:     Treat the health problem that may be causing pain  Anti-inflammation medicines, which reduce swelling and tenderness, such as ibuprofen (Advil, Motrin) or naproxen (Aleve)  Acetaminophen (Tylenol)  Antidepressants and anti-seizure medicines, especially for nerve pain  Topical treatments such as patches or creams  Injections or nerve blocks  Chiropractic or osteopathic treatment  Acupuncture, massage, deep breathing, meditation, visual imagery, aromatherapy  Use heat or ice at the pain site  Physical therapy   Exercise  Stop smoking  Take part in therapy       Risks and side effects     Talk to your doctor before you start or decide to keep taking opioids. Possible side effects include:    Lowering your breathing rate enough to cause death  Overdose, including death, especially if taking higher than prescribed doses  Worse depression symptoms; less pleasure in things you usually enjoy  Feeling tired or sluggish  Slower thoughts or cloudy thinking  Being more sensitive to pain over time; pain is harder to control  Trouble sleeping or restless sleep  Changes in hormone levels (for example, less testosterone)  Changes in sex drive or  ability to have sex  Constipation  Unsafe driving  Itching and sweating  Dizziness  Nausea, throwing up and dry mouth    What else should I know about opioids?    Opioids may lead to dependence, tolerance, or addiction.    Dependence means that if you stop or reduce the medicine too quickly, you will have withdrawal symptoms. These include loose poop (diarrhea), jitters, flu-like symptoms, nervousness and tremors. Dependence is not the same as addiction.                     Tolerance means needing higher doses over time to get the same effect. This may increase the chance of serious side effects.    Addiction is when people improperly use a substance that harms their body, their mind or their relations with others. Use of opiates can cause a relapse of addiction if you have a history of drug or alcohol abuse.    People who have used opioids for a long time may have a lower quality of life, worse depression, higher levels of pain and more visits to doctors.    You can overdose on opioids. Take these steps to lower your risk of overdose:    Recognize the signs:  Signs of overdose include decrease or loss of consciousness (blackout), slowed breathing, trouble waking up and blue lips. If someone is worried about overdose, they should call 911.    Talk to your doctor about Narcan (naloxone).   If you are at risk for overdose, you may be given a prescription for Narcan. This medicine very quickly reverses the effects of opioids.   If you overdose, a friend or family member can give you Narcan while waiting for the ambulance. They need to know the signs of overdose and how to give Narcan.     Don't use alcohol or street drugs.   Taking them with opioids can cause death.    Do not take any of these medicines unless your doctor says it s OK. Taking these with opioids can cause death:  Benzodiazepines, such as lorazepam (Ativan), alprazolam (Xanax) or diazepam (Valium)  Muscle relaxers, such as cyclobenzaprine  (Flexeril)  Sleeping pills like zolpidem (Ambien)   Other opioids      How to keep you and other people safe while taking opioids:    Never share your opioids with others.  Opioid medicines are regulated by the Drug Enforcement Agency (MARIO). Selling or sharing medications is a criminal act.    2. Be sure to store opioids in a secure place, locked up if possible. Young children can easily swallow them and overdose.    3. When you are traveling with your medicines, keep them in the original bottles. If you use a pill box, be sure you also carry a copy of your medicine list from your clinic or pharmacy.    4. Safe disposal of opioids    Most pharmacies have places to get rid of medicine, called disposal kiosks. Medicine disposal options are also available in every [facility]. Search your county and  medication disposal  to find more options. You can find more details at:  https://www.pca.[email or web address]./living-green/managing-unwanted-medications     I agree that my provider, clinic care team, and pharmacy may work with any city, state or federal law enforcement agency that investigates the misuse, sale, or other diversion of my controlled medicine. I will allow my provider to discuss my care with, or share a copy of, this agreement with any other treating provider, pharmacy or emergency room where I receive care.    I have read this agreement and have asked questions about anything I did not understand.    _______________________________________________________  Patient Signature - Rober Renee _____________________                   Date     _______________________________________________________  Provider Signature - ZEKE SAMANIEGO, [place]   _____________________                   Date     _______________________________________________________  Witness Signature (required if provider not present while patient signing)   _____________________                   Date

## 2024-04-18 NOTE — PATIENT INSTRUCTIONS
[facility]   Procedure Discharge Instructions    Today you saw:    Dr. Donnie Durbin,         You had a(n):  Epidural steroid injection    Medications used for cervical epidural: Lidocaine, Omnipaque, Dexamethasone, Bupividcaine, normal saline        Be cautious with activities. Numbness and/or weakness in the upper extremities may occur for up to 6-8 hours after the procedure due to effect of the local anesthetic  Do not drive for 6 hours. The effect of the local anesthetic could slow your reflexes.   You may resume your regular activities after 24 hours  Avoid strenuous activity for the first 24 hours  You may shower, however avoid swimming, tub baths or hot tubs for 24 hours following your procedure  You may have a mild to moderate increase in pain for several days following the injection.  It may take up to 14 days for the steroid medication to start working although you may feel the effect as early as a few days after the procedure.     You may use ice packs for 10-15 minutes, 3 to 4 times a day at the injection site for comfort  Do not use heat to painful areas for 6 to 8 hours. This will give the local anesthetic time to wear off and prevent you from accidentally burning your skin.   Unless you have been directed to avoid the use of anti-inflammatory medications (NSAIDS), you may use medications such as ibuprofen, Aleve or Tylenol for pain control if needed.   If you have diabetes, check your blood sugar more frequently than usual as your blood sugar may be higher than normal for 10-14 days following a steroid injection. Contact your doctor who manages your diabetes if your blood sugar is higher than usual  Possible side effects of steroids that you may experience include flushing, elevated blood pressure, increased appetite, mild headaches and restlessness.  All of these symptoms will get better with time.  If you experience any of the following, call the Pain Clinic during  work hours (Mon-Friday 8-4:30 pm) at [phone] or the Provider Line after hours at [phone]:  -Fever over 100 degree F  -Swelling, bleeding, redness, drainage, warmth at the injection site  -Progressive weakness or numbness in your arms  Unusual headache that is not relieved by Tylenol or other pain reliever  -Unusual new onset of pain that is not improving

## 2024-04-18 NOTE — NURSING NOTE
Pre-procedure Intake  If YES to any questions or NO to having a   Please complete laminated checklist and leave on the computer keyboard for Provider, verbally inform provider if able.    For SCS Trial, RFA's or any sedation procedure:  Have you been fasting? NA  If yes, for how long?     Are you taking any any blood thinners such as Coumadin, Warfarin, Jantoven, Pradaxa Xarelto, Eliquis, Edoxaban, Enoxaparin, Lovenox, Heparin, Arixtra, Fondaparinux, or Fragmin? OR Antiplatelet medication such as Plavix, Brilinta, or Effient?   No   If yes, when did you take your last dose?     Do you take aspirin?  No  If cervical procedure, have you held aspirin for 6 days?       Do you have any allergies to contrast dye, iodine, steroid and/or numbing medications?  NO    Are you currently taking antibiotics or have an active infection?  NO    Have you had a fever/elevated temperature within the past week? NO    Are you currently taking oral steroids? NO    Do you have a ? Yes    Are you pregnant or breastfeeding?  NO    Have you received the COVID-19 vaccine? No.   If yes, was it your 1st, 2nd or only dose needed?   Date of most recent vaccine:     Notify provider and RNs if systolic BP >170, diastolic BP >100, P >100 or O2 sats < 90%

## 2024-04-18 NOTE — NURSING NOTE
Discharge Information    IV Discontiued Time:  NA    Amount of Fluid Infused:  NA    Discharge Criteria = When patient returns to baseline or as per MD order    Consciousness:  Pt is fully awake    Circulation:  BP +/- 20% of pre-procedure level    Respiration:  Patient is able to breathe deeply    O2 Sat:  Patient is able to maintain O2 Sat >92% on room air    Activity:  Moves 4 extremities on command    Ambulation:  Patient is able to stand and walk or stand and pivot into wheelchair    Dressing:  Clean/dry or No Dressing    Notes:   Discharge instructions and AVS given to patient    Patient meets criteria for discharge?  YES    Admitted to PCU?  No    Responsible adult present to accompany patient home?  Yes    Signature/Title:    Jyotsna Garber RN  RN Care Coordinator  [facility] [place]

## 2024-04-18 NOTE — TELEPHONE ENCOUNTER
Pa needed    Jose-rx  ID: [id number]  [phone]    Thank you,  Ailyn Tejeda [facility]

## 2024-04-18 NOTE — PROGRESS NOTES
Assessment & Plan     Surgical menopause    - estradiol (ESTRACE) 2 MG tablet; Take 1.5 tablets (3 mg) by mouth daily    Continuous opioid dependence (H)      Platelet dysfunction (H)      Concentration deficit  No thoughts of self-harm.  - lisdexamfetamine (VYVANSE) 30 MG capsule; Take 1 capsule (30 mg) by mouth daily for 30 days  - lisdexamfetamine (VYVANSE) 30 MG capsule; Take 1 capsule (30 mg) by mouth daily for 30 days  - lisdexamfetamine (VYVANSE) 30 MG capsule; Take 1 capsule (30 mg) by mouth daily for 30 days    Fatigue, unspecified type    - Comprehensive metabolic panel (BMP + Alb, Alk Phos, ALT, AST, Total. Bili, TP); Future  - CBC with platelets and differential; Future  - Iron and iron binding capacity; Future  - Ferritin; Future  - Vitamin D Deficiency; Future  - TSH with free T4 reflex; Future  - Thyroid peroxidase antibody; Future  - Lipid panel reflex to direct LDL Fasting; Future  - Estradiol; Future  - Cortisol; Future  - Pain Management  Referral; Future  - Adult Mental Health  Referral; Future  - Cortisol  - Estradiol  - Lipid panel reflex to direct LDL Fasting  - Thyroid peroxidase antibody  - TSH with free T4 reflex  - Vitamin D Deficiency  - Ferritin  - Comprehensive metabolic panel (BMP + Alb, Alk Phos, ALT, AST, Total. Bili, TP)  - CBC with platelets and differential  - Iron and iron binding capacity    Current moderate episode of major depressive disorder, unspecified whether recurrent (H)    - Comprehensive metabolic panel (BMP + Alb, Alk Phos, ALT, AST, Total. Bili, TP); Future  - CBC with platelets and differential; Future  - Iron and iron binding capacity; Future  - Ferritin; Future  - Vitamin D Deficiency; Future  - TSH with free T4 reflex; Future  - Thyroid peroxidase antibody; Future  - Lipid panel reflex to direct LDL Fasting; Future  - Estradiol; Future  - Cortisol; Future  - Pain Management  Referral; Future  - Adult Mental Health  Referral;  Future  - Cortisol  - Estradiol  - Lipid panel reflex to direct LDL Fasting  - Thyroid peroxidase antibody  - TSH with free T4 reflex  - Vitamin D Deficiency  - Ferritin  - Comprehensive metabolic panel (BMP + Alb, Alk Phos, ALT, AST, Total. Bili, TP)  - CBC with platelets and differential  - Iron and iron binding capacity    Hx of iron deficiency    - CBC with platelets and differential; Future  - Iron and iron binding capacity; Future  - Ferritin; Future  - Ferritin  - CBC with platelets and differential  - Iron and iron binding capacity    Chronic pain syndrome    - Pain Management  Referral; Future  - Adult Mental Health  Referral; Future  - Drug Confirmation Panel Urine with Creat - lab collect; Future  - Drug Confirmation Panel Urine with Creat - lab collect    Review of external notes as documented elsewhere in note  Ordering of each unique test  Prescription drug management  30 minutes spent by me on the date of the encounter doing chart review, history and exam, documentation and further activities per the note        CONSULTATION/REFERRAL to [facility] for depression and pain  Regular exercise  See Patient Instructions    Suzanne Richter is a 43 year old, presenting for the following health issues:    She reports her mental health is still not doing well, does not feel like getting out of bed most days.  Difficulty getting out of the house if things are not organized a certain way.  Difficulty finishing tasks.  She has tried many medications for depression and that did not help.  Her bar is doing well, she does not drink alcohol.  She has contacted the [place] ketamine at [facility] and they told her since she has tried many things that were ineffective for her depression and pain they could treat her with ketamine.  She is needing referral.  Discussed trying medicine for attention deficit disorder, she has not tried this in the past.  Many of her  "symptoms could possibly improve with this while she is waiting to try the ketamine treatment.  She is in agreement to try it.  Discussed if not helping stop medication.  She reports history of significant iron deficiency prior to her hysterectomy.  She feels something is wrong she would like labs done today.  Daily headaches working with pain management and neurology, she is going in for injections today for headaches.    Headache      4/18/2024     8:51 AM   Additional Questions   Roomed by Starla   Accompanied by kate         4/18/2024     8:51 AM   Patient Reported Additional Medications   Patient reports taking the following new medications none     History of Present Illness       Headaches:   Since the patient's last clinic visit, headaches are: Worsened  The patient is getting headaches:  Everyday  She is not able to do normal daily activities when she has a migraine.  The patient is taking the following rescue/relief medications:  Ibuprofen (Advil, Motrin) and Tylenol   Patient states \"I get no relief\" from the rescue/relief medications.   The patient is taking the following medications to prevent migraines:  Other  In the past 4 weeks, the patient has gone to an Urgent Care or Emergency Room 0 times times due to headaches.    She eats 2-3 servings of fruits and vegetables daily.She consumes 0 sweetened beverage(s) daily.She exercises with enough effort to increase her heart rate 60 or more minutes per day.  She exercises with enough effort to increase her heart rate 7 days per week.   She is taking medications regularly.           Review of Systems  Constitutional, HEENT, cardiovascular, pulmonary, GI, , musculoskeletal, neuro, skin, endocrine and psych systems are negative, except as otherwise noted.      Objective    /76   Pulse 71   Temp 97.7  F (36.5  C) (Temporal)   Resp 18   Ht 1.549 m (5' 0.98\")   Wt 55.7 kg (122 lb 12.8 oz)   LMP 04/02/2018   SpO2 100%   BMI 23.22 kg/m    Body mass " index is 23.22 kg/m .  Physical Exam   GENERAL: alert and no distress  EYES: Eyes grossly normal to inspection.  No discharge or erythema, or obvious scleral/conjunctival abnormalities.  RESP: No audible wheeze, cough, or visible cyanosis.    SKIN: Visible skin clear. No significant rash, abnormal pigmentation or lesions.  NEURO: Cranial nerves grossly intact.  Mentation and speech appropriate for age.  PSYCH: Appropriate affect, tone, and pace of words    See orders        The longitudinal plan of care for the diagnosis(es)/condition(s) as documented were addressed during this visit. Due to the added complexity in care, I will continue to support Rober in the subsequent management and with ongoing continuity of care.      Signed Electronically by: EDITA BERG

## 2024-04-19 LAB
ALBUMIN SERPL BCG-MCNC: 4.3 G/DL (ref 3.5–5.2)
ALP SERPL-CCNC: 37 U/L (ref 40–150)
ALT SERPL W P-5'-P-CCNC: 20 U/L (ref 0–50)
ANION GAP SERPL CALCULATED.3IONS-SCNC: 9 MMOL/L (ref 7–15)
AST SERPL W P-5'-P-CCNC: 17 U/L (ref 0–45)
BILIRUB SERPL-MCNC: 0.2 MG/DL
BUN SERPL-MCNC: 10.1 MG/DL (ref 6–20)
CALCIUM SERPL-MCNC: 9.6 MG/DL (ref 8.6–10)
CHLORIDE SERPL-SCNC: 105 MMOL/L (ref 98–107)
CHOLEST SERPL-MCNC: 161 MG/DL
CORTIS SERPL-MCNC: 34.9 UG/DL
CREAT SERPL-MCNC: 0.71 MG/DL (ref 0.51–0.95)
CREAT UR-MCNC: 169 MG/DL
DEPRECATED HCO3 PLAS-SCNC: 27 MMOL/L (ref 22–29)
EGFRCR SERPLBLD CKD-EPI 2021: >90 ML/MIN/1.73M2
ESTRADIOL SERPL-MCNC: 125 PG/ML
FASTING STATUS PATIENT QL REPORTED: YES
FERRITIN SERPL-MCNC: 187 NG/ML (ref 6–175)
GLUCOSE SERPL-MCNC: 117 MG/DL (ref 70–99)
HDLC SERPL-MCNC: 64 MG/DL
IRON BINDING CAPACITY (ROCHE): 287 UG/DL (ref 240–430)
IRON SATN MFR SERPL: 28 % (ref 15–46)
IRON SERPL-MCNC: 81 UG/DL (ref 37–145)
LDLC SERPL CALC-MCNC: 67 MG/DL
NONHDLC SERPL-MCNC: 97 MG/DL
POTASSIUM SERPL-SCNC: 4.5 MMOL/L (ref 3.4–5.3)
PROT SERPL-MCNC: 6.4 G/DL (ref 6.4–8.3)
SODIUM SERPL-SCNC: 141 MMOL/L (ref 135–145)
THYROPEROXIDASE AB SERPL-ACNC: <10 IU/ML
TRIGL SERPL-MCNC: 152 MG/DL
TSH SERPL DL<=0.005 MIU/L-ACNC: 1.63 UIU/ML (ref 0.3–4.2)
VIT D+METAB SERPL-MCNC: 28 NG/ML (ref 20–50)

## 2024-04-22 LAB
AMPHET UR CFM-MCNC: 444 NG/ML
AMPHET/CREAT UR: 263 NG/MG {CREAT}
CODEINE UR CFM-MCNC: 634 NG/ML
CODEINE/CREAT UR: 375 NG/MG {CREAT}
MORPHINE UR CFM-MCNC: 55 NG/ML
MORPHINE/CREAT UR: 33 NG/MG {CREAT}
OXYCODONE UR CFM-MCNC: ABNORMAL NG/ML
OXYCODONE/CREAT UR: 7349 NG/MG {CREAT}
OXYMORPHONE UR CFM-MCNC: >7000 NG/ML
OXYMORPHONE/CREAT UR: ABNORMAL

## 2024-04-22 NOTE — TELEPHONE ENCOUNTER
Prior Authorization Not Needed per Insurance    Medication: UBRELVY 100 MG PO TABS  Insurance Company: [employer] - Phone [phone] Fax [phone]  Expected CoPay: $    Pharmacy Filling the Rx: [facility] [place], [place] - [address] [facility]  Pharmacy Notified: Yes  Patient Notified: No    LD Estevez processed qty# 16 tabs as written on script. Insurance will cover a max of #10 per 30 days. Being that this is a new dose, patient has not failed #16 tabs per 30 days. Per  [facility] pharmacy note, Ubrelvy script was not filled because patient needs to fill this at her plan's preferred dispensing pharmacy: [facility] in [place], [place] Phone# [phone]. Previous PA was approved on 2/2/24 (see encounter 1/12/24) for more details. A new script for #10 tabs per fill may be needed to be sent to [employer] to be processed and see if they get a paid claim.

## 2024-04-24 ENCOUNTER — HOSPITAL ENCOUNTER (OUTPATIENT)
Dept: MRI IMAGING | Facility: CLINIC | Age: 44
Discharge: HOME | End: 2024-04-24
Attending: NURSE PRACTITIONER | Admitting: NURSE PRACTITIONER
Payer: COMMERCIAL

## 2024-04-24 DIAGNOSIS — R20.2 PARESTHESIA: ICD-10-CM

## 2024-04-24 DIAGNOSIS — G44.86 CERVICOGENIC HEADACHE: ICD-10-CM

## 2024-04-24 DIAGNOSIS — R51.9 WORSENING HEADACHES: ICD-10-CM

## 2024-04-24 DIAGNOSIS — M54.2 CERVICALGIA: ICD-10-CM

## 2024-04-24 PROCEDURE — 72156 MRI NECK SPINE W/O & W/DYE: CPT

## 2024-04-24 PROCEDURE — A9585 GADOBUTROL INJECTION: HCPCS | Performed by: NURSE PRACTITIONER

## 2024-04-24 PROCEDURE — 255N000002 HC RX 255 OP 636: Performed by: NURSE PRACTITIONER

## 2024-04-24 PROCEDURE — 70553 MRI BRAIN STEM W/O & W/DYE: CPT

## 2024-04-24 RX ORDER — GADOBUTROL 604.72 MG/ML
6 INJECTION INTRAVENOUS ONCE
Status: COMPLETED | OUTPATIENT
Start: 2024-04-24 | End: 2024-04-24

## 2024-04-24 RX ORDER — DEXAMETHASONE SODIUM PHOSPHATE 10 MG/ML
10 INJECTION, SOLUTION INTRAMUSCULAR; INTRAVENOUS ONCE
Status: COMPLETED | OUTPATIENT
Start: 2024-04-18 | End: 2024-04-18

## 2024-04-24 RX ADMIN — GADOBUTROL 6 ML: 604.72 INJECTION INTRAVENOUS at 20:36

## 2024-04-24 NOTE — PROGRESS NOTES
"[facility] - Procedure Note    Date of Visit: 4/24/2024    Procedure performed: C7-T1 interlaminar epidural steroid injection with fluoroscopic guidance  Diagnosis: Cervical radiculitis/radiculopathy  : Donnie Durbin MD  Anesthesia: none    Indications: Rober Renee is a 43 year old female who is seen a for cervical epidural steroid injection. The patient describes neck rad to her ue. The patient has been exhibiting symptoms consistent with cervical intraspinal inflammation and radiculopathy. Symptoms have been persistent, disabling, and intermittently severe. The patient reports minimal improvement with conservative treatment, including meds.    Cervical MRI wrev    Allergies:      Allergies   Allergen Reactions    Butalbital Anaphylaxis    Imitrex [Sumatriptan] Other (See Comments)     Felt terrible, \"like pores were on fire\"        Vitals:  BP (!) 140/90   Pulse 71   LMP 04/02/2018   SpO2 100%     Review of Systems: The patient denies recent fever, chills, illness, use of antibiotics or anticoagulants. All other 10-point review of systems negative.     Procedure: The procedure and risks were explained, and informed written consent was obtained from the patient. Risks include but are not limited to: infection, bleeding, increased pain, and damage to soft tissue, nerve, muscle, and vasculature structures. After getting informed consent, patient was brought into the procedure suite and was placed in a prone position on the procedure table. A Pause for the Cause was performed. Patient was prepped and draped in sterile fashion.     The C7-T1 interspace was identified with use of fluoroscopy in AP view. A 25-gauge, 1.5 inch needle was used to anesthetize the skin and subcutaneous tissue entry site with a total of 2 ml of 1% lidocaine. Under fluoroscopic visualization, a 22-gauge, 3.5 inch Tuohy epidural needle was slowly advanced towards the epidural space a few millimeters midline " of midline. The latter part of the needle advancement was guided with fluoroscopy in the lateral view. The epidural space was identified using loss of resistance technique. After negative aspiration for heme and cerebrospinal fluid, a total of 1 mL of Omnipaque was injected to confirm needle placement. 9 mL of contrast was wasted. Epidurogram confirmed spread within the posterior epidural space. 2 ml of  NS,1 ml 10mg/ml of dexamethasone, and 1 ml of preservative free 0.25% bupivacaine was injected. The needle was removed.  Images were saved to PACS.    The patient tolerated the procedure well, and there was no evidence of procedural complications. No new sensory or motor deficits were noted following the procedure. The patient was stable and able to ambulate on discharge home. Post-procedure instructions were provided.     Pre-procedure pain score: 7/10 in the neck, 7/10 in the arm  Post-procedure pain score: 7/10 in the neck, 7/10 in the arm    Assessment/Plan: Rober Renee is a 43 year old female s/p cervical interlaminar epidural steroid injection today for cervical spondylosis and radiculitis/radiculopathy.     Following today's procedure, the patient was advised to contact the [facility] for any of the following:   Fever, chills, or night sweats   New onset of pain, numbness, or weakness   Any questions/concerns regarding the procedure  If unable to contact the Pain Center, the patient was instructed to go to a local Emergency Room for any complications.     Follow-up with provider  in 2 weeks for post-procedure evaluation.    Donnie Durbin MD   Pain Management    [facility]

## 2024-04-29 NOTE — RESULT ENCOUNTER NOTE
Alan Richter,    Thank you for your recent office visit.    Here are your recent results.  Normal ferritin level, normal cholesterol, normal kidney function, normal nonfasting glucose, liver functions normal except for slightly low alkaline phosphatase but it looks okay.  Normal CBC your platelet count is slightly low same as previous.  Normal thyroid level.  Normal cortisol.  Normal estradiol.  Normal iron.    Feel free to contact me via Plix[employer] or call the clinic at [phone].    Sincerely,    ANTONIO Alaniz, FNP-BC

## 2024-05-01 NOTE — TELEPHONE ENCOUNTER
Central Prior Authorization Team   Phone: [phone]    PA Initiation    Medication: Lisdexamfetamine 30mg capsules  Insurance Company: [employer] - Phone [phone] Fax [phone]  Pharmacy Filling the Rx: [facility] - [id number] [facility]  Filling Pharmacy Phone: [phone]  Filling Pharmacy Fax:    Start Date: 5/1/2024

## 2024-05-01 NOTE — RESULT ENCOUNTER NOTE
Hang Richter,   Your brain MRI and neck MRI results/images reviewed. No acute new findings to explain your headache symptoms.  Your neck MRI did show possible inflammation/radiculopathy and already address by neck specialist.   Treatment plan and follow up as discussed.   Take care,  Darcie

## 2024-05-02 ENCOUNTER — MYC MEDICAL ADVICE (OUTPATIENT)
Dept: FAMILY MEDICINE | Facility: CLINIC | Age: 44
End: 2024-05-02
Payer: COMMERCIAL

## 2024-05-02 NOTE — TELEPHONE ENCOUNTER
Insurance needs more clarification on the diagnosis. Dose she have ADD or ADHD? Current diagnosis just says Concentration deficit [R46.583]. Once documented please route back to me or the PA team. Thank you.

## 2024-05-06 ENCOUNTER — MYC REFILL (OUTPATIENT)
Dept: FAMILY MEDICINE | Facility: CLINIC | Age: 44
End: 2024-05-06
Payer: COMMERCIAL

## 2024-05-06 DIAGNOSIS — M50.20 HERNIATED CERVICAL INTERVERTEBRAL DISC: ICD-10-CM

## 2024-05-06 DIAGNOSIS — M48.02 FORAMINAL STENOSIS OF CERVICAL REGION: ICD-10-CM

## 2024-05-07 RX ORDER — OXYCODONE HYDROCHLORIDE 10 MG/1
10 TABLET ORAL DAILY PRN
Qty: 30 TABLET | Refills: 0 | Status: SHIPPED | OUTPATIENT
Start: 2024-05-07 | End: 2024-05-14

## 2024-05-14 ENCOUNTER — MYC REFILL (OUTPATIENT)
Dept: FAMILY MEDICINE | Facility: CLINIC | Age: 44
End: 2024-05-14
Payer: COMMERCIAL

## 2024-05-14 DIAGNOSIS — M50.20 HERNIATED CERVICAL INTERVERTEBRAL DISC: ICD-10-CM

## 2024-05-14 DIAGNOSIS — M48.02 FORAMINAL STENOSIS OF CERVICAL REGION: ICD-10-CM

## 2024-05-14 DIAGNOSIS — R41.840 CONCENTRATION DEFICIT: ICD-10-CM

## 2024-05-14 RX ORDER — LISDEXAMFETAMINE DIMESYLATE 30 MG/1
30 CAPSULE ORAL DAILY
Qty: 30 CAPSULE | Refills: 0 | Status: SHIPPED | OUTPATIENT
Start: 2024-05-14 | End: 2024-05-30

## 2024-05-14 RX ORDER — OXYCODONE HYDROCHLORIDE 10 MG/1
10 TABLET ORAL DAILY PRN
Qty: 30 TABLET | Refills: 0 | Status: CANCELLED | OUTPATIENT
Start: 2024-05-14

## 2024-05-14 RX ORDER — LISDEXAMFETAMINE DIMESYLATE 30 MG/1
30 CAPSULE ORAL DAILY
Qty: 30 CAPSULE | Refills: 0 | Status: CANCELLED | OUTPATIENT
Start: 2024-05-14

## 2024-05-14 RX ORDER — OXYCODONE HYDROCHLORIDE 10 MG/1
10 TABLET ORAL DAILY PRN
Qty: 30 TABLET | Refills: 0 | Status: SHIPPED | OUTPATIENT
Start: 2024-05-14 | End: 2024-05-24

## 2024-05-21 ENCOUNTER — MYC MEDICAL ADVICE (OUTPATIENT)
Dept: FAMILY MEDICINE | Facility: CLINIC | Age: 44
End: 2024-05-21
Payer: COMMERCIAL

## 2024-05-21 DIAGNOSIS — M50.20 HERNIATED CERVICAL INTERVERTEBRAL DISC: ICD-10-CM

## 2024-05-21 DIAGNOSIS — M48.02 FORAMINAL STENOSIS OF CERVICAL REGION: ICD-10-CM

## 2024-05-22 ENCOUNTER — TELEPHONE (OUTPATIENT)
Dept: FAMILY MEDICINE | Facility: CLINIC | Age: 44
End: 2024-05-22

## 2024-05-22 NOTE — TELEPHONE ENCOUNTER
Patient Quality Outreach    Patient is due for the following:   Breast Cancer Screening - Mammogram  Depression  -  PHQ-9 needed  Physical Preventive Adult Physical      Topic Date Due    Polio Vaccine (2 of 3 - 4-dose series) [birth year]1981    Hepatitis A Vaccine (1 of 2 - Risk 2-dose series) Never done    Hepatitis B Vaccine (1 of 3 - 19+ 3-dose series) Never done    COVID-19 Vaccine (1 - 2023-24 season) Never done       Type of outreach:    Sent [employer] message.    Questions for provider review:    None           Noelle Cuevas MA  Chart routed to Care Team.

## 2024-05-22 NOTE — LETTER
June 21, 2024    To  Rober Gatica  [address] [place] [address] [place] [address]    Your team at [facility] cares about your health. We have reviewed your chart and based on our findings; we are making the following recommendations to better manage your health.     You are in particular need of attention regarding the following:     Schedule Annual MAMMOGRAPHY. [facility] scheduling number is [phone] or schedule in [employer] (self referral).  Schedule an office visit with your PRIMARY CARE PHYSICIAN for mental health follow-up.  PREVENTATIVE VISIT: Physical  Please schedule a Nurse Only Appointment with your primary care clinic to update your immunizations that are due.    If you have already completed these items, please contact the clinic via phone or   SnipSn[employer]art so your care team can review and update your records. Thank you for   choosing [facility] for your healthcare needs. For any questions,   concerns, or to schedule an appointment please contact our clinic.    Healthy Regards,      Your [facility] Care Team

## 2024-05-24 ENCOUNTER — MYC MEDICAL ADVICE (OUTPATIENT)
Dept: FAMILY MEDICINE | Facility: CLINIC | Age: 44
End: 2024-05-24
Payer: COMMERCIAL

## 2024-05-24 RX ORDER — OXYCODONE HYDROCHLORIDE 10 MG/1
10 TABLET ORAL DAILY PRN
Qty: 30 TABLET | Refills: 0 | Status: SHIPPED | OUTPATIENT
Start: 2024-05-24 | End: 2024-06-20

## 2024-05-24 NOTE — TELEPHONE ENCOUNTER
[employer] message sent 5/24/24.       Rober Gatica   to CHETNA Tavera (supporting Serena Ojeda NP)   JA      5/24/24 11:30 AM  Just wanted to let you know I called the [facility] PD   They took my name and number and info about the meds I m missing, where they possibly were taken from. That s all they asked for.   But it s documented.       Liliana Rainey RN on 5/24/2024 at 12:39 PM

## 2024-05-28 ENCOUNTER — PATIENT OUTREACH (OUTPATIENT)
Dept: CARE COORDINATION | Facility: CLINIC | Age: 44
End: 2024-05-28
Payer: COMMERCIAL

## 2024-05-29 DIAGNOSIS — R41.840 CONCENTRATION DEFICIT: ICD-10-CM

## 2024-05-30 RX ORDER — LISDEXAMFETAMINE DIMESYLATE 30 MG/1
30 CAPSULE ORAL DAILY
Qty: 6 CAPSULE | Refills: 0 | Status: SHIPPED | OUTPATIENT
Start: 2024-05-30 | End: 2024-06-20

## 2024-06-11 ENCOUNTER — PATIENT OUTREACH (OUTPATIENT)
Dept: CARE COORDINATION | Facility: CLINIC | Age: 44
End: 2024-06-11
Payer: COMMERCIAL

## 2024-06-20 ENCOUNTER — MYC REFILL (OUTPATIENT)
Dept: FAMILY MEDICINE | Facility: CLINIC | Age: 44
End: 2024-06-20
Payer: COMMERCIAL

## 2024-06-20 ENCOUNTER — MYC MEDICAL ADVICE (OUTPATIENT)
Dept: FAMILY MEDICINE | Facility: CLINIC | Age: 44
End: 2024-06-20
Payer: COMMERCIAL

## 2024-06-20 DIAGNOSIS — M48.02 FORAMINAL STENOSIS OF CERVICAL REGION: ICD-10-CM

## 2024-06-20 DIAGNOSIS — R41.840 CONCENTRATION DEFICIT: ICD-10-CM

## 2024-06-20 DIAGNOSIS — M50.20 HERNIATED CERVICAL INTERVERTEBRAL DISC: ICD-10-CM

## 2024-06-20 RX ORDER — OXYCODONE HYDROCHLORIDE 10 MG/1
10 TABLET ORAL DAILY PRN
Qty: 30 TABLET | Refills: 0 | Status: SHIPPED | OUTPATIENT
Start: 2024-06-20 | End: 2024-07-21

## 2024-06-20 RX ORDER — LISDEXAMFETAMINE DIMESYLATE 30 MG/1
30 CAPSULE ORAL DAILY
Qty: 6 CAPSULE | Refills: 0 | Status: SHIPPED | OUTPATIENT
Start: 2024-06-20 | End: 2024-07-29

## 2024-07-21 ENCOUNTER — MYC REFILL (OUTPATIENT)
Dept: FAMILY MEDICINE | Facility: CLINIC | Age: 44
End: 2024-07-21
Payer: COMMERCIAL

## 2024-07-21 DIAGNOSIS — M48.02 FORAMINAL STENOSIS OF CERVICAL REGION: ICD-10-CM

## 2024-07-21 DIAGNOSIS — M50.20 HERNIATED CERVICAL INTERVERTEBRAL DISC: ICD-10-CM

## 2024-07-22 RX ORDER — OXYCODONE HYDROCHLORIDE 10 MG/1
10 TABLET ORAL DAILY PRN
Qty: 30 TABLET | Refills: 0 | Status: SHIPPED | OUTPATIENT
Start: 2024-07-22 | End: 2024-08-25

## 2024-07-29 ENCOUNTER — MYC REFILL (OUTPATIENT)
Dept: FAMILY MEDICINE | Facility: CLINIC | Age: 44
End: 2024-07-29
Payer: COMMERCIAL

## 2024-07-29 DIAGNOSIS — R41.840 CONCENTRATION DEFICIT: ICD-10-CM

## 2024-07-29 DIAGNOSIS — F41.0 PANIC ATTACK: ICD-10-CM

## 2024-07-29 RX ORDER — ALPRAZOLAM 0.5 MG
0.5 TABLET ORAL 2 TIMES DAILY PRN
Qty: 60 TABLET | Refills: 0 | Status: SHIPPED | OUTPATIENT
Start: 2024-07-29

## 2024-07-29 RX ORDER — LISDEXAMFETAMINE DIMESYLATE 30 MG/1
30 CAPSULE ORAL DAILY
Qty: 30 CAPSULE | Refills: 0 | Status: SHIPPED | OUTPATIENT
Start: 2024-07-29 | End: 2024-08-25

## 2024-08-25 ENCOUNTER — MYC REFILL (OUTPATIENT)
Dept: FAMILY MEDICINE | Facility: CLINIC | Age: 44
End: 2024-08-25
Payer: COMMERCIAL

## 2024-08-25 ENCOUNTER — HEALTH MAINTENANCE LETTER (OUTPATIENT)
Age: 44
End: 2024-08-25

## 2024-08-25 DIAGNOSIS — M50.20 HERNIATED CERVICAL INTERVERTEBRAL DISC: ICD-10-CM

## 2024-08-25 DIAGNOSIS — R41.840 CONCENTRATION DEFICIT: ICD-10-CM

## 2024-08-25 DIAGNOSIS — M48.02 FORAMINAL STENOSIS OF CERVICAL REGION: ICD-10-CM

## 2024-08-26 RX ORDER — LISDEXAMFETAMINE DIMESYLATE 30 MG/1
30 CAPSULE ORAL DAILY
Qty: 30 CAPSULE | Refills: 0 | Status: SHIPPED | OUTPATIENT
Start: 2024-08-26 | End: 2024-08-30

## 2024-08-26 RX ORDER — OXYCODONE HYDROCHLORIDE 10 MG/1
10 TABLET ORAL DAILY PRN
Qty: 30 TABLET | Refills: 0 | Status: SHIPPED | OUTPATIENT
Start: 2024-08-26 | End: 2024-08-30

## 2024-08-30 ENCOUNTER — MYC MEDICAL ADVICE (OUTPATIENT)
Dept: FAMILY MEDICINE | Facility: CLINIC | Age: 44
End: 2024-08-30
Payer: COMMERCIAL

## 2024-08-30 DIAGNOSIS — M50.20 HERNIATED CERVICAL INTERVERTEBRAL DISC: ICD-10-CM

## 2024-08-30 DIAGNOSIS — R41.840 CONCENTRATION DEFICIT: ICD-10-CM

## 2024-08-30 DIAGNOSIS — M48.02 FORAMINAL STENOSIS OF CERVICAL REGION: ICD-10-CM

## 2024-08-30 RX ORDER — LISDEXAMFETAMINE DIMESYLATE 30 MG/1
30 CAPSULE ORAL DAILY
Qty: 30 CAPSULE | Refills: 0 | Status: SHIPPED | OUTPATIENT
Start: 2024-08-30

## 2024-08-30 RX ORDER — OXYCODONE HYDROCHLORIDE 10 MG/1
10 TABLET ORAL DAILY PRN
Qty: 30 TABLET | Refills: 0 | Status: SHIPPED | OUTPATIENT
Start: 2024-08-30 | End: 2024-09-30

## 2024-08-30 NOTE — TELEPHONE ENCOUNTER
"See MyChart message from patient.     I spoke with Nathan at the [facility] pharmacy regarding the following prescriptions on file:    lisdexamfetamine (VYVANSE) 30 MG capsule   oxyCODONE IR (ROXICODONE) 10 MG tablet       Prescriptions were last picked up on 8/6/24.   Pharmacy has them set up to fill on 9/3/24 (per policy).     Patient will be out of medication on 9/5/24.     Pharmacy needs verbal \"ok\" from Serena Moore NP prior to filling for patient today.     Hina Maruo RN BSN  [facility]           "

## 2024-09-30 ENCOUNTER — MYC REFILL (OUTPATIENT)
Dept: FAMILY MEDICINE | Facility: CLINIC | Age: 44
End: 2024-09-30
Payer: COMMERCIAL

## 2024-09-30 DIAGNOSIS — G89.4 CHRONIC PAIN SYNDROME: Primary | ICD-10-CM

## 2024-09-30 DIAGNOSIS — M48.02 FORAMINAL STENOSIS OF CERVICAL REGION: ICD-10-CM

## 2024-09-30 DIAGNOSIS — M50.20 HERNIATED CERVICAL INTERVERTEBRAL DISC: ICD-10-CM

## 2024-09-30 RX ORDER — OXYCODONE HYDROCHLORIDE 10 MG/1
10 TABLET ORAL DAILY PRN
Qty: 30 TABLET | Refills: 0 | Status: SHIPPED | OUTPATIENT
Start: 2024-09-30

## 2024-09-30 NOTE — TELEPHONE ENCOUNTER
Received fax from pharmacy requesting refill(s) for     diclofenac (VOLTAREN) 1 % topical gel    Date last filled 06/08/2023    Last Appt Date:06/08/2023    Next Appt scheduled: None    Pharmacy:   [facility] - [id number] [facility] [place] route for processing    Elizabeth Kan MA  [facility] [place]     Detail Level: Zone Photo Preface (Leave Blank If You Do Not Want): Photographs were obtained today

## 2024-10-29 ENCOUNTER — OFFICE VISIT (OUTPATIENT)
Dept: FAMILY MEDICINE | Facility: CLINIC | Age: 44
End: 2024-10-29
Payer: COMMERCIAL

## 2024-10-29 VITALS
HEIGHT: 62 IN | OXYGEN SATURATION: 99 % | BODY MASS INDEX: 21.83 KG/M2 | HEART RATE: 73 BPM | RESPIRATION RATE: 18 BRPM | SYSTOLIC BLOOD PRESSURE: 120 MMHG | TEMPERATURE: 97.3 F | DIASTOLIC BLOOD PRESSURE: 68 MMHG | WEIGHT: 118.6 LBS

## 2024-10-29 DIAGNOSIS — M50.20 HERNIATED CERVICAL INTERVERTEBRAL DISC: Primary | ICD-10-CM

## 2024-10-29 DIAGNOSIS — G43.711 INTRACTABLE CHRONIC MIGRAINE WITHOUT AURA AND WITH STATUS MIGRAINOSUS: ICD-10-CM

## 2024-10-29 DIAGNOSIS — M48.02 CERVICAL STENOSIS OF SPINAL CANAL: ICD-10-CM

## 2024-10-29 DIAGNOSIS — R52 BURNING PAIN: ICD-10-CM

## 2024-10-29 DIAGNOSIS — M54.12 CERVICAL RADICULOPATHY: ICD-10-CM

## 2024-10-29 DIAGNOSIS — M50.30 DDD (DEGENERATIVE DISC DISEASE), CERVICAL: ICD-10-CM

## 2024-10-29 DIAGNOSIS — G43.001 MIGRAINE WITHOUT AURA AND WITH STATUS MIGRAINOSUS, NOT INTRACTABLE: ICD-10-CM

## 2024-10-29 DIAGNOSIS — M48.02 FORAMINAL STENOSIS OF CERVICAL REGION: ICD-10-CM

## 2024-10-29 PROCEDURE — 99214 OFFICE O/P EST MOD 30 MIN: CPT | Performed by: NURSE PRACTITIONER

## 2024-10-29 RX ORDER — GABAPENTIN 300 MG/1
300-600 CAPSULE ORAL 3 TIMES DAILY
Qty: 180 CAPSULE | Refills: 5 | Status: SHIPPED | OUTPATIENT
Start: 2024-10-29

## 2024-10-29 RX ORDER — OXYCODONE HYDROCHLORIDE 10 MG/1
10 TABLET ORAL DAILY PRN
Qty: 30 TABLET | Refills: 0 | Status: SHIPPED | OUTPATIENT
Start: 2024-10-29

## 2024-10-29 RX ORDER — KETOROLAC TROMETHAMINE 10 MG/1
10 TABLET, FILM COATED ORAL EVERY 8 HOURS PRN
Qty: 20 TABLET | Refills: 1 | Status: SHIPPED | OUTPATIENT
Start: 2024-10-29

## 2024-10-29 ASSESSMENT — ANXIETY QUESTIONNAIRES
IF YOU CHECKED OFF ANY PROBLEMS ON THIS QUESTIONNAIRE, HOW DIFFICULT HAVE THESE PROBLEMS MADE IT FOR YOU TO DO YOUR WORK, TAKE CARE OF THINGS AT HOME, OR GET ALONG WITH OTHER PEOPLE: SOMEWHAT DIFFICULT
GAD7 TOTAL SCORE: 6
3. WORRYING TOO MUCH ABOUT DIFFERENT THINGS: SEVERAL DAYS
1. FEELING NERVOUS, ANXIOUS, OR ON EDGE: SEVERAL DAYS
7. FEELING AFRAID AS IF SOMETHING AWFUL MIGHT HAPPEN: NOT AT ALL
2. NOT BEING ABLE TO STOP OR CONTROL WORRYING: SEVERAL DAYS
4. TROUBLE RELAXING: SEVERAL DAYS
6. BECOMING EASILY ANNOYED OR IRRITABLE: SEVERAL DAYS
5. BEING SO RESTLESS THAT IT IS HARD TO SIT STILL: SEVERAL DAYS
7. FEELING AFRAID AS IF SOMETHING AWFUL MIGHT HAPPEN: NOT AT ALL
8. IF YOU CHECKED OFF ANY PROBLEMS, HOW DIFFICULT HAVE THESE MADE IT FOR YOU TO DO YOUR WORK, TAKE CARE OF THINGS AT HOME, OR GET ALONG WITH OTHER PEOPLE?: SOMEWHAT DIFFICULT
GAD7 TOTAL SCORE: 6
GAD7 TOTAL SCORE: 6

## 2024-10-29 ASSESSMENT — ENCOUNTER SYMPTOMS: HEADACHES: 1

## 2024-10-29 ASSESSMENT — PATIENT HEALTH QUESTIONNAIRE - PHQ9
SUM OF ALL RESPONSES TO PHQ QUESTIONS 1-9: 7
SUM OF ALL RESPONSES TO PHQ QUESTIONS 1-9: 7
10. IF YOU CHECKED OFF ANY PROBLEMS, HOW DIFFICULT HAVE THESE PROBLEMS MADE IT FOR YOU TO DO YOUR WORK, TAKE CARE OF THINGS AT HOME, OR GET ALONG WITH OTHER PEOPLE: SOMEWHAT DIFFICULT

## 2024-10-29 NOTE — PROGRESS NOTES
Assessment & Plan     Herniated cervical intervertebral disc    - Spine  Referral; Future  - oxyCODONE IR (ROXICODONE) 10 MG tablet; Take 1 tablet (10 mg) by mouth daily as needed for severe pain. To last one month    Intractable chronic migraine without aura and with status migrainosus    - Spine  Referral; Future    Burning pain    - Spine  Referral; Future  - gabapentin (NEURONTIN) 300 MG capsule; Take 1-2 capsules (300-600 mg) by mouth 3 times daily.    Cervical radiculopathy    - Spine  Referral; Future  - gabapentin (NEURONTIN) 300 MG capsule; Take 1-2 capsules (300-600 mg) by mouth 3 times daily.    DDD (degenerative disc disease), cervical    - Spine  Referral; Future    Cervical stenosis of spinal canal    - Spine  Referral; Future  - gabapentin (NEURONTIN) 300 MG capsule; Take 1-2 capsules (300-600 mg) by mouth 3 times daily.    Foraminal stenosis of cervical region    - oxyCODONE IR (ROXICODONE) 10 MG tablet; Take 1 tablet (10 mg) by mouth daily as needed for severe pain. To last one month    Migraine without aura and with status migrainosus, not intractable    - ketorolac (TORADOL) 10 MG tablet; Take 1 tablet (10 mg) by mouth every 8 hours as needed for moderate pain (headache).      See Patient Instructions    Suzanne Richter is a 44 year old, presenting for the following health issues:  Headache and Shoulder / Tailbone Pain    Following up chronic headaches.  She reports she never was able to do the ketamine treatments due to cost of treatments and location.  She reports her mental health is better she reminds herself that this is a physical problem.  She has a referral with [facility] for evaluation.  Has been working with neurologist to try many medications with no improvement of her symptoms.  Nothing seems to trigger her headaches.  She has worked with a massage therapist, chiropractor.  She reports laying down and sitting seems to worsen  "her headaches.  They were supposed to do a spinal tap at 1 point and that was never done.  Her neurologist just did an MRI of the brain and neck and she has not ever received the results.  Chart reviewed discussed    IMPRESSION:  HEAD MRI:   1.  Normal head MRI.     CERVICAL SPINE MRI:  1.  No significant change since 10/20/2021. Stable degenerative disc disease at C5-C6.  2.  Small left central disc osteophyte complex at C5-C6 flattens the left ventral margin of the cord. Correlate for left C6 radiculopathy.  3.  Mild to moderate left neural foraminal stenosis at C5-C6.  4.  Mild spinal canal stenosis at C5-C6.  5.  Normal cervical spinal cord signal.    Discussed referral to spine specialist.  Trying gabapentin for nerve pain.  Risks of ongoing pain management discussed; hopeful to get off pain meds with treatment of spine issue.  Advised to use meds sparingly.        10/29/2024     2:28 PM   Additional Questions   Roomed by Noelle TOBIAS     Headache     History of Present Illness       Headaches:   Since the patient's last clinic visit, headaches are: worsened  The patient is getting headaches:  Everyday  She is not able to do normal daily activities when she has a migraine.  The patient is taking the following rescue/relief medications:  Ibuprofen (Advil, Motrin) and Tylenol   Patient states \"I get only a small amount of relief\" from the rescue/relief medications.   The patient is taking the following medications to prevent migraines:  No medications to prevent migraines  In the past 4 weeks, the patient has gone to an Urgent Care or Emergency Room 1 time times due to headaches.    She eats 2-3 servings of fruits and vegetables daily.She consumes 1 sweetened beverage(s) daily.She exercises with enough effort to increase her heart rate 30 to 60 minutes per day.  She exercises with enough effort to increase her heart rate 5 days per week.   She is taking medications regularly.         Review of " "Systems  Constitutional, HEENT, cardiovascular, pulmonary, GI, , musculoskeletal, neuro, skin, endocrine and psych systems are negative, except as otherwise noted.      Objective    /68   Pulse 73   Temp 97.3  F (36.3  C) (Oral)   Resp 18   Ht 1.57 m (5' 1.81\")   Wt 53.8 kg (118 lb 9.6 oz)   LMP 04/02/2018   SpO2 99%   BMI 21.83 kg/m    Body mass index is 21.83 kg/m .  Physical Exam   GENERAL: Healthy, alert and no distress  EYES: Eyes grossly normal to inspection.  No discharge or erythema, or obvious scleral/conjunctival abnormalities.  RESP: No audible wheeze, cough, or visible cyanosis.  No visible retractions or increased work of breathing.    SKIN: Visible skin clear. No significant rash, abnormal pigmentation or lesions.  NEURO: Mentation and speech appropriate for age.  PSYCH: Mentation appears normal, affect flat             Signed Electronically by: EDITA BERG    "

## 2024-10-30 NOTE — TELEPHONE ENCOUNTER
SPINE PATIENTS - NEW PROTOCOL PREVISIT    RECORDS RECEIVED FROM: Referred by Serena Ojeda   REASON FOR VISIT: Herniated cervical intervertebral disc, Cervical radiculopathy, Cervical stenosis of spinal canal   PROVIDER: Alejandro   DATE OF APPT: 10/31/2024   NOTES (FOR ALL VISITS) STATUS DETAILS   OFFICE NOTE from referring provider Internal Referral and notes in chart   OFFICE NOTE from other specialist N/A    DISCHARGE SUMMARY from hospital N/A    DISCHARGE REPORT from ER N/A    OPERATIVE REPORT N/A    EMG REPORT N/A    Injection Internal Injection 04/18/2024   Physical therapy N/A    IMAGING  (FOR ALL VISITS)     MRI (HEAD, NECK, SPINE) Internal MRI Cervical on 4/24/24   XRAY (SPINE) *NEUROSURGERY* N/A    CT (HEAD, NECK, SPINE) N/A

## 2024-10-31 ENCOUNTER — PRE VISIT (OUTPATIENT)
Dept: NEUROSURGERY | Facility: CLINIC | Age: 44
End: 2024-10-31

## 2024-10-31 ENCOUNTER — ANCILLARY PROCEDURE (OUTPATIENT)
Dept: GENERAL RADIOLOGY | Facility: CLINIC | Age: 44
End: 2024-10-31
Attending: STUDENT IN AN ORGANIZED HEALTH CARE EDUCATION/TRAINING PROGRAM
Payer: COMMERCIAL

## 2024-10-31 ENCOUNTER — OFFICE VISIT (OUTPATIENT)
Dept: NEUROSURGERY | Facility: CLINIC | Age: 44
End: 2024-10-31
Attending: NURSE PRACTITIONER
Payer: COMMERCIAL

## 2024-10-31 VITALS
BODY MASS INDEX: 21.71 KG/M2 | HEIGHT: 61 IN | WEIGHT: 115 LBS | HEART RATE: 59 BPM | SYSTOLIC BLOOD PRESSURE: 145 MMHG | DIASTOLIC BLOOD PRESSURE: 82 MMHG

## 2024-10-31 DIAGNOSIS — M54.2 NECK PAIN: ICD-10-CM

## 2024-10-31 DIAGNOSIS — R52 BURNING PAIN: ICD-10-CM

## 2024-10-31 DIAGNOSIS — R20.2 NUMBNESS AND TINGLING IN BOTH HANDS: ICD-10-CM

## 2024-10-31 DIAGNOSIS — M48.02 FORAMINAL STENOSIS OF CERVICAL REGION: ICD-10-CM

## 2024-10-31 DIAGNOSIS — M48.02 CERVICAL STENOSIS OF SPINAL CANAL: ICD-10-CM

## 2024-10-31 DIAGNOSIS — M54.12 CERVICAL RADICULOPATHY: ICD-10-CM

## 2024-10-31 DIAGNOSIS — M50.20 HERNIATED CERVICAL INTERVERTEBRAL DISC: Primary | ICD-10-CM

## 2024-10-31 DIAGNOSIS — G43.711 INTRACTABLE CHRONIC MIGRAINE WITHOUT AURA AND WITH STATUS MIGRAINOSUS: ICD-10-CM

## 2024-10-31 DIAGNOSIS — R20.0 NUMBNESS AND TINGLING IN BOTH HANDS: ICD-10-CM

## 2024-10-31 DIAGNOSIS — M50.30 DDD (DEGENERATIVE DISC DISEASE), CERVICAL: ICD-10-CM

## 2024-10-31 PROCEDURE — 99204 OFFICE O/P NEW MOD 45 MIN: CPT | Performed by: STUDENT IN AN ORGANIZED HEALTH CARE EDUCATION/TRAINING PROGRAM

## 2024-10-31 PROCEDURE — 72040 X-RAY EXAM NECK SPINE 2-3 VW: CPT | Performed by: RADIOLOGY

## 2024-10-31 PROCEDURE — 72082 X-RAY EXAM ENTIRE SPI 2/3 VW: CPT | Performed by: RADIOLOGY

## 2024-10-31 ASSESSMENT — PAIN SCALES - GENERAL: PAINLEVEL_OUTOF10: EXTREME PAIN (8)

## 2024-10-31 NOTE — LETTER
"10/31/2024      Rober Gatica  [address] [facility] [address]      Dear Colleague,    Thank you for referring your patient, Rober Gatica, to the [facility] [place]. Please see a copy of my visit note below.    HPI:  44-year-old female with chronic neck pain.  This is mostly at the base of her skull and does cause headaches consistently.  Symptoms are worse with sitting and laying down and relieved by standing.  With working she does note more difficulty with fine motor tasks with her hands and has more numbness in her right arm and pain in her left arm.  These do not follow any specific dermatomal pattern.  She has had medial branch blocks in the past without any significant benefit.  She has tried physical therapy in the past without any long-term benefit as well.  She notes when raising her arms up this does worsen the symptoms.  Current Outpatient Medications   Medication Sig Dispense Refill     estradiol (ESTRACE) 2 MG tablet Take 1.5 tablets (3 mg) by mouth daily 135 tablet 3     gabapentin (NEURONTIN) 300 MG capsule Take 1-2 capsules (300-600 mg) by mouth 3 times daily. 180 capsule 5     ketorolac (TORADOL) 10 MG tablet Take 1 tablet (10 mg) by mouth every 8 hours as needed for moderate pain (headache). 20 tablet 1     oxyCODONE IR (ROXICODONE) 10 MG tablet Take 1 tablet (10 mg) by mouth daily as needed for severe pain. To last one month 30 tablet 0     No current facility-administered medications for this visit.      Physical Exam:  Vital signs:      BP: (!) 145/82 Pulse: 59           Height: 154.9 cm (5' 1\") Weight: 52.2 kg (115 lb)  Estimated body mass index is 21.73 kg/m  as calculated from the following:    Height as of this encounter: 1.549 m (5' 1\").    Weight as of this encounter: 52.2 kg (115 lb).  She is full strength in her bilateral upper extremities.  Sensation is intact to light touch throughout.  No Laverne sign present.  Biceps and patellar reflexes " are 1+ bilaterally.  Results Reviewed:  I personally viewed the images of an MRI of the cervical spine.  This shows a C5-6 spondylolisthesis and small disc bulge versus herniation paracentral the left side which may narrow the exiting C6 nerve root but no significant central canal or foraminal stenosis at other levels.  No evidence of dynamic instability in flexion-extension films.  There is loss of normal cervical lordosis.  Assessment:  44-year-old female with neck pain and bilateral upper extremity symptoms.  Her symptoms are not consistent with a C6 radiculopathy right now.  For her neck pain I believe this is most likely related to myofascial pain associated with her loss of cervical lordosis.  I would recommend starting a MedX program for strengthening her neck muscles to combat chronic fatigue and stiffness in her neck that is likely contributing to her headaches as well.  For her arm symptoms I do not see any central canal or foraminal stenosis that would explain the symptoms.  I will get an EMG of the bilateral upper extremities as well as order an ultrasound of the bilateral upper extremities to evaluate for thoracic outlet syndrome.  We will also refer her to neurology as we do not see any structural cause that be causing the symptoms as well.    Diony Allen MD  Plan:      Again, thank you for allowing me to participate in the care of your patient.        Sincerely,        Diony Allen MD

## 2024-10-31 NOTE — PROGRESS NOTES
"HPI:  44-year-old female with chronic neck pain.  This is mostly at the base of her skull and does cause headaches consistently.  Symptoms are worse with sitting and laying down and relieved by standing.  With working she does note more difficulty with fine motor tasks with her hands and has more numbness in her right arm and pain in her left arm.  These do not follow any specific dermatomal pattern.  She has had medial branch blocks in the past without any significant benefit.  She has tried physical therapy in the past without any long-term benefit as well.  She notes when raising her arms up this does worsen the symptoms.  Current Outpatient Medications   Medication Sig Dispense Refill    estradiol (ESTRACE) 2 MG tablet Take 1.5 tablets (3 mg) by mouth daily 135 tablet 3    gabapentin (NEURONTIN) 300 MG capsule Take 1-2 capsules (300-600 mg) by mouth 3 times daily. 180 capsule 5    ketorolac (TORADOL) 10 MG tablet Take 1 tablet (10 mg) by mouth every 8 hours as needed for moderate pain (headache). 20 tablet 1    oxyCODONE IR (ROXICODONE) 10 MG tablet Take 1 tablet (10 mg) by mouth daily as needed for severe pain. To last one month 30 tablet 0     No current facility-administered medications for this visit.      Physical Exam:  Vital signs:      BP: (!) 145/82 Pulse: 59           Height: 154.9 cm (5' 1\") Weight: 52.2 kg (115 lb)  Estimated body mass index is 21.73 kg/m  as calculated from the following:    Height as of this encounter: 1.549 m (5' 1\").    Weight as of this encounter: 52.2 kg (115 lb).  She is full strength in her bilateral upper extremities.  Sensation is intact to light touch throughout.  No Laverne sign present.  Biceps and patellar reflexes are 1+ bilaterally.  Results Reviewed:  I personally viewed the images of an MRI of the cervical spine.  This shows a C5-6 spondylolisthesis and small disc bulge versus herniation paracentral the left side which may narrow the exiting C6 nerve root but no " significant central canal or foraminal stenosis at other levels.  No evidence of dynamic instability in flexion-extension films.  There is loss of normal cervical lordosis.  Assessment:  44-year-old female with neck pain and bilateral upper extremity symptoms.  Her symptoms are not consistent with a C6 radiculopathy right now.  For her neck pain I believe this is most likely related to myofascial pain associated with her loss of cervical lordosis.  I would recommend starting a MedX program for strengthening her neck muscles to combat chronic fatigue and stiffness in her neck that is likely contributing to her headaches as well.  For her arm symptoms I do not see any central canal or foraminal stenosis that would explain the symptoms.  I will get an EMG of the bilateral upper extremities as well as order an ultrasound of the bilateral upper extremities to evaluate for thoracic outlet syndrome.  We will also refer her to neurology as we do not see any structural cause that be causing the symptoms as well.    Diony Allen MD  Plan:

## 2024-11-01 NOTE — TELEPHONE ENCOUNTER
REASON FOR VISIT: Numbness and tingling in both hands [R20.0, R20.2]    DATE OF APPT: 11/26/2024   NOTES (FOR ALL VISITS) STATUS DETAILS   OFFICE NOTE from referring provider Internal [facility]  Diony Allen MD 10/31/2024   MEDICATION LIST Internal    IMAGING  (FOR ALL VISITS)     XR Internal [facility]  XR Spine complete 11/01/2024  XR Cervical spine flex 10/31/2024   MRI (HEAD, NECK, SPINE) N/A    CT (HEAD, NECK, SPINE) N/A

## 2024-11-26 ENCOUNTER — PRE VISIT (OUTPATIENT)
Dept: NEUROLOGY | Facility: CLINIC | Age: 44
End: 2024-11-26

## 2024-11-26 ENCOUNTER — MYC REFILL (OUTPATIENT)
Dept: FAMILY MEDICINE | Facility: CLINIC | Age: 44
End: 2024-11-26

## 2024-11-26 DIAGNOSIS — M48.02 FORAMINAL STENOSIS OF CERVICAL REGION: ICD-10-CM

## 2024-11-26 DIAGNOSIS — M50.20 HERNIATED CERVICAL INTERVERTEBRAL DISC: ICD-10-CM

## 2024-11-26 RX ORDER — OXYCODONE HYDROCHLORIDE 10 MG/1
10 TABLET ORAL DAILY PRN
Qty: 30 TABLET | Refills: 0 | Status: SHIPPED | OUTPATIENT
Start: 2024-11-27

## 2024-12-26 ENCOUNTER — MYC REFILL (OUTPATIENT)
Dept: FAMILY MEDICINE | Facility: CLINIC | Age: 44
End: 2024-12-26
Payer: COMMERCIAL

## 2024-12-26 DIAGNOSIS — M50.20 HERNIATED CERVICAL INTERVERTEBRAL DISC: ICD-10-CM

## 2024-12-26 DIAGNOSIS — M48.02 FORAMINAL STENOSIS OF CERVICAL REGION: ICD-10-CM

## 2024-12-26 RX ORDER — OXYCODONE HYDROCHLORIDE 10 MG/1
10 TABLET ORAL DAILY PRN
Qty: 30 TABLET | Refills: 0 | Status: SHIPPED | OUTPATIENT
Start: 2024-12-26

## 2025-01-08 ENCOUNTER — PATIENT OUTREACH (OUTPATIENT)
Dept: CARE COORDINATION | Facility: CLINIC | Age: 45
End: 2025-01-08
Payer: COMMERCIAL

## 2025-01-08 ENCOUNTER — TRANSFERRED RECORDS (OUTPATIENT)
Dept: HEALTH INFORMATION MANAGEMENT | Facility: CLINIC | Age: 45
End: 2025-01-08
Payer: COMMERCIAL

## 2025-01-26 ENCOUNTER — MYC REFILL (OUTPATIENT)
Dept: FAMILY MEDICINE | Facility: CLINIC | Age: 45
End: 2025-01-26
Payer: COMMERCIAL

## 2025-01-26 DIAGNOSIS — E89.40 SURGICAL MENOPAUSE: ICD-10-CM

## 2025-01-26 DIAGNOSIS — M54.12 CERVICAL RADICULOPATHY: ICD-10-CM

## 2025-01-26 DIAGNOSIS — M48.02 FORAMINAL STENOSIS OF CERVICAL REGION: ICD-10-CM

## 2025-01-26 DIAGNOSIS — M50.20 HERNIATED CERVICAL INTERVERTEBRAL DISC: ICD-10-CM

## 2025-01-26 DIAGNOSIS — R52 BURNING PAIN: ICD-10-CM

## 2025-01-26 DIAGNOSIS — M48.02 CERVICAL STENOSIS OF SPINAL CANAL: ICD-10-CM

## 2025-01-27 RX ORDER — OXYCODONE HYDROCHLORIDE 10 MG/1
10 TABLET ORAL DAILY PRN
Qty: 30 TABLET | Refills: 0 | Status: SHIPPED | OUTPATIENT
Start: 2025-01-27

## 2025-01-27 RX ORDER — ESTRADIOL 2 MG/1
3 TABLET ORAL DAILY
Qty: 135 TABLET | Refills: 3 | Status: SHIPPED | OUTPATIENT
Start: 2025-01-27

## 2025-01-27 RX ORDER — GABAPENTIN 300 MG/1
300-600 CAPSULE ORAL 3 TIMES DAILY
Qty: 180 CAPSULE | Refills: 5 | Status: SHIPPED | OUTPATIENT
Start: 2025-01-27

## 2025-03-31 ENCOUNTER — MYC REFILL (OUTPATIENT)
Dept: FAMILY MEDICINE | Facility: CLINIC | Age: 45
End: 2025-03-31
Payer: COMMERCIAL

## 2025-03-31 DIAGNOSIS — G43.001 MIGRAINE WITHOUT AURA AND WITH STATUS MIGRAINOSUS, NOT INTRACTABLE: ICD-10-CM

## 2025-04-02 RX ORDER — KETOROLAC TROMETHAMINE 10 MG/1
10 TABLET, FILM COATED ORAL EVERY 8 HOURS PRN
Qty: 20 TABLET | Refills: 0 | Status: SHIPPED | OUTPATIENT
Start: 2025-04-02

## 2025-04-14 ENCOUNTER — VIRTUAL VISIT (OUTPATIENT)
Dept: FAMILY MEDICINE | Facility: CLINIC | Age: 45
End: 2025-04-14
Payer: OTHER MISCELLANEOUS

## 2025-04-14 DIAGNOSIS — F33.1 MAJOR DEPRESSIVE DISORDER, RECURRENT EPISODE, MODERATE (H): Primary | ICD-10-CM

## 2025-04-14 DIAGNOSIS — M50.20 HERNIATED CERVICAL INTERVERTEBRAL DISC: ICD-10-CM

## 2025-04-14 DIAGNOSIS — M48.02 FORAMINAL STENOSIS OF CERVICAL REGION: ICD-10-CM

## 2025-04-14 DIAGNOSIS — G43.001 MIGRAINE WITHOUT AURA AND WITH STATUS MIGRAINOSUS, NOT INTRACTABLE: ICD-10-CM

## 2025-04-14 PROCEDURE — 98006 SYNCH AUDIO-VIDEO EST MOD 30: CPT | Performed by: NURSE PRACTITIONER

## 2025-04-14 RX ORDER — OXYCODONE HYDROCHLORIDE 10 MG/1
10 TABLET ORAL DAILY PRN
Qty: 30 TABLET | Refills: 0 | Status: SHIPPED | OUTPATIENT
Start: 2025-04-14

## 2025-04-14 RX ORDER — IBUPROFEN 400 MG/1
TABLET, FILM COATED ORAL
COMMUNITY

## 2025-04-14 RX ORDER — SUMATRIPTAN 50 MG/1
100 TABLET, FILM COATED ORAL
COMMUNITY

## 2025-04-14 RX ORDER — KETOROLAC TROMETHAMINE 10 MG/1
10 TABLET, FILM COATED ORAL EVERY 8 HOURS PRN
Qty: 20 TABLET | Refills: 0 | Status: SHIPPED | OUTPATIENT
Start: 2025-04-14

## 2025-04-14 ASSESSMENT — ANXIETY QUESTIONNAIRES
3. WORRYING TOO MUCH ABOUT DIFFERENT THINGS: NOT AT ALL
7. FEELING AFRAID AS IF SOMETHING AWFUL MIGHT HAPPEN: NOT AT ALL
IF YOU CHECKED OFF ANY PROBLEMS ON THIS QUESTIONNAIRE, HOW DIFFICULT HAVE THESE PROBLEMS MADE IT FOR YOU TO DO YOUR WORK, TAKE CARE OF THINGS AT HOME, OR GET ALONG WITH OTHER PEOPLE: NOT DIFFICULT AT ALL
4. TROUBLE RELAXING: SEVERAL DAYS
GAD7 TOTAL SCORE: 2
2. NOT BEING ABLE TO STOP OR CONTROL WORRYING: NOT AT ALL
5. BEING SO RESTLESS THAT IT IS HARD TO SIT STILL: NOT AT ALL
GAD7 TOTAL SCORE: 2
7. FEELING AFRAID AS IF SOMETHING AWFUL MIGHT HAPPEN: NOT AT ALL
8. IF YOU CHECKED OFF ANY PROBLEMS, HOW DIFFICULT HAVE THESE MADE IT FOR YOU TO DO YOUR WORK, TAKE CARE OF THINGS AT HOME, OR GET ALONG WITH OTHER PEOPLE?: NOT DIFFICULT AT ALL
GAD7 TOTAL SCORE: 2
6. BECOMING EASILY ANNOYED OR IRRITABLE: NOT AT ALL
1. FEELING NERVOUS, ANXIOUS, OR ON EDGE: SEVERAL DAYS

## 2025-04-14 ASSESSMENT — ENCOUNTER SYMPTOMS: HEADACHES: 1

## 2025-04-14 ASSESSMENT — PATIENT HEALTH QUESTIONNAIRE - PHQ9
SUM OF ALL RESPONSES TO PHQ QUESTIONS 1-9: 5
SUM OF ALL RESPONSES TO PHQ QUESTIONS 1-9: 5
10. IF YOU CHECKED OFF ANY PROBLEMS, HOW DIFFICULT HAVE THESE PROBLEMS MADE IT FOR YOU TO DO YOUR WORK, TAKE CARE OF THINGS AT HOME, OR GET ALONG WITH OTHER PEOPLE: SOMEWHAT DIFFICULT

## 2025-04-14 NOTE — PROGRESS NOTES
Rober is a 44 year old who is being evaluated via a billable video visit.    How would you like to obtain your AVS? MyChart  If the video visit is dropped, the invitation should be resent by: Text to cell phone: [phone]  Will anyone else be joining your video visit? No      Assessment & Plan     Herniated cervical intervertebral disc  Following up on headaches, she has been seeing [facility] neurology/pain clinics. they tried 3 injections to try to figure out what nerve is causing her pain and she had a reaction to the last injection- increased BP and pulse. Not sure what the plan will be from here. Needing medication refills for pain. Has been working with a therapist which has been helping, mental health stable.  Has found a lot of her past trauma is triggering neck pain/tension headaches, migraines. Has been using alternative treatments such as weekly massage, stretching, exercising, holistic evaluation. CSA UTD.   - oxyCODONE IR (ROXICODONE) 10 MG tablet; Take 1 tablet (10 mg) by mouth daily as needed for severe pain. To last one month    Foraminal stenosis of cervical region  Following up on headaches, she has been seeing [facility] neurology/pain clinics. they tried 3 injections to try to figure out what nerve is causing her pain and she had a reaction to the last injection- increased BP and pulse. Not sure what the plan will be from here. Needing medication refills for pain. Has been working with a therapist which has been helping, mental health stable.  Has found a lot of her past trauma is triggering neck pain/tension headaches, migraines. Has been using alternative treatments such as weekly massage, stretching, exercising, holistic evaluation. CSA UTD.   - oxyCODONE IR (ROXICODONE) 10 MG tablet; Take 1 tablet (10 mg) by mouth daily as needed for severe pain. To last one month    Migraine without aura and with status migrainosus, not intractable  Following up on headaches, she has been seeing [facility]  "neurology/pain clinics. they tried 3 injections to try to figure out what nerve is causing her pain and she had a reaction to the last injection- increased BP and pulse. Not sure what the plan will be from here. Needing medication refills for pain. She has tried many of the migraine medicines, has not tried amitriptyline but she is afraid it will make her too tired as she works an abnormal schedule.    - ketorolac (TORADOL) 10 MG tablet; Take 1 tablet (10 mg) by mouth every 8 hours as needed for moderate pain (headache).    Major depressive disorder, recurrent episode, moderate (H)  Has been working with a therapist which has been helping, mental health stable.  Has found a lot of her past trauma is triggering neck pain/tension headaches, migraines. Was thinking of trying ketamine in the past which was not covered by insurance at that time.        Suzanne Richter is a 44 year old, presenting for the following health issues:  Headache        4/14/2025    11:29 AM   Additional Questions   Roomed by Patient completed questions via [employer]Â®     Headache     History of Present Illness       Headaches:   Since the patient's last clinic visit, headaches are: worsened  The patient is getting headaches:  Daily  She is not able to do normal daily activities when she has a migraine.  The patient is taking the following rescue/relief medications:  Ibuprofen (Advil, Motrin) and Excedrin   Patient states \"The relief is inconsistent\" from the rescue/relief medications.   The patient is taking the following medications to prevent migraines:  No medications to prevent migraines  In the past 4 weeks, the patient has gone to an Urgent Care or Emergency Room 0 times times due to headaches.    She eats 2-3 servings of fruits and vegetables daily.She consumes 1 sweetened beverage(s) daily.She exercises with enough effort to increase her heart rate 30 to 60 minutes per day.  She exercises with enough effort to increase her heart rate 5 " days per week.   She is taking medications regularly.            Review of Systems  Constitutional, HEENT, cardiovascular, pulmonary, GI, , musculoskeletal, neuro, skin, endocrine and psych systems are negative, except as otherwise noted.      Objective           Vitals:  No vitals were obtained today due to virtual visit.    Physical Exam   GENERAL: alert and no distress  EYES: Eyes grossly normal to inspection.  No discharge or erythema, or obvious scleral/conjunctival abnormalities.  RESP: No audible wheeze, cough, or visible cyanosis.    SKIN: Visible skin clear. No significant rash, abnormal pigmentation or lesions.  NEURO: Cranial nerves grossly intact.  Mentation and speech appropriate for age.  PSYCH: Appropriate affect, tone, and pace of words      The longitudinal plan of care for the diagnosis(es)/condition(s) as documented were addressed during this visit. Due to the added complexity in care, I will continue to support Rober in the subsequent management and with ongoing continuity of care.        Video-Visit Details    Type of service:  Video Visit   Originating Location (pt. Location): Home    Distant Location (provider location):  On-site  Platform used for Video Visit: Shari  Signed Electronically by: EDITA BERG

## 2025-05-12 ENCOUNTER — MYC REFILL (OUTPATIENT)
Dept: FAMILY MEDICINE | Facility: CLINIC | Age: 45
End: 2025-05-12
Payer: COMMERCIAL

## 2025-05-12 DIAGNOSIS — G43.001 MIGRAINE WITHOUT AURA AND WITH STATUS MIGRAINOSUS, NOT INTRACTABLE: Primary | ICD-10-CM

## 2025-05-12 DIAGNOSIS — M48.02 FORAMINAL STENOSIS OF CERVICAL REGION: ICD-10-CM

## 2025-05-12 DIAGNOSIS — M50.20 HERNIATED CERVICAL INTERVERTEBRAL DISC: ICD-10-CM

## 2025-05-13 RX ORDER — OXYCODONE HYDROCHLORIDE 10 MG/1
10 TABLET ORAL DAILY PRN
Qty: 30 TABLET | Refills: 0 | Status: SHIPPED | OUTPATIENT
Start: 2025-05-13

## 2025-06-09 ENCOUNTER — MYC REFILL (OUTPATIENT)
Dept: FAMILY MEDICINE | Facility: CLINIC | Age: 45
End: 2025-06-09
Payer: COMMERCIAL

## 2025-06-09 DIAGNOSIS — G43.001 MIGRAINE WITHOUT AURA AND WITH STATUS MIGRAINOSUS, NOT INTRACTABLE: ICD-10-CM

## 2025-06-09 DIAGNOSIS — M50.20 HERNIATED CERVICAL INTERVERTEBRAL DISC: ICD-10-CM

## 2025-06-09 DIAGNOSIS — M48.02 FORAMINAL STENOSIS OF CERVICAL REGION: ICD-10-CM

## 2025-06-09 DIAGNOSIS — E89.40 SURGICAL MENOPAUSE: ICD-10-CM

## 2025-06-09 RX ORDER — OXYCODONE HYDROCHLORIDE 10 MG/1
10 TABLET ORAL DAILY PRN
Qty: 30 TABLET | Refills: 0 | Status: SHIPPED | OUTPATIENT
Start: 2025-06-09 | End: 2025-06-10

## 2025-06-09 RX ORDER — ESTRADIOL 2 MG/1
3 TABLET ORAL DAILY
Qty: 135 TABLET | Refills: 0 | Status: SHIPPED | OUTPATIENT
Start: 2025-06-09

## 2025-06-09 RX ORDER — KETOROLAC TROMETHAMINE 10 MG/1
10 TABLET, FILM COATED ORAL EVERY 8 HOURS PRN
Qty: 20 TABLET | Refills: 0 | Status: SHIPPED | OUTPATIENT
Start: 2025-06-09

## 2025-06-10 ENCOUNTER — MYC MEDICAL ADVICE (OUTPATIENT)
Dept: FAMILY MEDICINE | Facility: CLINIC | Age: 45
End: 2025-06-10
Payer: COMMERCIAL

## 2025-06-10 DIAGNOSIS — M48.02 FORAMINAL STENOSIS OF CERVICAL REGION: ICD-10-CM

## 2025-06-10 DIAGNOSIS — M50.20 HERNIATED CERVICAL INTERVERTEBRAL DISC: ICD-10-CM

## 2025-06-10 RX ORDER — OXYCODONE HYDROCHLORIDE 10 MG/1
10 TABLET ORAL DAILY PRN
Qty: 30 TABLET | Refills: 0 | Status: SHIPPED | OUTPATIENT
Start: 2025-06-10

## 2025-07-15 ENCOUNTER — MYC REFILL (OUTPATIENT)
Dept: FAMILY MEDICINE | Facility: CLINIC | Age: 45
End: 2025-07-15
Payer: COMMERCIAL

## 2025-07-15 DIAGNOSIS — M48.02 FORAMINAL STENOSIS OF CERVICAL REGION: ICD-10-CM

## 2025-07-15 DIAGNOSIS — M50.20 HERNIATED CERVICAL INTERVERTEBRAL DISC: ICD-10-CM

## 2025-07-16 RX ORDER — OXYCODONE HYDROCHLORIDE 10 MG/1
10 TABLET ORAL DAILY PRN
Qty: 30 TABLET | Refills: 0 | Status: SHIPPED | OUTPATIENT
Start: 2025-07-16

## 2025-08-15 ASSESSMENT — ANXIETY QUESTIONNAIRES
GAD7 TOTAL SCORE: 21
8. IF YOU CHECKED OFF ANY PROBLEMS, HOW DIFFICULT HAVE THESE MADE IT FOR YOU TO DO YOUR WORK, TAKE CARE OF THINGS AT HOME, OR GET ALONG WITH OTHER PEOPLE?: VERY DIFFICULT
GAD7 TOTAL SCORE: 21
IF YOU CHECKED OFF ANY PROBLEMS ON THIS QUESTIONNAIRE, HOW DIFFICULT HAVE THESE PROBLEMS MADE IT FOR YOU TO DO YOUR WORK, TAKE CARE OF THINGS AT HOME, OR GET ALONG WITH OTHER PEOPLE: VERY DIFFICULT
6. BECOMING EASILY ANNOYED OR IRRITABLE: NEARLY EVERY DAY
1. FEELING NERVOUS, ANXIOUS, OR ON EDGE: NEARLY EVERY DAY
3. WORRYING TOO MUCH ABOUT DIFFERENT THINGS: NEARLY EVERY DAY
7. FEELING AFRAID AS IF SOMETHING AWFUL MIGHT HAPPEN: NEARLY EVERY DAY
5. BEING SO RESTLESS THAT IT IS HARD TO SIT STILL: NEARLY EVERY DAY
2. NOT BEING ABLE TO STOP OR CONTROL WORRYING: NEARLY EVERY DAY
7. FEELING AFRAID AS IF SOMETHING AWFUL MIGHT HAPPEN: NEARLY EVERY DAY
GAD7 TOTAL SCORE: 21
4. TROUBLE RELAXING: NEARLY EVERY DAY

## 2025-08-18 ENCOUNTER — TELEPHONE (OUTPATIENT)
Dept: FAMILY MEDICINE | Facility: CLINIC | Age: 45
End: 2025-08-18

## 2025-08-18 ENCOUNTER — VIRTUAL VISIT (OUTPATIENT)
Dept: FAMILY MEDICINE | Facility: CLINIC | Age: 45
End: 2025-08-18
Payer: COMMERCIAL

## 2025-08-18 ENCOUNTER — MYC REFILL (OUTPATIENT)
Dept: FAMILY MEDICINE | Facility: CLINIC | Age: 45
End: 2025-08-18

## 2025-08-18 DIAGNOSIS — M50.20 HERNIATED CERVICAL INTERVERTEBRAL DISC: ICD-10-CM

## 2025-08-18 DIAGNOSIS — M48.02 FORAMINAL STENOSIS OF CERVICAL REGION: ICD-10-CM

## 2025-08-18 DIAGNOSIS — G43.001 MIGRAINE WITHOUT AURA AND WITH STATUS MIGRAINOSUS, NOT INTRACTABLE: ICD-10-CM

## 2025-08-18 DIAGNOSIS — Z12.31 VISIT FOR SCREENING MAMMOGRAM: ICD-10-CM

## 2025-08-18 DIAGNOSIS — R41.840 CONCENTRATION DEFICIT: ICD-10-CM

## 2025-08-18 DIAGNOSIS — F41.9 ANXIETY: Primary | ICD-10-CM

## 2025-08-18 DIAGNOSIS — G89.4 CHRONIC PAIN SYNDROME: ICD-10-CM

## 2025-08-18 PROCEDURE — 98006 SYNCH AUDIO-VIDEO EST MOD 30: CPT | Performed by: NURSE PRACTITIONER

## 2025-08-18 RX ORDER — DEXTROAMPHETAMINE SACCHARATE, AMPHETAMINE ASPARTATE MONOHYDRATE, DEXTROAMPHETAMINE SULFATE AND AMPHETAMINE SULFATE 2.5; 2.5; 2.5; 2.5 MG/1; MG/1; MG/1; MG/1
10 CAPSULE, EXTENDED RELEASE ORAL DAILY
Qty: 30 CAPSULE | Refills: 0 | Status: SHIPPED | OUTPATIENT
Start: 2025-09-17 | End: 2025-10-17

## 2025-08-18 RX ORDER — OXYCODONE HYDROCHLORIDE 10 MG/1
10 TABLET ORAL DAILY PRN
Qty: 30 TABLET | Refills: 0 | Status: SHIPPED | OUTPATIENT
Start: 2025-08-18

## 2025-08-18 RX ORDER — FLUOXETINE 10 MG/1
10 CAPSULE ORAL DAILY
Qty: 90 CAPSULE | Refills: 1 | Status: SHIPPED | OUTPATIENT
Start: 2025-08-18

## 2025-08-18 RX ORDER — OXYCODONE HYDROCHLORIDE 10 MG/1
10 TABLET ORAL DAILY PRN
Qty: 30 TABLET | Refills: 0 | Status: CANCELLED | OUTPATIENT
Start: 2025-08-18

## 2025-08-18 RX ORDER — DEXTROAMPHETAMINE SACCHARATE, AMPHETAMINE ASPARTATE MONOHYDRATE, DEXTROAMPHETAMINE SULFATE AND AMPHETAMINE SULFATE 2.5; 2.5; 2.5; 2.5 MG/1; MG/1; MG/1; MG/1
10 CAPSULE, EXTENDED RELEASE ORAL DAILY
Qty: 30 CAPSULE | Refills: 0 | Status: SHIPPED | OUTPATIENT
Start: 2025-08-18 | End: 2025-09-17

## 2025-08-18 RX ORDER — DEXTROAMPHETAMINE SACCHARATE, AMPHETAMINE ASPARTATE MONOHYDRATE, DEXTROAMPHETAMINE SULFATE AND AMPHETAMINE SULFATE 2.5; 2.5; 2.5; 2.5 MG/1; MG/1; MG/1; MG/1
10 CAPSULE, EXTENDED RELEASE ORAL DAILY
Qty: 30 CAPSULE | Refills: 0 | Status: SHIPPED | OUTPATIENT
Start: 2025-10-17 | End: 2025-11-16

## 2025-08-19 ENCOUNTER — PATIENT OUTREACH (OUTPATIENT)
Dept: CARE COORDINATION | Facility: CLINIC | Age: 45
End: 2025-08-19
Payer: COMMERCIAL

## 2025-08-19 ENCOUNTER — TELEPHONE (OUTPATIENT)
Dept: FAMILY MEDICINE | Facility: CLINIC | Age: 45
End: 2025-08-19
Payer: COMMERCIAL

## 2025-09-02 ENCOUNTER — TELEPHONE (OUTPATIENT)
Dept: FAMILY MEDICINE | Facility: CLINIC | Age: 45
End: 2025-09-02
Payer: COMMERCIAL

## (undated) DEVICE — ENDO FLOSEAL APPLICATOR 1500181

## (undated) DEVICE — TUBING INSUFFLATION W/FILTER CPC TO LUER 620-030-301

## (undated) DEVICE — BLADE KNIFE SURG 15 371115

## (undated) DEVICE — SUCTION IRR STRYKERFLOW II W/TIP 250-070-520

## (undated) DEVICE — GLOVE PROTEXIS W/NEU-THERA 7.0  2D73TE70

## (undated) DEVICE — DRAPE POUCH INSTRUMENT 3 POCKET 1018L

## (undated) DEVICE — ESU HOLSTER PLASTIC DISP E2400

## (undated) DEVICE — PACK LAPAROSCOPY/PELVISCOPY STD

## (undated) DEVICE — UTERINE MANIPULATOR HUMI KRONER 6003

## (undated) DEVICE — SUCTION TIP YANKAUER STR K87

## (undated) DEVICE — CATH TRAY FOLEY SURESTEP 16FR W/URINE MTR STATLK LF A303416A

## (undated) DEVICE — ADHESIVE SWIFTSET 0.8ML OCTYL SS6

## (undated) DEVICE — SU VICRYL 0 CT-1 36" J946H

## (undated) DEVICE — SU MONOCRYL 4-0 PS-2 18" UND Y496G

## (undated) DEVICE — GLOVE PROTEXIS BLUE W/NEU-THERA 6.5  2D73EB65

## (undated) DEVICE — SOL NACL 0.9% IRRIG 1000ML BOTTLE 07138-09

## (undated) DEVICE — SYR 10ML FINGER CONTROL W/O NDL 309695

## (undated) DEVICE — GLOVE PROTEXIS W/NEU-THERA 6.5  2D73TE65

## (undated) DEVICE — PAD PERI INDIV WRAP 11" 2022

## (undated) DEVICE — GOWN LG DISP 9515

## (undated) DEVICE — ESU LIGASURE IMPACT OPEN SEALER/DVDR CVD LG JAW 36MM LF4318

## (undated) DEVICE — SU VICRYL 0 CT-2 CR 8X18" J727D

## (undated) DEVICE — ANTIFOG SOLUTION W/FOAM PAD 31142527

## (undated) DEVICE — SOL WATER IRRIG 1000ML BOTTLE 07139-09

## (undated) DEVICE — SOL NACL 0.9% INJ 1000ML BAG 07983-09

## (undated) DEVICE — TUBING SUCTION MEDI-VAC 1/4"X20' N620A

## (undated) DEVICE — SYR 10ML LL W/O NDL

## (undated) DEVICE — ADH FLOSEAL W/HUMAN THROMBIN 5ML

## (undated) DEVICE — ENDO TROCAR 05MM VERSAONE BLADED W/STD FIX CANNULA B5STF

## (undated) DEVICE — NDL 22GA 1.5"

## (undated) DEVICE — ESU LIGASURE MARYLAND JAW OPEN SEALER/DVDR 5MMX37CM LF1737

## (undated) DEVICE — ENDO CANNULA 05MM VERSAONE UNIVERSAL UNVCA5STF

## (undated) RX ORDER — BUPIVACAINE HYDROCHLORIDE AND EPINEPHRINE 5; 5 MG/ML; UG/ML
INJECTION, SOLUTION EPIDURAL; INTRACAUDAL; PERINEURAL
Status: DISPENSED
Start: 2018-04-10

## (undated) RX ORDER — LIDOCAINE HYDROCHLORIDE 10 MG/ML
INJECTION, SOLUTION EPIDURAL; INFILTRATION; INTRACAUDAL; PERINEURAL
Status: DISPENSED
Start: 2020-09-22

## (undated) RX ORDER — GLYCOPYRROLATE 0.2 MG/ML
INJECTION, SOLUTION INTRAMUSCULAR; INTRAVENOUS
Status: DISPENSED
Start: 2018-04-10

## (undated) RX ORDER — OXYCODONE HYDROCHLORIDE 5 MG/1
TABLET ORAL
Status: DISPENSED
Start: 2018-04-10

## (undated) RX ORDER — FENTANYL CITRATE 50 UG/ML
INJECTION, SOLUTION INTRAMUSCULAR; INTRAVENOUS
Status: DISPENSED
Start: 2018-04-10

## (undated) RX ORDER — PROPOFOL 10 MG/ML
INJECTION, EMULSION INTRAVENOUS
Status: DISPENSED
Start: 2018-04-10

## (undated) RX ORDER — OXYCODONE HCL 10 MG/1
TABLET, FILM COATED, EXTENDED RELEASE ORAL
Status: DISPENSED
Start: 2018-04-10

## (undated) RX ORDER — HYDROMORPHONE HYDROCHLORIDE 1 MG/ML
INJECTION, SOLUTION INTRAMUSCULAR; INTRAVENOUS; SUBCUTANEOUS
Status: DISPENSED
Start: 2018-04-10

## (undated) RX ORDER — PHENAZOPYRIDINE HYDROCHLORIDE 200 MG/1
TABLET, FILM COATED ORAL
Status: DISPENSED
Start: 2018-04-10

## (undated) RX ORDER — HYDROXYZINE HYDROCHLORIDE 25 MG/1
TABLET, FILM COATED ORAL
Status: DISPENSED
Start: 2018-04-10

## (undated) RX ORDER — DEXAMETHASONE SODIUM PHOSPHATE 4 MG/ML
INJECTION, SOLUTION INTRA-ARTICULAR; INTRALESIONAL; INTRAMUSCULAR; INTRAVENOUS; SOFT TISSUE
Status: DISPENSED
Start: 2018-04-10

## (undated) RX ORDER — KETOROLAC TROMETHAMINE 30 MG/ML
INJECTION, SOLUTION INTRAMUSCULAR; INTRAVENOUS
Status: DISPENSED
Start: 2018-04-10

## (undated) RX ORDER — ONDANSETRON 2 MG/ML
INJECTION INTRAMUSCULAR; INTRAVENOUS
Status: DISPENSED
Start: 2018-04-10

## (undated) RX ORDER — LIDOCAINE HYDROCHLORIDE 10 MG/ML
INJECTION, SOLUTION EPIDURAL; INFILTRATION; INTRACAUDAL; PERINEURAL
Status: DISPENSED
Start: 2018-04-10